# Patient Record
Sex: MALE | Race: BLACK OR AFRICAN AMERICAN | Employment: UNEMPLOYED | URBAN - METROPOLITAN AREA
[De-identification: names, ages, dates, MRNs, and addresses within clinical notes are randomized per-mention and may not be internally consistent; named-entity substitution may affect disease eponyms.]

---

## 2017-01-01 ENCOUNTER — OFFICE VISIT (OUTPATIENT)
Dept: INFUSION THERAPY | Facility: CLINIC | Age: 47
End: 2017-01-01
Attending: INTERNAL MEDICINE
Payer: MEDICAID

## 2017-01-01 ENCOUNTER — TELEPHONE (OUTPATIENT)
Dept: CARDIOLOGY | Facility: CLINIC | Age: 47
End: 2017-01-01

## 2017-01-01 ENCOUNTER — HOSPITAL ENCOUNTER (OUTPATIENT)
Dept: CARDIAC REHAB | Facility: CLINIC | Age: 47
End: 2017-08-03
Attending: NURSE PRACTITIONER
Payer: MEDICAID

## 2017-01-01 ENCOUNTER — OFFICE VISIT (OUTPATIENT)
Dept: INFUSION THERAPY | Facility: CLINIC | Age: 47
End: 2017-01-01
Attending: INTERNAL MEDICINE
Payer: COMMERCIAL

## 2017-01-01 ENCOUNTER — HOSPITAL ENCOUNTER (OUTPATIENT)
Dept: CARDIAC REHAB | Facility: CLINIC | Age: 47
End: 2017-10-10
Attending: NURSE PRACTITIONER
Payer: COMMERCIAL

## 2017-01-01 ENCOUNTER — RADIANT APPOINTMENT (OUTPATIENT)
Dept: ULTRASOUND IMAGING | Facility: CLINIC | Age: 47
End: 2017-01-01
Attending: NURSE PRACTITIONER
Payer: COMMERCIAL

## 2017-01-01 ENCOUNTER — RADIANT APPOINTMENT (OUTPATIENT)
Dept: ULTRASOUND IMAGING | Facility: CLINIC | Age: 47
End: 2017-01-01
Attending: INTERNAL MEDICINE
Payer: COMMERCIAL

## 2017-01-01 ENCOUNTER — PRE VISIT (OUTPATIENT)
Dept: CARDIOLOGY | Facility: CLINIC | Age: 47
End: 2017-01-01

## 2017-01-01 ENCOUNTER — HOSPITAL ENCOUNTER (OUTPATIENT)
Dept: CARDIAC REHAB | Facility: CLINIC | Age: 47
End: 2017-09-19
Attending: NURSE PRACTITIONER
Payer: MEDICAID

## 2017-01-01 ENCOUNTER — HOSPITAL ENCOUNTER (OUTPATIENT)
Dept: CARDIAC REHAB | Facility: CLINIC | Age: 47
End: 2017-08-31
Attending: NURSE PRACTITIONER
Payer: MEDICAID

## 2017-01-01 ENCOUNTER — OFFICE VISIT (OUTPATIENT)
Dept: INTERNAL MEDICINE | Facility: CLINIC | Age: 47
End: 2017-01-01

## 2017-01-01 ENCOUNTER — HOSPITAL ENCOUNTER (EMERGENCY)
Facility: CLINIC | Age: 47
Discharge: HOME | End: 2017-01-15
Attending: EMERGENCY MEDICINE | Admitting: EMERGENCY MEDICINE
Payer: COMMERCIAL

## 2017-01-01 ENCOUNTER — TELEPHONE (OUTPATIENT)
Dept: GASTROENTEROLOGY | Facility: CLINIC | Age: 47
End: 2017-01-01

## 2017-01-01 ENCOUNTER — OFFICE VISIT (OUTPATIENT)
Dept: INFUSION THERAPY | Facility: CLINIC | Age: 47
End: 2017-01-01
Attending: NURSE PRACTITIONER
Payer: COMMERCIAL

## 2017-01-01 ENCOUNTER — HOSPITAL ENCOUNTER (OUTPATIENT)
Dept: CARDIAC REHAB | Facility: CLINIC | Age: 47
End: 2017-09-26
Attending: NURSE PRACTITIONER
Payer: MEDICAID

## 2017-01-01 ENCOUNTER — HOSPITAL ENCOUNTER (OUTPATIENT)
Dept: CARDIAC REHAB | Facility: CLINIC | Age: 47
End: 2017-10-31
Attending: NURSE PRACTITIONER
Payer: COMMERCIAL

## 2017-01-01 ENCOUNTER — APPOINTMENT (OUTPATIENT)
Dept: MEDSURG UNIT | Facility: CLINIC | Age: 47
End: 2017-01-01
Attending: INTERNAL MEDICINE
Payer: COMMERCIAL

## 2017-01-01 ENCOUNTER — CARE COORDINATION (OUTPATIENT)
Dept: CARDIOLOGY | Facility: CLINIC | Age: 47
End: 2017-01-01

## 2017-01-01 ENCOUNTER — HOSPITAL ENCOUNTER (OUTPATIENT)
Dept: CARDIAC REHAB | Facility: CLINIC | Age: 47
End: 2017-09-12
Attending: NURSE PRACTITIONER
Payer: MEDICAID

## 2017-01-01 ENCOUNTER — ALLIED HEALTH/NURSE VISIT (OUTPATIENT)
Dept: PHARMACY | Facility: CLINIC | Age: 47
End: 2017-01-01
Payer: COMMERCIAL

## 2017-01-01 ENCOUNTER — HOSPITAL ENCOUNTER (OUTPATIENT)
Facility: CLINIC | Age: 47
End: 2017-01-01
Admitting: INTERNAL MEDICINE
Payer: COMMERCIAL

## 2017-01-01 ENCOUNTER — RADIANT APPOINTMENT (OUTPATIENT)
Dept: ULTRASOUND IMAGING | Facility: CLINIC | Age: 47
End: 2017-01-01
Attending: INTERNAL MEDICINE
Payer: MEDICAID

## 2017-01-01 ENCOUNTER — OFFICE VISIT (OUTPATIENT)
Dept: CARDIOLOGY | Facility: CLINIC | Age: 47
End: 2017-01-01
Attending: INTERNAL MEDICINE
Payer: COMMERCIAL

## 2017-01-01 ENCOUNTER — RADIANT APPOINTMENT (OUTPATIENT)
Dept: ULTRASOUND IMAGING | Facility: CLINIC | Age: 47
End: 2017-01-01
Attending: NURSE PRACTITIONER
Payer: MEDICAID

## 2017-01-01 ENCOUNTER — HOSPITAL ENCOUNTER (OUTPATIENT)
Dept: CARDIAC REHAB | Facility: CLINIC | Age: 47
End: 2017-09-28
Attending: NURSE PRACTITIONER
Payer: MEDICAID

## 2017-01-01 ENCOUNTER — HOSPITAL ENCOUNTER (OUTPATIENT)
Dept: CARDIAC REHAB | Facility: CLINIC | Age: 47
End: 2017-09-05
Attending: NURSE PRACTITIONER
Payer: MEDICAID

## 2017-01-01 ENCOUNTER — HOSPITAL ENCOUNTER (OUTPATIENT)
Dept: CARDIAC REHAB | Facility: CLINIC | Age: 47
End: 2017-10-12
Attending: NURSE PRACTITIONER
Payer: COMMERCIAL

## 2017-01-01 ENCOUNTER — APPOINTMENT (OUTPATIENT)
Dept: GENERAL RADIOLOGY | Facility: CLINIC | Age: 47
DRG: 286 | End: 2017-01-01
Attending: FAMILY MEDICINE
Payer: COMMERCIAL

## 2017-01-01 ENCOUNTER — HOSPITAL ENCOUNTER (OUTPATIENT)
Dept: CARDIAC REHAB | Facility: CLINIC | Age: 47
End: 2017-07-27
Attending: NURSE PRACTITIONER
Payer: COMMERCIAL

## 2017-01-01 ENCOUNTER — OFFICE VISIT (OUTPATIENT)
Dept: GASTROENTEROLOGY | Facility: CLINIC | Age: 47
End: 2017-01-01
Attending: NURSE PRACTITIONER
Payer: COMMERCIAL

## 2017-01-01 ENCOUNTER — HOSPITAL ENCOUNTER (EMERGENCY)
Facility: CLINIC | Age: 47
Discharge: HOME | End: 2017-05-31
Attending: EMERGENCY MEDICINE | Admitting: EMERGENCY MEDICINE
Payer: COMMERCIAL

## 2017-01-01 ENCOUNTER — OFFICE VISIT (OUTPATIENT)
Dept: CARDIOLOGY | Facility: CLINIC | Age: 47
End: 2017-01-01
Attending: NURSE PRACTITIONER
Payer: COMMERCIAL

## 2017-01-01 ENCOUNTER — HOSPITAL ENCOUNTER (OUTPATIENT)
Dept: CARDIAC REHAB | Facility: CLINIC | Age: 47
End: 2017-08-29
Attending: NURSE PRACTITIONER
Payer: MEDICAID

## 2017-01-01 ENCOUNTER — HOSPITAL ENCOUNTER (OUTPATIENT)
Dept: CARDIAC REHAB | Facility: CLINIC | Age: 47
End: 2017-08-17
Attending: NURSE PRACTITIONER
Payer: MEDICAID

## 2017-01-01 ENCOUNTER — TELEPHONE (OUTPATIENT)
Dept: PHARMACY | Facility: CLINIC | Age: 47
End: 2017-01-01

## 2017-01-01 ENCOUNTER — HOSPITAL ENCOUNTER (OUTPATIENT)
Facility: CLINIC | Age: 47
Discharge: HOME | End: 2017-05-01
Attending: INTERNAL MEDICINE | Admitting: INTERNAL MEDICINE
Payer: COMMERCIAL

## 2017-01-01 ENCOUNTER — HOSPITAL ENCOUNTER (INPATIENT)
Facility: CLINIC | Age: 47
LOS: 4 days | Discharge: HOME | DRG: 286 | End: 2017-06-08
Attending: FAMILY MEDICINE | Admitting: INTERNAL MEDICINE
Payer: COMMERCIAL

## 2017-01-01 ENCOUNTER — HOSPITAL ENCOUNTER (OUTPATIENT)
Dept: CARDIAC REHAB | Facility: CLINIC | Age: 47
End: 2017-08-24
Attending: NURSE PRACTITIONER
Payer: MEDICAID

## 2017-01-01 ENCOUNTER — HOSPITAL ENCOUNTER (OUTPATIENT)
Dept: CARDIAC REHAB | Facility: CLINIC | Age: 47
End: 2017-08-15
Attending: NURSE PRACTITIONER
Payer: MEDICAID

## 2017-01-01 ENCOUNTER — HOSPITAL ENCOUNTER (OUTPATIENT)
Dept: CARDIAC REHAB | Facility: CLINIC | Age: 47
End: 2017-08-22
Attending: NURSE PRACTITIONER
Payer: MEDICAID

## 2017-01-01 ENCOUNTER — HOSPITAL ENCOUNTER (OUTPATIENT)
Dept: CARDIAC REHAB | Facility: CLINIC | Age: 47
End: 2017-10-19
Attending: NURSE PRACTITIONER
Payer: COMMERCIAL

## 2017-01-01 ENCOUNTER — APPOINTMENT (OUTPATIENT)
Dept: CT IMAGING | Facility: CLINIC | Age: 47
DRG: 286 | End: 2017-01-01
Attending: FAMILY MEDICINE
Payer: COMMERCIAL

## 2017-01-01 ENCOUNTER — APPOINTMENT (OUTPATIENT)
Dept: CARDIOLOGY | Facility: CLINIC | Age: 47
DRG: 286 | End: 2017-01-01
Attending: INTERNAL MEDICINE
Payer: COMMERCIAL

## 2017-01-01 ENCOUNTER — HOSPITAL ENCOUNTER (EMERGENCY)
Facility: CLINIC | Age: 47
Discharge: HOME | End: 2017-01-12
Attending: EMERGENCY MEDICINE | Admitting: EMERGENCY MEDICINE
Payer: COMMERCIAL

## 2017-01-01 ENCOUNTER — HOSPITAL ENCOUNTER (OUTPATIENT)
Dept: CARDIAC REHAB | Facility: CLINIC | Age: 47
End: 2017-10-26
Attending: NURSE PRACTITIONER
Payer: COMMERCIAL

## 2017-01-01 ENCOUNTER — HOSPITAL ENCOUNTER (OUTPATIENT)
Dept: CARDIAC REHAB | Facility: CLINIC | Age: 47
End: 2017-09-07
Attending: NURSE PRACTITIONER
Payer: MEDICAID

## 2017-01-01 VITALS
RESPIRATION RATE: 18 BRPM | DIASTOLIC BLOOD PRESSURE: 53 MMHG | OXYGEN SATURATION: 85 % | WEIGHT: 158.8 LBS | TEMPERATURE: 98.6 F | BODY MASS INDEX: 24.07 KG/M2 | SYSTOLIC BLOOD PRESSURE: 92 MMHG | HEART RATE: 78 BPM | HEIGHT: 68 IN

## 2017-01-01 VITALS
HEART RATE: 80 BPM | SYSTOLIC BLOOD PRESSURE: 96 MMHG | WEIGHT: 155.5 LBS | BODY MASS INDEX: 22.96 KG/M2 | TEMPERATURE: 97.2 F | DIASTOLIC BLOOD PRESSURE: 52 MMHG

## 2017-01-01 VITALS
WEIGHT: 161 LBS | OXYGEN SATURATION: 94 % | HEART RATE: 70 BPM | BODY MASS INDEX: 23.85 KG/M2 | HEIGHT: 69 IN | SYSTOLIC BLOOD PRESSURE: 82 MMHG | DIASTOLIC BLOOD PRESSURE: 43 MMHG

## 2017-01-01 VITALS
OXYGEN SATURATION: 93 % | TEMPERATURE: 96.3 F | HEART RATE: 65 BPM | DIASTOLIC BLOOD PRESSURE: 58 MMHG | SYSTOLIC BLOOD PRESSURE: 79 MMHG

## 2017-01-01 VITALS
DIASTOLIC BLOOD PRESSURE: 59 MMHG | TEMPERATURE: 97.7 F | BODY MASS INDEX: 24.19 KG/M2 | SYSTOLIC BLOOD PRESSURE: 87 MMHG | WEIGHT: 159.1 LBS

## 2017-01-01 VITALS
BODY MASS INDEX: 20.07 KG/M2 | DIASTOLIC BLOOD PRESSURE: 59 MMHG | TEMPERATURE: 97.9 F | RESPIRATION RATE: 18 BRPM | HEART RATE: 83 BPM | WEIGHT: 135.9 LBS | OXYGEN SATURATION: 95 % | SYSTOLIC BLOOD PRESSURE: 94 MMHG

## 2017-01-01 VITALS
SYSTOLIC BLOOD PRESSURE: 91 MMHG | DIASTOLIC BLOOD PRESSURE: 61 MMHG | TEMPERATURE: 97.3 F | WEIGHT: 146.7 LBS | RESPIRATION RATE: 16 BRPM | BODY MASS INDEX: 21.66 KG/M2

## 2017-01-01 VITALS
DIASTOLIC BLOOD PRESSURE: 43 MMHG | HEART RATE: 70 BPM | SYSTOLIC BLOOD PRESSURE: 82 MMHG | TEMPERATURE: 97.8 F | WEIGHT: 161.4 LBS | BODY MASS INDEX: 23.91 KG/M2 | HEIGHT: 69 IN | OXYGEN SATURATION: 94 %

## 2017-01-01 VITALS
HEART RATE: 75 BPM | SYSTOLIC BLOOD PRESSURE: 93 MMHG | OXYGEN SATURATION: 84 % | DIASTOLIC BLOOD PRESSURE: 62 MMHG | TEMPERATURE: 98.3 F | BODY MASS INDEX: 20.05 KG/M2 | WEIGHT: 135.8 LBS

## 2017-01-01 VITALS
HEART RATE: 62 BPM | WEIGHT: 158.1 LBS | SYSTOLIC BLOOD PRESSURE: 97 MMHG | DIASTOLIC BLOOD PRESSURE: 54 MMHG | BODY MASS INDEX: 23.34 KG/M2

## 2017-01-01 VITALS
DIASTOLIC BLOOD PRESSURE: 64 MMHG | BODY MASS INDEX: 24.51 KG/M2 | TEMPERATURE: 97.5 F | RESPIRATION RATE: 12 BRPM | SYSTOLIC BLOOD PRESSURE: 104 MMHG | HEIGHT: 69 IN | WEIGHT: 165.5 LBS | OXYGEN SATURATION: 99 %

## 2017-01-01 VITALS
RESPIRATION RATE: 18 BRPM | DIASTOLIC BLOOD PRESSURE: 59 MMHG | SYSTOLIC BLOOD PRESSURE: 82 MMHG | OXYGEN SATURATION: 94 % | TEMPERATURE: 95.9 F | HEART RATE: 60 BPM

## 2017-01-01 VITALS
OXYGEN SATURATION: 92 % | HEART RATE: 65 BPM | DIASTOLIC BLOOD PRESSURE: 52 MMHG | RESPIRATION RATE: 20 BRPM | SYSTOLIC BLOOD PRESSURE: 80 MMHG | TEMPERATURE: 97.9 F

## 2017-01-01 VITALS
SYSTOLIC BLOOD PRESSURE: 84 MMHG | BODY MASS INDEX: 22.04 KG/M2 | OXYGEN SATURATION: 93 % | HEIGHT: 69 IN | HEART RATE: 59 BPM | DIASTOLIC BLOOD PRESSURE: 54 MMHG | WEIGHT: 148.8 LBS

## 2017-01-01 VITALS
BODY MASS INDEX: 24.71 KG/M2 | SYSTOLIC BLOOD PRESSURE: 92 MMHG | DIASTOLIC BLOOD PRESSURE: 48 MMHG | OXYGEN SATURATION: 81 % | WEIGHT: 162.5 LBS | TEMPERATURE: 96.7 F

## 2017-01-01 VITALS
WEIGHT: 148 LBS | HEIGHT: 69 IN | HEART RATE: 56 BPM | SYSTOLIC BLOOD PRESSURE: 93 MMHG | BODY MASS INDEX: 21.92 KG/M2 | OXYGEN SATURATION: 85 % | DIASTOLIC BLOOD PRESSURE: 57 MMHG

## 2017-01-01 VITALS
DIASTOLIC BLOOD PRESSURE: 59 MMHG | OXYGEN SATURATION: 93 % | SYSTOLIC BLOOD PRESSURE: 90 MMHG | BODY MASS INDEX: 24.2 KG/M2 | HEART RATE: 115 BPM | RESPIRATION RATE: 16 BRPM | TEMPERATURE: 97.4 F | WEIGHT: 163.9 LBS

## 2017-01-01 VITALS
TEMPERATURE: 98.3 F | BODY MASS INDEX: 24.44 KG/M2 | SYSTOLIC BLOOD PRESSURE: 92 MMHG | RESPIRATION RATE: 18 BRPM | HEIGHT: 69 IN | HEART RATE: 61 BPM | DIASTOLIC BLOOD PRESSURE: 50 MMHG | WEIGHT: 165 LBS

## 2017-01-01 VITALS
SYSTOLIC BLOOD PRESSURE: 82 MMHG | RESPIRATION RATE: 20 BRPM | DIASTOLIC BLOOD PRESSURE: 40 MMHG | WEIGHT: 157 LBS | HEART RATE: 67 BPM | BODY MASS INDEX: 23.18 KG/M2 | OXYGEN SATURATION: 83 % | TEMPERATURE: 97.5 F

## 2017-01-01 VITALS
RESPIRATION RATE: 16 BRPM | HEART RATE: 62 BPM | OXYGEN SATURATION: 99 % | DIASTOLIC BLOOD PRESSURE: 68 MMHG | HEIGHT: 69 IN | WEIGHT: 161 LBS | SYSTOLIC BLOOD PRESSURE: 98 MMHG | BODY MASS INDEX: 23.85 KG/M2 | TEMPERATURE: 97.9 F

## 2017-01-01 VITALS
OXYGEN SATURATION: 86 % | TEMPERATURE: 100.7 F | SYSTOLIC BLOOD PRESSURE: 89 MMHG | BODY MASS INDEX: 19.92 KG/M2 | WEIGHT: 134.9 LBS | DIASTOLIC BLOOD PRESSURE: 59 MMHG

## 2017-01-01 VITALS
DIASTOLIC BLOOD PRESSURE: 50 MMHG | HEIGHT: 69 IN | BODY MASS INDEX: 24.44 KG/M2 | SYSTOLIC BLOOD PRESSURE: 92 MMHG | WEIGHT: 165 LBS | HEART RATE: 61 BPM

## 2017-01-01 VITALS — DIASTOLIC BLOOD PRESSURE: 47 MMHG | SYSTOLIC BLOOD PRESSURE: 78 MMHG | BODY MASS INDEX: 23.21 KG/M2 | WEIGHT: 157.19 LBS

## 2017-01-01 VITALS
DIASTOLIC BLOOD PRESSURE: 53 MMHG | WEIGHT: 163.6 LBS | SYSTOLIC BLOOD PRESSURE: 86 MMHG | HEART RATE: 65 BPM | BODY MASS INDEX: 24.88 KG/M2

## 2017-01-01 VITALS
SYSTOLIC BLOOD PRESSURE: 88 MMHG | DIASTOLIC BLOOD PRESSURE: 54 MMHG | TEMPERATURE: 98 F | BODY MASS INDEX: 23.77 KG/M2 | OXYGEN SATURATION: 93 % | WEIGHT: 156.31 LBS | HEART RATE: 98 BPM

## 2017-01-01 VITALS
WEIGHT: 164.6 LBS | TEMPERATURE: 98 F | SYSTOLIC BLOOD PRESSURE: 95 MMHG | HEART RATE: 61 BPM | DIASTOLIC BLOOD PRESSURE: 58 MMHG | RESPIRATION RATE: 16 BRPM | BODY MASS INDEX: 24.3 KG/M2

## 2017-01-01 DIAGNOSIS — I50.9 CONGESTIVE HEART FAILURE, UNSPECIFIED CONGESTIVE HEART FAILURE CHRONICITY, UNSPECIFIED CONGESTIVE HEART FAILURE TYPE: Primary | ICD-10-CM

## 2017-01-01 DIAGNOSIS — R06.09 DYSPNEA ON EXERTION: ICD-10-CM

## 2017-01-01 DIAGNOSIS — R18.8 ASCITES: Primary | ICD-10-CM

## 2017-01-01 DIAGNOSIS — R53.83 OTHER FATIGUE: Primary | ICD-10-CM

## 2017-01-01 DIAGNOSIS — N18.6 ESRD (END STAGE RENAL DISEASE) ON DIALYSIS (H): ICD-10-CM

## 2017-01-01 DIAGNOSIS — K76.9 DISORDER OF LIVER: Primary | ICD-10-CM

## 2017-01-01 DIAGNOSIS — Z99.2 ENCOUNTER FOR EXTRACORPOREAL DIALYSIS (H): ICD-10-CM

## 2017-01-01 DIAGNOSIS — L29.9 PRURITIC DISORDER: ICD-10-CM

## 2017-01-01 DIAGNOSIS — I27.20 PULMONARY HYPERTENSION (H): Primary | ICD-10-CM

## 2017-01-01 DIAGNOSIS — I50.9 CHRONIC CONGESTIVE HEART FAILURE, UNSPECIFIED CONGESTIVE HEART FAILURE TYPE: Primary | ICD-10-CM

## 2017-01-01 DIAGNOSIS — R19.7 DIARRHEA, UNSPECIFIED TYPE: ICD-10-CM

## 2017-01-01 DIAGNOSIS — I27.20 PULMONARY HTN (H): Primary | ICD-10-CM

## 2017-01-01 DIAGNOSIS — I27.0 PRIMARY PULMONARY HYPERTENSION (H): ICD-10-CM

## 2017-01-01 DIAGNOSIS — Z76.0 ISSUE OF REPEAT PRESCRIPTION: ICD-10-CM

## 2017-01-01 DIAGNOSIS — N18.6 END STAGE RENAL DISEASE (H): ICD-10-CM

## 2017-01-01 DIAGNOSIS — I27.20 PULMONARY HYPERTENSION (H): ICD-10-CM

## 2017-01-01 DIAGNOSIS — I27.0 PRIMARY PULMONARY HYPERTENSION (H): Primary | ICD-10-CM

## 2017-01-01 DIAGNOSIS — R42 DIZZINESS: ICD-10-CM

## 2017-01-01 DIAGNOSIS — I95.3 HEMODIALYSIS-ASSOCIATED HYPOTENSION: ICD-10-CM

## 2017-01-01 DIAGNOSIS — R57.0 CARDIOGENIC SHOCK (H): Primary | ICD-10-CM

## 2017-01-01 DIAGNOSIS — I27.21 PULMONARY ARTERIAL HYPERTENSION (H): Primary | ICD-10-CM

## 2017-01-01 DIAGNOSIS — R79.89 ELEVATED LFTS: Primary | ICD-10-CM

## 2017-01-01 DIAGNOSIS — L29.9 PRURITIC DISORDER: Primary | ICD-10-CM

## 2017-01-01 DIAGNOSIS — R94.5 ABNORMAL RESULTS OF LIVER FUNCTION STUDIES: ICD-10-CM

## 2017-01-01 DIAGNOSIS — R06.09 DYSPNEA ON EXERTION: Primary | ICD-10-CM

## 2017-01-01 DIAGNOSIS — Z00.00 ROUTINE GENERAL MEDICAL EXAMINATION AT A HEALTH CARE FACILITY: ICD-10-CM

## 2017-01-01 DIAGNOSIS — I42.9 CARDIOMYOPATHY (H): ICD-10-CM

## 2017-01-01 DIAGNOSIS — I50.9 CHF (CONGESTIVE HEART FAILURE) (H): Primary | ICD-10-CM

## 2017-01-01 DIAGNOSIS — I95.3 HEMODIALYSIS-ASSOCIATED HYPOTENSION: Primary | ICD-10-CM

## 2017-01-01 DIAGNOSIS — K76.9 DISORDER OF LIVER: ICD-10-CM
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
## 2017-01-01 DIAGNOSIS — Z99.2 ESRD (END STAGE RENAL DISEASE) ON DIALYSIS (H): ICD-10-CM

## 2017-01-01 DIAGNOSIS — J81.0 ACUTE PULMONARY EDEMA (H): ICD-10-CM

## 2017-01-01 DIAGNOSIS — I27.20 PULMONARY HTN (H): ICD-10-CM

## 2017-01-01 DIAGNOSIS — R53.81 MALAISE: ICD-10-CM

## 2017-01-01 DIAGNOSIS — I12.0 HYPERTENSIVE CHRONIC KIDNEY DISEASE WITH STAGE 5 CHRONIC KIDNEY DISEASE OR END STAGE RENAL DISEASE (H): ICD-10-CM

## 2017-01-01 DIAGNOSIS — L29.89 OTHER PRURITUS: ICD-10-CM

## 2017-01-01 DIAGNOSIS — I50.21 CHF (CONGESTIVE HEART FAILURE), NYHA CLASS I, ACUTE, SYSTOLIC (H): ICD-10-CM

## 2017-01-01 DIAGNOSIS — I27.29 PULMONARY HYPERTENSIVE VENOUS DISEASE (H): ICD-10-CM

## 2017-01-01 DIAGNOSIS — E04.1 THYROID NODULE: ICD-10-CM

## 2017-01-01 DIAGNOSIS — N19 RENAL FAILURE: ICD-10-CM

## 2017-01-01 DIAGNOSIS — R41.82 ALTERED MENTAL STATUS, UNSPECIFIED ALTERED MENTAL STATUS TYPE: ICD-10-CM

## 2017-01-01 DIAGNOSIS — R94.5 ABNORMAL RESULTS OF LIVER FUNCTION STUDIES: Primary | ICD-10-CM

## 2017-01-01 DIAGNOSIS — L29.9 PRURITUS: Primary | ICD-10-CM

## 2017-01-01 DIAGNOSIS — R53.83 OTHER FATIGUE: ICD-10-CM

## 2017-01-01 DIAGNOSIS — R06.00 DYSPNEA, UNSPECIFIED TYPE: ICD-10-CM

## 2017-01-01 DIAGNOSIS — L29.9 ITCHING: Primary | ICD-10-CM

## 2017-01-01 DIAGNOSIS — D64.9 ANEMIA: ICD-10-CM

## 2017-01-01 DIAGNOSIS — I50.9 CHF (CONGESTIVE HEART FAILURE) (H): ICD-10-CM

## 2017-01-01 DIAGNOSIS — K21.9 GASTROESOPHAGEAL REFLUX DISEASE, ESOPHAGITIS PRESENCE NOT SPECIFIED: ICD-10-CM

## 2017-01-01 DIAGNOSIS — D64.9 ANEMIA: Primary | ICD-10-CM

## 2017-01-01 LAB
ALBUMIN SERPL-MCNC: 2.7 G/DL (ref 3.4–5)
ALBUMIN SERPL-MCNC: 2.8 G/DL (ref 3.4–5)
ALBUMIN SERPL-MCNC: 2.9 G/DL (ref 3.4–5)
ALBUMIN SERPL-MCNC: 2.9 G/DL (ref 3.4–5)
ALBUMIN SERPL-MCNC: 3 G/DL (ref 3.4–5)
ALBUMIN SERPL-MCNC: 3 G/DL (ref 3.4–5)
ALBUMIN SERPL-MCNC: 3.1 G/DL (ref 3.4–5)
ALBUMIN SERPL-MCNC: 3.2 G/DL (ref 3.4–5)
ALBUMIN SERPL-MCNC: 3.3 G/DL (ref 3.4–5)
ALBUMIN SERPL-MCNC: 3.3 G/DL (ref 3.4–5)
ALBUMIN SERPL-MCNC: 3.5 G/DL (ref 3.4–5)
ALP SERPL-CCNC: 102 U/L (ref 40–150)
ALP SERPL-CCNC: 104 U/L (ref 40–150)
ALP SERPL-CCNC: 115 U/L (ref 40–150)
ALP SERPL-CCNC: 88 U/L (ref 40–150)
ALP SERPL-CCNC: 89 U/L (ref 40–150)
ALP SERPL-CCNC: 94 U/L (ref 40–150)
ALP SERPL-CCNC: 94 U/L (ref 40–150)
ALP SERPL-CCNC: 96 U/L (ref 40–150)
ALP SERPL-CCNC: 96 U/L (ref 40–150)
ALP SERPL-CCNC: 97 U/L (ref 40–150)
ALP SERPL-CCNC: 98 U/L (ref 40–150)
ALT SERPL W P-5'-P-CCNC: 18 U/L (ref 0–70)
ALT SERPL W P-5'-P-CCNC: 18 U/L (ref 0–70)
ALT SERPL W P-5'-P-CCNC: 19 U/L (ref 0–70)
ALT SERPL W P-5'-P-CCNC: 23 U/L (ref 0–70)
ALT SERPL W P-5'-P-CCNC: 23 U/L (ref 0–70)
ALT SERPL W P-5'-P-CCNC: 24 U/L (ref 0–70)
ALT SERPL W P-5'-P-CCNC: 24 U/L (ref 0–70)
ALT SERPL W P-5'-P-CCNC: 25 U/L (ref 0–70)
ALT SERPL W P-5'-P-CCNC: 30 U/L (ref 0–70)
AMMONIA PLAS-SCNC: 35 UMOL/L (ref 10–50)
ANION GAP SERPL CALCULATED.3IONS-SCNC: 10 MMOL/L (ref 3–14)
ANION GAP SERPL CALCULATED.3IONS-SCNC: 11 MMOL/L (ref 3–14)
ANION GAP SERPL CALCULATED.3IONS-SCNC: 12 MMOL/L (ref 3–14)
ANION GAP SERPL CALCULATED.3IONS-SCNC: 12 MMOL/L (ref 3–14)
ANION GAP SERPL CALCULATED.3IONS-SCNC: 14 MMOL/L (ref 3–14)
ANION GAP SERPL CALCULATED.3IONS-SCNC: 14 MMOL/L (ref 3–14)
ANION GAP SERPL CALCULATED.3IONS-SCNC: 8 MMOL/L (ref 3–14)
ANION GAP SERPL CALCULATED.3IONS-SCNC: 8 MMOL/L (ref 3–14)
ANION GAP SERPL CALCULATED.3IONS-SCNC: 9 MMOL/L (ref 3–14)
ANION GAP SERPL CALCULATED.3IONS-SCNC: NORMAL MMOL/L (ref 6–17)
AST SERPL W P-5'-P-CCNC: 21 U/L (ref 0–45)
AST SERPL W P-5'-P-CCNC: 22 U/L (ref 0–45)
AST SERPL W P-5'-P-CCNC: 24 U/L (ref 0–45)
AST SERPL W P-5'-P-CCNC: 24 U/L (ref 0–45)
AST SERPL W P-5'-P-CCNC: 25 U/L (ref 0–45)
AST SERPL W P-5'-P-CCNC: 28 U/L (ref 0–45)
AST SERPL W P-5'-P-CCNC: 29 U/L (ref 0–45)
AST SERPL W P-5'-P-CCNC: 30 U/L (ref 0–45)
AST SERPL W P-5'-P-CCNC: 31 U/L (ref 0–45)
AST SERPL W P-5'-P-CCNC: 33 U/L (ref 0–45)
AST SERPL W P-5'-P-CCNC: 45 U/L (ref 0–45)
BACTERIA SPEC CULT: NO GROWTH
BACTERIA SPEC CULT: NO GROWTH
BASE EXCESS BLDV CALC-SCNC: 2 MMOL/L
BASOPHILS # BLD AUTO: 0 10E9/L (ref 0–0.2)
BASOPHILS # BLD AUTO: 0.1 10E9/L (ref 0–0.2)
BASOPHILS NFR BLD AUTO: 0.6 %
BASOPHILS NFR BLD AUTO: 1.1 %
BILIRUB DIRECT SERPL-MCNC: 1.2 MG/DL (ref 0–0.2)
BILIRUB DIRECT SERPL-MCNC: 1.2 MG/DL (ref 0–0.2)
BILIRUB DIRECT SERPL-MCNC: 1.6 MG/DL (ref 0–0.2)
BILIRUB SERPL-MCNC: 1.5 MG/DL (ref 0.2–1.3)
BILIRUB SERPL-MCNC: 1.6 MG/DL (ref 0.2–1.3)
BILIRUB SERPL-MCNC: 1.7 MG/DL (ref 0.2–1.3)
BILIRUB SERPL-MCNC: 1.7 MG/DL (ref 0.2–1.3)
BILIRUB SERPL-MCNC: 1.8 MG/DL (ref 0.2–1.3)
BILIRUB SERPL-MCNC: 2.3 MG/DL (ref 0.2–1.3)
BILIRUB SERPL-MCNC: 2.4 MG/DL (ref 0.2–1.3)
BILIRUB SERPL-MCNC: 3 MG/DL (ref 0.2–1.3)
BILIRUB SERPL-MCNC: 3.9 MG/DL (ref 0.2–1.3)
BUN SERPL-MCNC: 10 MG/DL (ref 7–30)
BUN SERPL-MCNC: 17 MG/DL (ref 7–30)
BUN SERPL-MCNC: 21 MG/DL (ref 7–30)
BUN SERPL-MCNC: 27 MG/DL (ref 7–30)
BUN SERPL-MCNC: 27 MG/DL (ref 7–30)
BUN SERPL-MCNC: 31 MG/DL (ref 7–30)
BUN SERPL-MCNC: 33 MG/DL (ref 7–30)
BUN SERPL-MCNC: 35 MG/DL (ref 7–30)
BUN SERPL-MCNC: 40 MG/DL (ref 7–30)
BUN SERPL-MCNC: 40 MG/DL (ref 7–30)
BUN SERPL-MCNC: 46 MG/DL (ref 7–30)
BUN SERPL-MCNC: NORMAL MG/DL (ref 7–30)
CA-I BLD-SCNC: 4.3 MG/DL (ref 4.4–5.2)
CALCIUM SERPL-MCNC: 8.5 MG/DL (ref 8.5–10.1)
CALCIUM SERPL-MCNC: 8.5 MG/DL (ref 8.5–10.1)
CALCIUM SERPL-MCNC: 8.8 MG/DL (ref 8.5–10.1)
CALCIUM SERPL-MCNC: 8.9 MG/DL (ref 8.5–10.1)
CALCIUM SERPL-MCNC: 9 MG/DL (ref 8.5–10.1)
CALCIUM SERPL-MCNC: 9.1 MG/DL (ref 8.5–10.1)
CALCIUM SERPL-MCNC: 9.2 MG/DL (ref 8.5–10.1)
CALCIUM SERPL-MCNC: 9.3 MG/DL (ref 8.5–10.1)
CALCIUM SERPL-MCNC: 9.4 MG/DL (ref 8.5–10.1)
CALCIUM SERPL-MCNC: 9.5 MG/DL (ref 8.5–10.1)
CALCIUM SERPL-MCNC: 9.8 MG/DL (ref 8.5–10.1)
CALCIUM SERPL-MCNC: NORMAL MG/DL (ref 8.5–10.1)
CHLORIDE SERPL-SCNC: 100 MMOL/L (ref 94–109)
CHLORIDE SERPL-SCNC: 101 MMOL/L (ref 94–109)
CHLORIDE SERPL-SCNC: 101 MMOL/L (ref 94–109)
CHLORIDE SERPL-SCNC: 94 MMOL/L (ref 94–109)
CHLORIDE SERPL-SCNC: 97 MMOL/L (ref 94–109)
CHLORIDE SERPL-SCNC: 98 MMOL/L (ref 94–109)
CHLORIDE SERPL-SCNC: 98 MMOL/L (ref 94–109)
CHLORIDE SERPL-SCNC: 99 MMOL/L (ref 94–109)
CHLORIDE SERPL-SCNC: 99 MMOL/L (ref 94–109)
CHLORIDE SERPL-SCNC: NORMAL MMOL/L (ref 94–109)
CO2 BLDCOV-SCNC: 28 MMOL/L (ref 21–28)
CO2 BLDCOV-SCNC: 29 MMOL/L (ref 21–28)
CO2 SERPL-SCNC: 25 MMOL/L (ref 20–32)
CO2 SERPL-SCNC: 26 MMOL/L (ref 20–32)
CO2 SERPL-SCNC: 26 MMOL/L (ref 20–32)
CO2 SERPL-SCNC: 28 MMOL/L (ref 20–32)
CO2 SERPL-SCNC: 29 MMOL/L (ref 20–32)
CO2 SERPL-SCNC: 30 MMOL/L (ref 20–32)
CO2 SERPL-SCNC: 30 MMOL/L (ref 20–32)
CO2 SERPL-SCNC: 31 MMOL/L (ref 20–32)
CO2 SERPL-SCNC: 32 MMOL/L (ref 20–32)
CO2 SERPL-SCNC: NORMAL MMOL/L (ref 20–32)
CREAT SERPL-MCNC: 4.05 MG/DL (ref 0.66–1.25)
CREAT SERPL-MCNC: 4.47 MG/DL (ref 0.66–1.25)
CREAT SERPL-MCNC: 4.53 MG/DL (ref 0.66–1.25)
CREAT SERPL-MCNC: 5.89 MG/DL (ref 0.66–1.25)
CREAT SERPL-MCNC: 6.35 MG/DL (ref 0.66–1.25)
CREAT SERPL-MCNC: 6.54 MG/DL (ref 0.66–1.25)
CREAT SERPL-MCNC: 6.72 MG/DL (ref 0.66–1.25)
CREAT SERPL-MCNC: 6.93 MG/DL (ref 0.66–1.25)
CREAT SERPL-MCNC: 7.35 MG/DL (ref 0.66–1.25)
CREAT SERPL-MCNC: 7.57 MG/DL (ref 0.66–1.25)
CREAT SERPL-MCNC: 7.92 MG/DL (ref 0.66–1.25)
CREAT SERPL-MCNC: NORMAL MG/DL (ref 0.66–1.25)
DEPRECATED CALCIDIOL+CALCIFEROL SERPL-MC: 15 UG/L (ref 20–75)
DIFFERENTIAL METHOD BLD: ABNORMAL
DIFFERENTIAL METHOD BLD: ABNORMAL
EOSINOPHIL # BLD AUTO: 0.2 10E9/L (ref 0–0.7)
EOSINOPHIL # BLD AUTO: 0.2 10E9/L (ref 0–0.7)
EOSINOPHIL NFR BLD AUTO: 3.8 %
EOSINOPHIL NFR BLD AUTO: 4.8 %
ERYTHROCYTE [DISTWIDTH] IN BLOOD BY AUTOMATED COUNT: 16 % (ref 10–15)
ERYTHROCYTE [DISTWIDTH] IN BLOOD BY AUTOMATED COUNT: 16 % (ref 10–15)
ERYTHROCYTE [DISTWIDTH] IN BLOOD BY AUTOMATED COUNT: 17.4 % (ref 10–15)
ERYTHROCYTE [DISTWIDTH] IN BLOOD BY AUTOMATED COUNT: 17.5 % (ref 10–15)
ERYTHROCYTE [DISTWIDTH] IN BLOOD BY AUTOMATED COUNT: 17.5 % (ref 10–15)
ERYTHROCYTE [DISTWIDTH] IN BLOOD BY AUTOMATED COUNT: 18.2 % (ref 10–15)
ERYTHROCYTE [DISTWIDTH] IN BLOOD BY AUTOMATED COUNT: 18.4 % (ref 10–15)
ERYTHROCYTE [DISTWIDTH] IN BLOOD BY AUTOMATED COUNT: 18.6 % (ref 10–15)
ERYTHROCYTE [DISTWIDTH] IN BLOOD BY AUTOMATED COUNT: 18.6 % (ref 10–15)
ERYTHROCYTE [DISTWIDTH] IN BLOOD BY AUTOMATED COUNT: 19.2 % (ref 10–15)
ERYTHROCYTE [DISTWIDTH] IN BLOOD BY AUTOMATED COUNT: 20.2 % (ref 10–15)
GFR SERPL CREATININE-BSD FRML MDRD: 10 ML/MIN/1.7M2
GFR SERPL CREATININE-BSD FRML MDRD: 10 ML/MIN/1.7M2
GFR SERPL CREATININE-BSD FRML MDRD: 14 ML/MIN/1.7M2
GFR SERPL CREATININE-BSD FRML MDRD: 14 ML/MIN/1.7M2
GFR SERPL CREATININE-BSD FRML MDRD: 16 ML/MIN/1.7M2
GFR SERPL CREATININE-BSD FRML MDRD: 7 ML/MIN/1.7M2
GFR SERPL CREATININE-BSD FRML MDRD: 8 ML/MIN/1.7M2
GFR SERPL CREATININE-BSD FRML MDRD: 8 ML/MIN/1.7M2
GFR SERPL CREATININE-BSD FRML MDRD: 9 ML/MIN/1.7M2
GFR SERPL CREATININE-BSD FRML MDRD: NORMAL ML/MIN/1.7M2
GLUCOSE BLD-MCNC: 97 MG/DL (ref 70–99)
GLUCOSE SERPL-MCNC: 100 MG/DL (ref 70–99)
GLUCOSE SERPL-MCNC: 104 MG/DL (ref 70–99)
GLUCOSE SERPL-MCNC: 120 MG/DL (ref 70–99)
GLUCOSE SERPL-MCNC: 122 MG/DL (ref 70–99)
GLUCOSE SERPL-MCNC: 127 MG/DL (ref 70–99)
GLUCOSE SERPL-MCNC: 65 MG/DL (ref 70–99)
GLUCOSE SERPL-MCNC: 74 MG/DL (ref 70–99)
GLUCOSE SERPL-MCNC: 86 MG/DL (ref 70–99)
GLUCOSE SERPL-MCNC: 91 MG/DL (ref 70–99)
GLUCOSE SERPL-MCNC: 95 MG/DL (ref 70–99)
GLUCOSE SERPL-MCNC: 96 MG/DL (ref 70–99)
GLUCOSE SERPL-MCNC: NORMAL MG/DL (ref 70–99)
HBV SURFACE AB SERPL IA-ACNC: 5.44 M[IU]/ML
HBV SURFACE AG SERPL QL IA: NONREACTIVE
HCO3 BLDV-SCNC: 27 MMOL/L (ref 21–28)
HCT VFR BLD AUTO: 40.1 % (ref 40–53)
HCT VFR BLD AUTO: 41.4 % (ref 40–53)
HCT VFR BLD AUTO: 41.4 % (ref 40–53)
HCT VFR BLD AUTO: 41.8 % (ref 40–53)
HCT VFR BLD AUTO: 42.4 % (ref 40–53)
HCT VFR BLD AUTO: 43 % (ref 40–53)
HCT VFR BLD AUTO: 44.1 % (ref 40–53)
HCT VFR BLD AUTO: 45.1 % (ref 40–53)
HCT VFR BLD AUTO: 45.3 % (ref 40–53)
HCT VFR BLD AUTO: 46.1 % (ref 40–53)
HCT VFR BLD AUTO: 46.7 % (ref 40–53)
HCT VFR BLD CALC: 49 %PCV (ref 40–53)
HGB BLD CALC-MCNC: 16.7 G/DL (ref 13.3–17.7)
HGB BLD-MCNC: 12.6 G/DL (ref 13.3–17.7)
HGB BLD-MCNC: 12.9 G/DL (ref 13.3–17.7)
HGB BLD-MCNC: 13 G/DL (ref 13.3–17.7)
HGB BLD-MCNC: 13.1 G/DL (ref 13.3–17.7)
HGB BLD-MCNC: 13.2 G/DL (ref 13.3–17.7)
HGB BLD-MCNC: 13.3 G/DL (ref 13.3–17.7)
HGB BLD-MCNC: 14 G/DL (ref 13.3–17.7)
HGB BLD-MCNC: 14.2 G/DL (ref 13.3–17.7)
HGB BLD-MCNC: 14.6 G/DL (ref 13.3–17.7)
HGB BLD-MCNC: 14.7 G/DL (ref 13.3–17.7)
HGB BLD-MCNC: 14.8 G/DL (ref 13.3–17.7)
IMM GRANULOCYTES # BLD: 0 10E9/L (ref 0–0.4)
IMM GRANULOCYTES # BLD: 0 10E9/L (ref 0–0.4)
IMM GRANULOCYTES NFR BLD: 0.2 %
IMM GRANULOCYTES NFR BLD: 0.3 %
INR PPP: 1.28 (ref 0.86–1.14)
INR PPP: 1.28 (ref 0.86–1.14)
INR PPP: 1.4 (ref 0.86–1.14)
INTERPRETATION ECG - MUSE: NORMAL
IRON SATN MFR SERPL: 30 % (ref 15–46)
IRON SERPL-MCNC: 71 UG/DL (ref 35–180)
LACTATE BLD-SCNC: 2.2 MMOL/L (ref 0.7–2.1)
LACTATE BLD-SCNC: 2.9 MMOL/L (ref 0.7–2.1)
LACTATE BLD-SCNC: 3 MMOL/L (ref 0.7–2.1)
LACTATE BLD-SCNC: 3.2 MMOL/L (ref 0.7–2.1)
LACTATE BLD-SCNC: 3.6 MMOL/L (ref 0.7–2.1)
LYMPHOCYTES # BLD AUTO: 0.9 10E9/L (ref 0.8–5.3)
LYMPHOCYTES # BLD AUTO: 1.2 10E9/L (ref 0.8–5.3)
LYMPHOCYTES NFR BLD AUTO: 25.4 %
LYMPHOCYTES NFR BLD AUTO: 27.1 %
MAGNESIUM SERPL-MCNC: 2.3 MG/DL (ref 1.6–2.3)
MCH RBC QN AUTO: 31 PG (ref 26.5–33)
MCH RBC QN AUTO: 31.6 PG (ref 26.5–33)
MCH RBC QN AUTO: 31.7 PG (ref 26.5–33)
MCH RBC QN AUTO: 32 PG (ref 26.5–33)
MCH RBC QN AUTO: 32.4 PG (ref 26.5–33)
MCH RBC QN AUTO: 32.6 PG (ref 26.5–33)
MCH RBC QN AUTO: 34.4 PG (ref 26.5–33)
MCH RBC QN AUTO: 34.5 PG (ref 26.5–33)
MCH RBC QN AUTO: 35.1 PG (ref 26.5–33)
MCHC RBC AUTO-ENTMCNC: 30.7 G/DL (ref 31.5–36.5)
MCHC RBC AUTO-ENTMCNC: 30.9 G/DL (ref 31.5–36.5)
MCHC RBC AUTO-ENTMCNC: 31.2 G/DL (ref 31.5–36.5)
MCHC RBC AUTO-ENTMCNC: 31.3 G/DL (ref 31.5–36.5)
MCHC RBC AUTO-ENTMCNC: 31.4 G/DL (ref 31.5–36.5)
MCHC RBC AUTO-ENTMCNC: 31.6 G/DL (ref 31.5–36.5)
MCHC RBC AUTO-ENTMCNC: 31.6 G/DL (ref 31.5–36.5)
MCHC RBC AUTO-ENTMCNC: 31.7 G/DL (ref 31.5–36.5)
MCHC RBC AUTO-ENTMCNC: 32.6 G/DL (ref 31.5–36.5)
MCV RBC AUTO: 100 FL (ref 78–100)
MCV RBC AUTO: 100 FL (ref 78–100)
MCV RBC AUTO: 101 FL (ref 78–100)
MCV RBC AUTO: 101 FL (ref 78–100)
MCV RBC AUTO: 103 FL (ref 78–100)
MCV RBC AUTO: 104 FL (ref 78–100)
MCV RBC AUTO: 111 FL (ref 78–100)
MCV RBC AUTO: 113 FL (ref 78–100)
MCV RBC AUTO: 113 FL (ref 78–100)
MCV RBC AUTO: 97 FL (ref 78–100)
MCV RBC AUTO: 98 FL (ref 78–100)
MICRO REPORT STATUS: NORMAL
MICRO REPORT STATUS: NORMAL
MONOCYTES # BLD AUTO: 0.3 10E9/L (ref 0–1.3)
MONOCYTES # BLD AUTO: 0.5 10E9/L (ref 0–1.3)
MONOCYTES NFR BLD AUTO: 11.4 %
MONOCYTES NFR BLD AUTO: 7.6 %
NEUTROPHILS # BLD AUTO: 2.2 10E9/L (ref 1.6–8.3)
NEUTROPHILS # BLD AUTO: 2.5 10E9/L (ref 1.6–8.3)
NEUTROPHILS NFR BLD AUTO: 56.4 %
NEUTROPHILS NFR BLD AUTO: 61.3 %
NRBC # BLD AUTO: 0 10*3/UL
NRBC # BLD AUTO: 0 10*3/UL
NRBC BLD AUTO-RTO: 1 /100
NRBC BLD AUTO-RTO: 1 /100
NT-PROBNP SERPL-MCNC: ABNORMAL PG/ML (ref 0–125)
NT-PROBNP SERPL-MCNC: ABNORMAL PG/ML (ref 0–450)
O2/TOTAL GAS SETTING VFR VENT: 95 %
PCO2 BLDV: 41 MM HG (ref 40–50)
PCO2 BLDV: 46 MM HG (ref 40–50)
PCO2 BLDV: 47 MM HG (ref 40–50)
PH BLDV: 7.39 PH (ref 7.32–7.43)
PH BLDV: 7.4 PH (ref 7.32–7.43)
PH BLDV: 7.42 PH (ref 7.32–7.43)
PLATELET # BLD AUTO: 108 10E9/L (ref 150–450)
PLATELET # BLD AUTO: 114 10E9/L (ref 150–450)
PLATELET # BLD AUTO: 116 10E9/L (ref 150–450)
PLATELET # BLD AUTO: 126 10E9/L (ref 150–450)
PLATELET # BLD AUTO: 127 10E9/L (ref 150–450)
PLATELET # BLD AUTO: 129 10E9/L (ref 150–450)
PLATELET # BLD AUTO: 131 10E9/L (ref 150–450)
PLATELET # BLD AUTO: 132 10E9/L (ref 150–450)
PLATELET # BLD AUTO: 143 10E9/L (ref 150–450)
PLATELET # BLD AUTO: 143 10E9/L (ref 150–450)
PLATELET # BLD AUTO: 153 10E9/L (ref 150–450)
PLATELET # BLD AUTO: 166 10E9/L (ref 150–450)
PLATELET # BLD AUTO: 168 10E9/L (ref 150–450)
PLATELET # BLD AUTO: 172 10E9/L (ref 150–450)
PLATELET # BLD AUTO: 199 10E9/L (ref 150–450)
PO2 BLDV: 11 MM HG (ref 25–47)
PO2 BLDV: 12 MM HG (ref 25–47)
PO2 BLDV: 28 MM HG (ref 25–47)
POTASSIUM BLD-SCNC: 4.2 MMOL/L (ref 3.4–5.3)
POTASSIUM SERPL-SCNC: 3 MMOL/L (ref 3.4–5.3)
POTASSIUM SERPL-SCNC: 3.7 MMOL/L (ref 3.4–5.3)
POTASSIUM SERPL-SCNC: 4.1 MMOL/L (ref 3.4–5.3)
POTASSIUM SERPL-SCNC: 4.1 MMOL/L (ref 3.4–5.3)
POTASSIUM SERPL-SCNC: 4.3 MMOL/L (ref 3.4–5.3)
POTASSIUM SERPL-SCNC: 4.3 MMOL/L (ref 3.4–5.3)
POTASSIUM SERPL-SCNC: 4.6 MMOL/L (ref 3.4–5.3)
POTASSIUM SERPL-SCNC: NORMAL MMOL/L (ref 3.4–5.3)
PROCALCITONIN SERPL-MCNC: 1.06 NG/ML
PROT SERPL-MCNC: 6.6 G/DL (ref 6.8–8.8)
PROT SERPL-MCNC: 7 G/DL (ref 6.8–8.8)
PROT SERPL-MCNC: 7.2 G/DL (ref 6.8–8.8)
PROT SERPL-MCNC: 7.2 G/DL (ref 6.8–8.8)
PROT SERPL-MCNC: 7.3 G/DL (ref 6.8–8.8)
PROT SERPL-MCNC: 7.4 G/DL (ref 6.8–8.8)
PROT SERPL-MCNC: 7.5 G/DL (ref 6.8–8.8)
PROT SERPL-MCNC: 7.6 G/DL (ref 6.8–8.8)
PROT SERPL-MCNC: 7.7 G/DL (ref 6.8–8.8)
PROT SERPL-MCNC: 7.7 G/DL (ref 6.8–8.8)
PROT SERPL-MCNC: 7.8 G/DL (ref 6.8–8.8)
RBC # BLD AUTO: 3.68 10E12/L (ref 4.4–5.9)
RBC # BLD AUTO: 3.77 10E12/L (ref 4.4–5.9)
RBC # BLD AUTO: 3.87 10E12/L (ref 4.4–5.9)
RBC # BLD AUTO: 3.87 10E12/L (ref 4.4–5.9)
RBC # BLD AUTO: 4.08 10E12/L (ref 4.4–5.9)
RBC # BLD AUTO: 4.14 10E12/L (ref 4.4–5.9)
RBC # BLD AUTO: 4.49 10E12/L (ref 4.4–5.9)
RBC # BLD AUTO: 4.51 10E12/L (ref 4.4–5.9)
RBC # BLD AUTO: 4.56 10E12/L (ref 4.4–5.9)
RBC # BLD AUTO: 4.64 10E12/L (ref 4.4–5.9)
RBC # BLD AUTO: 4.69 10E12/L (ref 4.4–5.9)
SAO2 % BLDV FROM PO2: 12 %
SAO2 % BLDV FROM PO2: 13 %
SODIUM BLD-SCNC: 140 MMOL/L (ref 133–144)
SODIUM SERPL-SCNC: 135 MMOL/L (ref 133–144)
SODIUM SERPL-SCNC: 135 MMOL/L (ref 133–144)
SODIUM SERPL-SCNC: 136 MMOL/L (ref 133–144)
SODIUM SERPL-SCNC: 137 MMOL/L (ref 133–144)
SODIUM SERPL-SCNC: 137 MMOL/L (ref 133–144)
SODIUM SERPL-SCNC: 139 MMOL/L (ref 133–144)
SODIUM SERPL-SCNC: 141 MMOL/L (ref 133–144)
SODIUM SERPL-SCNC: 142 MMOL/L (ref 133–144)
SODIUM SERPL-SCNC: NORMAL MMOL/L (ref 133–144)
SPECIMEN SOURCE: NORMAL
SPECIMEN SOURCE: NORMAL
STFR SERPL-SCNC: 4.6 NMOL/L
T4 FREE SERPL-MCNC: 1.08 NG/DL (ref 0.76–1.46)
TIBC SERPL-MCNC: 235 UG/DL (ref 240–430)
TROPONIN I SERPL-MCNC: 0.17 UG/L (ref 0–0.04)
TROPONIN I SERPL-MCNC: 0.19 UG/L (ref 0–0.04)
TROPONIN I SERPL-MCNC: 0.51 UG/L (ref 0–0.04)
TROPONIN I SERPL-MCNC: 0.54 UG/L (ref 0–0.04)
TSH SERPL DL<=0.005 MIU/L-ACNC: 8.2 MU/L (ref 0.4–4)
VIT B12 SERPL-MCNC: 1856 PG/ML (ref 193–986)
WBC # BLD AUTO: 3.5 10E9/L (ref 4–11)
WBC # BLD AUTO: 3.5 10E9/L (ref 4–11)
WBC # BLD AUTO: 3.6 10E9/L (ref 4–11)
WBC # BLD AUTO: 4.1 10E9/L (ref 4–11)
WBC # BLD AUTO: 4.5 10E9/L (ref 4–11)
WBC # BLD AUTO: 4.8 10E9/L (ref 4–11)
WBC # BLD AUTO: 4.9 10E9/L (ref 4–11)
WBC # BLD AUTO: 5.1 10E9/L (ref 4–11)
WBC # BLD AUTO: 5.4 10E9/L (ref 4–11)
WBC # BLD AUTO: 5.5 10E9/L (ref 4–11)
WBC # BLD AUTO: 7.1 10E9/L (ref 4–11)

## 2017-01-01 PROCEDURE — G0239 OTH RESP PROC, GROUP: HCPCS | Performed by: CLINICAL EXERCISE PHYSIOLOGIST

## 2017-01-01 PROCEDURE — 99283 EMERGENCY DEPT VISIT LOW MDM: CPT | Performed by: EMERGENCY MEDICINE

## 2017-01-01 PROCEDURE — 83735 ASSAY OF MAGNESIUM: CPT | Performed by: FAMILY MEDICINE

## 2017-01-01 PROCEDURE — 83605 ASSAY OF LACTIC ACID: CPT | Performed by: STUDENT IN AN ORGANIZED HEALTH CARE EDUCATION/TRAINING PROGRAM

## 2017-01-01 PROCEDURE — 82330 ASSAY OF CALCIUM: CPT

## 2017-01-01 PROCEDURE — 85025 COMPLETE CBC W/AUTO DIFF WBC: CPT | Performed by: FAMILY MEDICINE

## 2017-01-01 PROCEDURE — 40000244 ZZH STATISTIC VISIT PULM REHAB

## 2017-01-01 PROCEDURE — G0239 OTH RESP PROC, GROUP: HCPCS

## 2017-01-01 PROCEDURE — 85610 PROTHROMBIN TIME: CPT | Performed by: EMERGENCY MEDICINE

## 2017-01-01 PROCEDURE — 25000125 ZZHC RX 250: Performed by: INTERNAL MEDICINE

## 2017-01-01 PROCEDURE — 85027 COMPLETE CBC AUTOMATED: CPT | Performed by: INTERNAL MEDICINE

## 2017-01-01 PROCEDURE — 36415 COLL VENOUS BLD VENIPUNCTURE: CPT | Performed by: NURSE PRACTITIONER

## 2017-01-01 PROCEDURE — 83880 ASSAY OF NATRIURETIC PEPTIDE: CPT | Performed by: INTERNAL MEDICINE

## 2017-01-01 PROCEDURE — 25000128 H RX IP 250 OP 636: Mod: ZF | Performed by: NURSE PRACTITIONER

## 2017-01-01 PROCEDURE — 21400006 ZZH R&B CCU INTERMEDIATE UMMC

## 2017-01-01 PROCEDURE — 93005 ELECTROCARDIOGRAM TRACING: CPT

## 2017-01-01 PROCEDURE — 99213 OFFICE O/P EST LOW 20 MIN: CPT | Mod: ZF

## 2017-01-01 PROCEDURE — 27210190 US PARACENTESIS

## 2017-01-01 PROCEDURE — B2111ZZ FLUOROSCOPY OF MULTIPLE CORONARY ARTERIES USING LOW OSMOLAR CONTRAST: ICD-10-PCS | Performed by: INTERNAL MEDICINE

## 2017-01-01 PROCEDURE — 40000244 ZZH STATISTIC VISIT PULM REHAB: Performed by: CLINICAL EXERCISE PHYSIOLOGIST

## 2017-01-01 PROCEDURE — 99605 MTMS BY PHARM NP 15 MIN: CPT | Mod: U4 | Performed by: PHARMACIST

## 2017-01-01 PROCEDURE — 27210787 ZZH MANIFOLD CR2

## 2017-01-01 PROCEDURE — 40000116 ZZH STATISTIC OP CR VISIT: Performed by: CLINICAL EXERCISE PHYSIOLOGIST

## 2017-01-01 PROCEDURE — 25000132 ZZH RX MED GY IP 250 OP 250 PS 637: Performed by: STUDENT IN AN ORGANIZED HEALTH CARE EDUCATION/TRAINING PROGRAM

## 2017-01-01 PROCEDURE — 25000125 ZZHC RX 250: Mod: ZF | Performed by: INTERNAL MEDICINE

## 2017-01-01 PROCEDURE — 87040 BLOOD CULTURE FOR BACTERIA: CPT | Performed by: FAMILY MEDICINE

## 2017-01-01 PROCEDURE — 36415 COLL VENOUS BLD VENIPUNCTURE: CPT | Performed by: INTERNAL MEDICINE

## 2017-01-01 PROCEDURE — 82803 BLOOD GASES ANY COMBINATION: CPT

## 2017-01-01 PROCEDURE — P9047 ALBUMIN (HUMAN), 25%, 50ML: HCPCS | Mod: ZF | Performed by: INTERNAL MEDICINE

## 2017-01-01 PROCEDURE — 99291 CRITICAL CARE FIRST HOUR: CPT | Mod: 25 | Performed by: INTERNAL MEDICINE

## 2017-01-01 PROCEDURE — 99212 OFFICE O/P EST SF 10 MIN: CPT | Mod: ZF

## 2017-01-01 PROCEDURE — 25000128 H RX IP 250 OP 636: Mod: ZF | Performed by: INTERNAL MEDICINE

## 2017-01-01 PROCEDURE — 99214 OFFICE O/P EST MOD 30 MIN: CPT | Mod: ZP | Performed by: INTERNAL MEDICINE

## 2017-01-01 PROCEDURE — P9047 ALBUMIN (HUMAN), 25%, 50ML: HCPCS | Mod: ZF | Performed by: NURSE PRACTITIONER

## 2017-01-01 PROCEDURE — 83550 IRON BINDING TEST: CPT | Performed by: INTERNAL MEDICINE

## 2017-01-01 PROCEDURE — 82306 VITAMIN D 25 HYDROXY: CPT | Performed by: NURSE PRACTITIONER

## 2017-01-01 PROCEDURE — 80053 COMPREHEN METABOLIC PANEL: CPT | Performed by: INTERNAL MEDICINE

## 2017-01-01 PROCEDURE — 86706 HEP B SURFACE ANTIBODY: CPT | Performed by: INTERNAL MEDICINE

## 2017-01-01 PROCEDURE — 85027 COMPLETE CBC AUTOMATED: CPT | Performed by: STUDENT IN AN ORGANIZED HEALTH CARE EDUCATION/TRAINING PROGRAM

## 2017-01-01 PROCEDURE — 85025 COMPLETE CBC W/AUTO DIFF WBC: CPT | Performed by: EMERGENCY MEDICINE

## 2017-01-01 PROCEDURE — 82803 BLOOD GASES ANY COMBINATION: CPT | Performed by: FAMILY MEDICINE

## 2017-01-01 PROCEDURE — 80053 COMPREHEN METABOLIC PANEL: CPT | Performed by: STUDENT IN AN ORGANIZED HEALTH CARE EDUCATION/TRAINING PROGRAM

## 2017-01-01 PROCEDURE — 27211236 ZZH CATHETER -FFR CR18

## 2017-01-01 PROCEDURE — 25000128 H RX IP 250 OP 636: Performed by: INTERNAL MEDICINE

## 2017-01-01 PROCEDURE — 87340 HEPATITIS B SURFACE AG IA: CPT | Performed by: INTERNAL MEDICINE

## 2017-01-01 PROCEDURE — 90937 HEMODIALYSIS REPEATED EVAL: CPT

## 2017-01-01 PROCEDURE — 93010 ELECTROCARDIOGRAM REPORT: CPT | Mod: Z6 | Performed by: FAMILY MEDICINE

## 2017-01-01 PROCEDURE — 99284 EMERGENCY DEPT VISIT MOD MDM: CPT | Mod: Z6 | Performed by: EMERGENCY MEDICINE

## 2017-01-01 PROCEDURE — 99232 SBSQ HOSP IP/OBS MODERATE 35: CPT | Mod: 25 | Performed by: INTERNAL MEDICINE

## 2017-01-01 PROCEDURE — 99212 OFFICE O/P EST SF 10 MIN: CPT

## 2017-01-01 PROCEDURE — G0278 ILIAC ART ANGIO,CARDIAC CATH: HCPCS | Mod: GC | Performed by: INTERNAL MEDICINE

## 2017-01-01 PROCEDURE — 36415 COLL VENOUS BLD VENIPUNCTURE: CPT | Performed by: STUDENT IN AN ORGANIZED HEALTH CARE EDUCATION/TRAINING PROGRAM

## 2017-01-01 PROCEDURE — G0238 OTH RESP PROC, INDIV: HCPCS | Performed by: CLINICAL EXERCISE PHYSIOLOGIST

## 2017-01-01 PROCEDURE — 99291 CRITICAL CARE FIRST HOUR: CPT | Mod: 25 | Performed by: FAMILY MEDICINE

## 2017-01-01 PROCEDURE — 71010 XR CHEST PORT 1 VW: CPT

## 2017-01-01 PROCEDURE — 25000128 H RX IP 250 OP 636: Performed by: FAMILY MEDICINE

## 2017-01-01 PROCEDURE — 27210995 ZZH RX 272: Mod: ZF | Performed by: NURSE PRACTITIONER

## 2017-01-01 PROCEDURE — S0090 SILDENAFIL CITRATE, 25 MG: HCPCS | Performed by: INTERNAL MEDICINE

## 2017-01-01 PROCEDURE — 99214 OFFICE O/P EST MOD 30 MIN: CPT | Mod: ZP | Performed by: NURSE PRACTITIONER

## 2017-01-01 PROCEDURE — 25000132 ZZH RX MED GY IP 250 OP 250 PS 637: Performed by: INTERNAL MEDICINE

## 2017-01-01 PROCEDURE — 80048 BASIC METABOLIC PNL TOTAL CA: CPT | Performed by: INTERNAL MEDICINE

## 2017-01-01 PROCEDURE — 83605 ASSAY OF LACTIC ACID: CPT | Performed by: INTERNAL MEDICINE

## 2017-01-01 PROCEDURE — 83880 ASSAY OF NATRIURETIC PEPTIDE: CPT | Performed by: FAMILY MEDICINE

## 2017-01-01 PROCEDURE — 85027 COMPLETE CBC AUTOMATED: CPT | Performed by: NURSE PRACTITIONER

## 2017-01-01 PROCEDURE — 27210757 ZZH CATH TEMP PACING HEART CR8

## 2017-01-01 PROCEDURE — 85610 PROTHROMBIN TIME: CPT | Performed by: NURSE PRACTITIONER

## 2017-01-01 PROCEDURE — 25000132 ZZH RX MED GY IP 250 OP 250 PS 637: Performed by: EMERGENCY MEDICINE

## 2017-01-01 PROCEDURE — 99153 MOD SED SAME PHYS/QHP EA: CPT

## 2017-01-01 PROCEDURE — 83540 ASSAY OF IRON: CPT | Performed by: INTERNAL MEDICINE

## 2017-01-01 PROCEDURE — 25000132 ZZH RX MED GY IP 250 OP 250 PS 637: Performed by: FAMILY MEDICINE

## 2017-01-01 PROCEDURE — 27210995 ZZH RX 272: Mod: ZF | Performed by: INTERNAL MEDICINE

## 2017-01-01 PROCEDURE — 93460 R&L HRT ART/VENTRICLE ANGIO: CPT

## 2017-01-01 PROCEDURE — 4A023N8 MEASUREMENT OF CARDIAC SAMPLING AND PRESSURE, BILATERAL, PERCUTANEOUS APPROACH: ICD-10-PCS | Performed by: INTERNAL MEDICINE

## 2017-01-01 PROCEDURE — 85049 AUTOMATED PLATELET COUNT: CPT | Performed by: INTERNAL MEDICINE

## 2017-01-01 PROCEDURE — C1887 CATHETER, GUIDING: HCPCS

## 2017-01-01 PROCEDURE — 99239 HOSP IP/OBS DSCHRG MGMT >30: CPT | Mod: GC | Performed by: INTERNAL MEDICINE

## 2017-01-01 PROCEDURE — 40000497 ZZHCL STATISTIC SODIUM ED POCT

## 2017-01-01 PROCEDURE — 20000004 ZZH R&B ICU UMMC

## 2017-01-01 PROCEDURE — 36415 COLL VENOUS BLD VENIPUNCTURE: CPT | Performed by: EMERGENCY MEDICINE

## 2017-01-01 PROCEDURE — 80053 COMPREHEN METABOLIC PANEL: CPT | Performed by: NURSE PRACTITIONER

## 2017-01-01 PROCEDURE — 82248 BILIRUBIN DIRECT: CPT | Performed by: NURSE PRACTITIONER

## 2017-01-01 PROCEDURE — 25000128 H RX IP 250 OP 636: Performed by: STUDENT IN AN ORGANIZED HEALTH CARE EDUCATION/TRAINING PROGRAM

## 2017-01-01 PROCEDURE — 80076 HEPATIC FUNCTION PANEL: CPT | Performed by: INTERNAL MEDICINE

## 2017-01-01 PROCEDURE — 84484 ASSAY OF TROPONIN QUANT: CPT | Performed by: FAMILY MEDICINE

## 2017-01-01 PROCEDURE — 93460 R&L HRT ART/VENTRICLE ANGIO: CPT | Mod: 26 | Performed by: INTERNAL MEDICINE

## 2017-01-01 PROCEDURE — 93010 ELECTROCARDIOGRAM REPORT: CPT | Performed by: INTERNAL MEDICINE

## 2017-01-01 PROCEDURE — 80053 COMPREHEN METABOLIC PANEL: CPT | Performed by: FAMILY MEDICINE

## 2017-01-01 PROCEDURE — 99152 MOD SED SAME PHYS/QHP 5/>YRS: CPT

## 2017-01-01 PROCEDURE — 99606 MTMS BY PHARM EST 15 MIN: CPT | Mod: U4 | Performed by: PHARMACIST

## 2017-01-01 PROCEDURE — 99214 OFFICE O/P EST MOD 30 MIN: CPT | Performed by: NURSE PRACTITIONER

## 2017-01-01 PROCEDURE — 80053 COMPREHEN METABOLIC PANEL: CPT | Performed by: EMERGENCY MEDICINE

## 2017-01-01 PROCEDURE — 93798 PHYS/QHP OP CAR RHAB W/ECG: CPT | Performed by: CLINICAL EXERCISE PHYSIOLOGIST

## 2017-01-01 PROCEDURE — 25000125 ZZHC RX 250: Performed by: STUDENT IN AN ORGANIZED HEALTH CARE EDUCATION/TRAINING PROGRAM

## 2017-01-01 PROCEDURE — 82140 ASSAY OF AMMONIA: CPT | Performed by: FAMILY MEDICINE

## 2017-01-01 PROCEDURE — 40000502 ZZHCL STATISTIC GLUCOSE ED POCT

## 2017-01-01 PROCEDURE — 84238 ASSAY NONENDOCRINE RECEPTOR: CPT | Performed by: INTERNAL MEDICINE

## 2017-01-01 PROCEDURE — 40000501 ZZHCL STATISTIC HEMATOCRIT ED POCT

## 2017-01-01 PROCEDURE — 83605 ASSAY OF LACTIC ACID: CPT

## 2017-01-01 PROCEDURE — 84145 PROCALCITONIN (PCT): CPT | Performed by: FAMILY MEDICINE

## 2017-01-01 PROCEDURE — 85049 AUTOMATED PLATELET COUNT: CPT | Performed by: NURSE PRACTITIONER

## 2017-01-01 PROCEDURE — 83605 ASSAY OF LACTIC ACID: CPT | Performed by: FAMILY MEDICINE

## 2017-01-01 PROCEDURE — C1769 GUIDE WIRE: HCPCS

## 2017-01-01 PROCEDURE — C1894 INTRO/SHEATH, NON-LASER: HCPCS

## 2017-01-01 PROCEDURE — 99223 1ST HOSP IP/OBS HIGH 75: CPT

## 2017-01-01 PROCEDURE — 99607 MTMS BY PHARM ADDL 15 MIN: CPT | Mod: U4 | Performed by: PHARMACIST

## 2017-01-01 PROCEDURE — 83880 ASSAY OF NATRIURETIC PEPTIDE: CPT | Performed by: NURSE PRACTITIONER

## 2017-01-01 PROCEDURE — 96365 THER/PROPH/DIAG IV INF INIT: CPT | Mod: ZF

## 2017-01-01 PROCEDURE — 85610 PROTHROMBIN TIME: CPT | Performed by: FAMILY MEDICINE

## 2017-01-01 PROCEDURE — 99283 EMERGENCY DEPT VISIT LOW MDM: CPT | Mod: Z6 | Performed by: EMERGENCY MEDICINE

## 2017-01-01 PROCEDURE — 33210 INSERT ELECTRD/PM CATH SNGL: CPT

## 2017-01-01 PROCEDURE — 70450 CT HEAD/BRAIN W/O DYE: CPT

## 2017-01-01 PROCEDURE — 25000125 ZZHC RX 250: Performed by: FAMILY MEDICINE

## 2017-01-01 PROCEDURE — 99291 CRITICAL CARE FIRST HOUR: CPT | Mod: 25

## 2017-01-01 PROCEDURE — 99283 EMERGENCY DEPT VISIT LOW MDM: CPT

## 2017-01-01 PROCEDURE — 40000498 ZZHCL STATISTIC POTASSIUM ED POCT

## 2017-01-01 PROCEDURE — 27211181 ZZH BALLOON TIP PRESSURE CR5

## 2017-01-01 PROCEDURE — 84484 ASSAY OF TROPONIN QUANT: CPT | Performed by: INTERNAL MEDICINE

## 2017-01-01 PROCEDURE — 25000128 H RX IP 250 OP 636

## 2017-01-01 PROCEDURE — 82607 VITAMIN B-12: CPT | Performed by: NURSE PRACTITIONER

## 2017-01-01 PROCEDURE — 40000172 ZZH STATISTIC PROCEDURE PREP ONLY

## 2017-01-01 PROCEDURE — 27210946 ZZH KIT HC TOTES DISP CR8

## 2017-01-01 PROCEDURE — 27211089 ZZH KIT ACIST INJECTOR CR3

## 2017-01-01 PROCEDURE — C1760 CLOSURE DEV, VASC: HCPCS

## 2017-01-01 RX ORDER — MIDODRINE HYDROCHLORIDE 5 MG/1
15 TABLET ORAL
Status: DISCONTINUED | OUTPATIENT
Start: 2017-01-01 | End: 2017-01-01

## 2017-01-01 RX ORDER — PROMETHAZINE HYDROCHLORIDE 25 MG/ML
6.25-25 INJECTION, SOLUTION INTRAMUSCULAR; INTRAVENOUS EVERY 4 HOURS PRN
Status: DISCONTINUED | OUTPATIENT
Start: 2017-01-01 | End: 2017-01-01 | Stop reason: HOSPADM

## 2017-01-01 RX ORDER — DIPHENHYDRAMINE HYDROCHLORIDE 50 MG/ML
25-50 INJECTION INTRAMUSCULAR; INTRAVENOUS
Status: DISCONTINUED | OUTPATIENT
Start: 2017-01-01 | End: 2017-01-01 | Stop reason: HOSPADM

## 2017-01-01 RX ORDER — ALBUMIN (HUMAN) 12.5 G/50ML
12.5 SOLUTION INTRAVENOUS 4 TIMES DAILY PRN
Status: DISCONTINUED | OUTPATIENT
Start: 2017-01-01 | End: 2017-01-01 | Stop reason: HOSPADM

## 2017-01-01 RX ORDER — SODIUM CHLORIDE 9 MG/ML
INJECTION, SOLUTION INTRAVENOUS CONTINUOUS
Status: CANCELLED | OUTPATIENT
Start: 2017-01-01

## 2017-01-01 RX ORDER — CYCLOBENZAPRINE HCL 5 MG
5 TABLET ORAL 3 TIMES DAILY PRN
Qty: 15 TABLET | Refills: 0 | Status: SHIPPED | OUTPATIENT
Start: 2017-01-01 | End: 2017-01-01

## 2017-01-01 RX ORDER — LIDOCAINE 40 MG/G
CREAM TOPICAL
Status: DISCONTINUED | OUTPATIENT
Start: 2017-01-01 | End: 2017-01-01 | Stop reason: HOSPADM

## 2017-01-01 RX ORDER — ALBUMIN (HUMAN) 12.5 G/50ML
12.5 SOLUTION INTRAVENOUS 4 TIMES DAILY PRN
Status: CANCELLED
Start: 2017-01-01

## 2017-01-01 RX ORDER — DOPAMINE HYDROCHLORIDE 160 MG/100ML
2-20 INJECTION, SOLUTION INTRAVENOUS CONTINUOUS PRN
Status: DISCONTINUED | OUTPATIENT
Start: 2017-01-01 | End: 2017-01-01 | Stop reason: HOSPADM

## 2017-01-01 RX ORDER — VERAPAMIL HYDROCHLORIDE 2.5 MG/ML
1-5 INJECTION, SOLUTION INTRAVENOUS
Status: DISCONTINUED | OUTPATIENT
Start: 2017-01-01 | End: 2017-01-01 | Stop reason: HOSPADM

## 2017-01-01 RX ORDER — LORATADINE 10 MG/1
10 TABLET ORAL DAILY
Qty: 90 TABLET | Refills: 3 | Status: SHIPPED | OUTPATIENT
Start: 2017-01-01

## 2017-01-01 RX ORDER — NITROGLYCERIN 5 MG/ML
100-500 VIAL (ML) INTRAVENOUS
Status: DISCONTINUED | OUTPATIENT
Start: 2017-01-01 | End: 2017-01-01 | Stop reason: HOSPADM

## 2017-01-01 RX ORDER — FUROSEMIDE 10 MG/ML
20-100 INJECTION INTRAMUSCULAR; INTRAVENOUS
Status: DISCONTINUED | OUTPATIENT
Start: 2017-01-01 | End: 2017-01-01 | Stop reason: HOSPADM

## 2017-01-01 RX ORDER — ARGATROBAN 1 MG/ML
350 INJECTION, SOLUTION INTRAVENOUS
Status: DISCONTINUED | OUTPATIENT
Start: 2017-01-01 | End: 2017-01-01 | Stop reason: HOSPADM

## 2017-01-01 RX ORDER — HYDROXYZINE PAMOATE 25 MG/1
50 CAPSULE ORAL 3 TIMES DAILY PRN
Qty: 120 CAPSULE | Refills: 0 | Status: SHIPPED | OUTPATIENT
Start: 2017-01-01

## 2017-01-01 RX ORDER — ADENOSINE 3 MG/ML
12-12000 INJECTION, SOLUTION INTRAVENOUS
Status: DISCONTINUED | OUTPATIENT
Start: 2017-01-01 | End: 2017-01-01 | Stop reason: HOSPADM

## 2017-01-01 RX ORDER — MIDODRINE HYDROCHLORIDE 10 MG/1
20 TABLET ORAL 3 TIMES DAILY
Qty: 180 TABLET | Refills: 3 | Status: SHIPPED | OUTPATIENT
Start: 2017-01-01 | End: 2017-01-01

## 2017-01-01 RX ORDER — ACETAMINOPHEN 650 MG/1
325 SUPPOSITORY RECTAL EVERY 4 HOURS PRN
Status: DISCONTINUED | OUTPATIENT
Start: 2017-01-01 | End: 2017-01-01 | Stop reason: HOSPADM

## 2017-01-01 RX ORDER — LIDOCAINE HYDROCHLORIDE 10 MG/ML
30 INJECTION, SOLUTION EPIDURAL; INFILTRATION; INTRACAUDAL; PERINEURAL
Status: DISCONTINUED | OUTPATIENT
Start: 2017-01-01 | End: 2017-01-01 | Stop reason: HOSPADM

## 2017-01-01 RX ORDER — BOSENTAN 125 MG/1
125 TABLET, FILM COATED ORAL 2 TIMES DAILY
Status: DISCONTINUED | OUTPATIENT
Start: 2017-01-01 | End: 2017-01-01 | Stop reason: HOSPADM

## 2017-01-01 RX ORDER — ONDANSETRON 2 MG/ML
4 INJECTION INTRAMUSCULAR; INTRAVENOUS EVERY 4 HOURS PRN
Status: DISCONTINUED | OUTPATIENT
Start: 2017-01-01 | End: 2017-01-01 | Stop reason: HOSPADM

## 2017-01-01 RX ORDER — DEXTROSE MONOHYDRATE 25 G/50ML
12.5-5 INJECTION, SOLUTION INTRAVENOUS EVERY 30 MIN PRN
Status: DISCONTINUED | OUTPATIENT
Start: 2017-01-01 | End: 2017-01-01 | Stop reason: HOSPADM

## 2017-01-01 RX ORDER — HYDROXYZINE HYDROCHLORIDE 25 MG/1
50 TABLET, FILM COATED ORAL ONCE
Status: COMPLETED | OUTPATIENT
Start: 2017-01-01 | End: 2017-01-01

## 2017-01-01 RX ORDER — MIDODRINE HYDROCHLORIDE 5 MG/1
10 TABLET ORAL
Status: DISCONTINUED | OUTPATIENT
Start: 2017-01-01 | End: 2017-01-01

## 2017-01-01 RX ORDER — MIDODRINE HYDROCHLORIDE 10 MG/1
20 TABLET ORAL 3 TIMES DAILY
Qty: 90 TABLET | Refills: 3 | Status: SHIPPED | OUTPATIENT
Start: 2017-01-01 | End: 2017-01-01

## 2017-01-01 RX ORDER — NALOXONE HYDROCHLORIDE 0.4 MG/ML
.2-.4 INJECTION, SOLUTION INTRAMUSCULAR; INTRAVENOUS; SUBCUTANEOUS
Status: ACTIVE | OUTPATIENT
Start: 2017-01-01 | End: 2017-01-01

## 2017-01-01 RX ORDER — NITROGLYCERIN 5 MG/ML
100-200 VIAL (ML) INTRAVENOUS
Status: DISCONTINUED | OUTPATIENT
Start: 2017-01-01 | End: 2017-01-01 | Stop reason: HOSPADM

## 2017-01-01 RX ORDER — PROTAMINE SULFATE 10 MG/ML
1-5 INJECTION, SOLUTION INTRAVENOUS
Status: DISCONTINUED | OUTPATIENT
Start: 2017-01-01 | End: 2017-01-01 | Stop reason: HOSPADM

## 2017-01-01 RX ORDER — ATROPINE SULFATE 0.1 MG/ML
0.5 INJECTION INTRAVENOUS EVERY 5 MIN PRN
Status: ACTIVE | OUTPATIENT
Start: 2017-01-01 | End: 2017-01-01

## 2017-01-01 RX ORDER — LORATADINE 10 MG/1
10 TABLET ORAL DAILY
Status: DISCONTINUED | OUTPATIENT
Start: 2017-01-01 | End: 2017-01-01 | Stop reason: HOSPADM

## 2017-01-01 RX ORDER — CARVEDILOL 25 MG/1
12.5 TABLET ORAL 2 TIMES DAILY
Qty: 90 TABLET | Refills: 2 | Status: ON HOLD | OUTPATIENT
Start: 2017-01-01 | End: 2017-01-01

## 2017-01-01 RX ORDER — ASPIRIN 325 MG
325 TABLET ORAL
Status: DISCONTINUED | OUTPATIENT
Start: 2017-01-01 | End: 2017-01-01 | Stop reason: HOSPADM

## 2017-01-01 RX ORDER — NALOXONE HYDROCHLORIDE 0.4 MG/ML
.1-.4 INJECTION, SOLUTION INTRAMUSCULAR; INTRAVENOUS; SUBCUTANEOUS
Status: DISCONTINUED | OUTPATIENT
Start: 2017-01-01 | End: 2017-01-01 | Stop reason: HOSPADM

## 2017-01-01 RX ORDER — CLOPIDOGREL BISULFATE 75 MG/1
300-600 TABLET ORAL
Status: DISCONTINUED | OUTPATIENT
Start: 2017-01-01 | End: 2017-01-01 | Stop reason: HOSPADM

## 2017-01-01 RX ORDER — BOSENTAN 125 MG/1
125 TABLET, FILM COATED ORAL 2 TIMES DAILY
Qty: 30 TABLET | Refills: 0 | Status: ON HOLD | OUTPATIENT
Start: 2017-01-01 | End: 2017-01-01

## 2017-01-01 RX ORDER — SILDENAFIL CITRATE 20 MG/1
40 TABLET ORAL 3 TIMES DAILY
Qty: 180 TABLET | Refills: 11 | Status: SHIPPED | OUTPATIENT
Start: 2017-01-01

## 2017-01-01 RX ORDER — NICARDIPINE HYDROCHLORIDE 2.5 MG/ML
100 INJECTION INTRAVENOUS
Status: DISCONTINUED | OUTPATIENT
Start: 2017-01-01 | End: 2017-01-01 | Stop reason: HOSPADM

## 2017-01-01 RX ORDER — BOSENTAN 125 MG/1
125 TABLET, FILM COATED ORAL 2 TIMES DAILY
Qty: 60 TABLET | Refills: 11 | Status: SHIPPED | OUTPATIENT
Start: 2017-01-01 | End: 2017-01-01

## 2017-01-01 RX ORDER — ACETAMINOPHEN 325 MG/1
325 TABLET ORAL EVERY 4 HOURS PRN
Status: DISCONTINUED | OUTPATIENT
Start: 2017-01-01 | End: 2017-01-01 | Stop reason: HOSPADM

## 2017-01-01 RX ORDER — PRAVASTATIN SODIUM 40 MG
40 TABLET ORAL
COMMUNITY
End: 2017-01-01

## 2017-01-01 RX ORDER — DIPHENHYDRAMINE HYDROCHLORIDE 50 MG/ML
25 INJECTION INTRAMUSCULAR; INTRAVENOUS EVERY 6 HOURS PRN
Status: DISCONTINUED | OUTPATIENT
Start: 2017-01-01 | End: 2017-01-01 | Stop reason: HOSPADM

## 2017-01-01 RX ORDER — HYDROXYZINE HYDROCHLORIDE 25 MG/1
50 TABLET, FILM COATED ORAL 3 TIMES DAILY PRN
Status: DISCONTINUED | OUTPATIENT
Start: 2017-01-01 | End: 2017-01-01 | Stop reason: HOSPADM

## 2017-01-01 RX ORDER — ENALAPRILAT 1.25 MG/ML
1.25-2.5 INJECTION INTRAVENOUS
Status: DISCONTINUED | OUTPATIENT
Start: 2017-01-01 | End: 2017-01-01 | Stop reason: HOSPADM

## 2017-01-01 RX ORDER — MAGNESIUM HYDROXIDE 1200 MG/15ML
1000 LIQUID ORAL CONTINUOUS PRN
Status: DISCONTINUED | OUTPATIENT
Start: 2017-01-01 | End: 2017-01-01 | Stop reason: HOSPADM

## 2017-01-01 RX ORDER — HEPARIN SODIUM,PORCINE 10 UNIT/ML
2-5 VIAL (ML) INTRAVENOUS
Status: DISCONTINUED | OUTPATIENT
Start: 2017-01-01 | End: 2017-01-01 | Stop reason: HOSPADM

## 2017-01-01 RX ORDER — NITROGLYCERIN 20 MG/100ML
.07-2 INJECTION INTRAVENOUS CONTINUOUS PRN
Status: DISCONTINUED | OUTPATIENT
Start: 2017-01-01 | End: 2017-01-01 | Stop reason: HOSPADM

## 2017-01-01 RX ORDER — BOSENTAN 125 MG/1
125 TABLET, FILM COATED ORAL ONCE
Status: COMPLETED | OUTPATIENT
Start: 2017-01-01 | End: 2017-01-01

## 2017-01-01 RX ORDER — FENTANYL CITRATE 50 UG/ML
25-50 INJECTION, SOLUTION INTRAMUSCULAR; INTRAVENOUS
Status: ACTIVE | OUTPATIENT
Start: 2017-01-01 | End: 2017-01-01

## 2017-01-01 RX ORDER — URSODIOL 300 MG/1
300 CAPSULE ORAL 2 TIMES DAILY
Qty: 60 CAPSULE | Refills: 1 | Status: SHIPPED | OUTPATIENT
Start: 2017-01-01 | End: 2017-01-01

## 2017-01-01 RX ORDER — PROTAMINE SULFATE 10 MG/ML
25-100 INJECTION, SOLUTION INTRAVENOUS EVERY 5 MIN PRN
Status: DISCONTINUED | OUTPATIENT
Start: 2017-01-01 | End: 2017-01-01 | Stop reason: HOSPADM

## 2017-01-01 RX ORDER — METOPROLOL TARTRATE 1 MG/ML
5 INJECTION, SOLUTION INTRAVENOUS EVERY 5 MIN PRN
Status: DISCONTINUED | OUTPATIENT
Start: 2017-01-01 | End: 2017-01-01 | Stop reason: HOSPADM

## 2017-01-01 RX ORDER — MIDODRINE HYDROCHLORIDE 10 MG/1
10 TABLET ORAL 3 TIMES DAILY
Qty: 180 TABLET | Refills: 3 | Status: SHIPPED | OUTPATIENT
Start: 2017-01-01 | End: 2017-01-01

## 2017-01-01 RX ORDER — MIDODRINE HYDROCHLORIDE 5 MG/1
20 TABLET ORAL
Status: DISCONTINUED | OUTPATIENT
Start: 2017-01-01 | End: 2017-01-01 | Stop reason: HOSPADM

## 2017-01-01 RX ORDER — POTASSIUM CHLORIDE 29.8 MG/ML
20 INJECTION INTRAVENOUS
Status: DISCONTINUED | OUTPATIENT
Start: 2017-01-01 | End: 2017-01-01 | Stop reason: HOSPADM

## 2017-01-01 RX ORDER — MIDODRINE HYDROCHLORIDE 5 MG/1
5 TABLET ORAL ONCE
Status: COMPLETED | OUTPATIENT
Start: 2017-01-01 | End: 2017-01-01

## 2017-01-01 RX ORDER — IOPAMIDOL 755 MG/ML
60 INJECTION, SOLUTION INTRAVASCULAR ONCE
Status: COMPLETED | OUTPATIENT
Start: 2017-01-01 | End: 2017-01-01

## 2017-01-01 RX ORDER — FLUMAZENIL 0.1 MG/ML
0.2 INJECTION, SOLUTION INTRAVENOUS
Status: ACTIVE | OUTPATIENT
Start: 2017-01-01 | End: 2017-01-01

## 2017-01-01 RX ORDER — ASPIRIN 81 MG/1
81-324 TABLET, CHEWABLE ORAL
Status: DISCONTINUED | OUTPATIENT
Start: 2017-01-01 | End: 2017-01-01 | Stop reason: HOSPADM

## 2017-01-01 RX ORDER — NITROGLYCERIN 0.4 MG/1
0.4 TABLET SUBLINGUAL EVERY 5 MIN PRN
Status: DISCONTINUED | OUTPATIENT
Start: 2017-01-01 | End: 2017-01-01 | Stop reason: HOSPADM

## 2017-01-01 RX ORDER — MIDODRINE HYDROCHLORIDE 5 MG/1
10 TABLET ORAL DAILY
Status: DISCONTINUED | OUTPATIENT
Start: 2017-01-01 | End: 2017-01-01

## 2017-01-01 RX ORDER — MIDODRINE HYDROCHLORIDE 10 MG/1
20 TABLET ORAL 3 TIMES DAILY
Qty: 180 TABLET | Refills: 0 | Status: SHIPPED | OUTPATIENT
Start: 2017-01-01

## 2017-01-01 RX ORDER — FENTANYL CITRATE 50 UG/ML
25-50 INJECTION, SOLUTION INTRAMUSCULAR; INTRAVENOUS
Status: DISCONTINUED | OUTPATIENT
Start: 2017-01-01 | End: 2017-01-01 | Stop reason: HOSPADM

## 2017-01-01 RX ORDER — CLOPIDOGREL BISULFATE 75 MG/1
75 TABLET ORAL
Status: DISCONTINUED | OUTPATIENT
Start: 2017-01-01 | End: 2017-01-01 | Stop reason: HOSPADM

## 2017-01-01 RX ORDER — ATROPINE SULFATE 0.1 MG/ML
.5-1 INJECTION INTRAVENOUS
Status: DISCONTINUED | OUTPATIENT
Start: 2017-01-01 | End: 2017-01-01 | Stop reason: HOSPADM

## 2017-01-01 RX ORDER — ARGATROBAN 1 MG/ML
150 INJECTION, SOLUTION INTRAVENOUS
Status: DISCONTINUED | OUTPATIENT
Start: 2017-01-01 | End: 2017-01-01 | Stop reason: HOSPADM

## 2017-01-01 RX ORDER — ASPIRIN 81 MG/1
81 TABLET ORAL DAILY
Status: DISCONTINUED | OUTPATIENT
Start: 2017-01-01 | End: 2017-01-01 | Stop reason: HOSPADM

## 2017-01-01 RX ORDER — LIDOCAINE 40 MG/G
CREAM TOPICAL
Status: DISCONTINUED | OUTPATIENT
Start: 2017-01-01 | End: 2017-01-01

## 2017-01-01 RX ORDER — LORAZEPAM 2 MG/ML
.5-2 INJECTION INTRAMUSCULAR
Status: DISCONTINUED | OUTPATIENT
Start: 2017-01-01 | End: 2017-01-01 | Stop reason: HOSPADM

## 2017-01-01 RX ORDER — DOBUTAMINE HYDROCHLORIDE 200 MG/100ML
2-20 INJECTION INTRAVENOUS CONTINUOUS PRN
Status: DISCONTINUED | OUTPATIENT
Start: 2017-01-01 | End: 2017-01-01 | Stop reason: HOSPADM

## 2017-01-01 RX ORDER — HEPARIN SODIUM 1000 [USP'U]/ML
1000-10000 INJECTION, SOLUTION INTRAVENOUS; SUBCUTANEOUS EVERY 5 MIN PRN
Status: DISCONTINUED | OUTPATIENT
Start: 2017-01-01 | End: 2017-01-01 | Stop reason: HOSPADM

## 2017-01-01 RX ORDER — DOPAMINE HYDROCHLORIDE 160 MG/100ML
INJECTION, SOLUTION INTRAVENOUS
Status: COMPLETED
Start: 2017-01-01 | End: 2017-01-01

## 2017-01-01 RX ORDER — SODIUM NITROPRUSSIDE 25 MG/ML
100-200 INJECTION INTRAVENOUS
Status: DISCONTINUED | OUTPATIENT
Start: 2017-01-01 | End: 2017-01-01 | Stop reason: HOSPADM

## 2017-01-01 RX ORDER — AMLODIPINE BESYLATE 10 MG/1
10 TABLET ORAL
COMMUNITY
End: 2017-01-01

## 2017-01-01 RX ORDER — PRASUGREL 10 MG/1
10-60 TABLET, FILM COATED ORAL
Status: DISCONTINUED | OUTPATIENT
Start: 2017-01-01 | End: 2017-01-01 | Stop reason: HOSPADM

## 2017-01-01 RX ORDER — PHENYLEPHRINE HCL IN 0.9% NACL 1 MG/10 ML
20-100 SYRINGE (ML) INTRAVENOUS
Status: DISCONTINUED | OUTPATIENT
Start: 2017-01-01 | End: 2017-01-01 | Stop reason: HOSPADM

## 2017-01-01 RX ORDER — POTASSIUM CHLORIDE 7.45 MG/ML
10 INJECTION INTRAVENOUS
Status: DISCONTINUED | OUTPATIENT
Start: 2017-01-01 | End: 2017-01-01 | Stop reason: HOSPADM

## 2017-01-01 RX ORDER — DOPAMINE HYDROCHLORIDE 160 MG/100ML
3 INJECTION, SOLUTION INTRAVENOUS CONTINUOUS
Status: DISCONTINUED | OUTPATIENT
Start: 2017-01-01 | End: 2017-01-01 | Stop reason: HOSPADM

## 2017-01-01 RX ORDER — LIDOCAINE/PRILOCAINE 2.5 %-2.5%
CREAM (GRAM) TOPICAL DAILY PRN
Status: DISCONTINUED | OUTPATIENT
Start: 2017-01-01 | End: 2017-01-01 | Stop reason: HOSPADM

## 2017-01-01 RX ORDER — LIDOCAINE 40 MG/G
CREAM TOPICAL
Status: CANCELLED | OUTPATIENT
Start: 2017-01-01

## 2017-01-01 RX ORDER — CEFTRIAXONE 1 G/1
1 INJECTION, POWDER, FOR SOLUTION INTRAMUSCULAR; INTRAVENOUS ONCE
Status: COMPLETED | OUTPATIENT
Start: 2017-01-01 | End: 2017-01-01

## 2017-01-01 RX ORDER — NALOXONE HYDROCHLORIDE 0.4 MG/ML
0.4 INJECTION, SOLUTION INTRAMUSCULAR; INTRAVENOUS; SUBCUTANEOUS EVERY 5 MIN PRN
Status: DISCONTINUED | OUTPATIENT
Start: 2017-01-01 | End: 2017-01-01 | Stop reason: HOSPADM

## 2017-01-01 RX ORDER — METHYLPREDNISOLONE SODIUM SUCCINATE 125 MG/2ML
125 INJECTION, POWDER, LYOPHILIZED, FOR SOLUTION INTRAMUSCULAR; INTRAVENOUS
Status: DISCONTINUED | OUTPATIENT
Start: 2017-01-01 | End: 2017-01-01 | Stop reason: HOSPADM

## 2017-01-01 RX ORDER — CARVEDILOL 25 MG/1
12.5 TABLET ORAL 2 TIMES DAILY
Qty: 90 TABLET | Refills: 2
Start: 2017-01-01 | End: 2017-01-01

## 2017-01-01 RX ORDER — FLUMAZENIL 0.1 MG/ML
0.2 INJECTION, SOLUTION INTRAVENOUS
Status: DISCONTINUED | OUTPATIENT
Start: 2017-01-01 | End: 2017-01-01 | Stop reason: HOSPADM

## 2017-01-01 RX ORDER — NIFEDIPINE 10 MG/1
10 CAPSULE ORAL
Status: DISCONTINUED | OUTPATIENT
Start: 2017-01-01 | End: 2017-01-01 | Stop reason: HOSPADM

## 2017-01-01 RX ORDER — SILDENAFIL CITRATE 20 MG/1
40 TABLET ORAL 3 TIMES DAILY
Status: DISCONTINUED | OUTPATIENT
Start: 2017-01-01 | End: 2017-01-01 | Stop reason: HOSPADM

## 2017-01-01 RX ORDER — HYDRALAZINE HYDROCHLORIDE 20 MG/ML
10-20 INJECTION INTRAMUSCULAR; INTRAVENOUS
Status: DISCONTINUED | OUTPATIENT
Start: 2017-01-01 | End: 2017-01-01 | Stop reason: HOSPADM

## 2017-01-01 RX ADMIN — CALCIUM ACETATE 1334 MG: 667 CAPSULE ORAL at 07:52

## 2017-01-01 RX ADMIN — MIDODRINE HYDROCHLORIDE 15 MG: 5 TABLET ORAL at 09:51

## 2017-01-01 RX ADMIN — MIDODRINE HYDROCHLORIDE 10 MG: 5 TABLET ORAL at 18:57

## 2017-01-01 RX ADMIN — CALCIUM ACETATE 1334 MG: 667 CAPSULE ORAL at 08:43

## 2017-01-01 RX ADMIN — MIDODRINE HYDROCHLORIDE 20 MG: 5 TABLET ORAL at 12:30

## 2017-01-01 RX ADMIN — LIDOCAINE HYDROCHLORIDE 30 ML: 20 SOLUTION ORAL; TOPICAL at 21:58

## 2017-01-01 RX ADMIN — Medication: at 13:52

## 2017-01-01 RX ADMIN — ALBUMIN HUMAN 12.5 G: 0.25 SOLUTION INTRAVENOUS at 13:01

## 2017-01-01 RX ADMIN — SODIUM CHLORIDE 1000 ML: 9 INJECTION, SOLUTION INTRAVENOUS at 09:35

## 2017-01-01 RX ADMIN — LIDOCAINE AND PRILOCAINE: 25; 25 CREAM TOPICAL at 10:54

## 2017-01-01 RX ADMIN — ALBUMIN HUMAN 12.5 G: 0.25 SOLUTION INTRAVENOUS at 15:00

## 2017-01-01 RX ADMIN — ALBUMIN HUMAN 12.5 G: 0.25 SOLUTION INTRAVENOUS at 10:46

## 2017-01-01 RX ADMIN — MIDODRINE HYDROCHLORIDE 20 MG: 5 TABLET ORAL at 08:43

## 2017-01-01 RX ADMIN — LIDOCAINE HYDROCHLORIDE 20 ML: 10 INJECTION, SOLUTION INFILTRATION; PERINEURAL at 14:55

## 2017-01-01 RX ADMIN — LORATADINE 10 MG: 10 TABLET ORAL at 07:52

## 2017-01-01 RX ADMIN — CALCIUM ACETATE 1334 MG: 667 CAPSULE ORAL at 16:33

## 2017-01-01 RX ADMIN — SILDENAFIL 40 MG: 20 TABLET ORAL at 14:28

## 2017-01-01 RX ADMIN — ALBUMIN HUMAN 25 G: 0.25 SOLUTION INTRAVENOUS at 10:35

## 2017-01-01 RX ADMIN — ALBUMIN HUMAN 12.5 G: 0.25 SOLUTION INTRAVENOUS at 10:37

## 2017-01-01 RX ADMIN — ALBUMIN HUMAN 12.5 G: 0.25 SOLUTION INTRAVENOUS at 12:06

## 2017-01-01 RX ADMIN — SILDENAFIL 40 MG: 20 TABLET ORAL at 09:54

## 2017-01-01 RX ADMIN — BOSENTAN 125 MG: 125 TABLET, FILM COATED ORAL at 20:40

## 2017-01-01 RX ADMIN — OMEPRAZOLE 40 MG: 20 CAPSULE, DELAYED RELEASE ORAL at 09:53

## 2017-01-01 RX ADMIN — BOSENTAN 125 MG: 125 TABLET, FILM COATED ORAL at 01:10

## 2017-01-01 RX ADMIN — Medication 60 MG: at 07:52

## 2017-01-01 RX ADMIN — OMEPRAZOLE 40 MG: 20 CAPSULE, DELAYED RELEASE ORAL at 07:51

## 2017-01-01 RX ADMIN — SILDENAFIL 40 MG: 20 TABLET ORAL at 13:38

## 2017-01-01 RX ADMIN — BOSENTAN 125 MG: 125 TABLET, FILM COATED ORAL at 10:30

## 2017-01-01 RX ADMIN — SODIUM CHLORIDE 250 ML: 9 INJECTION, SOLUTION INTRAVENOUS at 11:48

## 2017-01-01 RX ADMIN — LORATADINE 10 MG: 10 TABLET ORAL at 08:45

## 2017-01-01 RX ADMIN — MIDODRINE HYDROCHLORIDE 10 MG: 5 TABLET ORAL at 16:33

## 2017-01-01 RX ADMIN — Medication 60 MG: at 17:09

## 2017-01-01 RX ADMIN — CALCIUM ACETATE 1334 MG: 667 CAPSULE ORAL at 14:30

## 2017-01-01 RX ADMIN — ALBUMIN HUMAN 12.5 G: 0.25 SOLUTION INTRAVENOUS at 14:58

## 2017-01-01 RX ADMIN — ALBUMIN HUMAN 12.5 G: 0.25 SOLUTION INTRAVENOUS at 12:16

## 2017-01-01 RX ADMIN — ALBUMIN HUMAN 12.5 G: 0.25 SOLUTION INTRAVENOUS at 15:04

## 2017-01-01 RX ADMIN — LIDOCAINE HYDROCHLORIDE 20 ML: 10 INJECTION, SOLUTION INFILTRATION; PERINEURAL at 10:27

## 2017-01-01 RX ADMIN — LIDOCAINE HYDROCHLORIDE 0.5 ML: 10 INJECTION, SOLUTION EPIDURAL; INFILTRATION; INTRACAUDAL; PERINEURAL at 09:30

## 2017-01-01 RX ADMIN — LIDOCAINE HYDROCHLORIDE 20 ML: 10 INJECTION, SOLUTION INFILTRATION; PERINEURAL at 15:19

## 2017-01-01 RX ADMIN — LIDOCAINE HYDROCHLORIDE 0.5 ML: 10 INJECTION, SOLUTION EPIDURAL; INFILTRATION; INTRACAUDAL; PERINEURAL at 21:30

## 2017-01-01 RX ADMIN — ALBUMIN HUMAN 12.5 G: 0.25 SOLUTION INTRAVENOUS at 08:59

## 2017-01-01 RX ADMIN — LIDOCAINE HYDROCHLORIDE 20 ML: 10 INJECTION, SOLUTION INFILTRATION; PERINEURAL at 15:11

## 2017-01-01 RX ADMIN — BOSENTAN 125 MG: 125 TABLET, FILM COATED ORAL at 00:10

## 2017-01-01 RX ADMIN — CALCIUM ACETATE 1334 MG: 667 CAPSULE ORAL at 08:45

## 2017-01-01 RX ADMIN — BOSENTAN 125 MG: 125 TABLET, FILM COATED ORAL at 07:52

## 2017-01-01 RX ADMIN — IOPAMIDOL 60 ML: 755 INJECTION, SOLUTION INTRAVASCULAR at 10:45

## 2017-01-01 RX ADMIN — MIDODRINE HYDROCHLORIDE 10 MG: 5 TABLET ORAL at 07:51

## 2017-01-01 RX ADMIN — MIDODRINE HYDROCHLORIDE 5 MG: 5 TABLET ORAL at 17:06

## 2017-01-01 RX ADMIN — SILDENAFIL 40 MG: 20 TABLET ORAL at 07:51

## 2017-01-01 RX ADMIN — Medication 60 MG: at 09:54

## 2017-01-01 RX ADMIN — Medication 60 MG: at 16:08

## 2017-01-01 RX ADMIN — MIDODRINE HYDROCHLORIDE 15 MG: 5 TABLET ORAL at 19:32

## 2017-01-01 RX ADMIN — BOSENTAN 125 MG: 125 TABLET, FILM COATED ORAL at 21:34

## 2017-01-01 RX ADMIN — ALBUMIN HUMAN 12.5 G: 0.25 SOLUTION INTRAVENOUS at 13:08

## 2017-01-01 RX ADMIN — MIDODRINE HYDROCHLORIDE 20 MG: 5 TABLET ORAL at 17:51

## 2017-01-01 RX ADMIN — ALBUMIN HUMAN 12.5 G: 0.25 SOLUTION INTRAVENOUS at 08:37

## 2017-01-01 RX ADMIN — ASPIRIN 81 MG: 81 TABLET, COATED ORAL at 08:45

## 2017-01-01 RX ADMIN — DOPAMINE HYDROCHLORIDE 2 MCG/KG/MIN: 160 INJECTION, SOLUTION INTRAVENOUS at 16:33

## 2017-01-01 RX ADMIN — ASPIRIN 81 MG: 81 TABLET, COATED ORAL at 07:51

## 2017-01-01 RX ADMIN — ALBUMIN HUMAN 12.5 G: 0.25 SOLUTION INTRAVENOUS at 08:30

## 2017-01-01 RX ADMIN — ALBUMIN HUMAN 12.5 G: 0.25 SOLUTION INTRAVENOUS at 15:24

## 2017-01-01 RX ADMIN — LIDOCAINE HYDROCHLORIDE 20 ML: 10 INJECTION, SOLUTION INFILTRATION; PERINEURAL at 14:50

## 2017-01-01 RX ADMIN — CALCIUM ACETATE 1334 MG: 667 CAPSULE ORAL at 08:50

## 2017-01-01 RX ADMIN — LIDOCAINE HYDROCHLORIDE 20 ML: 10 INJECTION, SOLUTION INFILTRATION; PERINEURAL at 12:49

## 2017-01-01 RX ADMIN — ALBUMIN HUMAN 12.5 G: 0.25 SOLUTION INTRAVENOUS at 15:38

## 2017-01-01 RX ADMIN — Medication 20 ML: at 12:51

## 2017-01-01 RX ADMIN — SODIUM CHLORIDE 250 ML: 9 INJECTION, SOLUTION INTRAVENOUS at 21:29

## 2017-01-01 RX ADMIN — BOSENTAN 125 MG: 125 TABLET, FILM COATED ORAL at 07:53

## 2017-01-01 RX ADMIN — MIDODRINE HYDROCHLORIDE 5 MG: 5 TABLET ORAL at 14:28

## 2017-01-01 RX ADMIN — ALBUMIN HUMAN 37.5 G: 0.25 SOLUTION INTRAVENOUS at 15:18

## 2017-01-01 RX ADMIN — FENTANYL CITRATE 25 MCG: 50 INJECTION, SOLUTION INTRAMUSCULAR; INTRAVENOUS at 09:37

## 2017-01-01 RX ADMIN — LORATADINE 10 MG: 10 TABLET ORAL at 07:51

## 2017-01-01 RX ADMIN — MIDODRINE HYDROCHLORIDE 15 MG: 5 TABLET ORAL at 14:48

## 2017-01-01 RX ADMIN — SODIUM CHLORIDE 250 ML: 9 INJECTION, SOLUTION INTRAVENOUS at 09:35

## 2017-01-01 RX ADMIN — ALBUMIN HUMAN 12.5 G: 0.25 SOLUTION INTRAVENOUS at 12:28

## 2017-01-01 RX ADMIN — CALCIUM ACETATE 1334 MG: 667 CAPSULE ORAL at 20:04

## 2017-01-01 RX ADMIN — LIDOCAINE AND PRILOCAINE: 25; 25 CREAM TOPICAL at 12:30

## 2017-01-01 RX ADMIN — MIDODRINE HYDROCHLORIDE 15 MG: 5 TABLET ORAL at 17:38

## 2017-01-01 RX ADMIN — Medication 1500 UNITS: at 09:38

## 2017-01-01 RX ADMIN — ASPIRIN 81 MG: 81 TABLET, COATED ORAL at 07:52

## 2017-01-01 RX ADMIN — SILDENAFIL 40 MG: 20 TABLET ORAL at 20:09

## 2017-01-01 RX ADMIN — OMEPRAZOLE 40 MG: 20 CAPSULE, DELAYED RELEASE ORAL at 08:49

## 2017-01-01 RX ADMIN — LORATADINE 10 MG: 10 TABLET ORAL at 09:53

## 2017-01-01 RX ADMIN — ALBUMIN HUMAN 12.5 G: 0.25 SOLUTION INTRAVENOUS at 15:18

## 2017-01-01 RX ADMIN — SILDENAFIL 40 MG: 20 TABLET ORAL at 20:17

## 2017-01-01 RX ADMIN — SILDENAFIL 40 MG: 20 TABLET ORAL at 20:40

## 2017-01-01 RX ADMIN — MIDODRINE HYDROCHLORIDE 10 MG: 5 TABLET ORAL at 14:30

## 2017-01-01 RX ADMIN — ALBUMIN HUMAN 25 G: 0.25 SOLUTION INTRAVENOUS at 15:11

## 2017-01-01 RX ADMIN — OMEPRAZOLE 40 MG: 20 CAPSULE, DELAYED RELEASE ORAL at 07:53

## 2017-01-01 RX ADMIN — ALBUMIN HUMAN 12.5 G: 0.25 SOLUTION INTRAVENOUS at 12:53

## 2017-01-01 RX ADMIN — MIDODRINE HYDROCHLORIDE 15 MG: 5 TABLET ORAL at 12:26

## 2017-01-01 RX ADMIN — LORATADINE 10 MG: 10 TABLET ORAL at 08:43

## 2017-01-01 RX ADMIN — SODIUM CHLORIDE 250 ML: 9 INJECTION, SOLUTION INTRAVENOUS at 13:52

## 2017-01-01 RX ADMIN — Medication: at 21:30

## 2017-01-01 RX ADMIN — BOSENTAN 125 MG: 125 TABLET, FILM COATED ORAL at 21:14

## 2017-01-01 RX ADMIN — ALBUMIN HUMAN 12.5 G: 0.25 SOLUTION INTRAVENOUS at 08:48

## 2017-01-01 RX ADMIN — SODIUM CHLORIDE 250 ML: 9 INJECTION, SOLUTION INTRAVENOUS at 23:57

## 2017-01-01 RX ADMIN — ALBUMIN HUMAN 37.5 G: 0.25 SOLUTION INTRAVENOUS at 14:55

## 2017-01-01 RX ADMIN — ALBUMIN HUMAN 12.5 G: 0.25 SOLUTION INTRAVENOUS at 10:31

## 2017-01-01 RX ADMIN — HYDROXYZINE HYDROCHLORIDE 25 MG: 25 TABLET ORAL at 23:38

## 2017-01-01 RX ADMIN — LIDOCAINE HYDROCHLORIDE 20 ML: 10 INJECTION, SOLUTION INFILTRATION; PERINEURAL at 10:35

## 2017-01-01 RX ADMIN — ALBUMIN HUMAN 12.5 G: 0.25 SOLUTION INTRAVENOUS at 13:00

## 2017-01-01 RX ADMIN — MIDODRINE HYDROCHLORIDE 15 MG: 5 TABLET ORAL at 07:52

## 2017-01-01 RX ADMIN — SILDENAFIL 40 MG: 20 TABLET ORAL at 14:48

## 2017-01-01 RX ADMIN — MIDODRINE HYDROCHLORIDE 15 MG: 5 TABLET ORAL at 20:04

## 2017-01-01 RX ADMIN — OMEPRAZOLE 40 MG: 20 CAPSULE, DELAYED RELEASE ORAL at 08:45

## 2017-01-01 RX ADMIN — ASPIRIN 81 MG: 81 TABLET, COATED ORAL at 10:04

## 2017-01-01 RX ADMIN — HYDROXYZINE HYDROCHLORIDE 50 MG: 25 TABLET ORAL at 15:28

## 2017-01-01 RX ADMIN — LIDOCAINE HYDROCHLORIDE 20 ML: 10 INJECTION, SOLUTION INFILTRATION; PERINEURAL at 15:13

## 2017-01-01 RX ADMIN — NOREPINEPHRINE BITARTRATE 0.03 MCG/KG/MIN: 1 INJECTION INTRAVENOUS at 01:34

## 2017-01-01 RX ADMIN — ASPIRIN 81 MG: 81 TABLET, COATED ORAL at 08:43

## 2017-01-01 RX ADMIN — Medication 60 MG: at 18:33

## 2017-01-01 RX ADMIN — CEFTRIAXONE 1 G: 1 INJECTION, POWDER, FOR SOLUTION INTRAMUSCULAR; INTRAVENOUS at 02:08

## 2017-01-01 RX ADMIN — SODIUM CHLORIDE 2000 ML: 9 INJECTION, SOLUTION INTRAVENOUS at 13:52

## 2017-01-01 RX ADMIN — Medication 60 MG: at 08:45

## 2017-01-01 RX ADMIN — ALBUMIN HUMAN 12.5 G: 0.25 SOLUTION INTRAVENOUS at 15:46

## 2017-01-01 RX ADMIN — SILDENAFIL 40 MG: 20 TABLET ORAL at 08:43

## 2017-01-01 RX ADMIN — Medication 60 MG: at 14:31

## 2017-01-01 RX ADMIN — CALCIUM ACETATE 1334 MG: 667 CAPSULE ORAL at 09:52

## 2017-01-01 RX ADMIN — BOSENTAN 125 MG: 125 TABLET, FILM COATED ORAL at 09:57

## 2017-01-01 RX ADMIN — CALCIUM ACETATE 1334 MG: 667 CAPSULE ORAL at 18:56

## 2017-01-01 RX ADMIN — BOSENTAN 125 MG: 125 TABLET, FILM COATED ORAL at 10:20

## 2017-01-01 RX ADMIN — LIDOCAINE HYDROCHLORIDE 20 ML: 10 INJECTION, SOLUTION INFILTRATION; PERINEURAL at 08:20

## 2017-01-01 RX ADMIN — CALCIUM ACETATE 1334 MG: 667 CAPSULE ORAL at 12:30

## 2017-01-01 RX ADMIN — SILDENAFIL 40 MG: 20 TABLET ORAL at 14:31

## 2017-01-01 RX ADMIN — SODIUM CHLORIDE 1000 ML: 9 INJECTION, SOLUTION INTRAVENOUS at 21:29

## 2017-01-01 RX ADMIN — ALBUMIN HUMAN 12.5 G: 0.25 SOLUTION INTRAVENOUS at 15:11

## 2017-01-01 RX ADMIN — AZITHROMYCIN MONOHYDRATE 500 MG: 500 INJECTION, POWDER, LYOPHILIZED, FOR SOLUTION INTRAVENOUS at 03:07

## 2017-01-01 RX ADMIN — MIDODRINE HYDROCHLORIDE 10 MG: 5 TABLET ORAL at 08:45

## 2017-01-01 RX ADMIN — MIDAZOLAM HYDROCHLORIDE 0.5 MG: 1 INJECTION, SOLUTION INTRAMUSCULAR; INTRAVENOUS at 09:37

## 2017-01-01 RX ADMIN — SILDENAFIL 40 MG: 20 TABLET ORAL at 07:53

## 2017-01-01 ASSESSMENT — ACTIVITIES OF DAILY LIVING (ADL)
RETIRED_COMMUNICATION: 0-->UNDERSTANDS/COMMUNICATES WITHOUT DIFFICULTY
SWALLOWING: 0-->SWALLOWS FOODS/LIQUIDS WITHOUT DIFFICULTY
RETIRED_EATING: 0-->INDEPENDENT
TRANSFERRING: 0-->INDEPENDENT
COGNITION: 0 - NO COGNITION ISSUES REPORTED
DRESS: 0-->INDEPENDENT
FALL_HISTORY_WITHIN_LAST_SIX_MONTHS: NO
TOILETING: 0-->INDEPENDENT
AMBULATION: 0-->INDEPENDENT
BATHING: 0-->INDEPENDENT

## 2017-01-01 ASSESSMENT — PAIN DESCRIPTION - DESCRIPTORS
DESCRIPTORS: SORE
DESCRIPTORS: SORE

## 2017-01-01 ASSESSMENT — ENCOUNTER SYMPTOMS
FEVER: 0
WHEEZING: 0
CONSTIPATION: 0
NECK PAIN: 1
FEVER: 0
ABDOMINAL PAIN: 0
ARTHRALGIAS: 0
BACK PAIN: 0
DYSURIA: 0
COUGH: 0
CONFUSION: 1
WEAKNESS: 0
NERVOUS/ANXIOUS: 0
COLOR CHANGE: 0
WEAKNESS: 1
ARTHRALGIAS: 0
PANIC: 0
COUGH: 0
COUGH: 0
COLOR CHANGE: 0
SHORTNESS OF BREATH: 1
CHILLS: 1
NAUSEA: 0
TINGLING: 0
NECK PAIN: 0
POSTURAL DYSPNEA: 1
PALPITATIONS: 0
SHORTNESS OF BREATH: 0
FEVER: 0
NECK STIFFNESS: 0
COLOR CHANGE: 0
INSOMNIA: 1
NAUSEA: 0
EYE PAIN: 0
SHORTNESS OF BREATH: 0
WEAKNESS: 1
DEPRESSION: 0
TROUBLE SWALLOWING: 0
ABDOMINAL PAIN: 0
FEVER: 0
HEADACHES: 0
NEUROLOGICAL NEGATIVE: 1
WEIGHT GAIN: 0
EYE REDNESS: 0
SORE THROAT: 0
CHILLS: 0
ABDOMINAL PAIN: 0
HEMATURIA: 0
PALPITATIONS: 0
DYSPNEA ON EXERTION: 1
ABDOMINAL PAIN: 0
VOMITING: 0
HEADACHES: 0
DIAPHORESIS: 1
CHILLS: 0
FREQUENCY: 0
MYALGIAS: 0
LEG SWELLING: 1
SHORTNESS OF BREATH: 0
WEIGHT LOSS: 0
VOMITING: 0
FEVER: 0
BLOOD IN STOOL: 0
SLEEP DISTURBANCE: 1
POLYDIPSIA: 0
DIARRHEA: 0
LIGHT-HEADEDNESS: 1
HEADACHES: 1
VOMITING: 0
ORTHOPNEA: 1
DECREASED CONCENTRATION: 0
DIFFICULTY URINATING: 0
VOMITING: 0
COUGH: 0
FATIGUE: 0
NUMBNESS: 0
PALPITATIONS: 0

## 2017-01-01 ASSESSMENT — PAIN SCALES - GENERAL
PAINLEVEL: EXTREME PAIN (8)
PAINLEVEL: NO PAIN (0)

## 2017-01-12 NOTE — PATIENT INSTRUCTIONS
DISCHARGE INSTRUCTIONS FOLLOWING ABDOMINAL PARACENTESIS    After you go home:    No strenuous activity for 24 hours    Resume your regular diet    Limit fluid intake for the first 48 hours to no more than 2 quarts per day.  There should be minimal drainage from the needle site.  If drainage does occur and soaks through the bandage, apply gentle pressure with your hand for 5 minutes.    Notify MD for the following:    Excessive drainage    Excessive swelling, redness or tenderness at the needle site    Fever greater than 101 degrees F    Dizziness or light-headedness when getting up or walking      IF THIS IS A MEDICAL EMERGENCY, CALL [phone]    If you have any questions or concerns    Contact the Hepatology Clinic:   [phone]    If you are a post-transplant patient, contact the Transplant Office:  [phone]    If this is after hours, contact the hospital :  [phone] and as to have the GI resident on call paged                   I have received and understand my discharge instructions and I have all of my personal belongings.          ------------------------------------------------------        ---------------------------------------------------    Patient / Significant Other's Signature     Relationship
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
## 2017-01-12 NOTE — ED AVS SNAPSHOT
[facility], [place], Emergency Department    [address] [place] [address]    Phone:  [phone]                                       Jayy Gill   MRN: [id number]    Department:  [facility], Emergency Department   Date of Visit:  1/12/2017           After Visit Summary Signature Page     I have received my discharge instructions, and my questions have been answered. I have discussed any challenges I see with this plan with the nurse or doctor.    ..........................................................................................................................................  Patient/Patient Representative Signature      ..........................................................................................................................................  Patient Representative Print Name and Relationship to Patient    ..................................................               ................................................  Date                                            Time    ..........................................................................................................................................  Reviewed by Signature/Title    ...................................................              ..............................................  Date                                                            Time

## 2017-01-12 NOTE — PROGRESS NOTES
Paracentesis Nursing Note  Jay[facility] Gill [facility].    During today's appointment orders from Cory Fuentes NP were completed.    Progress Note:  Patient identification verified by name and date of birth.  Assessment completed.  Vitals monitored throughout appointment and recorded in Doc Flowsheets.  See proceduralist note in ultrasound.    Date of consent or authorization: 11/11/2016.  Invasive Procedure Safety Checklist was completed and sent for scanning.     Paracentesis performed by Alton Wick PA-C Radiology.    The following labs were communicated to provider performing paracentesis:  PLT      153   1/12/2017    Total amount of ascites fluid drained: 2.4 liters.  Color of ascites fluid: yellow.  Total amount of albumin given: 37.5  grams.    Patient tolerated procedure well.    Post procedure,denies pain or discomfort post paracentesis.      Discharge Plan:  Discharge instructions were reviewed with patient.  Patient/Representative verbalized understanding and all questions were answered.   Discharged from [facility] in stable condition.    Alba Duran RN    Administrations This Visit     albumin human 25 % injection 12.5 g     Admin Date Action Dose Route Administered By             01/12/2017 New Bag 50 g Intravenous Alba Duran RN                    lidocaine BUFFERED 1 % injection 20 mL     Admin Date Action Dose Route Administered By             01/12/2017 Given by Other 20 mL Injection Alba Duran RN                          BP 95/58 mmHg  Pulse 61  Temp(Src) 98  F (36.7  C) (Tympanic)  Resp 16  Wt 74.662 kg (164 lb 9.6 oz)
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
## 2017-01-12 NOTE — PROGRESS NOTES
Procedure date: 1/12/2017:    1. Ultrasound guided therapeutic paracentesis (CSC).    Preop diagnosis: Ascites.    Postop diagnosis: Same.    Proceduralist: Oniel Wick PA-C    Assist: None.    Attending on duty: Delmar Lomax MD    Medications: No intravenous sedation was administered. 1% buffered lidocaine, 10 cc.  37.5 grams 25% albumin IV.     Nursing: The patient was placed on continuous monitoring. The patient remained stable throughout the procedure.    PROCEDURE: The patient understood the limitations, alternatives, and risks of the procedure and requested the procedure be performed. Both written and oral consent were obtained.    The patient was identified by name and date of birth, and placed in the recumbent position. The right lower quadrant was prepped and draped in the usual sterile fashion. Ultrasound evaluation revealed a moderate fluid collection at this location, and an image was saved. The site overlying the fluid was anesthetized with 1% buffered lidocaine. Under ultrasound guidance, a 5 Turkmen, 15 cm. Pigtail Yueh catheter was advanced into the abdominal space, and an image was saved. Syringe aspiration revealed initial return of clear serous fluid. Catheter to vacuum drainage, with 2400 cc ascites aspirated. Repeat ultrasound revealed no ascites remaining. Catheter removed. Sterile dressing applied. Images were saved throughout the procedure. Procedure was well tolerated, with no immediate complications.    Estimate blood loss: Less than 1 cc.    Specimens: None.    IMPRESSION: Ultrasound guided therapeutic paracentesis. Total of  2400 cc of clear serous ascites aspirated.    PLAN: Followup per primary team.
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
## 2017-01-12 NOTE — MR AVS SNAPSHOT
After Visit Summary   1/12/2017    Jayy Gill    MRN: [id number]           Patient Information     Date Of Birth          1970        Visit Information        Provider Department      1/12/2017 2:00 PM Provider, Herminia Spec Inf Para; HERMINIA 40 ATC [facility] Specialty and Procedure        Today's Diagnoses     Chronic congestive heart failure, unspecified congestive heart failure type (H)    -  1     Pulmonary hypertension (H)           Care Instructions    DISCHARGE INSTRUCTIONS FOLLOWING ABDOMINAL PARACENTESIS    After you go home:    No strenuous activity for 24 hours    Resume your regular diet    Limit fluid intake for the first 48 hours to no more than 2 quarts per day.  There should be minimal drainage from the needle site.  If drainage does occur and soaks through the bandage, apply gentle pressure with your hand for 5 minutes.    Notify MD for the following:    Excessive drainage    Excessive swelling, redness or tenderness at the needle site    Fever greater than 101 degrees F    Dizziness or light-headedness when getting up or walking      IF THIS IS A MEDICAL EMERGENCY, CALL 911    If you have any questions or concerns    Contact the Hepatology Clinic:   [phone]    If you are a post-transplant patient, contact the Transplant Office:  [phone]    If this is after hours, contact the hospital :  [phone] and as to have the GI resident on call paged                   I have received and understand my discharge instructions and I have all of my personal belongings.          ------------------------------------------------------        ---------------------------------------------------    Patient / Significant Other's Signature     Relationship          Follow-ups after your visit        Your next 10 appointments already scheduled     Jan 19, 2017 12:30 PM   Lab with  LAB    [facility] ([facility] [place])    [address] [place] [address]  [address] [facility] [address]   [phone]            Jan 19, 2017  1:00 PM   (Arrive by 12:45 PM)   RETURN PRIMARY PULMONARY with Tyrone Blank MD   [facility] ([facility])    [address]  3rd Floor  [facility] [address]   [phone]            Feb 02, 2017  2:00 PM   Paracentesis Visit with Uc Spec Inf Para Provider, UC 39 ATC   [facility] Specialty and Procedure ([facility])    [address]  2nd Floor  [facility] [address]   [phone]            Feb 23, 2017  2:00 PM   Paracentesis Visit with Uc Spec Inf Para Provider, UC 39 ATC   [facility] Specialty and Procedure ([facility])    [address]  [address] [facility] [address]   [phone]            Mar 16, 2017  2:00 PM   Paracentesis Visit with Uc Spec Inf Para Provider, UC 39 ATC   [facility] Specialty and Procedure ([facility])    [address]  [address] [facility] [address] [phone]              Who to contact     If you have questions or need follow up information about today's clinic visit or your schedule please contact [facility] SPECIALTY AND PROCEDURE directly at [phone].  Normal or non-critical lab and imaging results will be communicated to you by MyChart, letter or phone within 4 business days after the clinic has received the results. If you do not hear from us within 7 days, please contact the clinic through [employer] or phone. If you have a critical or abnormal lab result, we will notify you by phone as soon as possible.  Submit refill requests through [employer] or call your pharmacy and they will forward the refill request to us. Please allow 3 business days for your refill to be completed.          Additional Information About Your Visit        [facility]  "Information     [employer] lets you send messages to your doctor, view your test results, renew your prescriptions, schedule appointments and more. To sign up, go to [email or web address] . Click on \"Log in\" on the left side of the screen, which will take you to the Welcome page. Then click on \"Sign up Now\" on the right side of the page.     You will be asked to enter the access code listed below, as well as some personal information. Please follow the directions to create your username and password.     Your access code is: [email or web address]  Expires: 3/27/2017  6:30 AM     Your access code will  in 90 days. If you need help or a new code, please call your [facility] clinic or [phone].        Care EveryWhere ID     This is your Care EveryWhere ID. This could be used by other organizations to access your [facility] medical records  [id number]        Your Vitals Were     Pulse Temperature Respirations             61 98  F (36.7  C) (Tympanic) 16          Blood Pressure from Last 3 Encounters:   17 95/58   16 100/68   16 88/64    Weight from Last 3 Encounters:   17 74.662 kg (164 lb 9.6 oz)   16 71.442 kg (157 lb 8 oz)   16 73.982 kg (163 lb 1.6 oz)              We Performed the Following     CBC with platelets     Hepatic panel     Treatment Conditions     US Paracentesis        Primary Care Provider Office Phone # Fax #    Milly Eve Healy [phone]       [address]  [facility] [address]        Thank you!     Thank you for choosing [facility] SPECIALTY AND PROCEDURE  for your care. Our goal is always to provide you with excellent care. Hearing back from our patients is one way we can continue to improve our services. Please take a few minutes to complete the written survey that you may receive in the mail after your visit with us. Thank you!             Your Updated Medication List - Protect others " around you: Learn how to safely use, store and throw away your medicines at www.disposemymeds.org.          This list is accurate as of: 1/12/17  3:07 PM.  Always use your most recent med list.                   Brand Name Dispense Instructions for use    amLODIPine 5 MG tablet    NORVASC     10 mg daily       aspirin 81 MG tablet      Take 81 mg by mouth daily       bosentan 125 MG tablet    TRACLEER    60 tablet    Take 1 tablet (125 mg) by mouth 2 times daily       carvedilol 25 MG tablet    COREG    180 tablet    Take 1 tablet (25 mg) by mouth 2 times daily       midodrine HCl 10 MG Tabs      Take 10 mg by mouth As needed       omeprazole 40 MG capsule    priLOSEC    90 capsule    Take 1 capsule (40 mg) by mouth daily Take 30-60 minutes before a meal.       paricalcitol 5 MCG/ML injection    ZEMPLAR     2 mcg 2 mcg by IV Push route dialysis.       PHOSLO 667 MG Caps capsule   Generic drug:  calcium acetate     180 capsule    Take 2 capsules (1,334 mg) by mouth 3 times daily (with meals)       pravastatin 40 MG tablet    PRAVACHOL     Take 40 mg by mouth Take 1 tablet (40 mg) by mouth at bedtime.       RENAL 1 MG Caps      Take 1 mg by mouth Take 1 capsule (1 mg) by mouth daily in the afternoon.  Indications: Nutritional Support       sildenafil 20 MG tablet    REVATIO/VIAGRA    180 tablet    Take 2 tablets (40 mg) by mouth 3 times daily       torsemide 20 MG tablet    DEMADEX    90 tablet    Take 3 tablets (60 mg) by mouth 2 times daily

## 2017-01-15 NOTE — ED AVS SNAPSHOT
[facility], Emergency Department    [address] [address]    Phone:  [phone]                                       Jayy Gill   MRN: [id number]    Department:  [facility], Emergency Department   Date of Visit:  1/15/2017           Patient Information     Date Of Birth          1970        Your diagnoses for this visit were:     Other pruritus        You were seen by Citlaly Arias MD.        Discharge Instructions       Take Hydroxyzine as needed for itching. Also use Eucerin Cream (think cream that comes in a tub with a screw cap) and hydrocortisone ointment and apply to itchy areas as needed.     Please make an appointment to follow up with Your Primary Care Provider in 5 days if not improving. Return to the Emergency Department if new or worsening symptoms.         Future Appointments        Provider Department Dept Phone Center    1/19/2017 12:00 PM Cory Fuentes NP [facility] Hepatology [phone] [facility]    1/19/2017 12:30 PM Lab [facility] Lab [phone] [facility]    1/19/2017 1:00 PM Tyrone Blank MD [facility] [phone] [facility]    2/2/2017 2:00 PM Specialty Infusion Paracentesis Provider; [facility] Specialty and Procedure [phone] [facility]    2/23/2017 2:00 PM Specialty Infusion Paracentesis Provider; [facility] Specialty and Procedure [phone] [facility]    3/16/2017 2:00 PM Specialty Infusion Paracentesis Provider; [facility] Specialty and Procedure [phone] [facility]      24 Hour Appointment Hotline       To make an appointment at any [facility] call [phone] ([phone]). If you don't have a family doctor or clinic, we will help you find one. [facility] are conveniently located to serve the needs of you and your family.             Review of your medicines       START taking        Dose / Directions Last dose taken    hydrOXYzine 25 MG capsule   Commonly known as:  VISTARIL   Dose:  50 mg   Quantity:  120 capsule        Take 2 capsules (50 mg) by mouth 3 times daily as needed for itching   Refills:  0          Our records show that you are taking the medicines listed below. If these are incorrect, please call your family doctor or clinic.        Dose / Directions Last dose taken    aspirin 81 MG tablet   Dose:  81 mg        Take 81 mg by mouth daily   Refills:  0        bosentan 125 MG tablet   Commonly known as:  TRACLEER   Dose:  125 mg   Quantity:  60 tablet        Take 1 tablet (125 mg) by mouth 2 times daily   Refills:  11        carvedilol 25 MG tablet   Commonly known as:  COREG   Dose:  25 mg   Quantity:  180 tablet        Take 1 tablet (25 mg) by mouth 2 times daily   Refills:  2        cyclobenzaprine 5 MG tablet   Commonly known as:  FLEXERIL   Dose:  5 mg   Quantity:  15 tablet        Take 1 tablet (5 mg) by mouth 3 times daily as needed for muscle spasms   Refills:  0        midodrine HCl 10 MG Tabs   Dose:  10 mg        Take 10 mg by mouth As needed   Refills:  0        omeprazole 40 MG capsule   Commonly known as:  priLOSEC   Dose:  40 mg   Quantity:  90 capsule        Take 1 capsule (40 mg) by mouth daily Take 30-60 minutes before a meal.   Refills:  3        paricalcitol 5 MCG/ML injection   Commonly known as:  ZEMPLAR   Dose:  2 mcg        2 mcg 2 mcg by IV Push route dialysis.   Refills:  0        PHOSLO 667 MG Caps capsule   Dose:  1334 mg   Quantity:  180 capsule   Generic drug:  calcium acetate        Take 2 capsules (1,334 mg) by mouth 3 times daily (with meals)   Refills:  0        pravastatin 40 MG tablet   Commonly known as:  PRAVACHOL   Dose:  40 mg        Take 40 mg by mouth Take 1 tablet (40 mg) by mouth at bedtime.   Refills:  0        RENAL 1 MG Caps   Dose:  1 mg        Take 1 mg by mouth Take 1 capsule (1 mg) by mouth daily in the  "afternoon.  Indications: Nutritional Support   Refills:  0        sildenafil 20 MG tablet   Commonly known as:  REVATIO/VIAGRA   Dose:  40 mg   Quantity:  180 tablet        Take 2 tablets (40 mg) by mouth 3 times daily   Refills:  11        torsemide 20 MG tablet   Commonly known as:  DEMADEX   Dose:  60 mg   Quantity:  90 tablet        Take 3 tablets (60 mg) by mouth 2 times daily   Refills:  3                Prescriptions were sent or printed at these locations (1 Prescription)                   Other Prescriptions                Printed at Department/Unit printer (1 of 1)         hydrOXYzine (VISTARIL) 25 MG capsule                Procedures and tests performed during your visit     CBC with platelets differential    Comprehensive metabolic panel    INR      Orders Needing Specimen Collection     None      Pending Results     No orders found from 2017 to 2017.            Pending Culture Results     No orders found from 2017 to 2017.            Thank you for choosing [facility]       Thank you for choosing [facility] for your care. Our goal is always to provide you with excellent care. Hearing back from our patients is one way we can continue to improve our services. Please take a few minutes to complete the written survey that you may receive in the mail after you visit with us. Thank you!        [employer] Information     [employer] lets you send messages to your doctor, view your test results, renew your prescriptions, schedule appointments and more. To sign up, go to [email or web address] . Click on \"Log in\" on the left side of the screen, which will take you to the Welcome page. Then click on \"Sign up Now\" on the right side of the page.     You will be asked to enter the access code listed below, as well as some personal information. Please follow the directions to create your username and password.     Your access code is: [email or web address]  Expires: 3/27/2017  6:30 AM     Your access code will  " in 90 days. If you need help or a new code, please call your [facility] clinic or [phone].        Care EveryWhere ID     This is your Care EveryWhere ID. This could be used by other organizations to access your [facility] medical records  [id number]        After Visit Summary       This is your record. Keep this with you and show to your community pharmacist(s) and doctor(s) at your next visit.

## 2017-01-15 NOTE — ED NOTES
"Jayy comes in for itching everywhere that is making him cry and be restless. He has no apparent rash and denies pain but tells me, \"I need to be able to sleep because this is driving me crazy.\" His girlfriend tells me, \"Benedryl makes him itch more. I did give him zyrtec today but it is not helping. What about hydroxyzine?\"  "

## 2017-01-15 NOTE — DISCHARGE INSTRUCTIONS
Take Hydroxyzine as needed for itching. Also use Eucerin Cream (think cream that comes in a tub with a screw cap) and hydrocortisone ointment and apply to itchy areas as needed.     Please make an appointment to follow up with Your Primary Care Provider in 5 days if not improving. Return to the Emergency Department if new or worsening symptoms.

## 2017-01-15 NOTE — ED AVS SNAPSHOT
[facility], [place], Emergency Department    [address] [place] [address]    Phone:  [phone]                                       Jayy Gill   MRN: [id number]    Department:  [facility], Emergency Department   Date of Visit:  1/15/2017           After Visit Summary Signature Page     I have received my discharge instructions, and my questions have been answered. I have discussed any challenges I see with this plan with the nurse or doctor.    ..........................................................................................................................................  Patient/Patient Representative Signature      ..........................................................................................................................................  Patient Representative Print Name and Relationship to Patient    ..................................................               ................................................  Date                                            Time    ..........................................................................................................................................  Reviewed by Signature/Title    ...................................................              ..............................................  Date                                                            Time

## 2017-01-15 NOTE — ED NOTES
Pt took zyrtec prior to coming to see if that would help with his itching.  Pt have sores on his head, back and legs.
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
## 2017-01-19 NOTE — PATIENT INSTRUCTIONS
Try Sarna lotion for itching (apply cool, before bedtime)    Encourage really good skin hydration (lotion), and avoid ultra-hot showers.     Ok to continue Hydroxyzine for now (careful though as can cause sedation; advise don't drive shortly after taking it)    Cory Fuentes will ask her nurse Gogo Cosme to call you next week to see how your itching is and whether you need any additional medications    Abdominal ultrasound after next paracentesis    Follow up with Dr Healy for a physical in the near future, refill on Omeperazole    Follow up appointment with Cory Fuentes CNP in 3-4 months    If you have any questions or concerns about your last test results or plan of care, please call our clinic at [phone].
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
## 2017-01-19 NOTE — MR AVS SNAPSHOT
After Visit Summary   1/19/2017    Jayy Gill    MRN: [id number]           Patient Information     Date Of Birth          1970        Visit Information        Provider Department      1/19/2017 12:00 PM Cory Fuentes NP [facility] Hepatology        Today's Diagnoses     Abnormal results of liver function studies    -  1       Care Instructions    Try Sarna lotion for itching (apply cool, before bedtime)    Encourage really good skin hydration (lotion), and avoid ultra-hot showers.     Ok to continue Hydroxyzine for now (careful though as can cause sedation; advise don't drive shortly after taking it)    Cory Fuentes will ask her nurse Gogo Cosme to call you next week to see how your itching is and whether you need any additional medications    Abdominal ultrasound after next paracentesis    Follow up with Dr Healy for a physical in the near future, refill on Omeperazole    Follow up appointment with Cory Fuentes CNP in 3-4 months    If you have any questions or concerns about your last test results or plan of care, please call our clinic at [phone].           Follow-ups after your visit        Your next 10 appointments already scheduled     Jan 19, 2017  1:00 PM   (Arrive by 12:45 PM)   RETURN PRIMARY PULMONARY with Tyrone Blank MD   [facility] ([facility])    [address]  3rd Floor  [facility] [address] [phone]            Feb 02, 2017  2:00 PM   Paracentesis Visit with  Spec Inf Para Provider,  39 ATC   [facility] ([facility])    [address]  2nd Floor  [facility] [address] [phone]            Feb 02, 2017  4:00 PM   US ABDOMEN/PELVIS DUPLEX COMPLETE with UCUS3   [facility] US ([facility])    [address]  1st Floor  [facility] [address] [phone]           Please  bring a list of your medicines (including vitamins, minerals and over-the-counter drugs). Also, tell your doctor about any allergies you may have. Wear comfortable clothes and leave your valuables at home.  Adults: No eating or drinking for 8 hours before the exam. You may take medicine with a small sip of water.  Children: - Children 6+ years: No food or drink for 6 hours before exam. - Children 1-5 years: No food or drink for 4 hours before exam. - Infants, breast-fed: may have breast milk up to 2 hours before exam. - Infants, formula: may have bottle until 4 hours before exam.  Please call the Imaging Department at your exam site with any questions.            Feb 17, 2017  2:30 PM   (Arrive by 2:15 PM)   PHYSICAL with Milly Healy MD   [facility] ([facility])    [address] [place] [address]  4th [facility] [address] [phone]            Feb 23, 2017  2:00 PM   Paracentesis Visit with  Spec Inf Para Provider,  39 ATC   [facility] Specialty and Procedure ([facility])    [address] [place] [address]   [phone]            Mar 16, 2017  2:00 PM   Paracentesis Visit with  Spec Inf Para Provider,  39 ATC   [facility] Procedure ([facility])    [address] [place] [address]   [phone]            Apr 25, 2017 11:00 AM   (Arrive by 10:45 AM)   Return General Liver with Cory Fuentes NP   [facility] Hepatology ([facility])    [address] [place] [address]  3rd [facility] [address]   [phone]              Future tests that were ordered for you today     Open Future Orders        Priority Expected Expires Ordered    US Abdomen Complete w Doppler Complete Routine  1/20/2018 1/19/2017    Comprehensive metabolic panel Routine 1/19/2017 2/3/2017  "2017    N terminal pro BNP outpatient Routine 2017 2/3/2017 2017    CBC with platelets Routine 2017 2/3/2017 2017            Who to contact     If you have questions or need follow up information about today's clinic visit or your schedule please contact [facility]PATOLOG[facility] directly at [phone].  Normal or non-critical lab and imaging results will be communicated to you by [employer], letter or phone within 4 business days after the clinic has received the results. If you do not hear from us within 7 days, please contact the clinic through [employer] or phone. If you have a critical or abnormal lab result, we will notify you by phone as soon as possible.  Submit refill requests through "[employer]" or call your pharmacy and they will forward the refill request to us. Please allow 3 business days for your refill to be completed.          Additional Information About Your Visit        [employer] Information     "[employer]" lets you send messages to your doctor, view your test results, renew your prescriptions, schedule appointments and more. To sign up, go to [email or web address]" . Click on \"Log in\" on the left side of the screen, which will take you to the Welcome page. Then click on \"Sign up Now\" on the right side of the page.     You will be asked to enter the access code listed below, as well as some personal information. Please follow the directions to create your username and password.     Your access code is: [email or web address]  Expires: 3/27/2017  6:30 AM     Your access code will  in 90 days. If you need help or a new code, please call your [place] clinic or [phone].        Care EveryWhere ID     This is your Care EveryWhere ID. This could be used by other organizations to access your [place] medical records  [id number]        Your Vitals Were     Pulse Temperature Respirations Height BMI (Body Mass Index)       61 98.3  F (36.8  C) (Oral) 18 1.753 m (5' 9.02\") 24.36 kg/m2        Blood " Pressure from Last 3 Encounters:   01/19/17 92/50   01/15/17 82/59   01/12/17 98/68    Weight from Last 3 Encounters:   01/19/17 74.844 kg (165 lb)   01/12/17 73.029 kg (161 lb)   01/12/17 74.662 kg (164 lb 9.6 oz)               Primary Care Provider Office Phone # Fax #    Milly Eve Healy [phone]       [address]  [facility] [address]        Thank you!     Thank you for choosing [facility]PATOLOG[facility]  for your care. Our goal is always to provide you with excellent care. Hearing back from our patients is one way we can continue to improve our services. Please take a few minutes to complete the written survey that you may receive in the mail after your visit with us. Thank you!             Your Updated Medication List - Protect others around you: Learn how to safely use, store and throw away your medicines at www.disposemymeds.org.          This list is accurate as of: 1/19/17 12:44 PM.  Always use your most recent med list.                   Brand Name Dispense Instructions for use    aspirin 81 MG tablet      Take 81 mg by mouth daily       bosentan 125 MG tablet    TRACLEER    60 tablet    Take 1 tablet (125 mg) by mouth 2 times daily       carvedilol 25 MG tablet    COREG    180 tablet    Take 1 tablet (25 mg) by mouth 2 times daily       hydrOXYzine 25 MG capsule    VISTARIL    120 capsule    Take 2 capsules (50 mg) by mouth 3 times daily as needed for itching       midodrine HCl 10 MG Tabs      Take 10 mg by mouth As needed       omeprazole 40 MG capsule    priLOSEC    90 capsule    Take 1 capsule (40 mg) by mouth daily Take 30-60 minutes before a meal.       paricalcitol 5 MCG/ML injection    ZEMPLAR     2 mcg 2 mcg by IV Push route dialysis.       PHOSLO 667 MG Caps capsule   Generic drug:  calcium acetate     180 capsule    Take 2 capsules (1,334 mg) by mouth 3 times daily (with meals)       RENAL 1 MG Caps      Take 1 mg by mouth Take 1 capsule (1 mg)  by mouth daily in the afternoon.  Indications: Nutritional Support       sildenafil 20 MG tablet    REVATIO/VIAGRA    180 tablet    Take 2 tablets (40 mg) by mouth 3 times daily       torsemide 20 MG tablet    DEMADEX    90 tablet    Take 3 tablets (60 mg) by mouth 2 times daily

## 2017-01-19 NOTE — PROGRESS NOTES
Patient returns for follow-up and is essential unchanged. He is fatigued, particularly after dialysis.  He has no PND, orthopnea, edema, wheezing, hemoptysis.  Alert, oriented, JVP increased (non dialysis day), chest clear, no edema, regular rhythm.    I saw and examined patient and agree with fellow's note.  Tyrone Blank

## 2017-01-19 NOTE — NURSING NOTE
"Chief Complaint   Patient presents with     RECHECK     cirrhosis       Initial BP 84/46 mmHg  Pulse 61  Temp(Src) 98.3  F (36.8  C) (Oral)  Resp 18  Ht 1.753 m (5' 9.02\")  Wt 74.844 kg (165 lb)  BMI 24.36 kg/m2 Estimated body mass index is 24.36 kg/(m^2) as calculated from the following:    Height as of this encounter: 1.753 m (5' 9.02\").    Weight as of this encounter: 74.844 kg (165 lb).  BP completed using cuff size: regular    "

## 2017-01-19 NOTE — NURSING NOTE
attempted to get SpO2 on multiple fingers, low sensor quality, unable to get accurate read. Provider notified. Pt seeing cardiology later today.    DIA ADORNO CMA
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
## 2017-01-19 NOTE — PATIENT INSTRUCTIONS
Medication Changes:  No medication changes at this time. Please continue current medication regiment.     Patient Instructions:      Follow up Appointment Information:  3 months follow up    We are located on the third floor of the [facility] on the [facility].  Our address is     [address] [place] [address] on [address]rd [place] [address]      Thank you for allowing us to be a part of your care here at the [facility]    If you have questions or concerns please contact us at:    Kelsi Stover RN      Nurse Coordinator       Pulmonary Hypertension     [facility]   [phone])[phone]                ** Please note that you will NOT receive a reminder call regarding your scheduled testing, reminder calls are for provider appointments only.  If you are scheduled for testing within the [employer] Website system you may receive a call regarding pre-registration for billing purposes only.**     Remember to weigh yourself daily after voiding and before you consume any food or beverages and log the numbers.  If you have gained/lost 2 pounds overnight or 5 pounds in a week contact us immediately for medication adjustments or further instructions.

## 2017-01-19 NOTE — NURSING NOTE
Med Reconcile: Reviewed and verified all current medications with the patient. The updated medication list was printed and given to the patient.  Return Appointment: Patient given instructions regarding scheduling next clinic visit. Patient demonstrated understanding of this information and agreed to call with further questions or concerns.  Patient stated he understood all health information given and agreed to call with further questions or concerns.     Medication Changes:   No medication changes at this time. Please continue current medication regiment.   Patient Instructions:   Follow up Appointment Information:   3 months follow up

## 2017-01-26 NOTE — TELEPHONE ENCOUNTER
Pt states that he was unable to  Sarna due to the expense. But pt is still experiencing itching and like to try the Frankie. Please advise.

## 2017-01-26 NOTE — TELEPHONE ENCOUNTER
----- Message from Cory Fuentes NP sent at 1/19/2017 12:56 PM CST -----  Regarding: Call pt in 1 week  Luis Alfredo Bowens,  I was wondering if you can call this pt next Thursday at  and check and see how he is doing with itching. I advised he try Sarna and cont. Hydroxyzine. If not doing well, we may add Ursodiol.  Let me know what he says and will go from there.   Thanks,  Cory

## 2017-01-27 NOTE — TELEPHONE ENCOUNTER
[facility] pharmacy ran it through pt's insurance and it is not covered.

## 2017-01-31 NOTE — TELEPHONE ENCOUNTER
Instructed pt to begin the newscript, use lotion and avoid hot showers. Pt states that he ran out of the hydroxyzine and it was prescribed when in the hospital. Can pt get a new script? Please advise.

## 2017-01-31 NOTE — TELEPHONE ENCOUNTER
Ok, please call and let her know I submitted a prescription for Ursodiol 300 mg twice a day to help, but it can take some time. He can use lotion (ideally Sarna, buy it out of pocket), or other lotion. Use avoiding hot showers.

## 2017-02-02 NOTE — TELEPHONE ENCOUNTER
Pt stops in to state that he became dizzy when he started the ursodiol. He has stopped the medication. Pt then describes this itching as deep in his veins then more so itching topically. I asked that pt touch base with PCP until Cory hears back from Chase the pharmacist as the itching may not be associated with the liver as his bilirubin is within normal range. Pt asked that he could get enough to hold him over until he sees his PCP which will be in a couple of weeks. Informed pt that I will mention this to Cory. DALILA.

## 2017-02-02 NOTE — PROGRESS NOTES
Paracentesis Nursing Note  Jayy Gill [facility].    During today's appointment orders from Cory Fuentes NP were completed.    Progress Note:  Patient identification verified by name and date of birth.  Assessment completed.  Vitals monitored throughout appointment and recorded in Doc Flowsheets.  See proceduralist note in ultrasound.    Date of consent or authorization: 2/2/17.  Invasive Procedure Safety Checklist was completed and sent for scanning.     Paracentesis performed by Cristino Strickland PA-C Radiology.    The following labs were communicated to provider performing paracentesis:  PLT      126   1/19/2017    Total amount of ascites fluid drained: 2.1 liters.  Color of ascites fluid: yellow.  Total amount of albumin given: 25  grams.    Patient tolerated procedure well.    Post procedure,denies pain or discomfort post paracentesis.      Discharge Plan:  Discharge instructions were reviewed with patient.  Patient/Representative verbalized understanding and all questions were answered.   Discharged from [facility] in stable condition.    Delmy Garcia RN    Administrations This Visit     albumin human 25 % injection 12.5 g     Admin Date Action Dose Route Administered By             02/02/2017 New Bag 50 g Intravenous Delmy Garcia RN                    lidocaine BUFFERED 1 % injection 20 mL     Admin Date Action Dose Route Administered By             02/02/2017 Given by Other Clinician 20 mL Injection Delmy Garcia RN                          BP 97/54 mmHg  Pulse 62  Wt 71.714 kg (158 lb 1.6 oz)

## 2017-02-03 NOTE — TELEPHONE ENCOUNTER
----- Message from Cory Fuentes NP sent at 2/3/2017  2:51 PM CST -----  Regarding: Hydroxyzine/QT changes  Hi all,  Jayy has itching. Am not really sure it is from his liver (bili a whiff abnl).     He went to the ED and they gave him Hydroxyzine. I try to get him to  and try Sarna but he said he couldn't afford it, I then started him on Frankie but he stopped,said it may him dizzy. He wanted a refill on Hydroxyzine. I looked it up and see there is a contraindication if prolonged QT which he has. Personally do not feel comfort refilling.    Welcome any thoughts on this.    Thanks,  Cory

## 2017-02-03 NOTE — TELEPHONE ENCOUNTER
I called pt and informed him of Cory Fuentes's insight on the medication hydroxyzine. Due to oCry apprehension to prescribe, a message has been sent to pt's providers. I encouraged pt to contact PCP to see if he could get in sooner then the appt scheduled. Pt agreed.

## 2017-02-13 NOTE — MR AVS SNAPSHOT
After Visit Summary   2/13/2017    Jayy Gill    MRN: [id number]           Patient Information     Date Of Birth          1970        Visit Information        Provider Department      2/13/2017 1:00 PM Milly Healy MD [facility]        Today's Diagnoses     Itching    -  1    Renal failure        Thyroid nodule          Care Instructions    Primary Care Center Medication Refill Request Information:  * Please contact your pharmacy regarding ANY request for medication refills.  ** [facility] Prescription Fax = [phone]  * Please allow 3 business days for routine medication refills.  * Please allow 5 business days for controlled substance medication refills.     Primary Care Center Test Result notification information:  *You will be notified with in 7-10 days of your appointment day regarding the results of your test.  If you are on MyChart you will be notified as soon as the provider has reviewed the results and signed off on them.    Primary Care Center [phone]           Follow-ups after your visit        Your next 10 appointments already scheduled     Feb 16, 2017  2:00 PM CST   US THYROID with US3   [facility] ([facility])    [address] [place] [id number] [phone]           Please bring a list of your medicines (including vitamins, minerals and over-the-counter drugs). Also, tell your doctor about any allergies you may have. Wear comfortable clothes and leave your valuables at home.  You do not need to do anything special to prepare for your exam.  Please call the Imaging Department at your exam site with any questions.            Feb 23, 2017  2:00 PM CST   Paracentesis Visit with  Spec Inf Para Provider,  39 ATC   [facility] Procedure ([facility])    [address] [place] [id number]    [phone]            Mar 16, 2017  2:00 PM CDT   Paracentesis Visit with  Spec Inf Para Provider, UC 39 ATC   [facility] Specialty [facility] Procedure ([facility])    [address] [place] [address]  [address]nd [facility] [id number] [phone]            Apr 06, 2017  2:00 PM CDT   Lab with ELBA LAB   [facility] ([facility])    [address] [place] [address]  [address]st [facility] [id number] [phone]            Apr 06, 2017  2:30 PM CDT   (Arrive by 2:15 PM)   RETURN PRIMARY PULMONARY with Tyrone Blank MD   [facility] ([facility])    [address] [place] [address]  3rd [facility] [id number] [phone]            Apr 25, 2017 11:00 AM CDT   (Arrive by 10:45 AM)   Return General Liver with Cory Fuentes NP   [facility] Hepatology ([facility])    [address] [place] [id number] [phone]              Future tests that were ordered for you today     Open Future Orders        Priority Expected Expires Ordered    US Thyroid Routine  2/13/2018 2/13/2017            Who to contact     Please call your clinic at [phone] to:    Ask questions about your health    Make or cancel appointments    Discuss your medicines    Learn about your test results    Speak to your doctor   If you have compliments or concerns about an experience at your clinic, or if you wish to file a complaint, please contact [facility] Patient Relations at [phone] or email us at [email or web address]         Additional Information About Your Visit        Jemstephart Information     [employer] is an electronic gateway that provides easy, online access to your medical records. With [employer], you can request a clinic appointment, read your test results, renew a prescription or communicate with your care team.     To sign up for MyChart visit  "the website at [email or web address]   You will be asked to enter the access code listed below, as well as some personal information. Please follow the directions to create your username and password.     Your access code is: [email or web address]  Expires: 3/27/2017  6:30 AM     Your access code will  in 90 days. If you need help or a new code, please contact your [facility] or call [phone] for assistance.        Care EveryWhere ID     This is your Care EveryWhere ID. This could be used by other organizations to access your [facility] medical records  [id number]        Your Vitals Were     Pulse Temperature Respirations Height Pulse Oximetry BMI (Body Mass Index)    78 98.6  F (37  C) (Oral) 18 1.727 m (5' 8\") 85% 24.15 kg/m2       Blood Pressure from Last 3 Encounters:   17 92/53   17 97/54   17 92/50    Weight from Last 3 Encounters:   17 72 kg (158 lb 12.8 oz)   17 71.7 kg (158 lb 1.6 oz)   17 74.8 kg (165 lb)                 Today's Medication Changes          These changes are accurate as of: 17  1:49 PM.  If you have any questions, ask your nurse or doctor.               Start taking these medicines.        Dose/Directions    loratadine 10 MG tablet   Commonly known as:  CLARITIN   Used for:  Itching   Started by:  Milly Healy MD        Dose:  10 mg   Take 1 tablet (10 mg) by mouth daily   Quantity:  90 tablet   Refills:  3         These medicines have changed or have updated prescriptions.        Dose/Directions    carvedilol 25 MG tablet   Commonly known as:  COREG   This may have changed:  how much to take   Used for:  Renal failure   Changed by:  Milly Healy MD        Dose:  12.5 mg   Take 0.5 tablets (12.5 mg) by mouth 2 times daily   Quantity:  90 tablet   Refills:  2         Stop taking these medicines if you haven't already. Please contact your care team if you have questions.     ursodiol 300 MG " capsule   Commonly known as:  ACTIGALL   Stopped by:  Milly Healy MD                Where to get your medicines      These medications were sent to [facility] [place] [place] - [address] [facility] [address]  [address] [facility] [address], [facility] [address]    Hours:  TRANSPLANT PHONE NUMBER [phone] Phone:  [phone]     loratadine 10 MG tablet    sildenafil 20 MG tablet         Some of these will need a paper prescription and others can be bought over the counter.  Ask your nurse if you have questions.     You don't need a prescription for these medications     carvedilol 25 MG tablet                Primary Care Provider Office Phone # Fax #    Milly Healy [phone]       [address]  [facility] [address]        Thank you!     Thank you for choosing [facility]  for your care. Our goal is always to provide you with excellent care. Hearing back from our patients is one way we can continue to improve our services. Please take a few minutes to complete the written survey that you may receive in the mail after your visit with us. Thank you!             Your Updated Medication List - Protect others around you: Learn how to safely use, store and throw away your medicines at www.disposemymeds.org.          This list is accurate as of: 2/13/17  1:49 PM.  Always use your most recent med list.                   Brand Name Dispense Instructions for use    aspirin 81 MG tablet      Take 81 mg by mouth daily       bosentan 125 MG tablet    TRACLEER    60 tablet    Take 1 tablet (125 mg) by mouth 2 times daily       camphor-menthol 0.5-0.5 % Lotn    SARNA    222 mL    Apply 1 mL topically every 6 hours as needed for skin care       carvedilol 25 MG tablet    COREG    90 tablet    Take 0.5 tablets (12.5 mg) by mouth 2 times daily       hydrOXYzine 25 MG capsule    VISTARIL    120 capsule    Take 2 capsules (50  mg) by mouth 3 times daily as needed for itching       loratadine 10 MG tablet    CLARITIN    90 tablet    Take 1 tablet (10 mg) by mouth daily       midodrine HCl 10 MG Tabs      Take 10 mg by mouth As needed       omeprazole 40 MG capsule    priLOSEC    90 capsule    Take 1 capsule (40 mg) by mouth daily Take 30-60 minutes before a meal.       paricalcitol 5 MCG/ML injection    ZEMPLAR     2 mcg 2 mcg by IV Push route dialysis.       PHOSLO 667 MG Caps capsule   Generic drug:  calcium acetate     180 capsule    Take 2 capsules (1,334 mg) by mouth 3 times daily (with meals)       RENAL 1 MG Caps      Take 1 mg by mouth Take 1 capsule (1 mg) by mouth daily in the afternoon.  Indications: Nutritional Support       sildenafil 20 MG tablet    REVATIO/VIAGRA    180 tablet    Take 2 tablets (40 mg) by mouth 3 times daily       torsemide 20 MG tablet    DEMADEX    90 tablet    Take 3 tablets (60 mg) by mouth 2 times daily

## 2017-02-13 NOTE — PROGRESS NOTES
"Chief complaint:  Jayy Gill is a 46 year old male presents for   Chief Complaint   Patient presents with     Physical     Here for annual physical and body itchiness        SUBJECTIVE:  Itching: has been very bothersome.  Saw Kailey in the GI clinic who was concerned about hydroxizine use due to prolonged QT.  No rashes    Has had some sores on his back.  States they're old, but he doesn't remember picking them.       HD: doing well.  No missed sessions.  BP has been runing low.  Taking 12.5mg coreg, but not on dialysis days.    SOB: doing well.  Not using O2, but has some available if needed.  He does not want to use portable O2 due to the inconvenience.  Sate running in the mid 80's, but asymptomatic        Medications and allergies were reviewed by me today.     SocHx:   History   Smoking Status     Never Smoker   Smokeless Tobacco     Never Used   ,   Family history and PMH reviewed and updated in chart    Patient Active Problem List    Diagnosis Date Noted     CHF (congestive heart failure) (H) 03/14/2016     Priority: Medium     Disorder of liver 02/19/2016     Priority: Medium     Thyroid nodule 08/18/2015     Priority: Medium     1/2015 US improved  Incidentally noted on cardiac PET  3/2015 saw Dr. Amato   \"Left solitary thyroid nodule: incidentally found from PET/CT scan. It is metabolically active with SUV 4.9. US showed left solitary thyroid nodule with small calcification. He has already underwent FNA which showed AUS. I discussed the result. Given co-morbidities, I decided to repeat US and possible re-biopsy in 3 months. \"       LAD (lymphadenopathy) 08/18/2015     Priority: Medium     ?due to sarcoid  - CT 3/2015: No significant change in bilateral level IV cervical, subclavicular and superior mediastinal adenopathy since 1/20/2015. Multiple superior mediastinal lymph nodes are stable since previous chest CT 9/12/2013.  - Endobronchial biopsy 9/2014 and 12/2014 nondiagnostic       Diarrhea " "08/18/2015     Priority: Medium     IBD panel done 4/2015 returned suggestive of UC  Colonoscopy done 7/2015 had poor prep  Colonoscopy 11/2015 without evidence of IBD       Dyspnea 11/20/2014     Priority: Medium     ESRD (end stage renal disease) on dialysis 03/06/2014     Priority: Medium     Hypertension 03/06/2014     Priority: Medium     Cardiomyopathy (H) 03/06/2014     Priority: Medium     TTE 5/2015: severe pHTN (90/30), severe ARMANDO, mod RVH       Pulmonary hypertension 11/21/2013     Priority: Medium       RHC   11/21/14: RA 6  RV 77/8  PA 84/22(45)  W 8  CO/I 6.9/3.6 PVR 6.2      9/26/13  RA- 17/12/9  RV 87/15  PA 85/17/43  PCW  10/11/6  PVR  5.8  COI  3.4    4/30/15: RA 15  RV 94/17  PA 94/42(58)  W 15  CO/CI 4.2/2.2  PVR 10.3  PAsat 61.8%      6MWT  3/20/14 198m/4L/85% lowest    Echo  3/6/14- normal pressures    VQ- 9/26/13         Review Of Systems   ROS: 10 point ROS neg other than the symptoms noted above in the HPI.    ROS    PE:  BP 92/53 (BP Location: Right arm, Patient Position: Chair, Cuff Size: Adult Regular)  Pulse 78  Temp 98.6  F (37  C) (Oral)  Resp 18  Ht 1.727 m (5' 8\")  Wt 72 kg (158 lb 12.8 oz)  SpO2 (!) 85%  BMI 24.15 kg/m2  Gen: no distress, comfortable, pleasant   Eyes: anicteric, normal extra-ocular movements   Cardiovascular: regular rate and rhythm  Respiratory: clear to auscultation, no wheezes or crackles, normal breath sounds.  +clubbing  Gastrointestinal: positive bowel sounds, nontender, nondistended  Skin: well healed scars on back, but no open sores.  No rashes.    Psychological: appropriate mood       ASSESSMENT/PLAN:    Hypoxia: sats at baseline.  Encouraged O2 use, but he is not currently interested due to the inconveniences.    Itching  Unclear etiology.  Hydroxazine helps, but has prolonged QTc so not recommended.  Will try claritin.  If no improvement, consider hydroxazine.  He understands the possible associated risks  - loratadine (CLARITIN) 10 MG tablet  Dispense: " 90 tablet; Refill: 3    Thyroid nodule  F/u US  - US Thyroid      HM:  Thinks he had tetanus in past 5 years    RTC: 6 mo    Soni Healy MD

## 2017-02-13 NOTE — PATIENT INSTRUCTIONS
[facility] Medication Refill Request Information:  * Please contact your pharmacy regarding ANY request for medication refills.  ** [facility] Prescription Fax = [phone]  * Please allow 3 business days for routine medication refills.  * Please allow 5 business days for controlled substance medication refills.     [facility] Test Result notification information:  *You will be notified with in 7-10 days of your appointment day regarding the results of your test.  If you are on MyChart you will be notified as soon as the provider has reviewed the results and signed off on them.    [facility] [phone]

## 2017-02-13 NOTE — NURSING NOTE
Chief Complaint   Patient presents with     Physical     Here for annual physical and body itchiness     Mac Batista CMA at 12:58 PM on 2/13/2017

## 2017-02-14 NOTE — TELEPHONE ENCOUNTER
Prior Authorization Specialty Medication Request    Medication/Dose: Revatio  Frequency: TID    Route: PO  Revatio (sildenafil) 40 mg TID Quantity 180 tablets/30 days  Diagnosis and ICD: Idiopathic PAH   WHO Group: 1 NYHA FC: 3  New/Renewal/Insurance Change PA: Renewal  Previously Tried and Failed Therapies:   *Revatio initiated: prior to 09/2013  *Tracleer initiated: 12/04/13   Orenitram 6/12/15

## 2017-02-15 NOTE — TELEPHONE ENCOUNTER
[facility] Prior Authorization Team   Phone: [phone]  Fax: [phone]    PA Initiation    Medication: Revatio  Insurance Company: [place] Medicaid ([facility] - Phone [phone] Fax [phone]  Pharmacy Filling the Rx: [facility] [place], [place] - [address] [place] [address] [phone]  Filling Pharmacy Phone: [phone]  Filling Pharmacy Fax: [phone]  Start Date: 2/15/2017    Clinical documentation and RHC were also included with request.

## 2017-02-16 NOTE — TELEPHONE ENCOUNTER
[facility] Prior Authorization Team   Phone: [phone]  Fax: [phone]    Prior Authorization Approval    Authorization Effective Date: 2/1/2017  Authorization Expiration Date: 2/28/2017  Medication: Revatio - Approved  Approved Dose/Quantity:    Reference #: [id number]   Insurance Company: [place] Medicaid ([facility]) - Phone [phone] Fax [phone]  Expected CoPay: $1.00     CoPay Card Available:      Foundation Assistance Needed:    Which Pharmacy is filling the prescription (Not needed for infusion/clinic administered): [facility] [place], [place] - [address] [place] [address] [phone]  Pharmacy Notified: Yes  Patient Notified: No     The pharmacy was contacted and the patient has picked up.

## 2017-02-17 NOTE — TELEPHONE ENCOUNTER
Ok. I would pass this info on to his PCP who can reassess, advise on Neuro. Thanks for trying and the update.

## 2017-02-17 NOTE — TELEPHONE ENCOUNTER
Informed pt that Cory Fuentes suggests that the recommendation for a neurologist come from the PCP. I forwarded message to pt's PCP.

## 2017-02-17 NOTE — TELEPHONE ENCOUNTER
"I called pt and had questioned if he would be interested in purchasing sarna lotion but at our \"[employer] expense\". Pt states that he is not willing to place lotion all over his body as the discomfort stems from his veins. Pt describes the sensation as \"jumpy veins\". When pt saw PCP, she prescribed Claritin but it has not helped. Questioned pt if he was wanting to see a neurologist and pt agreed to seeing one. Please advise.   "

## 2017-02-23 NOTE — PATIENT INSTRUCTIONS
Dear Jayy Gill    Thank you for choosing [facility] ([facility]) for your procedure.  The following information is a summary of our appointment as well as important reminders.      DISCHARGE INSTRUCTIONS FOLLOWING ABDOMINAL PARACENTESIS    After you go home:    No strenuous activity for 24 hours    Resume your regular diet    Limit fluid intake for the first 48 hours to no more than 2 quarts per day.  There should be minimal drainage from the needle site.  If drainage does occur and soaks through the bandage, apply gentle pressure with your hand for 5 minutes.    Notify MD for the following:    Excessive drainage    Excessive swelling, redness or tenderness at the needle site    Fever greater than 101 degrees F    Dizziness or light-headedness when getting up or walking      IF THIS IS A MEDICAL EMERGENCY, CALL [phone]    If you have any questions or concerns    Contact the Hepatology Clinic:   [phone]    If you are a post-transplant patient, contact the Transplant Office:  [phone]    If this is after hours, contact the hospital :  [phone] and as to have the GI resident on call paged                     Additional information: you had a paracentesis performed today.       We look forward in seeing you on your next appointment here at [facility].  Please don t hesitate to call us at [phone] to reschedule any of your appointments or to speak with one of the [facility] registered nurses.  It was a pleasure taking care of you today.    Sincerely,  Miranda Dias RN  [facility]  [facility]  [address] [place] [address]  [address]  Phone:  [phone]

## 2017-02-23 NOTE — PROGRESS NOTES
Paracentesis Nursing Note  Jayy Gill presents today [facility] for a [facility]acentesis.    During today's appointment orders from Cory Fuentes NP were completed.    Pt O2 readings via pulse oximeter to fingertip consistently registered from upper 70's to lower 80's. Pt declined oxygen via NC, stating only method to record accurate result is via ear lobe; adaptor n/a. Pt remained assymptomatic throughout procedure; no difficulty breathing, mentation clear not cloudy, warm skin and mucous membranes WNL of without discoloration. Pt did not present with any indication of deoxygenation.     Progress Note:  Patient identification verified by name and date of birth.  Assessment completed.  Vitals monitored throughout appointment and recorded in Doc Flowsheets.  See proceduralist note in ultrasound.    Date of consent or authorization: 2/2/17.  Invasive Procedure Safety Checklist was completed and sent for scanning.     Paracentesis performed by Cristino Strickland PA-C Radiology.    The following labs were communicated to provider performing paracentesis:  Lab Results   Component Value Date     02/23/2017       Total amount of ascites fluid drained: 2.1 liters.  Color of ascites fluid: yellow and clear.  Total amount of albumin given: 25  grams.    Patient tolerated procedure well.    Post procedure,denies pain or discomfort post paracentesis.      Discharge Plan:  Discharge instructions were reviewed with patient.  Patient/Representative verbalized understanding and all questions were answered.   Discharged from [facility] in stable condition.    Miranda Dias RN        BP 96/48  Temp 96.7  F (35.9  C) (Oral)  Wt 75.2 kg (165 lb 11.2 oz)  SpO2 (!) 81%  BMI 25.19 kg/m2

## 2017-02-23 NOTE — MR AVS SNAPSHOT
After Visit Summary   2/23/2017    Jayy Gill    MRN: [id number]           Patient Information     Date Of Birth          1970        Visit Information        Provider Department      2/23/2017 2:00 PM Alton Wick PA-C; [address] Specialty and Procedure        Today's Diagnoses     Chronic congestive heart failure, unspecified congestive heart failure type (H)    -  1      Care Instructions    Dear Jayy Gill    Thank you for choosing [facility] ([facility]) for your procedure.  The following information is a summary of our appointment as well as important reminders.      DISCHARGE INSTRUCTIONS FOLLOWING ABDOMINAL PARACENTESIS    After you go home:    No strenuous activity for 24 hours    Resume your regular diet    Limit fluid intake for the first 48 hours to no more than 2 quarts per day.  There should be minimal drainage from the needle site.  If drainage does occur and soaks through the bandage, apply gentle pressure with your hand for 5 minutes.    Notify MD for the following:    Excessive drainage    Excessive swelling, redness or tenderness at the needle site    Fever greater than 101 degrees F    Dizziness or light-headedness when getting up or walking      IF THIS IS A MEDICAL EMERGENCY, CALL 911    If you have any questions or concerns    Contact the Hepatology Clinic:   [phone]    If you are a post-transplant patient, contact the Transplant Office:  [phone]    If this is after hours, contact the hospital :  [phone] and as to have the GI resident on call paged                     Additional information: you had a paracentesis performed today.       We look forward in seeing you on your next appointment here at [facility].  Please don t hesitate to call us at [phone] to reschedule any of your appointments or to speak with one of the [facility] registered nurses.  It  was a pleasure taking care of you today.    Sincerely,  Miranda Dias RN  [facility]  Special[facility] Infusion [facility] Procedure Center  [address] [place] [address]  [address]  Phone:  [phone]          Follow-ups after your visit        Your next 10 appointments already scheduled     Mar 16, 2017  2:00 PM CDT   Paracentesis Visit with  Spec Inf Para Provider, UC 39 ATC   [facility] [place] Specialty and Procedure ([facility])    [address] [place] [address]  2nd Floor  [facility] [address] [phone]            Apr 06, 2017  2:00 PM CDT   Lab with  LAB   [facility] ([facility])    [address] [place] [address]  1st [facility] [address] [phone]            Apr 06, 2017  2:30 PM CDT   (Arrive by 2:15 PM)   RETURN PRIMARY PULMONARY with Tyrone Blank MD   [facility] Heart Care ([facility])    [address] [place] [address]  3rd [facility] [address] [phone]            Apr 25, 2017 11:00 AM CDT   (Arrive by 10:45 AM)   Return General Liver with Cory Fuentes NP   [facility] Hepatology ([facility])    [address] [place] [address] [phone]              Who to contact     If you have questions or need follow up information about today's clinic visit or your schedule please contact [facility] SPECIALTY AND PROCEDURE directly at [phone].  Normal or non-critical lab and imaging results will be communicated to you by MyChart, letter or phone within 4 business days after the clinic has received the results. If you do not hear from us within 7 days, please contact the clinic through MyChart or phone. If you have a critical or abnormal lab result, we will notify you by phone as soon as possible.  Submit refill requests through [employer] or call your pharmacy and they will forward the  "refill request to us. Please allow 3 business days for your refill to be completed.          Additional Information About Your Visit        MyChart Information     [employer] lets you send messages to your doctor, view your test results, renew your prescriptions, schedule appointments and more. To sign up, go to [email or web address] . Click on \"Log in\" on the left side of the screen, which will take you to the Welcome page. Then click on \"Sign up Now\" on the right side of the page.     You will be asked to enter the access code listed below, as well as some personal information. Please follow the directions to create your username and password.     Your access code is: [email or web address]  Expires: 3/27/2017  6:30 AM     Your access code will  in 90 days. If you need help or a new code, please call your [place] clinic or [phone].        Care EveryWhere ID     This is your Care EveryWhere ID. This could be used by other organizations to access your [place] medical records  [id number]        Your Vitals Were     Temperature Pulse Oximetry BMI (Body Mass Index)             96.7  F (35.9  C) (Oral) 81% 24.71 kg/m2          Blood Pressure from Last 3 Encounters:   17 92/48   17 92/53   17 97/54    Weight from Last 3 Encounters:   17 73.7 kg (162 lb 8 oz)   17 72 kg (158 lb 12.8 oz)   17 71.7 kg (158 lb 1.6 oz)              We Performed the Following     Platelet count     Treatment Conditions     US Paracentesis        Primary Care Provider Office Phone # Fax #    Millycruz Healy [phone]       [address]  [facility] [address]        Thank you!     Thank you for choosing [facility] SPECIALTY AND PROCEDURE  for your care. Our goal is always to provide you with excellent care. Hearing back from our patients is one way we can continue to improve our services. Please take a few minutes to complete the " written survey that you may receive in the mail after your visit with us. Thank you!             Your Updated Medication List - Protect others around you: Learn how to safely use, store and throw away your medicines at www.disposemymeds.org.          This list is accurate as of: 2/23/17  4:00 PM.  Always use your most recent med list.                   Brand Name Dispense Instructions for use    aspirin 81 MG tablet      Take 81 mg by mouth daily       bosentan 125 MG tablet    TRACLEER    60 tablet    Take 1 tablet (125 mg) by mouth 2 times daily       camphor-menthol 0.5-0.5 % Lotn    SARNA    222 mL    Apply 1 mL topically every 6 hours as needed for skin care       carvedilol 25 MG tablet    COREG    90 tablet    Take 0.5 tablets (12.5 mg) by mouth 2 times daily       hydrOXYzine 25 MG capsule    VISTARIL    120 capsule    Take 2 capsules (50 mg) by mouth 3 times daily as needed for itching       loratadine 10 MG tablet    CLARITIN    90 tablet    Take 1 tablet (10 mg) by mouth daily       midodrine HCl 10 MG Tabs      Take 10 mg by mouth As needed       omeprazole 40 MG capsule    priLOSEC    90 capsule    Take 1 capsule (40 mg) by mouth daily Take 30-60 minutes before a meal.       paricalcitol 5 MCG/ML injection    ZEMPLAR     2 mcg 2 mcg by IV Push route dialysis.       PHOSLO 667 MG Caps capsule   Generic drug:  calcium acetate     180 capsule    Take 2 capsules (1,334 mg) by mouth 3 times daily (with meals)       RENAL 1 MG Caps      Take 1 mg by mouth Take 1 capsule (1 mg) by mouth daily in the afternoon.  Indications: Nutritional Support       sildenafil 20 MG tablet    REVATIO/VIAGRA    180 tablet    Take 2 tablets (40 mg) by mouth 3 times daily       torsemide 20 MG tablet    DEMADEX    90 tablet    Take 3 tablets (60 mg) by mouth 2 times daily

## 2017-03-08 NOTE — TELEPHONE ENCOUNTER
Prior Authorization Specialty Medication Request     Medication/Dose: Revatio  Frequency: TID   Route: PO  Revatio (sildenafil) 40 mg TID Quantity 180 tablets/30 days  Diagnosis and ICD: Idiopathic PAH WHO Group: 1 NYHA FC: 3  New/Renewal/Insurance Change PA: Renewal  Previously Tried and Failed Therapies:   *Revatio initiated: prior to 09/2013  *Tracleer initiated: 12/04/13   *Orenitram 6/12/15

## 2017-03-08 NOTE — TELEPHONE ENCOUNTER
[facility] Prior Authorization Team   Phone: [phone]  Fax: [phone]    No prior authorization is required at this time. A [facility] representative states that this medication has an override authorized until 072017. At this time a test-claim processes correctly through insurance and no PA is required.

## 2017-03-16 NOTE — PROGRESS NOTES
Paracentesis Nursing Note  Jayy Gill presents today to [facility] for a paracentesis.    During today's appointment orders from Cory Fuentes NP were completed.    Progress Note:  Patient identification verified by name and date of birth.  Assessment completed.  Vitals monitored throughout appointment and recorded in Doc Flowsheets.  See proceduralist note in ultrasound.    Date of consent or authorization: today 3/16/17.  Invasive Procedure Safety Checklist was completed and sent for scanning.     Paracentesis performed by Jamey Pereira PA-C Radiology.    The following labs were communicated to provider performing paracentesis:  Lab Results   Component Value Date     02/23/2017       Total amount of ascites fluid drained: 1.7 liters.  Color of ascites fluid: yellow.  Total amount of albumin given: 25  grams.    Patient tolerated procedure well.    Post procedure,denies pain or discomfort post paracentesis. Denies dizzness      Discharge Plan:  Discharge instructions were reviewed with patient.  Patient/Representative verbalized understanding and all questions were answered.   Discharged from [facility] in stable condition.    Graciela Stahl RN        BP (!) 87/59  Temp 97.7  F (36.5  C) (Tympanic)  Wt 73.5 kg (162 lb)  BMI 24.63 kg/m2

## 2017-03-16 NOTE — MR AVS SNAPSHOT
After Visit Summary   3/16/2017    Jayy Gill    MRN: [id number]           Patient Information     Date Of Birth          1970        Visit Information        Provider Department      3/16/2017 2:00 PM Jamey Pereira PA-C;  39 ATC [facility] Specialty and Procedure        Today's Diagnoses     Chronic congestive heart failure, unspecified congestive heart failure type (H)    -  1       Follow-ups after your visit        Your next 10 appointments already scheduled     Apr 06, 2017  2:00 PM CDT   Lab with  LAB   [facility] Lab ([facility])    [address] [place] [id number] [phone]            Apr 06, 2017  2:30 PM CDT   (Arrive by 2:15 PM)   RETURN PRIMARY PULMONARY with Tyrone Blank MD   [facility] ([facility])    [address] [place] [id number] [phone]            Apr 25, 2017 11:00 AM CDT   (Arrive by 10:45 AM)   Return General Liver with Cory Fuentes NP   [facility] Hepatology ([facility])    [address] [place] [address] [phone]              Who to contact     If you have questions or need follow up information about today's clinic visit or your schedule please contact [facility] SPECIALTY AND PROCEDURE directly at [phone].  Normal or non-critical lab and imaging results will be communicated to you by MyChart, letter or phone within 4 business days after the clinic has received the results. If you do not hear from us within 7 days, please contact the clinic through MyChart or phone. If you have a critical or abnormal lab result, we will notify you by phone as soon as possible.  Submit refill requests through [employer] or call your pharmacy and they will forward the refill request to us. Please allow 3 business days for your refill  "to be completed.          Additional Information About Your Visit        [employer] Information     [employer] lets you send messages to your doctor, view your test results, renew your prescriptions, schedule appointments and more. To sign up, go to [email or web address] . Click on \"Log in\" on the left side of the screen, which will take you to the Welcome page. Then click on \"Sign up Now\" on the right side of the page.     You will be asked to enter the access code listed below, as well as some personal information. Please follow the directions to create your username and password.     Your access code is: [email or web address]  Expires: 3/27/2017  7:30 AM     Your access code will  in 90 days. If you need help or a new code, please call your [place] clinic or [phone].        Care EveryWhere ID     This is your Care EveryWhere ID. This could be used by other organizations to access your [place] medical records  [id number]        Your Vitals Were     Temperature BMI (Body Mass Index)                97.7  F (36.5  C) (Tympanic) 24.63 kg/m2           Blood Pressure from Last 3 Encounters:   17 (!) 87/59   17 92/48   17 92/53    Weight from Last 3 Encounters:   17 73.5 kg (162 lb)   17 73.7 kg (162 lb 8 oz)   17 72 kg (158 lb 12.8 oz)              We Performed the Following     Treatment Conditions     US Paracentesis        Primary Care Provider Office Phone # Fax #    Millycruz Healy [phone]       [address]  [facility] [address]        Thank you!     Thank you for choosing [facility] SPECIALTY AND PROCEDURE  for your care. Our goal is always to provide you with excellent care. Hearing back from our patients is one way we can continue to improve our services. Please take a few minutes to complete the written survey that you may receive in the mail after your visit with us. Thank you!           "   Your Updated Medication List - Protect others around you: Learn how to safely use, store and throw away your medicines at www.disposemymeds.org.          This list is accurate as of: 3/16/17  3:26 PM.  Always use your most recent med list.                   Brand Name Dispense Instructions for use    aspirin 81 MG tablet      Take 81 mg by mouth daily       bosentan 125 MG tablet    TRACLEER    60 tablet    Take 1 tablet (125 mg) by mouth 2 times daily       camphor-menthol 0.5-0.5 % Lotn    SARNA    222 mL    Apply 1 mL topically every 6 hours as needed for skin care       carvedilol 25 MG tablet    COREG    90 tablet    Take 0.5 tablets (12.5 mg) by mouth 2 times daily       hydrOXYzine 25 MG capsule    VISTARIL    120 capsule    Take 2 capsules (50 mg) by mouth 3 times daily as needed for itching       loratadine 10 MG tablet    CLARITIN    90 tablet    Take 1 tablet (10 mg) by mouth daily       midodrine HCl 10 MG Tabs      Take 10 mg by mouth As needed       omeprazole 40 MG capsule    priLOSEC    90 capsule    Take 1 capsule (40 mg) by mouth daily Take 30-60 minutes before a meal.       paricalcitol 5 MCG/ML injection    ZEMPLAR     2 mcg 2 mcg by IV Push route dialysis.       PHOSLO 667 MG Caps capsule   Generic drug:  calcium acetate     180 capsule    Take 2 capsules (1,334 mg) by mouth 3 times daily (with meals)       RENAL 1 MG Caps      Take 1 mg by mouth Take 1 capsule (1 mg) by mouth daily in the afternoon.  Indications: Nutritional Support       sildenafil 20 MG tablet    REVATIO/VIAGRA    180 tablet    Take 2 tablets (40 mg) by mouth 3 times daily       torsemide 20 MG tablet    DEMADEX    90 tablet    Take 3 tablets (60 mg) by mouth 2 times daily

## 2017-04-06 NOTE — LETTER
4/6/2017      RE: Jayy Gill  [address]  [place] [facility] [address]       Dear Colleague,    Thank you for the opportunity to participate in the care of your patient, Jayy Gill, at the [facility]. Please see a copy of my visit note below.    Tyrone Blank M.D.  Cardiovascular Medicine    I personally saw and examined this patient, discussed care with housestaff and other consultants, reviewed current laboratories and imaging studies, and conveyed impression and diagnostic/therapeutic plan to patient.    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 Asymmetric ventricular hypertrophy with mid cavity gradient  3. Pulmonary hypertension  4. ESRD with dialysis dependence  5. Hypotension  6. Exertional lightheadedness  7. Pruritis     History  Patient returns for f/u with increasing exercise associated lightheadedness without true syncope, weight gain, weight loss, palpitation, chest pain, hemoptysis.  He has had midodrine added to avoid dialysis associated hypotension.     Objective  BP (!) 86/53 (BP Location: Right arm, Patient Position: Chair, Cuff Size: Adult Regular)  Pulse 65  Wt 74.2 kg (163 lb 9.6 oz)  BMI 24.88 kg/m2   Alert oriented basilar rales, S1S2 S4, soft murmur with standing, no edema, arm fistula    Wt Readings from Last 5 Encounters:   04/06/17 74.2 kg (163 lb 9.6 oz)   03/16/17 72.2 kg (159 lb 1.6 oz)   02/23/17 73.7 kg (162 lb 8 oz)   02/13/17 72 kg (158 lb 12.8 oz)   02/02/17 71.7 kg (158 lb 1.6 oz)       Meds  Current Outpatient Prescriptions   Medication     carvedilol (COREG) 25 MG tablet     sildenafil (REVATIO/VIAGRA) 20 MG tablet     loratadine (CLARITIN) 10 MG tablet     hydrOXYzine (VISTARIL) 25 MG capsule     midodrine HCl 10 MG TABS     torsemide (DEMADEX) 20 MG tablet     bosentan (TRACLEER) 125 MG tablet     omeprazole (PRILOSEC) 40 MG capsule     aspirin 81 MG tablet     B Complex-C-Folic Acid (RENAL) 1 MG CAPS      paricalcitol (ZEMPLAR) 5 MCG/ML injection     calcium acetate (PHOSLO) 667 MG CAPS     [DISCONTINUED] SILDENAFIL CITRATE PO     No current facility-administered medications for this visit.          Labs  Results for BRANDIN AWAN (MRN [id number]) as of 4/6/2017 15:12   Ref. Range 3/16/2017 15:22 4/6/2017 14:10   Sodium Latest Ref Range: 133 - 144 mmol/L  139   Potassium Latest Ref Range: 3.4 - 5.3 mmol/L  4.6   Chloride Latest Ref Range: 94 - 109 mmol/L  99   Carbon Dioxide Latest Ref Range: 20 - 32 mmol/L  31   Urea Nitrogen Latest Ref Range: 7 - 30 mg/dL  35 (H)   Creatinine Latest Ref Range: 0.66 - 1.25 mg/dL  6.93 (H)   GFR Estimate Latest Ref Range: >60 mL/min/1.7m2  9 (L)   GFR Estimate If Black Latest Ref Range: >60 mL/min/1.7m2  10 (L)   Calcium Latest Ref Range: 8.5 - 10.1 mg/dL  9.1   Anion Gap Latest Ref Range: 3 - 14 mmol/L  9   Albumin Latest Ref Range: 3.4 - 5.0 g/dL  3.1 (L)   Protein Total Latest Ref Range: 6.8 - 8.8 g/dL  7.6   Bilirubin Total Latest Ref Range: 0.2 - 1.3 mg/dL  1.8 (H)   Alkaline Phosphatase Latest Ref Range: 40 - 150 U/L  104   ALT Latest Ref Range: 0 - 70 U/L  19   AST Latest Ref Range: 0 - 45 U/L  21   N-Terminal Pro Bnp Latest Ref Range: 0 - 125 pg/mL  [address] (H)   Glucose Latest Ref Range: 70 - 99 mg/dL  122 (H)   WBC Latest Ref Range: 4.0 - 11.0 10e9/L  4.9   Hemoglobin Latest Ref Range: 13.3 - 17.7 g/dL  12.6 (L)   Hematocrit Latest Ref Range: 40.0 - 53.0 %  40.1   Platelet Count Latest Ref Range: 150 - 450 10e9/L  108 (L)   RBC Count Latest Ref Range: 4.4 - 5.9 10e12/L  3.87 (L)   MCV Latest Ref Range: 78 - 100 fl  104 (H)   MCH Latest Ref Range: 26.5 - 33.0 pg  32.6   MCHC Latest Ref Range: 31.5 - 36.5 g/dL  31.4 (L)   RDW Latest Ref Range: 10.0 - 15.0 %  16.0 (H)   US PARACENTESIS Unknown Rpt      Imaging          Interpretation Summary  Global and regional left ventricular function is normal with an EF of 60-65%.  Severe asymmetric LV hypertrophy (septum 1.4, PW 2.0). There  is a dynamic mid  -ventricular gradient of 36 mmHg due to this.  Severe pulmonary hypertension is present. Right ventricular systolic pressure  is 83mmHg above the right atrial pressure. Paradoxical septal motion  consistent with right ventricular pressure and volume overload is present.  Estimated right atrial pressure is > 15 mmHg.  Global right ventricular function is mildly reduced. Mild right ventricular  dilation is present.  Moderate to severe functional tricuspid insufficiency is present. Mechanism  is annular dilation (5.5 cm).  Small circumferential pericardial effusion is present without any hemodynamic  significance.  ______________________________________________________________________________           Left Ventricle  Global and regional left ventricular function is normal with an EF of 60-65%.  Severe asymmetric LV hypertrophy (septum 1.4, PW 2.0). Normal left  ventricular filling for age. Paradoxical septal motion consistent with right  ventricular pressure and volume overload is present.     Right Ventricle  Mild right ventricular dilation is present. Global right ventricular function  is mildly reduced. A pacemaker lead is noted in the right ventricle.  Atria  The left atrium appears normal. Severe right atrial enlargement is present.     Mitral Valve  The mitral valve is normal.     Aortic Valve  Aortic valve is normal in structure and function.     Tricuspid Valve  Moderate to severe tricuspid insufficiency is present. Severe pulmonary  hypertension is present. Right ventricular systolic pressure is 83mmHg above  the right atrial pressure.     Vessels  The aorta root is normal. Dilation of the inferior vena cava is present with  abnormal respiratory variation in diameter. Estimated right atrial pressure  is > 15 mmHg.     Pericardium  Small circumferential pericardial effusion is present without any hemodynamic  significance.  Compared to Previous Study  This study was compared with the study  from 5/1/15 . There has been no  change.     ______________________________________________________________________________  MMode/2D Measurements & Calculations  IVSd: 1.1 cm  LVIDd: 3.2 cm  LVIDs: 1.4 cm  LVPWd: 1.8 cm  FS: 56.4 %  EDV(Teich): 40.4 ml  ESV(Teich): 4.9 ml  LV mass(C)d: 163.0 grams  Ao root diam: 3.4 cm  LA dimension: 5.0 cm  asc Aorta Diam: 3.5 cm  LA/Ao: 1.5  LVOT diam: 2.3 cm  LVOT area: 4.0 cm2  LA Volume (BP): 57.0 ml     LA Volume Index (BP): 30.6 ml/m2           Doppler Measurements & Calculations  MV E max jareth: 57.9 cm/sec  MV A max jareth: 55.4 cm/sec  MV E/A: 1.0  MV dec time: 0.24 sec  Ao V2 max: 141.9 cm/sec  Ao max P.1 mmHg  Ao V2 mean: 104.0 cm/sec  Ao mean P.7 mmHg  Ao V2 VTI: 30.2 cm  ANTON(I,D): 3.2 cm2  ANTON(V,D): 3.4 cm2  LV V1 max: 121.2 cm/sec  LV V1 VTI: 24.1 cm  SV(LVOT): 96.7 ml  PI end-d jareth: 142.7 cm/sec  TR max jareth: 400.4 cm/sec  TR max P.6 mmHg  ANTON Index (cm2/m2): 1.7  Lateral E/e': 6.9  Medial E/e': 6.9        Assessment/Plan     The patient has progressive symptoms of exertional pre-syncope and as yet completely understood cardiopulmonary problem.  His hemodynamics have been odd, especially in light of mid-systolic left ventricular gradient.    I would suggest that the patient have BHC with simultaneous determination of LVED with careful pull-back to establish gradient.  I would also suggest that the patient have pressures in arteries measured during right ventricular apical pacing to create LBBB to see if either PA pressure comes down with elimination of the ventricular gradient as well as to see of LV mid cavitary gradient can be eliminated.  In addition the patient should have PCP measures from territories from each of 4 pulmonary veins in view of history and biopsy suggestive of PVOD.    Please do not hesitate to contact me if you have any questions/concerns.     Sincerely,     Tyrone Blank MD

## 2017-04-06 NOTE — NURSING NOTE
Chief Complaint   Patient presents with     Vascular Disease     Jayy is here for a 3 month follow up     Alex Vaughn LPN

## 2017-04-06 NOTE — PROGRESS NOTES
Tyrone Blank M.D.  Cardiovascular Medicine    I personally saw and examined this patient, discussed care with housestaff and other consultants, reviewed current laboratories and imaging studies, and conveyed impression and diagnostic/therapeutic plan to patient.    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 Asymmetric ventricular hypertrophy with mid cavity gradient  3. Pulmonary hypertension  4. ESRD with dialysis dependence  5. Hypotension  6. Exertional lightheadedness  7. Pruritis     History  Patient returns for f/u with increasing exercise associated lightheadedness without true syncope, weight gain, weight loss, palpitation, chest pain, hemoptysis.  He has had midodrine added to avoid dialysis associated hypotension.     Objective  BP (!) 86/53 (BP Location: Right arm, Patient Position: Chair, Cuff Size: Adult Regular)  Pulse 65  Wt 74.2 kg (163 lb 9.6 oz)  BMI 24.88 kg/m2   Alert oriented basilar rales, S1S2 S4, soft murmur with standing, no edema, arm fistula    Wt Readings from Last 5 Encounters:   04/06/17 74.2 kg (163 lb 9.6 oz)   03/16/17 72.2 kg (159 lb 1.6 oz)   02/23/17 73.7 kg (162 lb 8 oz)   02/13/17 72 kg (158 lb 12.8 oz)   02/02/17 71.7 kg (158 lb 1.6 oz)       Meds  Current Outpatient Prescriptions   Medication     carvedilol (COREG) 25 MG tablet     sildenafil (REVATIO/VIAGRA) 20 MG tablet     loratadine (CLARITIN) 10 MG tablet     hydrOXYzine (VISTARIL) 25 MG capsule     midodrine HCl 10 MG TABS     torsemide (DEMADEX) 20 MG tablet     bosentan (TRACLEER) 125 MG tablet     omeprazole (PRILOSEC) 40 MG capsule     aspirin 81 MG tablet     B Complex-C-Folic Acid (RENAL) 1 MG CAPS     paricalcitol (ZEMPLAR) 5 MCG/ML injection     calcium acetate (PHOSLO) 667 MG CAPS     [DISCONTINUED] SILDENAFIL CITRATE PO     No current facility-administered medications for this visit.          Labs  Results for BRANDIN AWAN (MRN [id number]) as of 4/6/2017 15:12   Ref. Range 3/16/2017 15:22 4/6/2017 14:10   Sodium  Latest Ref Range: 133 - 144 mmol/L  139   Potassium Latest Ref Range: 3.4 - 5.3 mmol/L  4.6   Chloride Latest Ref Range: 94 - 109 mmol/L  99   Carbon Dioxide Latest Ref Range: 20 - 32 mmol/L  31   Urea Nitrogen Latest Ref Range: 7 - 30 mg/dL  35 (H)   Creatinine Latest Ref Range: 0.66 - 1.25 mg/dL  6.93 (H)   GFR Estimate Latest Ref Range: >60 mL/min/1.7m2  9 (L)   GFR Estimate If Black Latest Ref Range: >60 mL/min/1.7m2  10 (L)   Calcium Latest Ref Range: 8.5 - 10.1 mg/dL  9.1   Anion Gap Latest Ref Range: 3 - 14 mmol/L  9   Albumin Latest Ref Range: 3.4 - 5.0 g/dL  3.1 (L)   Protein Total Latest Ref Range: 6.8 - 8.8 g/dL  7.6   Bilirubin Total Latest Ref Range: 0.2 - 1.3 mg/dL  1.8 (H)   Alkaline Phosphatase Latest Ref Range: 40 - 150 U/L  104   ALT Latest Ref Range: 0 - 70 U/L  19   AST Latest Ref Range: 0 - 45 U/L  21   N-Terminal Pro Bnp Latest Ref Range: 0 - 125 pg/mL  78485 (H)   Glucose Latest Ref Range: 70 - 99 mg/dL  122 (H)   WBC Latest Ref Range: 4.0 - 11.0 10e9/L  4.9   Hemoglobin Latest Ref Range: 13.3 - 17.7 g/dL  12.6 (L)   Hematocrit Latest Ref Range: 40.0 - 53.0 %  40.1   Platelet Count Latest Ref Range: 150 - 450 10e9/L  108 (L)   RBC Count Latest Ref Range: 4.4 - 5.9 10e12/L  3.87 (L)   MCV Latest Ref Range: 78 - 100 fl  104 (H)   MCH Latest Ref Range: 26.5 - 33.0 pg  32.6   MCHC Latest Ref Range: 31.5 - 36.5 g/dL  31.4 (L)   RDW Latest Ref Range: 10.0 - 15.0 %  16.0 (H)   US PARACENTESIS Unknown Rpt      Imaging          Interpretation Summary  Global and regional left ventricular function is normal with an EF of 60-65%.  Severe asymmetric LV hypertrophy (septum 1.4, PW 2.0). There is a dynamic mid  -ventricular gradient of 36 mmHg due to this.  Severe pulmonary hypertension is present. Right ventricular systolic pressure  is 83mmHg above the right atrial pressure. Paradoxical septal motion  consistent with right ventricular pressure and volume overload is present.  Estimated right atrial  pressure is > 15 mmHg.  Global right ventricular function is mildly reduced. Mild right ventricular  dilation is present.  Moderate to severe functional tricuspid insufficiency is present. Mechanism  is annular dilation (5.5 cm).  Small circumferential pericardial effusion is present without any hemodynamic  significance.  ______________________________________________________________________________           Left Ventricle  Global and regional left ventricular function is normal with an EF of 60-65%.  Severe asymmetric LV hypertrophy (septum 1.4, PW 2.0). Normal left  ventricular filling for age. Paradoxical septal motion consistent with right  ventricular pressure and volume overload is present.     Right Ventricle  Mild right ventricular dilation is present. Global right ventricular function  is mildly reduced. A pacemaker lead is noted in the right ventricle.  Atria  The left atrium appears normal. Severe right atrial enlargement is present.     Mitral Valve  The mitral valve is normal.     Aortic Valve  Aortic valve is normal in structure and function.     Tricuspid Valve  Moderate to severe tricuspid insufficiency is present. Severe pulmonary  hypertension is present. Right ventricular systolic pressure is 83mmHg above  the right atrial pressure.     Vessels  The aorta root is normal. Dilation of the inferior vena cava is present with  abnormal respiratory variation in diameter. Estimated right atrial pressure  is > 15 mmHg.     Pericardium  Small circumferential pericardial effusion is present without any hemodynamic  significance.  Compared to Previous Study  This study was compared with the study from 5/1/15 . There has been no  change.     ______________________________________________________________________________  MMode/2D Measurements & Calculations  IVSd: 1.1 cm  LVIDd: 3.2 cm  LVIDs: 1.4 cm  LVPWd: 1.8 cm  FS: 56.4 %  EDV(Teich): 40.4 ml  ESV(Teich): 4.9 ml  LV mass(C)d: 163.0 grams  Ao root diam:  3.4 cm  LA dimension: 5.0 cm  asc Aorta Diam: 3.5 cm  LA/Ao: 1.5  LVOT diam: 2.3 cm  LVOT area: 4.0 cm2  LA Volume (BP): 57.0 ml     LA Volume Index (BP): 30.6 ml/m2           Doppler Measurements & Calculations  MV E max jareth: 57.9 cm/sec  MV A max jareth: 55.4 cm/sec  MV E/A: 1.0  MV dec time: 0.24 sec  Ao V2 max: 141.9 cm/sec  Ao max P.1 mmHg  Ao V2 mean: 104.0 cm/sec  Ao mean P.7 mmHg  Ao V2 VTI: 30.2 cm  ANTON(I,D): 3.2 cm2  ANTON(V,D): 3.4 cm2  LV V1 max: 121.2 cm/sec  LV V1 VTI: 24.1 cm  SV(LVOT): 96.7 ml  PI end-d jareth: 142.7 cm/sec  TR max jareth: 400.4 cm/sec  TR max P.6 mmHg  ANTON Index (cm2/m2): 1.7  Lateral E/e': 6.9  Medial E/e': 6.9        Assessment/Plan     The patient has progressive symptoms of exertional pre-syncope and as yet completely understood cardiopulmonary problem.  His hemodynamics have been odd, especially in light of mid-systolic left ventricular gradient.    I would suggest that the patient have BHC with simultaneous determination of LVED with careful pull-back to establish gradient.  I would also suggest that the patient have pressures in arteries measured during right ventricular apical pacing to create LBBB to see if either PA pressure comes down with elimination of the ventricular gradient as well as to see of LV mid cavitary gradient can be eliminated.  In addition the patient should have PCP measures from territories from each of 4 pulmonary veins in view of history and biopsy suggestive of PVOD.

## 2017-04-06 NOTE — MR AVS SNAPSHOT
After Visit Summary   4/6/2017    Jayy Gill    MRN: [id number]           Patient Information     Date Of Birth          1970        Visit Information        Provider Department      4/6/2017 2:30 PM Tyrone Blank MD [facility]        Today's Diagnoses     Pulmonary HTN (H)    -  1      Care Instructions    Medication Changes:  No medication changes at this time. Please continue current medication regiment.    Patient Instructions:    Check-In  Time Check-In Location Estimated Length Procedure   Name        [facility]  waiting room  minutes Right and Left Heart Cath**     Procedure Preparations & Instructions     This is an invasive procedure that DOES require preparation:    - Nothing to eat or drink for 6 hours   - A ride should be arranged for you as you will be unable to drive home.  You will be required to lay flat for approximately 2-4 hours in the recovery unit to ensure proper clotting of the artery.  - Take 325 mg of Asprin 24 hours prior to procedure and morning of prior.      *For Patients with Diabetes: ? DO NOT take any oral diabetic medication, short-acting diabetes medications/insulin, humalog or regular insulin the morning of your test  ? Take   dose of long-acting insulin (Lantus, Levemir) the day of your test  ? Hold Oral Diabetic on the day of the procedure and for 48 hours after IV contrast given  ? Remember to  bring your glucometer and insulin with you to take after your test if needed     *For Patients on anticoagulants: ? Your Coumadin Clinic will give you instructions on medication adjustments or bridging prior to the procedure        Follow up Appointment Information:  After testing    We are located on the third floor of the [facility] on the [facility].  Our address is     [address] [place] [address] [place] [address] [place] [address]    Thank you for allowing us to be a part of your care here at the  [facility]    If you have questions or concerns please contact us at:    Armida Pastor, RN, BSN    Hermann Laguerre (Schedule,P.A.)  Nurse Coordinator     Clinic   Pulmonary Hypertension   Pulmonary Hypertension  [facility]  (P)[phone]    (P) [phone]        (F)[phone]    ** Please note that you will NOT receive a reminder call regarding your scheduled testing, reminder calls are for provider appointments only.  If you are scheduled for testing within the [facility] system you may receive a call regarding pre-registration for billing purposes only.**     Remember to weigh yourself daily after voiding and before you consume any food or beverages and log the numbers.  If you have gained/lost 2 pounds overnight or 5 pounds in a week contact us immediately for medication adjustments or further instructions.         Follow-ups after your visit        Follow-up notes from your care team     Return for with Rosamaria, Return PH, with, Labs Adter Testing.      Your next 10 appointments already scheduled     Apr 20, 2017  1:30 PM CDT   Cath 90 Minute with [id number]   [facility] ([facility])    [address] [facility] [facility] [place] [id number] [phone]            Apr 25, 2017 11:00 AM CDT   (Arrive by 10:45 AM)   Return General Liver with Cory Fuentes NP   [facility] ([facility])    [address] [address] [phone]              Future tests that were ordered for you today     Open Future Orders        Priority Expected Expires Ordered    Outpatient Coronary Angiography Adult Order Routine  6/25/2017 4/6/2017            Who to contact     If you have questions or need follow up information about today's clinic visit or your schedule please contact [facility]  "directly at [phone].  Normal or non-critical lab and imaging results will be communicated to you by MyChart, letter or phone within 4 business days after the clinic has received the results. If you do not hear from us within 7 days, please contact the clinic through [employer] or phone. If you have a critical or abnormal lab result, we will notify you by phone as soon as possible.  Submit refill requests through [employer] or call your pharmacy and they will forward the refill request to us. Please allow 3 business days for your refill to be completed.          Additional Information About Your Visit        [employer] Information     [employer] lets you send messages to your doctor, view your test results, renew your prescriptions, schedule appointments and more. To sign up, go to [email or web address] . Click on \"Log in\" on the left side of the screen, which will take you to the Welcome page. Then click on \"Sign up Now\" on the right side of the page.     You will be asked to enter the access code listed below, as well as some personal information. Please follow the directions to create your username and password.     Your access code is: [email or web address]  Expires: 2017  3:55 PM     Your access code will  in 90 days. If you need help or a new code, please call your [facility] clinic or [phone].        Care EveryWhere ID     This is your Care EveryWhere ID. This could be used by other organizations to access your [facility] medical records  [id number]        Your Vitals Were     Pulse BMI (Body Mass Index)                65 24.88 kg/m2           Blood Pressure from Last 3 Encounters:   17 (!) 86/53   17 (!) 87/59   17 92/48    Weight from Last 3 Encounters:   17 74.2 kg (163 lb 9.6 oz)   17 72.2 kg (159 lb 1.6 oz)   17 73.7 kg (162 lb 8 oz)                 Today's Medication Changes          These changes are accurate as of: 17  3:55 PM.  If you have any questions, ask your " nurse or doctor.               These medicines have changed or have updated prescriptions.        Dose/Directions    aspirin 81 MG tablet   This may have changed:  Another medication with the same name was removed. Continue taking this medication, and follow the directions you see here.        Dose:  81 mg   Take 81 mg by mouth daily   Refills:  0         Stop taking these medicines if you haven't already. Please contact your care team if you have questions.     amLODIPine 10 MG tablet   Commonly known as:  NORVASC   Stopped by:  Tyrone Blank MD           camphor-menthol 0.5-0.5 % Lotn   Commonly known as:  SARNA   Stopped by:  Tyrone Blank MD           pravastatin 40 MG tablet   Commonly known as:  PRAVACHOL   Stopped by:  Tyrone Blank MD                    Primary Care Provider Office Phone # Fax #    Milly Eve Healy [phone]       [address] [place] [id number]        Thank you!     Thank you for choosing [facility]  for your care. Our goal is always to provide you with excellent care. Hearing back from our patients is one way we can continue to improve our services. Please take a few minutes to complete the written survey that you may receive in the mail after your visit with us. Thank you!             Your Updated Medication List - Protect others around you: Learn how to safely use, store and throw away your medicines at www.disposemymeds.org.          This list is accurate as of: 4/6/17  3:55 PM.  Always use your most recent med list.                   Brand Name Dispense Instructions for use    aspirin 81 MG tablet      Take 81 mg by mouth daily       bosentan 125 MG tablet    TRACLEER    60 tablet    Take 1 tablet (125 mg) by mouth 2 times daily       carvedilol 25 MG tablet    COREG    90 tablet    Take 0.5 tablets (12.5 mg) by mouth 2 times daily       hydrOXYzine 25 MG capsule    VISTARIL    120 capsule     Take 2 capsules (50 mg) by mouth 3 times daily as needed for itching       loratadine 10 MG tablet    CLARITIN    90 tablet    Take 1 tablet (10 mg) by mouth daily       midodrine HCl 10 MG Tabs      Take 10 mg by mouth As needed       omeprazole 40 MG capsule    priLOSEC    90 capsule    Take 1 capsule (40 mg) by mouth daily Take 30-60 minutes before a meal.       paricalcitol 5 MCG/ML injection    ZEMPLAR     2 mcg 2 mcg by IV Push route dialysis.       PHOSLO 667 MG Caps capsule   Generic drug:  calcium acetate     180 capsule    Take 2 capsules (1,334 mg) by mouth 3 times daily (with meals)       RENAL 1 MG Caps      Take 1 mg by mouth Take 1 capsule (1 mg) by mouth daily in the afternoon.  Indications: Nutritional Support       sildenafil 20 MG tablet    REVATIO/VIAGRA    180 tablet    Take 2 tablets (40 mg) by mouth 3 times daily       torsemide 20 MG tablet    DEMADEX    90 tablet    Take 3 tablets (60 mg) by mouth 2 times daily

## 2017-04-11 NOTE — TELEPHONE ENCOUNTER
[facility]   2/16/16: RA 16/17 (13), RV 85/10, PA 85/20 (48), PAW 10,  (5.9), ARI 5.7 (3.0)  4/30/15: RA 15 RV 94/17 PA 94/42(58) W 15 CO/CI 4.2/2.2 PVR 10.3 PAsat 61.8%  11/21/14: RA 6 RV 77/8 PA 84/22(45) W 8 CO/I 6.9/3.6 PVR 6.2   7/2/14: RA 12/9 (7), RV 64/12, PA 65/20 (37), PAW 7/7 (6),  (4.8), ARI 6.4 (3.4)  9/26/13: RA- 17/12/9, RV 87/15, PA 85/17/43, PCW 10/11/6, PVR 5.8, COI 3.4    6MWT  3/20/14 198m/4L/85% lowest     Echo  2/16/16- RVSP 83/severe atrial enlargement is present. Mild RV dilation is present. Global RV function is mildly reduced.      PFT  9/5/2014  3/20/2014     VQ- 9/26/13

## 2017-04-12 NOTE — PROGRESS NOTES
Paracentesis Nursing Note  Jayy Gill presents today to [facility] for a paracentesis.    During today's appointment orders from Cory Fuentes NP were completed.    Progress Note:  Patient identification verified by name and date of birth.  Assessment completed.  Vitals monitored throughout appointment and recorded in Doc Flowsheets.  See proceduralist note in ultrasound.    Date of consent or authorization: 3/16/17.  Invasive Procedure Safety Checklist was completed and sent for scanning.     Paracentesis performed by Cristino Strickland PA-C Radiology.    The following labs were communicated to provider performing paracentesis:  Lab Results   Component Value Date     04/06/2017       Total amount of ascites fluid drained: 2.1 liters  Color of ascites fluid: yellow and clear.  Total amount of albumin given: 25  grams.    Patient with SBP 70's during procedure. Patient has a history of lower pressures. Patient asymptomatic but stopped paracentesis anyways after 2L due to BP's. Patient had dialysis run today also. Patient scheduled his next appointment for a paracentesis on a non dialysis day.     Post procedure,denies pain or discomfort post paracentesis. and SBP returned to upper 80's post procedure. Patient denies dizziness or lightheadedness. Just complains of fatigue after dialysis run.. Do to fatigue, assisted patient in wheelchair downstairs per his request.       Discharge Plan:  Discharge instructions were reviewed with patient.  Patient/Representative verbalized understanding and all questions were answered.   Discharged from [facility] in stable condition.    Ericka Severson, RN       Administrations This Visit     albumin human 25 % injection 12.5 g     Admin Date Action Dose Route Administered By             04/12/2017 New Bag 12.5 g Intravenous Severson, Ericka, RN              Admin Date Action Dose Route Administered By              04/12/2017 New Bag 12.5 g Intravenous Severson, Ericka, HERBIE                    lidocaine BUFFERED 1 % injection 20 mL     Admin Date Action Dose Route Administered By             04/12/2017 Given by Other Clinician 20 mL Injection Severson, Ericka, RN                            BP (!) 88/54  Pulse 98  Temp 98  F (36.7  C) (Tympanic)  Wt 70.9 kg (156 lb 4.9 oz)  SpO2 93%  BMI 23.77 kg/m2

## 2017-04-12 NOTE — MR AVS SNAPSHOT
After Visit Summary   4/12/2017    Jayy Gill    MRN: [id number]           Patient Information     Date Of Birth          1970        Visit Information        Provider Department      4/12/2017 12:00 PM Cristino Strickland PA-C; UC 39 ATC [facility] Specialty and Procedure        Today's Diagnoses     Chronic congestive heart failure, unspecified congestive heart failure type (H)    -  1       Follow-ups after your visit        Your next 10 appointments already scheduled     Apr 20, 2017 12:00 PM CDT   Procedure 1.5 hr with U2A ROOM 16   Unit [address] [facility] [place] ([facility])    [address] [address]               Apr 20, 2017  1:30 PM CDT   Cath 90 Minute with [id number]   [facility], [place],  Heart Cath Lab ([facility])    [address] [id number] [phone]            Apr 25, 2017 10:30 AM CDT   Lab with ELBA LAB   [facility] ([facility])    [address] [place] [id number] [phone]            Apr 25, 2017 11:00 AM CDT   (Arrive by 10:45 AM)   Return General Liver with Cory Fuentes NP   [facility] Hepatology ([facility])    [address] [place] [address] [id number] [phone]            Apr 25, 2017  1:30 PM CDT   (Arrive by 1:15 PM)   RETURN PRIMARY PULMONARY with Tyrone Blank MD   [facility] ([facility])    [address] [place] [address] [id number] [phone]            May 04, 2017  9:30 AM CDT   Paracentesis Visit with  Spec Inf Para Provider, UC 40 ATC   [facility] Specialty and Procedure ([facility])    [address] [place] [address] [id number] [phone]              Who to contact     If you have questions or  "need follow up information about today's clinic visit or your schedule please contact [facility] SPECIALTY AND PROCEDURE directly at [phone].  Normal or non-critical lab and imaging results will be communicated to you by Neonodehart, letter or phone within 4 business days after the clinic has received the results. If you do not hear from us within 7 days, please contact the clinic through Neonodehart or phone. If you have a critical or abnormal lab result, we will notify you by phone as soon as possible.  Submit refill requests through Kwanji or call your pharmacy and they will forward the refill request to us. Please allow 3 business days for your refill to be completed.          Additional Information About Your Visit        NeonodeharFanChatter Information     Kwanji lets you send messages to your doctor, view your test results, renew your prescriptions, schedule appointments and more. To sign up, go to [email or web address] . Click on \"Log in\" on the left side of the screen, which will take you to the Welcome page. Then click on \"Sign up Now\" on the right side of the page.     You will be asked to enter the access code listed below, as well as some personal information. Please follow the directions to create your username and password.     Your access code is: [email or web address]  Expires: 2017  3:55 PM     Your access code will  in 90 days. If you need help or a new code, please call your [place] clinic or [phone].        Care EveryWhere ID     This is your Care EveryWhere ID. This could be used by other organizations to access your [facility] medical records  [id number]        Your Vitals Were     Pulse Temperature Pulse Oximetry BMI (Body Mass Index)          98 98  F (36.7  C) (Tympanic) 93% 23.77 kg/m2         Blood Pressure from Last 3 Encounters:   17 (!) 88/54   17 (!) 86/53   17 (!) 87/59    Weight from Last 3 Encounters:   17 70.9 kg (156 lb 4.9 oz) "   04/06/17 74.2 kg (163 lb 9.6 oz)   03/16/17 72.2 kg (159 lb 1.6 oz)              We Performed the Following     Treatment Conditions     US Paracentesis        Primary Care Provider Office Phone # Fax #    Milly Eve Healy [phone]       [address]  [facility] [address]        Thank you!     Thank you for choosing [facility] SPECIALTY AND PROCEDURE  for your care. Our goal is always to provide you with excellent care. Hearing back from our patients is one way we can continue to improve our services. Please take a few minutes to complete the written survey that you may receive in the mail after your visit with us. Thank you!             Your Updated Medication List - Protect others around you: Learn how to safely use, store and throw away your medicines at www.disposemymeds.org.          This list is accurate as of: 4/12/17  2:54 PM.  Always use your most recent med list.                   Brand Name Dispense Instructions for use    aspirin 81 MG tablet      Take 81 mg by mouth daily       bosentan 125 MG tablet    TRACLEER    60 tablet    Take 1 tablet (125 mg) by mouth 2 times daily       carvedilol 25 MG tablet    COREG    90 tablet    Take 0.5 tablets (12.5 mg) by mouth 2 times daily       hydrOXYzine 25 MG capsule    VISTARIL    120 capsule    Take 2 capsules (50 mg) by mouth 3 times daily as needed for itching       loratadine 10 MG tablet    CLARITIN    90 tablet    Take 1 tablet (10 mg) by mouth daily       midodrine HCl 10 MG Tabs      Take 10 mg by mouth As needed       omeprazole 40 MG capsule    priLOSEC    90 capsule    Take 1 capsule (40 mg) by mouth daily Take 30-60 minutes before a meal.       paricalcitol 5 MCG/ML injection    ZEMPLAR     2 mcg 2 mcg by IV Push route dialysis.       PHOSLO 667 MG Caps capsule   Generic drug:  calcium acetate     180 capsule    Take 2 capsules (1,334 mg) by mouth 3 times daily (with  meals)       RENAL 1 MG Caps      Take 1 mg by mouth Take 1 capsule (1 mg) by mouth daily in the afternoon.  Indications: Nutritional Support       sildenafil 20 MG tablet    REVATIO/VIAGRA    180 tablet    Take 2 tablets (40 mg) by mouth 3 times daily       torsemide 20 MG tablet    DEMADEX    90 tablet    Take 3 tablets (60 mg) by mouth 2 times daily

## 2017-04-25 NOTE — MR AVS SNAPSHOT
After Visit Summary   4/25/2017    Jayy Gill    MRN: [id number]           Patient Information     Date Of Birth          1970        Visit Information        Provider Department      4/25/2017 11:00 AM Cory Fuentes NP [facility] Hepatology        Care Instructions    Please talk with Dr Blank today about your lightheadedness when walking.     Consider referral to Palliative Care medicine as discussed and if interested, talk wit your primary care provider or Dr Blank.     Follow up appointment with Dr Rojo in 4-6 months.     If you have any questions or concerns about your last test results or plan of care, please call our clinic at [phone].               Follow-ups after your visit        Your next 10 appointments already scheduled     May 04, 2017  9:30 AM CDT   Paracentesis Visit with  Spec Inf Para Provider,  40 ATC   [facility] [place] Specialty and Procedure ([facility])    [address] [place] [address]  2nd [facility] [address] [phone]            Aug 29, 2017  9:00 AM CDT   Lab with  LAB   [facility] Lab ([facility])    [address] [place] [address] [phone]            Aug 29, 2017 10:00 AM CDT   (Arrive by 9:45 AM)   Return General Liver with Rocky Rojo MD   [facility] Hepatology ([facility])    [address] [place] [address]  3rd [facility] [address] [phone]              Who to contact     If you have questions or need follow up information about today's clinic visit or your schedule please contact [facility] HEPATOLOGY directly at [phone].  Normal or non-critical lab and imaging results will be communicated to you by MyChart, letter or phone within 4 business days after the clinic has received the results. If you do not hear from us within 7 days, please contact the clinic through MyChart or phone. If you have a  "critical or abnormal lab result, we will notify you by phone as soon as possible.  Submit refill requests through [email or web address] or call your pharmacy and they will forward the refill request to us. Please allow 3 business days for your refill to be completed.          Additional Information About Your Visit        MyChart Information     [email or web address] lets you send messages to your doctor, view your test results, renew your prescriptions, schedule appointments and more. To sign up, go to [email or web address] . Click on \"Log in\" on the left side of the screen, which will take you to the Welcome page. Then click on \"Sign up Now\" on the right side of the page.     You will be asked to enter the access code listed below, as well as some personal information. Please follow the directions to create your username and password.     Your access code is: [email or web address]  Expires: 2017  3:55 PM     Your access code will  in 90 days. If you need help or a new code, please call your [place] clinic or [phone].        Care EveryWhere ID     This is your Care EveryWhere ID. This could be used by other organizations to access your [facility] medical records  [id number]        Your Vitals Were     Pulse Temperature Height Pulse Oximetry BMI (Body Mass Index)       70 97.8  F (36.6  C) (Oral) 1.753 m (5' 9\") 94% 23.83 kg/m2        Blood Pressure from Last 3 Encounters:   17 (!) 82/43   17 (!) 82/43   17 (!) 88/54    Weight from Last 3 Encounters:   17 73 kg (161 lb)   17 73.2 kg (161 lb 6.4 oz)   17 70.9 kg (156 lb 4.9 oz)              Today, you had the following     No orders found for display       Primary Care Provider Office Phone # Fax #    Milly Healy [phone]       [address]  [facility] [address]        Thank you!     Thank you for choosing [facility] HEPATOLOGY  for your care. Our goal is always to provide you with excellent " care. Hearing back from our patients is one way we can continue to improve our services. Please take a few minutes to complete the written survey that you may receive in the mail after your visit with us. Thank you!             Your Updated Medication List - Protect others around you: Learn how to safely use, store and throw away your medicines at www.disposemymeds.org.          This list is accurate as of: 4/25/17 12:10 PM.  Always use your most recent med list.                   Brand Name Dispense Instructions for use    aspirin 81 MG tablet      Take 81 mg by mouth daily       bosentan 125 MG tablet    TRACLEER    60 tablet    Take 1 tablet (125 mg) by mouth 2 times daily       carvedilol 25 MG tablet    COREG    90 tablet    Take 0.5 tablets (12.5 mg) by mouth 2 times daily       hydrOXYzine 25 MG capsule    VISTARIL    120 capsule    Take 2 capsules (50 mg) by mouth 3 times daily as needed for itching       loratadine 10 MG tablet    CLARITIN    90 tablet    Take 1 tablet (10 mg) by mouth daily       midodrine HCl 10 MG Tabs      Take 10 mg by mouth As needed       omeprazole 40 MG capsule    priLOSEC    90 capsule    Take 1 capsule (40 mg) by mouth daily Take 30-60 minutes before a meal.       paricalcitol 5 MCG/ML injection    ZEMPLAR     2 mcg 2 mcg by IV Push route dialysis.       PHOSLO 667 MG Caps capsule   Generic drug:  calcium acetate     180 capsule    Take 2 capsules (1,334 mg) by mouth 3 times daily (with meals)       RENAL 1 MG Caps      Take 1 mg by mouth Take 1 capsule (1 mg) by mouth daily in the afternoon.  Indications: Nutritional Support       sildenafil 20 MG tablet    REVATIO/VIAGRA    180 tablet    Take 2 tablets (40 mg) by mouth 3 times daily       torsemide 20 MG tablet    DEMADEX    90 tablet    Take 3 tablets (60 mg) by mouth 2 times daily

## 2017-04-25 NOTE — NURSING NOTE
Chief Complaint   Patient presents with     RECHECK     abnormal results of Liver function   Pt roomed, vitals, meds, and allergies reviewed with pt. Pt ready for provider.  Jamel Vickers, CMA

## 2017-04-25 NOTE — PROGRESS NOTES
REASON FOR VISIT:  Followup congestive heart failure, ascites.      HISTORY OF PRESENT ILLNESS:  Mr. Jayy Gill is a 46-year-old -American male who has a history of pulmonary hypertension/PVOD and end-stage renal disease along with hypotension.  He additionally does have ascites.  He has had ascites fluid analysis that shows he has elevated total protein of 4.  His SAAG is borderline at 1.1.  Currently, he is getting a small amount of ascitic fluid removed approximately every 2-3 weeks (around 2 liters).  He feels as though this interval is quite comfortable and manageable for him.  He denies any difficulty with abdominal pain.  He does have chronic nausea and rare vomiting.  He feels as though he has a decreased appetite.  His weight is low but generally stable over the last couple months.  He does not have any problems with diarrhea or constipation.  No GI bleeding.  He indicates that he is not sleeping especially well, but this is a long-term issue for him.  His energy level is very low.  When asked specifically if he has any depression, he indicates that he is hanging on.  He does not have any shortness of breath.  What he does have are some concerns regarding being lightheaded when he is walking.  Of note, he does have significant hypotension for which he takes midodrine.  He is on several heart medications.  He indicates that his dialysis runs have been able to be completed despite his low blood pressure.  He has seen his primary cardiologist, Dr. Blank, shortly after today's appointment.  He also does report some ongoing issues with itching for which he currently takes hydroxyzine approximately every 8 hours.  He indicates that it does help with his itching.  (He previously tried and did not tolerate ursodiol.)  His most recent bilirubin is 1.7.  Regarding his current quality of life, he indicates that he does watch a fair bit of TV.  On dialysis days, he likes to work on cars and still watch TV.   When asked specifically what he would do if he was a lot healthier, he thinks he would do about the same types of activities.      ALLERGIES AND MEDICATIONS:  Listed in the electronic medical record.      VITAL SIGNS:  Blood pressure is low at 82/43 (patient often runs in this range), heart rate 70, temperature 97.8, O2 sat 94% on room air.   HEENT:  Anicteric sclerae.  Oropharynx is clear.   NECK:  Supple, no lymphadenopathy.   PULMONARY:  Clear to auscultation.  No crackles or rales noted posteriorly.   CARDIOVASCULAR:  S1, S2, regular rate.   ABDOMEN:  Semi-firm, nontender.   EXTREMITIES:  Exam shows +1-2 edema bilaterally.   NEUROLOGIC:  Grossly intact.   PSYCHIATRIC:  Normal speech, normal affect, normal memory.      LABORATORY:  Results from today showed the following:  White count 4.1, hemoglobin 13.2, platelet count 168,000.  INR 1.4.  Sodium 137, potassium 4.6, glucose 91, creatinine elevated at 6.35.  Total bilirubin 1.7, albumin 2.8, alkaline phosphatase 102, ALT 18, AST 22.  BMP 27,000 (trending up).      ASSESSMENT:  Pulmonary hypertension, pulmonary veno-occlusive disease, hypotension, end-stage renal disease, ascites.      PLAN:  At this time, I have advised Mr. Gill to talk with Dr. Blank who he will be seeing shortly after today's appointment regarding his lightheadedness, as he might require some medication adjustments given his hypotension.  I did broach the subject of enrolling him in palliative care for symptom management and support (not hospice).  He is open to thinking about it, but he does not want to commit to it just yet.  I did send his primary care provider, Dr. Healy, a message regarding this, as he really might benefit from some additional support and symptom management.  I did let him know that I am leaving our liver program and would like him to have a followup appointment scheduled with Dr. Rojo who he has met in the past and asked for that to be sometime in the next 4-6  months.  With his ascitic protein of 4, I have not previously placed him on SBP prophylaxis.  To date, he has not had an upper endoscopy procedure.  I am not sure if he could safely tolerate it.  I would value Dr. Rojo's input on this at a future visit.  All of his questions were addressed.      Fifteen minutes were spent with this patient, greater than 50% spent in counseling and coordination of care.        cc: MD Tyrone High MD John [place], MD

## 2017-04-25 NOTE — MR AVS SNAPSHOT
After Visit Summary   4/25/2017    Jayy Gill    MRN: [id number]           Patient Information     Date Of Birth          1970        Visit Information        Provider Department      4/25/2017 12:00 PM Graciela Penn APRN CNP M [facility]        Today's Diagnoses     Fatigue    -  1    Dyspnea on exertion          Care Instructions    Medication Changes:  Continue taking your Tracleer  Midodrine 10 mg, daily (call if you are still dizzy)    Patient Instructions:  **add vitamin B12 & vitamin D to labs today  Check-In  Time Check-In Location Estimated Length Procedure   Name        [facility]  waiting room  minutes Right and Left Heart Cath**      Procedure Preparations & Instructions    This is an invasive procedure that DOES require preparation:       Nothing to eat or drink for 6 hours     A ride should be arranged for you as you will be unable to drive home. You will be required to lay flat for approximately 2-4 hours in the recovery unit to ensure proper clotting of the artery.    Take 325 mg of Asprin 24 hours prior to procedure and morning of prior.        *For Patients with Diabetes:   DO NOT take any oral diabetic medication, short-acting diabetes medications/insulin, humalog or regular insulin the morning of your test    Take   dose of long-acting insulin (Lantus, Levemir) the day of your test    Hold Oral Diabetic on the day of the procedure and for 48 hours after IV contrast given    Remember to bring your glucometer and insulin with you to take after your test if needed       *For Patients on anticoagulants:   Your Coumadin Clinic will give you instructions on medication adjustments or bridging prior to the procedure      Results:  Lab Results   Component Value Date    WBC 4.1 04/25/2017    HGB 13.2 (L) 04/25/2017    HCT 41.8 04/25/2017     04/25/2017    CHOL 68 03/15/2016    TRIG 64 03/15/2016    HDL 30 (L) 03/15/2016    ALT 18 04/25/2017    AST 22  04/25/2017     04/25/2017    BUN 27 04/25/2017    CO2 30 04/25/2017    TSH 8.98 (H) 02/19/2016    INR 1.40 (H) 04/25/2017     Follow up Appointment Information:    After testing   [address]  3rd Floor Routine Clinic   Follow Up Visit CAROLYN Villalpando CNP   Appointment Preparations & Instructions  We ask that you plan accordingly due to traffic and parking that may delay you. We look forward to seeing you!     We are located on the third floor of the [facility] [place] [facility].  Our address is     [address] [place] [address] on 3rd [place] [address]    Thank you for allowing us to be a part of your care here at the [facility]    If you have questions or concerns please contact us at:    Armida Pastor RN, BSN    Hermann Laguerre (Schedule,P.A.)  Nurse Coordinator     Clinic   Pulmonary Hypertension   Pulmonary Hypertension  [facility]  (P)[phone]    (P) [phone]        (F)[phone]    ** Please note that you will NOT receive a reminder call regarding your scheduled testing, reminder calls are for provider appointments only.  If you are scheduled for testing within the [facility] system you may receive a call regarding pre-registration for billing purposes only.**     Remember to weigh yourself daily after voiding and before you consume any food or beverages and log the numbers.  If you have gained/lost 2 pounds overnight or 5 pounds in a week contact us immediately for medication adjustments or further instructions.          Follow-ups after your visit        Follow-up notes from your care team     Return for with CAROLYN Villalpando CNP, Return , Will schedule follow up after testing is completed.      Your next 10 appointments already scheduled     May 04, 2017  9:30 AM CDT   Paracentesis Visit with Uc  "Spec Inf Para Provider,  40 ATC   [facility] Specialty and Procedure ([facility])    [address] [facility]  2nd Floor  [facility] [id number] [phone]            Aug 29, 2017  9:00 AM CDT   Lab with  LAB   [facility] Lab ([facility])    [address]  [address] [facility] [address] [phone]            Aug 29, 2017 10:00 AM CDT   (Arrive by 9:45 AM)   Return General Liver with Rocky Rojo MD   [facility] Hepatology ([facility])    [address]  3rd Floor  [facility] [id number] [phone]              Who to contact     If you have questions or need follow up information about today's clinic visit or your schedule please contact [facility] directly at [phone].  Normal or non-critical lab and imaging results will be communicated to you by MyChart, letter or phone within 4 business days after the clinic has received the results. If you do not hear from us within 7 days, please contact the clinic through [facility] or phone. If you have a critical or abnormal lab result, we will notify you by phone as soon as possible.  Submit refill requests through [facility] or call your pharmacy and they will forward the refill request to us. Please allow 3 business days for your refill to be completed.          Additional Information About Your Visit        [facility] Information     [facility] lets you send messages to your doctor, view your test results, renew your prescriptions, schedule appointments and more. To sign up, go to [email or web address] . Click on \"Log in\" on the left side of the screen, which will take you to the Welcome page. Then click on \"Sign up Now\" on the right side of the page.     You will be asked to enter the access code listed below, as well as some personal information. Please follow the directions to create your username and password.     Your access code " "is: [email or web address]  Expires: 2017  3:55 PM     Your access code will  in 90 days. If you need help or a new code, please call your [facility] clinic or [phone].        Care EveryWhere ID     This is your Care EveryWhere ID. This could be used by other organizations to access your [facility] medical records  [id number]        Your Vitals Were     Pulse Height Pulse Oximetry BMI (Body Mass Index)          70 1.753 m (5' 9\") 94% 23.78 kg/m2         Blood Pressure from Last 3 Encounters:   17 (!) 82/43   17 (!) 82/43   17 (!) 88/54    Weight from Last 3 Encounters:   17 73 kg (161 lb)   17 73.2 kg (161 lb 6.4 oz)   17 70.9 kg (156 lb 4.9 oz)              We Performed the Following     Vitamin B12     Vitamin D Deficiency Screening        Primary Care Provider Office Phone # Fax #    Millycruz Healy [phone]       [address]  [facility] [address]        Thank you!     Thank you for choosing [facility]  for your care. Our goal is always to provide you with excellent care. Hearing back from our patients is one way we can continue to improve our services. Please take a few minutes to complete the written survey that you may receive in the mail after your visit with us. Thank you!             Your Updated Medication List - Protect others around you: Learn how to safely use, store and throw away your medicines at www.disposemymeds.org.          This list is accurate as of: 17 12:55 PM.  Always use your most recent med list.                   Brand Name Dispense Instructions for use    aspirin 81 MG tablet      Take 81 mg by mouth daily       bosentan 125 MG tablet    TRACLEER    60 tablet    Take 1 tablet (125 mg) by mouth 2 times daily       carvedilol 25 MG tablet    COREG    90 tablet    Take 0.5 tablets (12.5 mg) by mouth 2 times daily       hydrOXYzine 25 MG capsule    VISTARIL    120 capsule    Take 2 " capsules (50 mg) by mouth 3 times daily as needed for itching       loratadine 10 MG tablet    CLARITIN    90 tablet    Take 1 tablet (10 mg) by mouth daily       midodrine HCl 10 MG Tabs      Take 10 mg by mouth As needed       omeprazole 40 MG capsule    priLOSEC    90 capsule    Take 1 capsule (40 mg) by mouth daily Take 30-60 minutes before a meal.       paricalcitol 5 MCG/ML injection    ZEMPLAR     2 mcg 2 mcg by IV Push route dialysis.       PHOSLO 667 MG Caps capsule   Generic drug:  calcium acetate     180 capsule    Take 2 capsules (1,334 mg) by mouth 3 times daily (with meals)       RENAL 1 MG Caps      Take 1 mg by mouth Take 1 capsule (1 mg) by mouth daily in the afternoon.  Indications: Nutritional Support       sildenafil 20 MG tablet    REVATIO/VIAGRA    180 tablet    Take 2 tablets (40 mg) by mouth 3 times daily       torsemide 20 MG tablet    DEMADEX    90 tablet    Take 3 tablets (60 mg) by mouth 2 times daily

## 2017-04-25 NOTE — NURSING NOTE
Diet: Patient instructed regarding a heart healthy diet, including discussion of reduced fat and sodium intake. Patient demonstrated understanding of this information and agreed to call with further questions or concerns.    Labs: Patient was given results of the laboratory testing obtained today. Patient demonstrated understanding of this information and agreed to call with further questions or concerns.     Left Heart Catheterization: Patient given Coronary Angiogram and Angioplasty: A Patient's Guide booklet. Patient was instructed regarding left heart catheterization, including discussion of the indication, procedure, preparation, intra-procedural steps, and recovery at home. Patient demonstrated understanding of this information and agreed to call with further questions or concerns.    Med Reconcile: Reviewed and verified all current medications with the patient. The updated medication list was printed and given to the patient.    Return Appointment: Patient given instructions regarding scheduling next clinic visit. Patient demonstrated understanding of this information and agreed to call with further questions or concerns.    Right Heart Catheterization: Patient was instructed regarding right heart catheterization, including discussion of the procedure, preparation, intra-procedural steps, and recovery at home. Patient demonstrated understanding of this information and agreed to call with further questions or concerns.    Medication Change: Patient was educated regarding prescribed medication change, including discussion of the indication, administration, side effects, and when to report to MD or RN. Patient demonstrated understanding of this information and agreed to call with further questions or concerns.    Patient stated he understood all health information given and agreed to call with further questions or concerns.     Medication Changes:  Continue taking your Tracleer  Midodrine 10 mg, daily (call if  you are still dizzy)    Patient Instructions:  **add Vitamin B12 and Vitamin D levels    Check-In  Time Check-In Location Estimated Length Procedure   Name        [facility]  waiting room  minutes Right and Left Heart Cath**      Procedure Preparations & Instructions    This is an invasive procedure that DOES require preparation:       Nothing to eat or drink for 6 hours     A ride should be arranged for you as you will be unable to drive home. You will be required to lay flat for approximately 2-4 hours in the recovery unit to ensure proper clotting of the artery.    Take 325 mg of Asprin 24 hours prior to procedure and morning of prior.        *For Patients with Diabetes:   DO NOT take any oral diabetic medication, short-acting diabetes medications/insulin, humalog or regular insulin the morning of your test    Take   dose of long-acting insulin (Lantus, Levemir) the day of your test    Hold Oral Diabetic on the day of the procedure and for 48 hours after IV contrast given    Remember to bring your glucometer and insulin with you to take after your test if needed       *For Patients on anticoagulants:   Your Coumadin Clinic will give you instructions on medication adjustments or bridging prior to the procedure      Results:  Lab Results   Component Value Date    WBC 4.1 04/25/2017    HGB 13.2 (L) 04/25/2017    HCT 41.8 04/25/2017     04/25/2017    CHOL 68 03/15/2016    TRIG 64 03/15/2016    HDL 30 (L) 03/15/2016    ALT 18 04/25/2017    AST 22 04/25/2017     04/25/2017    BUN 27 04/25/2017    CO2 30 04/25/2017    TSH 8.98 (H) 02/19/2016    INR 1.40 (H) 04/25/2017     Follow up Appointment Information:    After testing   [facility]  [address] [place] [address]  3rd Floor Routine Clinic   Follow Up Visit CAROLYN Villalpando, CNP   Appointment Preparations & Instructions  We ask that you plan accordingly due to traffic and parking that may delay you. We look forward to seeing you!

## 2017-04-25 NOTE — PATIENT INSTRUCTIONS
Please talk with Dr Blank today about your lightheadedness when walking.     Consider referral to Palliative Care medicine as discussed and if interested, talk wit your primary care provider or Dr Blank.     Follow up appointment with Dr Rojo in 4-6 months.     If you have any questions or concerns about your last test results or plan of care, please call our clinic at [phone].

## 2017-04-25 NOTE — LETTER
4/25/2017      RE: Jayy Gill  [address]  [place] [facility] [address]       REASON FOR VISIT:  Followup congestive heart failure, ascites.      HISTORY OF PRESENT ILLNESS:  Mr. Jayy Gill is a 46-year-old -American male who has a history of pulmonary hypertension/PVOD and end-stage renal disease along with hypotension.  He additionally does have ascites.  He has had ascites fluid analysis that shows he has elevated total protein of 4.  His SAAG is borderline at 1.1.  Currently, he is getting a small amount of ascitic fluid removed approximately every 2-3 weeks (around 2 liters).  He feels as though this interval is quite comfortable and manageable for him.  He denies any difficulty with abdominal pain.  He does have chronic nausea and rare vomiting.  He feels as though he has a decreased appetite.  His weight is low but generally stable over the last couple months.  He does not have any problems with diarrhea or constipation.  No GI bleeding.  He indicates that he is not sleeping especially well, but this is a long-term issue for him.  His energy level is very low.  When asked specifically if he has any depression, he indicates that he is hanging on.  He does not have any shortness of breath.  What he does have are some concerns regarding being lightheaded when he is walking.  Of note, he does have significant hypotension for which he takes midodrine.  He is on several heart medications.  He indicates that his dialysis runs have been able to be completed despite his low blood pressure.  He has seen his primary cardiologist, Dr. Blank, shortly after today's appointment.  He also does report some ongoing issues with itching for which he currently takes hydroxyzine approximately every 8 hours.  He indicates that it does help with his itching.  (He previously tried and did not tolerate ursodiol.)  His most recent bilirubin is 1.7.  Regarding his current quality of life, he indicates that he does watch a  fair bit of TV.  On dialysis days, he likes to work on cars and still watch TV.  When asked specifically what he would do if he was a lot healthier, he thinks he would do about the same types of activities.      ALLERGIES AND MEDICATIONS:  Listed in the electronic medical record.      VITAL SIGNS:  Blood pressure is low at 82/43 (patient often runs in this range), heart rate 70, temperature 97.8, O2 sat 94% on room air.   HEENT:  Anicteric sclerae.  Oropharynx is clear.   NECK:  Supple, no lymphadenopathy.   PULMONARY:  Clear to auscultation.  No crackles or rales noted posteriorly.   CARDIOVASCULAR:  S1, S2, regular rate.   ABDOMEN:  Semi-firm, nontender.   EXTREMITIES:  Exam shows +1-2 edema bilaterally.   NEUROLOGIC:  Grossly intact.   PSYCHIATRIC:  Normal speech, normal affect, normal memory.      LABORATORY:  Results from today showed the following:  White count 4.1, hemoglobin 13.2, platelet count 168,000.  INR 1.4.  Sodium 137, potassium 4.6, glucose 91, creatinine elevated at 6.35.  Total bilirubin 1.7, albumin 2.8, alkaline phosphatase 102, ALT 18, AST 22.  BMP 27,000 (trending up).      ASSESSMENT:  Pulmonary hypertension, pulmonary veno-occlusive disease, hypotension, end-stage renal disease, ascites.      PLAN:  At this time, I have advised Mr. Gill to talk with Dr. Blank who he will be seeing shortly after today's appointment regarding his lightheadedness, as he might require some medication adjustments given his hypotension.  I did broach the subject of enrolling him in palliative care for symptom management and support (not hospice).  He is open to thinking about it, but he does not want to commit to it just yet.  I did send his primary care provider, Dr. Healy, a message regarding this, as he really might benefit from some additional support and symptom management.  I did let him know that I am leaving our liver program and would like him to have a followup appointment scheduled with   Darrel who he has met in the past and asked for that to be sometime in the next 4-6 months.  With his ascitic protein of 4, I have not previously placed him on SBP prophylaxis.  To date, he has not had an upper endoscopy procedure.  I am not sure if he could safely tolerate it.  I would value Dr. Rojo's input on this at a future visit.  All of his questions were addressed.      Fifteen minutes were spent with this patient, greater than 50% spent in counseling and coordination of care.         Cory Fuentes NP         cc: MD Tyrone High MD John Lake, MD

## 2017-04-25 NOTE — NURSING NOTE
Chief Complaint   Patient presents with     Follow Up For     Return PH for followup s/p right & left heart cath (Sildenafil, Tracleer)

## 2017-04-25 NOTE — PATIENT INSTRUCTIONS
Medication Changes:  Continue taking your Tracleer  Midodrine 10 mg, daily (call if you are still dizzy)    Patient Instructions:  **add vitamin B12 & vitamin D to labs today  Check-In  Time Check-In Location Estimated Length Procedure   Name        [facility]  waiting room  minutes Right and Left Heart Cath**      Procedure Preparations & Instructions    This is an invasive procedure that DOES require preparation:       Nothing to eat or drink for 6 hours     A ride should be arranged for you as you will be unable to drive home. You will be required to lay flat for approximately 2-4 hours in the recovery unit to ensure proper clotting of the artery.    Take 325 mg of Asprin 24 hours prior to procedure and morning of prior.        *For Patients with Diabetes:   DO NOT take any oral diabetic medication, short-acting diabetes medications/insulin, humalog or regular insulin the morning of your test    Take   dose of long-acting insulin (Lantus, Levemir) the day of your test    Hold Oral Diabetic on the day of the procedure and for 48 hours after IV contrast given    Remember to bring your glucometer and insulin with you to take after your test if needed       *For Patients on anticoagulants:   Your Coumadin Clinic will give you instructions on medication adjustments or bridging prior to the procedure      Results:  Lab Results   Component Value Date    WBC 4.1 04/25/2017    HGB 13.2 (L) 04/25/2017    HCT 41.8 04/25/2017     04/25/2017    CHOL 68 03/15/2016    TRIG 64 03/15/2016    HDL 30 (L) 03/15/2016    ALT 18 04/25/2017    AST 22 04/25/2017     04/25/2017    BUN 27 04/25/2017    CO2 30 04/25/2017    TSH 8.98 (H) 02/19/2016    INR 1.40 (H) 04/25/2017     Follow up Appointment Information:    After testing   [address]  3rd Floor Routine Clinic   Follow Up Visit CAROLYN Villalpando, CNP   Appointment Preparations & Instructions  We ask that you plan accordingly due to traffic and parking  that may delay you. We look forward to seeing you!     We are located on the third floor of the [facility] ([facility]) on the [facility].  Our address is     [address] [place] [address] on [address]rd [place] [address]    Thank you for allowing us to be a part of your care here at the [facility]    If you have questions or concerns please contact us at:    Armida Pastor RN, BSN    Hermann Laguerre (Schedule,P.A.)  Nurse Coordinator     Clinic   Pulmonary Hypertension   Pulmonary Hypertension  [facility]  (P)[phone]    (P) [phone]        (F)[phone]    ** Please note that you will NOT receive a reminder call regarding your scheduled testing, reminder calls are for provider appointments only.  If you are scheduled for testing within the [facility] system you may receive a call regarding pre-registration for billing purposes only.**     Remember to weigh yourself daily after voiding and before you consume any food or beverages and log the numbers.  If you have gained/lost 2 pounds overnight or 5 pounds in a week contact us immediately for medication adjustments or further instructions.

## 2017-04-25 NOTE — LETTER
4/25/2017      RE: Jayy Gill  [address]  [place] [facility] [address]       Dear Colleague,    Thank you for the opportunity to participate in the care of your patient, Jayy Gill, at the [facility]. Please see a copy of my visit note below.    April 25, 2017      Dear Doctor Milly Healy,    We had the pleasure of seeing Mr. Jayy Gill at [facility] today.  As you know, Mr. Gill is a pleasant 46-year-old -American male who has a history of hypertensive cardiomyopathy (versus cardiac cirrhosis), pulmonary hypertension, right heart failure, end-stage kidney disease currently on dialysis and PVOD.     Today he is here for follow up after a recent visit on 4/6/2017.  He reports that since his last visit, he has had daily lightheadedness and dizziness, as well as low BP's.  He feels unsteady when he walks, but states he has not fallen; no syncope as well.  His last dialysis treatment was yesterday and 2.5 Liters were removed.  The procedure was not stopped due to hypotension, but rather he states his BPs were consistently low yesterday.  He denies any shortness of breath.  He has experienced chest pain intermittently but cannot correlate it with any certain time or activity.  He reports it being non-radiating, not associated with any nausea, diaphoresis, or shortness of breath, and he cannot correlate it with palpitations although he states he has experienced those as well from time to time.  Appetite is poor, no intentional weight loss or gain.  No changes in sleep - he sleeps with one pillow and wakes up several times a night.  No related PND or coughing, he states he wakes up and just falls back to sleep.  He overall would like to have more energy.  Regarding medications, he is coming to the end of his Tracleer and states he has not received any replacements for months.       Problem  List  1. 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 Asymmetric ventricular hypertrophy with mid cavity gradient  3. Pulmonary hypertension  4. ESRD with dialysis dependence  5. Hypotension  6. Exertional lightheadedness  7. Pruritis       PAST MEDICAL HISTORY:  Past Medical History:   Diagnosis Date     Cardiomyopathy (H)      Dialysis patient (H)     M-W-F     Diarrhea     C diff     ESRD (end stage renal disease) (H)      HLD (hyperlipidemia)      Hypertension      Pulmonary hypertension (H) 11/21/2013       FAMILY HISTORY:  Family History   Problem Relation Age of Onset     Hypertension Mother      DIABETES Father          SOCIAL HISTORY:  Social History     Social History     Marital status: Single     Spouse name: N/A     Number of children: 2     Years of education: N/A     Social History Main Topics     Smoking status: Never Smoker     Smokeless tobacco: Never Used     Alcohol use No     Drug use: No     Sexual activity: Yes     Partners: Female     Other Topics Concern     Not on file     Social History Narrative       CURRENT MEDICATIONS:  Current Outpatient Prescriptions   Medication Sig Dispense Refill     carvedilol (COREG) 25 MG tablet Take 0.5 tablets (12.5 mg) by mouth 2 times daily 90 tablet 2     sildenafil (REVATIO/VIAGRA) 20 MG tablet Take 2 tablets (40 mg) by mouth 3 times daily 180 tablet 11     loratadine (CLARITIN) 10 MG tablet Take 1 tablet (10 mg) by mouth daily 90 tablet 3     hydrOXYzine (VISTARIL) 25 MG capsule Take 2 capsules (50 mg) by mouth 3 times daily as needed for itching 120 capsule 0     midodrine HCl 10 MG TABS Take 10 mg by mouth As needed       torsemide (DEMADEX) 20 MG tablet Take 3 tablets (60 mg) by mouth 2 times daily 90 tablet 3     bosentan (TRACLEER) 125 MG tablet Take 1 tablet (125 mg) by mouth 2 times daily 60 tablet 11     omeprazole (PRILOSEC) 40 MG capsule Take 1 capsule (40 mg) by mouth daily Take 30-60 minutes before a meal. 90 capsule 3     aspirin 81 MG tablet Take 81 mg by mouth  "daily       B Complex-C-Folic Acid (RENAL) 1 MG CAPS Take 1 mg by mouth Take 1 capsule (1 mg) by mouth daily in the afternoon.  Indications: Nutritional Support       paricalcitol (ZEMPLAR) 5 MCG/ML injection 2 mcg 2 mcg by IV Push route dialysis.       [DISCONTINUED] SILDENAFIL CITRATE PO Take 20 mg by mouth 3 times daily       calcium acetate (PHOSLO) 667 MG CAPS Take 2 capsules (1,334 mg) by mouth 3 times daily (with meals) 180 capsule        ROS:   10 point ROS negative except HPI    EXAM:  BP (!) 82/43  Pulse 70  Ht 1.753 m (5' 9\")  Wt 73 kg (161 lb)  SpO2 94%  BMI 23.78 kg/m2  Home weight  General: appears comfortable, alert and articulate  Head: normocephalic, atraumatic  Eyes: anicteric sclera, EOMI  Neck: no adenopathy  Orophyarynx: moist mucosa, no lesions, dentition intact  Heart: regular, S1/S2 S4, murmur, gallop, rub, estimated JVP 8 cm  Lungs: clear, no rales or wheezing  Abdomen: round, soft, non-tender, bowel sounds present, no hepatosplenomegaly  Extremities: no clubbing, cyanosis; +1 bilateral lower leg edema  Neurological: normal speech and affect, no gross motor deficits    Labs:  CBC RESULTS:  Lab Results   Component Value Date    WBC 4.1 04/25/2017    RBC 4.08 (L) 04/25/2017    HGB 13.2 (L) 04/25/2017    HCT 41.8 04/25/2017     (H) 04/25/2017    MCH 32.4 04/25/2017    MCHC 31.6 04/25/2017    RDW 16.0 (H) 04/25/2017     04/25/2017       CMP RESULTS:  Lab Results   Component Value Date     04/25/2017    POTASSIUM 4.6 04/25/2017    CHLORIDE 98 04/25/2017    CO2 30 04/25/2017    ANIONGAP 9 04/25/2017    GLC 91 04/25/2017    BUN 27 04/25/2017    CR 6.35 (H) 04/25/2017    GFRESTIMATED 10 (L) 04/25/2017    GFRESTBLACK 12 (L) 04/25/2017    JAZMINE 9.4 04/25/2017    BILITOTAL 1.7 (H) 04/25/2017    ALBUMIN 2.8 (L) 04/25/2017    ALKPHOS 102 04/25/2017    ALT 18 04/25/2017    AST 22 04/25/2017        INR RESULTS:  Lab Results   Component Value Date    INR 1.40 (H) 04/25/2017       No " components found for: CK  Lab Results   Component Value Date    MAG 2.3 03/18/2016     Lab Results   Component Value Date    NTBNPI 87011 (H) 03/13/2016       Echocardiogram (2/16/2016):  Interpretation Summary  Global and regional left ventricular function is normal with an EF of 60-65%.  Severe asymmetric LV hypertrophy (septum 1.4, PW 2.0). There is a dynamic mid  -ventricular gradient of 36 mmHg due to this.  Severe pulmonary hypertension is present. Right ventricular systolic pressure  is 83mmHg above the right atrial pressure. Paradoxical septal motion  consistent with right ventricular pressure and volume overload is present.  Estimated right atrial pressure is > 15 mmHg.  Global right ventricular function is mildly reduced. Mild right ventricular  dilation is present.  Moderate to severe functional tricuspid insufficiency is present. Mechanism  is annular dilation (5.5 cm).  Small circumferential pericardial effusion is present without any hemodynamic  significance.      US Abdomen Complete (2/02/2017):  Impression:   1. No focal liver lesion or parenchymal abnormality. There is  hepatosplenomegaly with decrease in mild ascites, likely sequela of  portal hypertension.   2. Adenomyomatosis/cholesterolosis of the gallbladder, otherwise  unremarkable.   3. Echogenic atrophic kidneys consistent with medical renal disease.  Bilateral renal cysts with simple appearance.  4. Patent Doppler evaluation with antegrade flow.     I have personally reviewed the examination and initial interpretation  and I agree with the findings.      RHC (2/16/2016):  RA- 16, 17, 13  RV-85, 10  PA-85/20, 48  PAW-10  PVR-5.9  CO/CI-6.4 (3.4)  Kate CO/CI-5.7 (3.0)      Assessment and Plan:   Mr. Gill is a 46 year old male with a past medical history of pulmonary hypertension of unclear etiology (likely PAH) complicated by right ventricular failure; WHO Group I, NYHA Functional Class III.     1. PAH with RV failure.  Continue sildenafil  40 mg PO TID and bosentan 125 mg PO BID. Reschedule left and right heart catheterization for next week, for accurate measurements of current mPAP, mRA and mPAWP pressures.  Return to clinic following procedure, or sooner if symptoms worsen.    2. Hypotension; lightheadedness upon activity.  Change dosing of midodrine from prn on HD days to 10 mg daily.  Despite 2.5 L taken off with HD yesterday, he does have hypotension and 1+ BLE edema.  Patient is to call if dizziness does not improve.     3. Malaise.  Although the lack of energy Mr. Gill feels is likely a direct result of HD and resultant hypotension; we will add on a Vitamin B12 and Vitamin D to his labs to check if those values are low as well.  Hgb and Hct are slightly improved from last visit.    4. ESRD on HD: followed by nephrology.     It was a pleasure seeing Mr. Gill at the [facility].    Please contact us with any questions or concerns that you may have.    Sincerely,  Graciela Penn, APRN, CNP.

## 2017-04-25 NOTE — PROGRESS NOTES
April 25, 2017      Dear Doctor Milly Healy,    We had the pleasure of seeing Mr. Jayy Gill at The [facility] today.  As you know, Mr. Gill is a pleasant 46-year-old -American male who has a history of hypertensive cardiomyopathy (versus cardiac cirrhosis), pulmonary hypertension, right heart failure, end-stage kidney disease currently on dialysis and PVOD.     Today he is here for follow up after a recent visit on 4/6/2017.  He reports that since his last visit, he has had daily lightheadedness and dizziness, as well as low BP's.  He feels unsteady when he walks, but states he has not fallen; no syncope as well.  His last dialysis treatment was yesterday and 2.5 Liters were removed.  The procedure was not stopped due to hypotension, but rather he states his BPs were consistently low yesterday.  He denies any shortness of breath.  He has experienced chest pain intermittently but cannot correlate it with any certain time or activity.  He reports it being non-radiating, not associated with any nausea, diaphoresis, or shortness of breath, and he cannot correlate it with palpitations although he states he has experienced those as well from time to time.  Appetite is poor, no intentional weight loss or gain.  No changes in sleep - he sleeps with one pillow and wakes up several times a night.  No related PND or coughing, he states he wakes up and just falls back to sleep.  He overall would like to have more energy.  Regarding medications, he is coming to the end of his Tracleer and states he has not received any replacements for months.       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 Asymmetric ventricular hypertrophy with mid cavity gradient  3. Pulmonary hypertension  4. ESRD with dialysis dependence  5. Hypotension  6. Exertional lightheadedness  7. Pruritis       PAST MEDICAL HISTORY:  Past Medical History:   Diagnosis Date     Cardiomyopathy (H)      Dialysis  patient (H)     M-W-F     Diarrhea     C diff     ESRD (end stage renal disease) (H)      HLD (hyperlipidemia)      Hypertension      Pulmonary hypertension (H) 11/21/2013       FAMILY HISTORY:  Family History   Problem Relation Age of Onset     Hypertension Mother      DIABETES Father          SOCIAL HISTORY:  Social History     Social History     Marital status: Single     Spouse name: N/A     Number of children: 2     Years of education: N/A     Social History Main Topics     Smoking status: Never Smoker     Smokeless tobacco: Never Used     Alcohol use No     Drug use: No     Sexual activity: Yes     Partners: Female     Other Topics Concern     Not on file     Social History Narrative       CURRENT MEDICATIONS:  Current Outpatient Prescriptions   Medication Sig Dispense Refill     carvedilol (COREG) 25 MG tablet Take 0.5 tablets (12.5 mg) by mouth 2 times daily 90 tablet 2     sildenafil (REVATIO/VIAGRA) 20 MG tablet Take 2 tablets (40 mg) by mouth 3 times daily 180 tablet 11     loratadine (CLARITIN) 10 MG tablet Take 1 tablet (10 mg) by mouth daily 90 tablet 3     hydrOXYzine (VISTARIL) 25 MG capsule Take 2 capsules (50 mg) by mouth 3 times daily as needed for itching 120 capsule 0     midodrine HCl 10 MG TABS Take 10 mg by mouth As needed       torsemide (DEMADEX) 20 MG tablet Take 3 tablets (60 mg) by mouth 2 times daily 90 tablet 3     bosentan (TRACLEER) 125 MG tablet Take 1 tablet (125 mg) by mouth 2 times daily 60 tablet 11     omeprazole (PRILOSEC) 40 MG capsule Take 1 capsule (40 mg) by mouth daily Take 30-60 minutes before a meal. 90 capsule 3     aspirin 81 MG tablet Take 81 mg by mouth daily       B Complex-C-Folic Acid (RENAL) 1 MG CAPS Take 1 mg by mouth Take 1 capsule (1 mg) by mouth daily in the afternoon.  Indications: Nutritional Support       paricalcitol (ZEMPLAR) 5 MCG/ML injection 2 mcg 2 mcg by IV Push route dialysis.       [DISCONTINUED] SILDENAFIL CITRATE PO Take 20 mg by mouth 3 times  "daily       calcium acetate (PHOSLO) 667 MG CAPS Take 2 capsules (1,334 mg) by mouth 3 times daily (with meals) 180 capsule        ROS:   10 point ROS negative except HPI    EXAM:  BP (!) 82/43  Pulse 70  Ht 1.753 m (5' 9\")  Wt 73 kg (161 lb)  SpO2 94%  BMI 23.78 kg/m2  Home weight  General: appears comfortable, alert and articulate  Head: normocephalic, atraumatic  Eyes: anicteric sclera, EOMI  Neck: no adenopathy  Orophyarynx: moist mucosa, no lesions, dentition intact  Heart: regular, S1/S2 S4, murmur, gallop, rub, estimated JVP 8 cm  Lungs: clear, no rales or wheezing  Abdomen: round, soft, non-tender, bowel sounds present, no hepatosplenomegaly  Extremities: no clubbing, cyanosis; +1 bilateral lower leg edema  Neurological: normal speech and affect, no gross motor deficits    Labs:  CBC RESULTS:  Lab Results   Component Value Date    WBC 4.1 04/25/2017    RBC 4.08 (L) 04/25/2017    HGB 13.2 (L) 04/25/2017    HCT 41.8 04/25/2017     (H) 04/25/2017    MCH 32.4 04/25/2017    MCHC 31.6 04/25/2017    RDW 16.0 (H) 04/25/2017     04/25/2017       CMP RESULTS:  Lab Results   Component Value Date     04/25/2017    POTASSIUM 4.6 04/25/2017    CHLORIDE 98 04/25/2017    CO2 30 04/25/2017    ANIONGAP 9 04/25/2017    GLC 91 04/25/2017    BUN 27 04/25/2017    CR 6.35 (H) 04/25/2017    GFRESTIMATED 10 (L) 04/25/2017    GFRESTBLACK 12 (L) 04/25/2017    JAZMINE 9.4 04/25/2017    BILITOTAL 1.7 (H) 04/25/2017    ALBUMIN 2.8 (L) 04/25/2017    ALKPHOS 102 04/25/2017    ALT 18 04/25/2017    AST 22 04/25/2017        INR RESULTS:  Lab Results   Component Value Date    INR 1.40 (H) 04/25/2017       No components found for: CK  Lab Results   Component Value Date    MAG 2.3 03/18/2016     Lab Results   Component Value Date    NTBNPI 37159 (H) 03/13/2016       Echocardiogram (2/16/2016):  Interpretation Summary  Global and regional left ventricular function is normal with an EF of 60-65%.  Severe asymmetric LV " hypertrophy (septum 1.4, PW 2.0). There is a dynamic mid  -ventricular gradient of 36 mmHg due to this.  Severe pulmonary hypertension is present. Right ventricular systolic pressure  is 83mmHg above the right atrial pressure. Paradoxical septal motion  consistent with right ventricular pressure and volume overload is present.  Estimated right atrial pressure is > 15 mmHg.  Global right ventricular function is mildly reduced. Mild right ventricular  dilation is present.  Moderate to severe functional tricuspid insufficiency is present. Mechanism  is annular dilation (5.5 cm).  Small circumferential pericardial effusion is present without any hemodynamic  significance.      US Abdomen Complete (2/02/2017):  Impression:   1. No focal liver lesion or parenchymal abnormality. There is  hepatosplenomegaly with decrease in mild ascites, likely sequela of  portal hypertension.   2. Adenomyomatosis/cholesterolosis of the gallbladder, otherwise  unremarkable.   3. Echogenic atrophic kidneys consistent with medical renal disease.  Bilateral renal cysts with simple appearance.  4. Patent Doppler evaluation with antegrade flow.     I have personally reviewed the examination and initial interpretation  and I agree with the findings.      RHC (2/16/2016):  RA- 16, 17, 13  RV-85, 10  PA-85/20, 48  PAW-10  PVR-5.9  CO/CI-6.4 (3.4)  Kate CO/CI-5.7 (3.0)      Assessment and Plan:   Mr. Gill is a 46 year old male with a past medical history of pulmonary hypertension of unclear etiology (likely PAH) complicated by right ventricular failure; WHO Group I, NYHA Functional Class III.     1. PAH with RV failure.  Continue sildenafil 40 mg PO TID and bosentan 125 mg PO BID. Reschedule left and right heart catheterization for next week, for accurate measurements of current mPAP, mRA and mPAWP pressures.  Return to clinic following procedure, or sooner if symptoms worsen.    2. Hypotension; lightheadedness upon activity.  Change dosing of  midodrine from prn on HD days to 10 mg daily.  Despite 2.5 L taken off with HD yesterday, he does have hypotension and 1+ BLE edema.  Patient is to call if dizziness does not improve.     3. Malaise.  Although the lack of energy Mr. Gill feels is likely a direct result of HD and resultant hypotension; we will add on a Vitamin B12 and Vitamin D to his labs to check if those values are low as well.  Hgb and Hct are slightly improved from last visit.    4. ESRD on HD: followed by nephrology.     It was a pleasure seeing Mr. Gill at the [facility].    Please contact us with any questions or concerns that you may have.    Sincerely,  Graciela Penn, CAROLYN, CNP.

## 2017-04-26 NOTE — PROGRESS NOTES
"Situation: follow up regarding pt's scheduled right & left heart cath with Dr. Santa on Monday, 5/1    Background: Mr. Gill is a pleasant 46-year-old -American male who has a history of hypertensive cardiomyopathy (versus cardiac cirrhosis), pulmonary hypertension, right heart failure, end-stage kidney disease currently on dialysis and PVOD.     Pt was seen in clinic on Tuesday, 4/25 at 12:00pm. Sergio Penn, APRN, CNP saw this pt and reordered his right & left heart cath because he was a no call/no show to his last cath appointment.    Pt stated in clinic that he \"forgot\" when his appointment was. Writer strongly encouraged to attend all appointment and follow through with all recommendations made by his providers in order to optimize his health.     Intervention: called Jayy this morning (4/26) and informed him that we had rescheduled his right & left heart cath with Dr. Ramirez for Monday, 5/1 at 12:00pm.     Response: Jayy assured writer that he will attend his appointment for his right & left heart cath on Monday, 5/1 at 12:00pm. Writer again emphasized the importance of this testing in order to provide the best care for him.    Pt verbalized understanding of above information and agrees to the plan. All questions and concerns were addressed.     Continue to monitor closely.    "

## 2017-04-28 NOTE — NURSING NOTE
Chief Complaint   Patient presents with     Fatigue     Here for fatique and go over labs results     Mac Batista CMA at 3:17 PM on 4/28/2017

## 2017-04-28 NOTE — MR AVS SNAPSHOT
After Visit Summary   4/28/2017    Jayy Gill    MRN: [id number]           Patient Information     Date Of Birth          1970        Visit Information        Provider Department      4/28/2017 3:00 PM Milly Healy MD [facility]        Today's Diagnoses     Other fatigue    -  1      Care Instructions    Primary Care Center Medication Refill Request Information:  * Please contact your pharmacy regarding ANY request for medication refills.  ** [facility] Prescription Fax = [phone]  * Please allow 3 business days for routine medication refills.  * Please allow 5 business days for controlled substance medication refills.     Primary Care Center Test Result notification information:  *You will be notified with in 7-10 days of your appointment day regarding the results of your test.  If you are on MyChart you will be notified as soon as the provider has reviewed the results and signed off on them.    [facility] [phone]             Follow-ups after your visit        Your next 10 appointments already scheduled     May 01, 2017 12:00 PM CDT   Procedure 1.5 hr with U2A ROOM 1   Unit 2A [facility] ([facility])    [address] [address]               May 01, 2017  1:30 PM CDT   Cath 90 Minute with UUHCVR5   [facility] [place],  Heart Cath Lab ([facility])    [address] [address] [phone]            May 01, 2017  3:00 PM CDT   US THYROID with UCUS2   [facility] US ([facility])    [address]  1st Floor  [facility] [id number] [phone]           Please bring a list of your medicines (including vitamins, minerals and over-the-counter drugs). Also, tell your doctor about any allergies you may have. Wear comfortable clothes and leave your valuables at home.  You do not need to do anything  special to prepare for your exam.  Please call the Imaging Department at your exam site with any questions.            May 04, 2017  9:15 AM CDT   LAB with  LAB   [facility] ([facility])    [address]  [address] [facility] [address] [phone]           Patient must bring picture ID.  Patient should be prepared to give a urine specimen  Please do not eat 10-12 hours before your appointment if you are coming in fasting for labs on lipids, cholesterol, or glucose (sugar).  Pregnant women should follow their Care Team instructions. Water with medications is okay. Do not drink coffee or other fluids.   If you have concerns about taking  your medications, please ask at office or if scheduling via [employer], send a message by clicking on Secure Messaging, Message Your Care Team.            May 04, 2017  9:30 AM CDT   Paracentesis Visit with  Spec Inf Para Provider,  40 ATC   [facility] ([facility])    [address]  [address] [facility] [id number] [phone]            Aug 29, 2017  9:00 AM CDT   Lab with  LAB   [facility] ([facility])    [address]  [address] [facility] [address] [phone]            Aug 29, 2017 10:00 AM CDT   (Arrive by 9:45 AM)   Return General Liver with Rocky Rojo MD   [facility] Hepatology ([facility])    [address] [place] [address]  [address] [facility] [address] [phone]              Future tests that were ordered for you today     Open Future Orders        Priority Expected Expires Ordered    TSH with free T4 reflex Routine 4/28/2017 5/12/2017 4/28/2017            Who to contact     Please call your clinic at [phone] to:    Ask questions about your health    Make or cancel appointments    Discuss your medicines    Learn about your test results    Speak to your doctor   If  you have compliments or concerns about an experience at your clinic, or if you wish to file a complaint, please contact [facility] Patient Relations at [phone] or email us at [email or web address]         Additional Information About Your Visit        [employer] Information     [employer] is an electronic gateway that provides easy, online access to your medical records. With JamStar, you can request a clinic appointment, read your test results, renew a prescription or communicate with your care team.     To sign up for Jam[employer] visit the website at [email or web address]   You will be asked to enter the access code listed below, as well as some personal information. Please follow the directions to create your username and password.     Your access code is: [email or web address]  Expires: 2017  3:55 PM     Your access code will  in 90 days. If you need help or a new code, please contact your [facility] or call [phone] for assistance.        Care EveryWhere ID     This is your Care EveryWhere ID. This could be used by other organizations to access your [place] medical records  [id number]        Your Vitals Were     Pulse Temperature Respirations Pulse Oximetry BMI (Body Mass Index)       67 97.5  F (36.4  C) (Oral) 20 83% 23.18 kg/m2        Blood Pressure from Last 3 Encounters:   17 (!) 82/40   17 (!) 82/43   17 (!) 82/43    Weight from Last 3 Encounters:   17 71.2 kg (157 lb)   17 73 kg (161 lb)   17 73.2 kg (161 lb 6.4 oz)               Primary Care Provider Office Phone # Fax #    Milly Healy [phone]       [address] [address]        Thank you!     Thank you for choosing [facility]  for your care. Our goal is always to provide you with excellent care. Hearing back from our patients is one way we can continue  to improve our services. Please take a few minutes to complete the written survey that you may receive in the mail after your visit with us. Thank you!             Your Updated Medication List - Protect others around you: Learn how to safely use, store and throw away your medicines at www.disposemymeds.org.          This list is accurate as of: 4/28/17  4:16 PM.  Always use your most recent med list.                   Brand Name Dispense Instructions for use    aspirin 81 MG tablet      Take 81 mg by mouth daily       bosentan 125 MG tablet    TRACLEER    60 tablet    Take 1 tablet (125 mg) by mouth 2 times daily       carvedilol 25 MG tablet    COREG    90 tablet    Take 0.5 tablets (12.5 mg) by mouth 2 times daily       hydrOXYzine 25 MG capsule    VISTARIL    120 capsule    Take 2 capsules (50 mg) by mouth 3 times daily as needed for itching       loratadine 10 MG tablet    CLARITIN    90 tablet    Take 1 tablet (10 mg) by mouth daily       midodrine HCl 10 MG Tabs      Take 10 mg by mouth As needed       omeprazole 40 MG capsule    priLOSEC    90 capsule    Take 1 capsule (40 mg) by mouth daily Take 30-60 minutes before a meal.       paricalcitol 5 MCG/ML injection    ZEMPLAR     2 mcg 2 mcg by IV Push route dialysis.       PHOSLO 667 MG Caps capsule   Generic drug:  calcium acetate     180 capsule    Take 2 capsules (1,334 mg) by mouth 3 times daily (with meals)       RENAL 1 MG Caps      Take 1 mg by mouth Take 1 capsule (1 mg) by mouth daily in the afternoon.  Indications: Nutritional Support       sildenafil 20 MG tablet    REVATIO/VIAGRA    180 tablet    Take 2 tablets (40 mg) by mouth 3 times daily       torsemide 20 MG tablet    DEMADEX    90 tablet    Take 3 tablets (60 mg) by mouth 2 times daily

## 2017-04-28 NOTE — PATIENT INSTRUCTIONS
[facility] Medication Refill Request Information:  * Please contact your pharmacy regarding ANY request for medication refills.  ** [facility] Prescription Fax = [phone]  * Please allow 3 business days for routine medication refills.  * Please allow 5 business days for controlled substance medication refills.     [facility] Test Result notification information:  *You will be notified with in 7-10 days of your appointment day regarding the results of your test.  If you are on MyChart you will be notified as soon as the provider has reviewed the results and signed off on them.    [facility] [phone]

## 2017-04-28 NOTE — PROGRESS NOTES
"       HPI:       Jayy Gill is a 46 year old male with a PMH of pulmonary HTN, cardiomyopathy, and CKD who presents for follow up of concern(s) listed below:    Pulmonary HTN  Patient is currently taking Sildenafil and bosentan. He is also wearing his O2 mask inconsistently; only when he feels SOB.    Thyroid nodule  Patient does not recollect having a nodule or being referred to specialist.  From last note:  Thyroid nodule- Atypia of undetermined significance (AUS)  1/2015 US improved  Incidentally noted on cardiac PET  3/2015 saw Dr. Amato  \"Left solitary thyroid nodule: incidentally found from PET/CT scan. It is metabolically active with SUV 4.9. US showed left solitary thyroid nodule with small calcification. He has already underwent FNA which showed AUS. I discussed the result. Given co-morbidities, I decided to repeat US and possible re-biopsy in 3 months.\"    Fatigue  Patient says that he has been feeling fatigued for months. It has been constant throughout the day. Does not exercise.         PMHX:     Reviewed.         Review of Systems:     Review of Systems     Constitutional:  Negative for fever, weight loss and weight gain.   Respiratory:   Positive for dyspnea on exertion and postural dyspnea. Negative for cough and wheezing.    Cardiovascular:  Positive for chest pain, dyspnea on exertion, orthopnea, leg swelling and light-headedness. Negative for palpitations.   Gastrointestinal:  Positive for change in stool. Negative for abdominal pain.   Skin:  Negative for rash.   Neurological:  Positive for light-headedness. Negative for tingling and numbness.   Psychiatric/Behavioral:  Negative for depression, decreased concentration, mood swings and panic attacks.    - See HPI         Physical Exam:   BP (!) 82/40  Pulse 67  Temp 97.5  F (36.4  C) (Oral)  Resp 20  Wt 71.2 kg (157 lb)  SpO2 (!) 83%  BMI 23.18 kg/m2  General: middle-aged -american man lying comfortably in " bed  Respiratory: CTA b/l in all lung fields, normal respiratory effort  Cardio: RRR, fistula thrill is appreciated, 2/4 pitting edema in lower extremities  Abd: normal bowel sounds, soft, NTND  Skin: warm and well perfused extremities  Neuro: A&Ox3, CN III-XII grossly intact, strength and gait grossly intact  Psych: blunted affect, looking depressed, patient's head is mainly looking down at floor, poor eye contact, low volume monotonal speech with brief sentences.         Labs and Imaging:     Reviewed.         Assessment and Plan:     1. Thyroid nodule- AUS  Schedule thyroid US (previosly ordered)  -Labs: TSH with reflex T4    2. Pulmonary HTN  -Patient is currently seeing the [place] [facility] HT[facility] team. O2 sat was 83% today.  -Advised the pt to wear O2 mask more consistently.  -Medications include: Sildenafil and bosentan    3. Palliative care for symptomatic trt  Patient is not interested.    Vitamin D deficiency: currently receiving vit D supplementation from his nephrologist.        Options for treatment and follow-up care were reviewed with the patient and/or guardian. Jayy Gill and/or guardian engaged in the decision making process and verbalized understanding of the options discussed and agreed with the final plan.    Milly Healy MD    This note was scribed by Alton Betancourt, 3rd year medical student, for attending physician Dr. Healy.

## 2017-05-01 NOTE — PROGRESS NOTES
Jayy arrived on 2a to prepare for a right and left heart cath.  Pre-procedure assessment is complete.  He stated he was not told that he would need a  for the procedures.  MD notified and will come talk with Jayy about how to proceed.

## 2017-05-01 NOTE — PROGRESS NOTES
Per Dr Jacob procedure has been postponed due to the fact pt does not have a ride home, pt drove himself.  PIV D/C'ed, catheter intact. Pt taking po apple juice before leaving.  1425-Pt did not want to wait for the MD to talk to him. Solitario billings RN in [facility] notified, he was going to let Dr Jacob know pt left.

## 2017-05-04 NOTE — MR AVS SNAPSHOT
After Visit Summary   5/4/2017    Jayy Gill    MRN: [id number]           Patient Information     Date Of Birth          1970        Visit Information        Provider Department      5/4/2017 9:30 AM Provider, [place] Spec Inf Para; HERMINIA 40 ATC [facility] Specialty [facility] Procedure        Today's Diagnoses     Disorder of liver    -  1      Care Instructions      Discharge Instructions for Paracentesis  Paracentesis is a procedure to remove extra fluid from your belly (abdomen). Buildup of fluid in the abdomen is called ascites. The procedure may have been done to take a sample of the fluid. Or, it may have been done to drain the extra fluid from your abdomen.     Ascites is buildup of excess fluid in the abdomen.   Home care    If you have pain after the procedure, your health care provider can prescribe or recommend pain medicines. Take these exactly as directed. If you stopped taking other medicines before the procedure, ask your provider when you can start them again.    Take it easy for 24 hours after the procedure. Avoid physical activity until your provider says it s OK.    You will have a small bandage over the puncture site. Stitches (sutures), surgical staples, adhesive tapes, adhesive strips, or surgical glue may be used to close the incision. They also help stop bleeding and speed healing. You may take the bandage off in 24 hours.    Check the puncture site for the signs of infection listed below.  Follow-up care  Make a follow-up appointment with your health care provider as directed. During your follow-up visit, your provider will check your healing. Let your provider know how you are feeling. You can also discuss the cause of your ascites and whether you need any further treatment.  When to call the doctor  Call your health care provider if you notice any of the following after the procedure:    A fever of 100.4 F (38.0 C) or higher    Trouble breathing    Pain that  doesn't go away even after taking pain medicine    Abdominal pain not caused by having the skin punctured    Bleeding from the puncture site    More than a small amount of fluid leaking from the puncture site    Swelling of the abdomen    Signs of infection at the puncture site. These include increased pain, redness, or swelling, warmth, or foul-smelling drainage.    Blood in your urine    Dizziness, lightheadedness, or fainting      0430-2876 [employer]. [address] [place] [address]. All rights reserved. This information is not intended as a substitute for professional medical care. Always follow your healthcare professional's instructions.              Follow-ups after your visit        Your next 10 appointments already scheduled     Aug 29, 2017  9:00 AM CDT   Lab with  LAB   [facility] ([facility])    [address] [place] [address] [phone]            Aug 29, 2017 10:00 AM CDT   (Arrive by 9:45 AM)   Return General Liver with Rocky Rojo MD   [facility] Hepatology ([facility])    [address] [place] [address] [phone]              Who to contact     If you have questions or need follow up information about today's clinic visit or your schedule please contact [facility] directly at [phone].  Normal or non-critical lab and imaging results will be communicated to you by MyChart, letter or phone within 4 business days after the clinic has received the results. If you do not hear from us within 7 days, please contact the clinic through MyChart or phone. If you have a critical or abnormal lab result, we will notify you by phone as soon as possible.  Submit refill requests through [employer] or call your pharmacy and they will forward the refill request to us. Please allow 3 business days for your refill to be completed.  "         Additional Information About Your Visit        MyChart Information     SecondLeap lets you send messages to your doctor, view your test results, renew your prescriptions, schedule appointments and more. To sign up, go to [email or web address] . Click on \"Log in\" on the left side of the screen, which will take you to the Welcome page. Then click on \"Sign up Now\" on the right side of the page.     You will be asked to enter the access code listed below, as well as some personal information. Please follow the directions to create your username and password.     Your access code is: [email or web address]  Expires: 2017  3:55 PM     Your access code will  in 90 days. If you need help or a new code, please call your [place] clinic or [phone].        Care EveryWhere ID     This is your Care EveryWhere ID. This could be used by other organizations to access your [place] medical records  [id number]        Your Vitals Were     Pulse Temperature BMI (Body Mass Index)             80 97.2  F (36.2  C) (Oral) 22.96 kg/m2          Blood Pressure from Last 3 Encounters:   17 96/52   17 (!) 80/52   17 (!) 82/40    Weight from Last 3 Encounters:   17 70.5 kg (155 lb 8 oz)   17 71.2 kg (157 lb)   17 73 kg (161 lb)              We Performed the Following     Treatment Conditions     US Paracentesis        Primary Care Provider Office Phone # Fax #    Milly Eve Healy [phone]       [address]  [facility] [address]        Thank you!     Thank you for choosing [facility] SPECIAL[facility] PROCEDURE  for your care. Our goal is always to provide you with excellent care. Hearing back from our patients is one way we can continue to improve our services. Please take a few minutes to complete the written survey that you may receive in the mail after your visit with us. Thank you!             Your Updated Medication " List - Protect others around you: Learn how to safely use, store and throw away your medicines at www.disposemymeds.org.          This list is accurate as of: 5/4/17 11:48 AM.  Always use your most recent med list.                   Brand Name Dispense Instructions for use    aspirin 81 MG tablet      Take 81 mg by mouth daily       bosentan 125 MG tablet    TRACLEER    60 tablet    Take 1 tablet (125 mg) by mouth 2 times daily       carvedilol 25 MG tablet    COREG    90 tablet    Take 0.5 tablets (12.5 mg) by mouth 2 times daily       hydrOXYzine 25 MG capsule    VISTARIL    120 capsule    Take 2 capsules (50 mg) by mouth 3 times daily as needed for itching       loratadine 10 MG tablet    CLARITIN    90 tablet    Take 1 tablet (10 mg) by mouth daily       midodrine HCl 10 MG Tabs      Take 10 mg by mouth As needed       omeprazole 40 MG capsule    priLOSEC    90 capsule    Take 1 capsule (40 mg) by mouth daily Take 30-60 minutes before a meal.       paricalcitol 5 MCG/ML injection    ZEMPLAR     2 mcg 2 mcg by IV Push route dialysis.       PHOSLO 667 MG Caps capsule   Generic drug:  calcium acetate     180 capsule    Take 2 capsules (1,334 mg) by mouth 3 times daily (with meals)       RENAL 1 MG Caps      Take 1 mg by mouth Take 1 capsule (1 mg) by mouth daily in the afternoon.  Indications: Nutritional Support       sildenafil 20 MG tablet    REVATIO/VIAGRA    180 tablet    Take 2 tablets (40 mg) by mouth 3 times daily       torsemide 20 MG tablet    DEMADEX    90 tablet    Take 3 tablets (60 mg) by mouth 2 times daily

## 2017-05-04 NOTE — PROGRESS NOTES
Paracentesis Nursing Note  [facility].    During today's appointment orders from Rocky Rojo MD were completed.    Progress Note:  Patient identification verified by name and date of birth.  Assessment completed.  Vitals monitored throughout appointment and recorded in Doc Flowsheets.  See proceduralist note in ultrasound.    Date of consent or authorization: 05/04/17.  Invasive Procedure Safety Checklist was completed and sent for scanning.     Paracentesis performed by Hanna Headley PA-C.    The following labs were communicated to provider performing paracentesis:  Lab Results   Component Value Date     04/25/2017       Total amount of ascites fluid drained: 2.4 liters.  Color of ascites fluid: yellow and clear.  Total amount of albumin given: 25  grams.    Patient tolerated procedure well.    Post procedure,denies pain or discomfort post paracentesis.      Discharge Plan:  Discharge instructions were reviewed with patient.  Patient/Representative verbalized understanding and all questions were answered.   Discharged [facility] in stable condition.    Cassidy Gilmore RN       Administrations This Visit     albumin human 25 % injection 12.5 g     Admin Date Action Dose Route Administered By             05/04/2017 New Bag 25 g Intravenous Cassidy Gilmore RN                    lidocaine BUFFERED 1 % injection 20 mL     Admin Date Action Dose Route Administered By             05/04/2017 Given by Other Clinician 20 mL Injection Cassidy Gilmore RN                              BP (!) 77/48  Pulse 64  Temp 97.2  F (36.2  C) (Oral)  Wt 72.9 kg (160 lb 11.2 oz)  BMI 23.73 kg/m2

## 2017-05-04 NOTE — PATIENT INSTRUCTIONS
Discharge Instructions for Paracentesis  Paracentesis is a procedure to remove extra fluid from your belly (abdomen). Buildup of fluid in the abdomen is called ascites. The procedure may have been done to take a sample of the fluid. Or, it may have been done to drain the extra fluid from your abdomen.     Ascites is buildup of excess fluid in the abdomen.   Home care    If you have pain after the procedure, your health care provider can prescribe or recommend pain medicines. Take these exactly as directed. If you stopped taking other medicines before the procedure, ask your provider when you can start them again.    Take it easy for 24 hours after the procedure. Avoid physical activity until your provider says it s OK.    You will have a small bandage over the puncture site. Stitches (sutures), surgical staples, adhesive tapes, adhesive strips, or surgical glue may be used to close the incision. They also help stop bleeding and speed healing. You may take the bandage off in 24 hours.    Check the puncture site for the signs of infection listed below.  Follow-up care  Make a follow-up appointment with your health care provider as directed. During your follow-up visit, your provider will check your healing. Let your provider know how you are feeling. You can also discuss the cause of your ascites and whether you need any further treatment.  When to call the doctor  Call your health care provider if you notice any of the following after the procedure:    A fever of 100.4 F (38.0 C) or higher    Trouble breathing    Pain that doesn't go away even after taking pain medicine    Abdominal pain not caused by having the skin punctured    Bleeding from the puncture site    More than a small amount of fluid leaking from the puncture site    Swelling of the abdomen    Signs of infection at the puncture site. These include increased pain, redness, or swelling, warmth, or foul-smelling drainage.    Blood in your  urine    Dizziness, lightheadedness, or fainting      4352-1021 [employer]. [address] [place] [address], [place] [address]. All rights reserved. This information is not intended as a substitute for professional medical care. Always follow your healthcare professional's instructions.

## 2017-05-17 NOTE — PROGRESS NOTES
I agree with the PFSH and ROS as completed by the MS.  The remainder of the encounter was performed by me and scribed by the MS.  The scribed note accurately reflects my personal services and the decisions made by me.    Milly Healy MD

## 2017-05-24 NOTE — TELEPHONE ENCOUNTER
LAST VISIT 4/6/17  NEXT VISIT NONE  PLAN Assessment/Plan      The patient has progressive symptoms of exertional pre-syncope and as yet completely understood cardiopulmonary problem.  His hemodynamics have been odd, especially in light of mid-systolic left ventricular gradient.     I would suggest that the patient have BHC with simultaneous determination of LVED with careful pull-back to establish gradient.  I would also suggest that the patient have pressures in arteries measured during right ventricular apical pacing to create LBBB to see if either PA pressure comes down with elimination of the ventricular gradient as well as to see of LV mid cavitary gradient can be eliminated.  In addition the patient should have PCP measures from territories from each of 4 pulmonary veins in view of history and biopsy suggestive of PVOD.

## 2017-05-26 NOTE — TELEPHONE ENCOUNTER
[facility] called to report that they are unable to fill pt's prescription of Tracleer, as it is a specialty drug that needs to be filled by [facility]. [facility] phone number-[phone].

## 2017-05-30 NOTE — TELEPHONE ENCOUNTER
----- Message from Samia Bird LPN sent at 5/30/2017  8:19 AM CDT -----  Regarding: FW: Pt Needing Orders Placed from Hept to Spec Infusion for Paracentesis   Contact: [phone]      ----- Message -----     From: Tray Lees     Sent: 5/30/2017   8:03 AM       To: Hepatology Nurses-  Subject: Pt Needing Orders Placed from Hept to Spec I#    Good Morning Team,    Pt's Friend or Coordinator Nancy Called to confirm that there are Paracentesis Orders, I was not able to see any in the file. Please call pt back directly to confirm when orders are placed so they can call Spec to schedule.    Thank you    Tray :)    Please DO NOT send this message and/or reply back to sender.  Call Center Representatives DO NOT respond to messages.

## 2017-05-30 NOTE — ED AVS SNAPSHOT
[facility], [place], Emergency Department    [address] [place] [address]    Phone:  [phone]                                       Jayy Gill   MRN: [id number]    Department:  [facility], Emergency Department   Date of Visit:  5/30/2017           After Visit Summary Signature Page     I have received my discharge instructions, and my questions have been answered. I have discussed any challenges I see with this plan with the nurse or doctor.    ..........................................................................................................................................  Patient/Patient Representative Signature      ..........................................................................................................................................  Patient Representative Print Name and Relationship to Patient    ..................................................               ................................................  Date                                            Time    ..........................................................................................................................................  Reviewed by Signature/Title    ...................................................              ..............................................  Date                                                            Time

## 2017-05-30 NOTE — ED AVS SNAPSHOT
[facility], Emergency Department    [address] [facility] [address]    Phone:  [phone]                                       Jayy Gill   MRN: [id number]    Department:  [facility], Emergency Department   Date of Visit:  2017           Patient Information     Date Of Birth          1970        Your diagnoses for this visit were:     Pulmonary hypertension (H)        You were seen by Kierra Dior MD.        Discharge Instructions       TODAY'S VISIT:  You were seen today for a medication refill of your Bosentan.   - Discussed this with the Cardiology attending on call and they report that it should be stable to restart your medication at the previous doses and frequency.    FOLLOW-UP:  Please make an appointment to follow up with your Cardiology team.  [facility], operated [facility]  [address]oor [address]  [address] [place] [address]  Appointments: [phone]    PRESCRIPTIONS / MEDICATIONS:  - Luis Eduardo ongoing medications for additional refills, etc. with your usual Cardiology and your other medical teams.    RETURN TO THE EMERGENCY DEPARTMENT  Return to the Emergency Department at any time for new/worsening symptoms.       Pulmonary Hypertension  Pulmonary hypertension is high pressure in the blood vessels that carry blood into the lungs. This strains the lungs and heart and can lead to serious problems.  Systemic hypertension means the pressure is too high in blood vessels throughout the body. A person with pulmonary hypertension may also have systemic hypertension.   Causes of pulmonary hypertension  The cause of pulmonary hypertension is sometimes unknown. But it is most often caused by another health problem. In many cases, controlling this health problem can help prevent or control pulmonary hypertension. Some of the most common causes of pulmonary hypertension are:  In children    Severe lung problems in a     Lung  conditions, such as cystic fibrosis or interstitial lung disease    Heart disease    Congenital heart defects    HIV infection    Other conditions, such as scleroderma, lupus, or sickle cell disease  In adults    Lung conditions, such as chronic obstructive pulmonary disease (COPD), advanced bronchitis, cystic fibrosis, or pulmonary fibrosis    Liver disease    Blood clots in the lungs    Left-sided heart failure    HIV infection    Sleep apnea    Other conditions, such as scleroderma, lupus, or sickle cell disease  Symptoms of pulmonary hypertension  Symptoms may come on suddenly. Or, they may come on slowly over time. Symptoms can include:    Shortness of breath    Blue lips or fingernails (signs that the body is having trouble getting oxygen)    Tiring quickly, especially when active    Fast heartbeat    Pain in the upper right side of the abdomen    Swelling in the legs or ankles    Chest pain or pressure    Fainting or dizzy spells  Diagnosing pulmonary hypertension  Your health care provider will examine you and listen to your heart and lungs. Your blood pressure will also be measured. Tests may be done as well. These may include:    Blood tests. These measure certain body functions. They also check for problems such as infection.    A chest X-ray. This takes a picture of the inside of the chest. It can show certain heart and lung problems.    An electrocardiogram (ECG or EKG). This test records the heart s electrical activity.    An echocardiogram (echo). This test uses sound waves to create a moving picture of the heart.    Pulmonary function tests. These tests measure breathing and lung capacity.    Cardiac catheterization. This procedure gives detailed information about the heart s structures. A thin tube (catheter) is put into a blood vessel in the groin or neck and guided into the right side of the heart. Certain blood pressure tests are then done.  Treating pulmonary hypertension  Treatment depends on  your age, health, and the severity of your symptoms. Any underlying health problems you have will be treated. Treatment may also include:    Oxygen    Medication to lower the pressure in the lung blood vessels    Medication to help the body lose excess water    Medication to prevent blood clots    Medications that help the heart beat stronger, pump more blood and control abnormal heart rhythms  Long-term concerns  Though pulmonary hypertension has no cure, certain treatments may relieve symptoms and slow progression of the disease. In rare and severe cases, a lung transplant may be needed. Your health care provider can tell you more about this if needed.  When to seek medical care  Call your health care provider right away if you have any of the following:    Persistent blueness of lips or fingernails    Shortness of breath    Fever of 100.4 F (38.0 C) or higher    Fainting spells     2706-4536 [employer]. [address] [place] [address]. All rights reserved. This information is not intended as a substitute for professional medical care. Always follow your healthcare professional's instructions.            Future Appointments        Provider Department Dept Phone Center    5/31/2017 2:00 PM Specialty Infusion Paracentesis Provider; Advanced Treatment [facility] Advanced Treatment [place] Specialty and Procedure [phone] [facility]    8/29/2017 9:00 AM Lab [facility] Lab [phone] [facility]    8/29/2017 10:00 AM Rocky Rojo MD [facility] Hepatology [phone] [facility]      24 Hour Appointment Hotline       To make an appointment at any [facility], call [phone] ([phone]). If you don't have a family doctor or clinic, we will help you find one. [facility] are conveniently located to serve the needs of you and your family.             Review of your medicines      Our records show that you are taking the medicines listed below. If these are incorrect, please call your  family doctor or clinic.        Dose / Directions Last dose taken    aspirin 81 MG tablet   Dose:  81 mg        Take 81 mg by mouth daily   Refills:  0        bosentan 125 MG tablet   Commonly known as:  TRACLEER   Dose:  125 mg   Quantity:  30 tablet        Take 1 tablet (125 mg) by mouth 2 times daily   Refills:  0        carvedilol 25 MG tablet   Commonly known as:  COREG   Dose:  12.5 mg   Quantity:  90 tablet        Take 0.5 tablets (12.5 mg) by mouth 2 times daily   Refills:  2        hydrOXYzine 25 MG capsule   Commonly known as:  VISTARIL   Dose:  50 mg   Quantity:  120 capsule        Take 2 capsules (50 mg) by mouth 3 times daily as needed for itching   Refills:  0        loratadine 10 MG tablet   Commonly known as:  CLARITIN   Dose:  10 mg   Quantity:  90 tablet        Take 1 tablet (10 mg) by mouth daily   Refills:  3        midodrine HCl 10 MG Tabs   Dose:  10 mg        Take 10 mg by mouth As needed   Refills:  0        omeprazole 40 MG capsule   Commonly known as:  priLOSEC   Dose:  40 mg   Quantity:  90 capsule        Take 1 capsule (40 mg) by mouth daily Take 30-60 minutes before a meal.   Refills:  3        paricalcitol 5 MCG/ML injection   Commonly known as:  ZEMPLAR   Dose:  2 mcg        2 mcg 2 mcg by IV Push route dialysis.   Refills:  0        PHOSLO 667 MG Caps capsule   Dose:  1334 mg   Quantity:  180 capsule   Generic drug:  calcium acetate        Take 2 capsules (1,334 mg) by mouth 3 times daily (with meals)   Refills:  0        RENAL 1 MG Caps   Dose:  1 mg        Take 1 mg by mouth Take 1 capsule (1 mg) by mouth daily in the afternoon.  Indications: Nutritional Support   Refills:  0        sildenafil 20 MG tablet   Commonly known as:  REVATIO/VIAGRA   Dose:  40 mg   Quantity:  180 tablet        Take 2 tablets (40 mg) by mouth 3 times daily   Refills:  11        torsemide 20 MG tablet   Commonly known as:  DEMADEX   Dose:  60 mg   Quantity:  90 tablet        Take 3 tablets (60 mg) by mouth  "2 times daily   Refills:  3                Prescriptions were sent or printed at these locations (1 Prescription)                   Other Prescriptions                Printed at Department/Unit printer (1 of 1)         bosentan (TRACLEER) 125 MG tablet                Orders Needing Specimen Collection     None      Pending Results     No orders found from 2017 to 2017.            Pending Culture Results     No orders found from 2017 to 2017.            Pending Results Instructions     If you had any lab results that were not finalized at the time of your Discharge, you can call the ED Lab Result RN at [phone]. You will be contacted by this team for any positive Lab results or changes in treatment. The nurses are available 7 days a week from 10A to 6:30P.  You can leave a message 24 hours per day and they will return your call.        Thank you for choosing [facility]       Thank you for choosing [facility] for your care. Our goal is always to provide you with excellent care. Hearing back from our patients is one way we can continue to improve our services. Please take a few minutes to complete the written survey that you may receive in the mail after you visit with us. Thank you!        [employer] Information     [employer] lets you send messages to your doctor, view your test results, renew your prescriptions, schedule appointments and more. To sign up, go to [email or web address] . Click on \"Log in\" on the left side of the screen, which will take you to the Welcome page. Then click on \"Sign up Now\" on the right side of the page.     You will be asked to enter the access code listed below, as well as some personal information. Please follow the directions to create your username and password.     Your access code is: [email or web address]  Expires: 2017  3:55 PM     Your access code will  in 90 days. If you need help or a new code, please call your [facility] clinic or [phone].        Care " EveryWhere ID     This is your Care EveryWhere ID. This could be used by other organizations to access your [facility] medical records  [id number]        After Visit Summary       This is your record. Keep this with you and show to your community pharmacist(s) and doctor(s) at your next visit.

## 2017-05-30 NOTE — TELEPHONE ENCOUNTER
I left detailed message stating that para orders are in pt's chart and then left a number to [facility] to call.

## 2017-05-31 NOTE — ED NOTES
47 yo male presents with abdominal distention, fluid overload & generalized weakness for 2 weeks.   Patient states he is out of medication Bosentan.  On dialysis MWF- states he is compliant.  Scheduled for paracentesis tomorrow. Just needs medications refill.

## 2017-05-31 NOTE — MR AVS SNAPSHOT
After Visit Summary   5/31/2017    Jayy Gill    MRN: [id number]           Patient Information     Date Of Birth          1970        Visit Information        Provider Department      5/31/2017 2:00 PM Cristino Strickland PA-C; HER[facility] 40 ATC [facility] Specialty and Procedure        Today's Diagnoses     Disorder of liver    -  1      Care Instructions    DISCHARGE INSTRUCTIONS FOLLOWING ABDOMINAL PARACENTESIS    After you go home:    No strenuous activity for 24 hours    Resume your regular diet    Limit fluid intake for the first 48 hours to no more than 2 quarts per day.  There should be minimal drainage from the needle site.  If drainage does occur and soaks through the bandage, apply gentle pressure with your hand for 5 minutes.    Notify MD for the following:    Excessive drainage    Excessive swelling, redness or tenderness at the needle site    Fever greater than 101 degrees F    Dizziness or light-headedness when getting up or walking      IF THIS IS A MEDICAL EMERGENCY, CALL 911    If you have any questions or concerns    Contact the Hepatology Clinic:   [phone]    If you are a post-transplant patient, contact the Transplant Office:  [phone]    If this is after hours, contact the hospital :  [phone] and as to have the GI resident on call paged                   I have received and understand my discharge instructions and I have all of my personal belongings.          ------------------------------------------------------        ---------------------------------------------------    Patient / Significant Other's Signature     Relationship            Follow-ups after your visit        Your next 10 appointments already scheduled     Jun 22, 2017 12:00 PM CDT   Paracentesis Visit with [facility] Spec Inf Para Provider,  39 ATC   [facility] Specialty and Procedure ([facility])    [address] [place] [address]  "Floor  [facility] [id number] [phone]            Aug 29, 2017  9:00 AM CDT   Lab with  LAB   [facility] Lab ([facility])    [address] [facility]  1st Floor  [facility] [id number] [phone]            Aug 29, 2017 10:00 AM CDT   (Arrive by 9:45 AM)   Return General Liver with Rocky Rojo MD   [facility] Hepatology ([facility])    [address]  3rd Floor  [facility] [id number] [phone]              Who to contact     If you have questions or need follow up information about today's clinic visit or your schedule please contact [facility] SPECIALTY AND PROCEDURE directly at [phone].  Normal or non-critical lab and imaging results will be communicated to you by [employer], letter or phone within 4 business days after the clinic has received the results. If you do not hear from us within 7 days, please contact the clinic through [employer] or phone. If you have a critical or abnormal lab result, we will notify you by phone as soon as possible.  Submit refill requests through [email or web address] or call your pharmacy and they will forward the refill request to us. Please allow 3 business days for your refill to be completed.          Additional Information About Your Visit        [email or web address] Information     [email or web address] lets you send messages to your doctor, view your test results, renew your prescriptions, schedule appointments and more. To sign up, go to [email or web address] . Click on \"Log in\" on the left side of the screen, which will take you to the Welcome page. Then click on \"Sign up Now\" on the right side of the page.     You will be asked to enter the access code listed below, as well as some personal information. Please follow the directions to create your username and password.     Your access code is: [email or web address]  Expires: 2017  3:55 PM     Your access code will  in 90 days. If you need help or a new code, " please call your [facility] clinic or [phone].        Care EveryWhere ID     This is your Care EveryWhere ID. This could be used by other organizations to access your [facility] medical records  [id number]         Blood Pressure from Last 3 Encounters:   05/31/17 (!) 72/41   05/31/17 104/64   05/04/17 96/52    Weight from Last 3 Encounters:   05/30/17 75.1 kg (165 lb 8 oz)   05/04/17 70.5 kg (155 lb 8 oz)   04/28/17 71.2 kg (157 lb)              We Performed the Following     Platelet count     US Paracentesis        Primary Care Provider Office Phone # Fax #    Milly Healy [phone]       [facility] [address]  [facility] [address]        Thank you!     Thank you for choosing [facility] SPECIALTY AND PROCEDURE  for your care. Our goal is always to provide you with excellent care. Hearing back from our patients is one way we can continue to improve our services. Please take a few minutes to complete the written survey that you may receive in the mail after your visit with us. Thank you!             Your Updated Medication List - Protect others around you: Learn how to safely use, store and throw away your medicines at www.disposemymeds.org.          This list is accurate as of: 5/31/17  3:31 PM.  Always use your most recent med list.                   Brand Name Dispense Instructions for use    aspirin 81 MG tablet      Take 81 mg by mouth daily       bosentan 125 MG tablet    TRACLEER    30 tablet    Take 1 tablet (125 mg) by mouth 2 times daily       carvedilol 25 MG tablet    COREG    90 tablet    Take 0.5 tablets (12.5 mg) by mouth 2 times daily       hydrOXYzine 25 MG capsule    VISTARIL    120 capsule    Take 2 capsules (50 mg) by mouth 3 times daily as needed for itching       loratadine 10 MG tablet    CLARITIN    90 tablet    Take 1 tablet (10 mg) by mouth daily       midodrine HCl 10 MG Tabs      Take 10 mg by mouth As needed        omeprazole 40 MG capsule    priLOSEC    90 capsule    Take 1 capsule (40 mg) by mouth daily Take 30-60 minutes before a meal.       paricalcitol 5 MCG/ML injection    ZEMPLAR     2 mcg 2 mcg by IV Push route dialysis.       PHOSLO 667 MG Caps capsule   Generic drug:  calcium acetate     180 capsule    Take 2 capsules (1,334 mg) by mouth 3 times daily (with meals)       RENAL 1 MG Caps      Take 1 mg by mouth Take 1 capsule (1 mg) by mouth daily in the afternoon.  Indications: Nutritional Support       sildenafil 20 MG tablet    REVATIO/VIAGRA    180 tablet    Take 2 tablets (40 mg) by mouth 3 times daily       torsemide 20 MG tablet    DEMADEX    90 tablet    Take 3 tablets (60 mg) by mouth 2 times daily

## 2017-05-31 NOTE — TELEPHONE ENCOUNTER
[facility] Prior Authorization Team Request    Medication: sildenafil 20mg tablet   Dosing: two tablets three times daily   Qty: 180  Day Supply: 30   NDC (required for Medicaid members):     Insurance [employer] PMAP   BIN: [id number]  PCN: [id number]  Grp: [id number]  ID: [id number]  CoverMyMeds Key (if applicable):     Additional documentation: PA REQUIRED - CALL [employer] [phone]        Filling Pharmacy: [facility]   Phone Number: [phone]  Contact:  Elan Ward NPI (required for Medicaid members):

## 2017-05-31 NOTE — ED PROVIDER NOTES
History     Chief Complaint   Patient presents with     Generalized Weakness     Bloated     HPI  Jayy Gill is a 46 year old male with a history of pulmonary hypertension, CHF, ESRD on HD MWF, hypertension, hyperlipidemia and cardiomyopathy who presents seeking medication refill.     The patient denies any new symptoms today and reports that his blood pressure is at his usual baseline. He notes that he has run out of his bosentan and has been out of this for the past 2 weeks because he has to order it from a specialty pharmacy, and has been unable to arrange as of yet. He is concerned as this medication is for his pulmonary hypertension and needs it refilled.  He denies any complaints currently.  He has no new symptoms or condition/concerns at this time.  Please see ROS for further details.    He has his regularly scheduled appointments for dialysis and paracentesis tomorrow.  He is unsure when his next appointment with Cardiology/Dr. Blank is scheduled.    Past Medical History:   Diagnosis Date     Cardiomyopathy (H)      Dialysis patient (H)     M-W-F     Diarrhea     C diff     ESRD (end stage renal disease) (H)      HLD (hyperlipidemia)      Hypertension      Pulmonary hypertension (H) 11/21/2013       Past Surgical History:   Procedure Laterality Date     AV FISTULA OR GRAFT ARTERIAL      left upper arm     COLONOSCOPY  7/3015     COLONOSCOPY WITH CO2 INSUFFLATION N/A 11/10/2015    Procedure: COLONOSCOPY WITH CO2 INSUFFLATION;  Surgeon: Samuel Fritz MD;  Location:  OR     ENDOBRONCHIAL ULTRASOUND FLEXIBLE N/A 9/18/2014    Procedure: ENDOBRONCHIAL ULTRASOUND FLEXIBLE;  Surgeon: Jg Morrison MD;  Location:  GI     ENDOBRONCHIAL ULTRASOUND FLEXIBLE N/A 12/18/2014    Procedure: ENDOBRONCHIAL ULTRASOUND FLEXIBLE;  Surgeon: Jg Morrison MD;  Location:  OR       Family History   Problem Relation Age of Onset     Hypertension Mother      DIABETES Father        Social History  "  Substance Use Topics     Smoking status: Never Smoker     Smokeless tobacco: Never Used     Alcohol use No     No current facility-administered medications for this encounter.      Current Outpatient Prescriptions   Medication     bosentan (TRACLEER) 125 MG tablet     carvedilol (COREG) 25 MG tablet     sildenafil (REVATIO/VIAGRA) 20 MG tablet     loratadine (CLARITIN) 10 MG tablet     hydrOXYzine (VISTARIL) 25 MG capsule     midodrine HCl 10 MG TABS     torsemide (DEMADEX) 20 MG tablet     omeprazole (PRILOSEC) 40 MG capsule     aspirin 81 MG tablet     B Complex-C-Folic Acid (RENAL) 1 MG CAPS     calcium acetate (PHOSLO) 667 MG CAPS     paricalcitol (ZEMPLAR) 5 MCG/ML injection     [DISCONTINUED] SILDENAFIL CITRATE PO      No Known Allergies     I have reviewed the Medications, Allergies, Past Medical and Surgical History, and Social History in the Epic system.    Review of Systems   Constitutional: Negative for chills, fatigue and fever.   Respiratory: Negative for cough and shortness of breath.    Cardiovascular: Negative for chest pain and palpitations.   Gastrointestinal: Negative for nausea and vomiting.   Genitourinary: Negative.    Musculoskeletal: Negative for arthralgias and myalgias.   Skin: Negative for color change and rash.   Allergic/Immunologic: Negative for immunocompromised state.   Neurological: Negative.        Physical Exam   BP: (!) 83/51  Heart Rate: 60  Temp: 97.5  F (36.4  C)  Resp: 12  Height: 175.3 cm (5' 9\")  Weight: 75.1 kg (165 lb 8 oz)  SpO2: 94 %  Physical Exam  CONSTITUTIONAL: Well-developed and well-nourished. Awake and alert. Non-toxic appearance. No acute distress.   HENT:   - Head: Normocephalic and atraumatic.   - Ears: Hearing and external ear grossly normal.   - Nose: Nose normal. No rhinorrhea. No epistaxis.   - Mouth/Throat: Oropharynx is clear and MMM  EYES: Conjunctivae and lids are normal. No scleral icterus.   NECK: Normal range of motion and phonation normal. Neck " supple.  No tracheal deviation, no stridor. No edema or erythema noted.  CARDIOVASCULAR: Normal rate, regular rhythm, does sound to have an extra heart sound after S2 which could be an S3 or alternatively a more widely split S2 no appreciable rubs.  PULMONARY/CHEST: Effort normal. No accessory muscle usage or stridor. No respiratory distress.  No appreciable abnormal breath sounds.  ABDOMEN: Soft, does have some distention and ascites but no peritoneal findings/No rigidity, rebound or guarding.   MUSCULOSKELETAL: Extremities warm and seemingly well perfused. No edema or calf tenderness.  NEUROLOGIC: Awake, alert. Not disoriented. Normal tone. No seizure activity. Coordination normal. GCS 15  SKIN: Skin is warm and dry. No rash noted. No diaphoresis. No pallor.   PSYCHIATRIC: Normal mood and affect. Speech and behavior normal. Thought processes linear. Cognition and memory are normal.     ED Course     ED Course   Comment By Time   Discussed case with the Cardiology attending on call.  She reports that it should be safe to start his previous dose. Kierra Dior MD 05/30 2324     Procedures               Labs Ordered and Resulted from Time of ED Arrival Up to the Time of Departure from the ED - No data to display         Assessments & Plan (with Medical Decision Making)   IMPRESSION: 46-year-old with a history of pulmonary hypertension, CHF, ESRD on HD (MWF), hypertension, hyperlipidemia and cardiomyopathy  presents for medication refill for bosentan for having run out 2 weeks ago.  He has no new symptoms or complaints here today.  Clinically he appears nontoxic, vital signs while he is hypotensive or consistent with review of previous vitals on EMR.  He does have abnormal findings on exam that would be consistent with his known medical conditions including some ascites, extra heart sound after S2, etc. but no emergent worrisome/new findings (possible S3 was heard previously as well per EMR review, though could be  more wide split S2).     PLAN: We'll order him a dose of his medication here and then prescribed a short course and then have him follow-up with his usual Cardiology teams for further evaluation/management and ongoing prescriptions.  We'll discuss with Cardiology to ensure it is appropriate to restart at the same level given he has not had that for 2 weeks.    INTERVENTIONS:   - Bosentan 125mg PO x1    DISCUSSIONS:  - w/ Cards attending: They report should be safe to restart bosentan as previously dosed  - w/ Patient: I have reviewed the plan, strict return instructions and need for follow up with the patient.    DISPOSITION/PLANNING:  - FINAL IMPRESSION: Medication refill and setting of Pulmonary Hypertension  - DISPOSITION: DC home  --- Follow-up: With Cardiology  --- Recommendations: Strict return instructions  --- Rx: bosentan      ______________________________________________________________________________    - I have reviewed the available nursing notes.      Discharge Medication List as of 5/30/2017 11:43 PM          Final diagnoses:   Pulmonary hypertension (H)       5/30/2017   [facility] [place], EMERGENCY DEPARTMENT     Kierra Dior MD  05/31/17 0250

## 2017-05-31 NOTE — PROGRESS NOTES
Paracentesis Nursing Note  Jayy Gill presents today to [facility] for a paracentesis.    During today's appointment orders from Rocky Rojo MD were completed.    Progress Note:  Patient identification verified by name and date of birth.  Assessment completed.  Vitals monitored throughout appointment and recorded in Doc Flowsheets.  See proceduralist note in ultrasound.    Date of consent or authorization: 5/4/17.  Invasive Procedure Safety Checklist was completed and sent for scanning.     Paracentesis performed by Cristino Strickland PA-C Radiology.    The following labs were communicated to provider performing paracentesis:  Lab Results   Component Value Date     05/31/2017       Total amount of ascites fluid drained: 3 liters.  Color of ascites fluid: yellow.  Total amount of albumin given: 50  grams.    Patient tolerated procedure fair. BP hypotensive to start procedure at 80/40 and after draining started BP dropped to 70/40. Procedure stopped to see if BP would increase. Ordering MD paged as well as Cristino Strickland. No response so spoke with on-call GI MD Dr. Goss. He advised to give total 50g albumin and observe BP. If BP goes back to baseline pt can discharge to home. If BP does not increase recommendation would be to go to ED.   Pt received 50g albumin and observed for another 15minutes. BP did get up to 78/47 which was close to initial BP. Pt did not feel any dizziness or lightheadedness but advised if anything starts to report to ED.   Post procedure,denies pain or discomfort post paracentesis.      Discharge Plan:  Discharge instructions were reviewed with patient.  Patient/Representative verbalized understanding and all questions were answered.   Discharged from [facility] in stable condition.    Mamta Li RN    Administrations This Visit     albumin human 25 % injection 12.5 g     Admin Date Action Dose Route Administered By              05/31/2017 New Bag 12.5 g Intravenous Mamta Li RN                    lidocaine BUFFERED 1 % injection 20 mL     Admin Date Action Dose Route Administered By             05/31/2017 Given 20 mL Injection Mamta Li RN

## 2017-05-31 NOTE — PATIENT INSTRUCTIONS
DISCHARGE INSTRUCTIONS FOLLOWING ABDOMINAL PARACENTESIS    After you go home:    No strenuous activity for 24 hours    Resume your regular diet    Limit fluid intake for the first 48 hours to no more than 2 quarts per day.  There should be minimal drainage from the needle site.  If drainage does occur and soaks through the bandage, apply gentle pressure with your hand for 5 minutes.    Notify MD for the following:    Excessive drainage    Excessive swelling, redness or tenderness at the needle site    Fever greater than 101 degrees F    Dizziness or light-headedness when getting up or walking      IF THIS IS A MEDICAL EMERGENCY, CALL 486    If you have any questions or concerns    Contact the Hepatology Clinic:   [phone]    If you are a post-transplant patient, contact the Transplant Office:  [phone]    If this is after hours, contact the hospital :  [phone] and as to have the GI resident on call paged                   I have received and understand my discharge instructions and I have all of my personal belongings.          ------------------------------------------------------        ---------------------------------------------------    Patient / Significant Other's Signature     Relationship

## 2017-05-31 NOTE — DISCHARGE INSTRUCTIONS
TODAY'S VISIT:  You were seen today for a medication refill of your Bosentan.   - Discussed this with the Cardiology attending on call and they report that it should be stable to restart your medication at the previous doses and frequency.    FOLLOW-UP:  Please make an appointment to follow up with your Cardiology team.  [facility]  [address]  [address] [place] [address]  Appointments: [phone]    PRESCRIPTIONS / MEDICATIONS:  - Luis Eduardo ongoing medications for additional refills, etc. with your usual Cardiology and your other medical teams.    RETURN TO THE EMERGENCY DEPARTMENT  Return to the Emergency Department at any time for new/worsening symptoms.       Pulmonary Hypertension  Pulmonary hypertension is high pressure in the blood vessels that carry blood into the lungs. This strains the lungs and heart and can lead to serious problems.  Systemic hypertension means the pressure is too high in blood vessels throughout the body. A person with pulmonary hypertension may also have systemic hypertension.   Causes of pulmonary hypertension  The cause of pulmonary hypertension is sometimes unknown. But it is most often caused by another health problem. In many cases, controlling this health problem can help prevent or control pulmonary hypertension. Some of the most common causes of pulmonary hypertension are:  In children    Severe lung problems in a     Lung conditions, such as cystic fibrosis or interstitial lung disease    Heart disease    Congenital heart defects    HIV infection    Other conditions, such as scleroderma, lupus, or sickle cell disease  In adults    Lung conditions, such as chronic obstructive pulmonary disease (COPD), advanced bronchitis, cystic fibrosis, or pulmonary fibrosis    Liver disease    Blood clots in the lungs    Left-sided heart failure    HIV infection    Sleep apnea    Other conditions, such as  scleroderma, lupus, or sickle cell disease  Symptoms of pulmonary hypertension  Symptoms may come on suddenly. Or, they may come on slowly over time. Symptoms can include:    Shortness of breath    Blue lips or fingernails (signs that the body is having trouble getting oxygen)    Tiring quickly, especially when active    Fast heartbeat    Pain in the upper right side of the abdomen    Swelling in the legs or ankles    Chest pain or pressure    Fainting or dizzy spells  Diagnosing pulmonary hypertension  Your health care provider will examine you and listen to your heart and lungs. Your blood pressure will also be measured. Tests may be done as well. These may include:    Blood tests. These measure certain body functions. They also check for problems such as infection.    A chest X-ray. This takes a picture of the inside of the chest. It can show certain heart and lung problems.    An electrocardiogram (ECG or EKG). This test records the heart s electrical activity.    An echocardiogram (echo). This test uses sound waves to create a moving picture of the heart.    Pulmonary function tests. These tests measure breathing and lung capacity.    Cardiac catheterization. This procedure gives detailed information about the heart s structures. A thin tube (catheter) is put into a blood vessel in the groin or neck and guided into the right side of the heart. Certain blood pressure tests are then done.  Treating pulmonary hypertension  Treatment depends on your age, health, and the severity of your symptoms. Any underlying health problems you have will be treated. Treatment may also include:    Oxygen    Medication to lower the pressure in the lung blood vessels    Medication to help the body lose excess water    Medication to prevent blood clots    Medications that help the heart beat stronger, pump more blood and control abnormal heart rhythms  Long-term concerns  Though pulmonary hypertension has no cure, certain treatments  may relieve symptoms and slow progression of the disease. In rare and severe cases, a lung transplant may be needed. Your health care provider can tell you more about this if needed.  When to seek medical care  Call your health care provider right away if you have any of the following:    Persistent blueness of lips or fingernails    Shortness of breath    Fever of 100.4 F (38.0 C) or higher    Fainting spells     5421-2504 [employer]. [address] [place] [address], [place] [id number]. All rights reserved. This information is not intended as a substitute for professional medical care. Always follow your healthcare professional's instructions.

## 2017-05-31 NOTE — TELEPHONE ENCOUNTER
[facility] Prior Authorization Team Request    Medication: Tracleer 125mg tablets   Dosing: one tablet twice daily   Qty: 60   Day Supply: 30   NDC (required for Medicaid members):     Insurance [employer] PMAP   BIN: [id number]  PCN: [id number]  Grp: [id number]  ID: [id number]    CoverMyMeds Key (if applicable):     Additional documentation:   PA REQUIRED - CALL [place] [phone]      Filling Pharmacy: [facility]   Phone Number: [phone]  Contact:  Elan Ward NPI (required for Medicaid members):

## 2017-06-01 NOTE — TELEPHONE ENCOUNTER
[facility] Prior Authorization Team   Phone: [phone]  Fax: [phone]      PA Initiation    Medication: tracleer 125mg  Insurance Company: [employer] PMAP - Phone [phone] Fax [phone]  Pharmacy Filling the Rx:    Filling Pharmacy Phone:    Filling Pharmacy Fax:    Start Date: 6/1/2017

## 2017-06-03 NOTE — IP AVS SNAPSHOT
MRN:[id number]                      After Visit Summary   6/3/2017    Jayy Gill    MRN: [id number]           Thank you!     Thank you for choosing [facility] for your care. Our goal is always to provide you with excellent care. Hearing back from our patients is one way we can continue to improve our services. Please take a few minutes to complete the written survey that you may receive in the mail after you visit with us. Thank you!        Patient Information     Date Of Birth          1970        Designated Caregiver       Most Recent Value    Caregiver    Will someone help with your care after discharge? no    Name of designated caregiver n/a    Phone number of caregiver n/a    Caregiver address n/a      About your hospital stay     You were admitted on:  June 4, 2017 You last received care in the:  [address] [facility]    You were discharged on:  June 8, 2017        Reason for your hospital stay       Severe pulmonary hypertension                  Who to Call     For medical emergencies, please call 911.  For non-urgent questions about your medical care, please call your primary care provider or clinic, [phone]          Attending Provider     Provider Rodger Olguin MD Emergency Medicine    Lindsey Kamara MD Cardiology    MarjanBrittney epperson MD Cardiology       Primary Care Provider Office Phone # Fax #    Milly Eve Healy [phone]      After Care Instructions     Activity       Your activity upon discharge: bedrest            Diet       Follow this diet upon discharge: Orders Placed This Encounter      Advance Diet as Tolerated: Regular Diet Adult; Dialysis Diet            Monitor and record       fluid intake and output daily  Limit intake to 1.5 per day            Supplies       List the supplies the pt needs to go home                  Follow-up Appointments     Follow Up and recommended labs and tests       [facility]    Phone: [phone]   Fax: [phone]    For resumption of outpatient dialysis on M-W-F at 5 AM.                  Your next 10 appointments already scheduled     Jun 22, 2017 12:00 PM CDT   Paracentesis Visit with  Spec Inf Para Provider, UC 39 ATC   [facility] ([facility])    [address]  2nd Floor  [place] [id number] [phone]            Aug 29, 2017  9:00 AM CDT   Lab with  LAB   [facility] ([facility])    [address]  [address] [facility] [id number] [phone]            Aug 29, 2017 10:00 AM CDT   (Arrive by 9:45 AM)   Return General Liver with Rocky Rojo MD   [facility] ([facility])    [address]  [address]rd [facility] [id number] [phone]              Additional Services     Medication Therapy Management Referral       Reason for referral:  on more than 5 medications and managing chronic disease    This service is designed to help you get the most from your medications.  A specially trained pharmacist will work closely with you and your doctors  to solve any problems related to your medications and to help you get the   best results from taking them.      The Medication Therapy Management staff will call you to schedule an appointment.                  Pending Results     Date and Time Order Name Status Description    6/8/2017 1751 Comprehensive metabolic panel In process     6/3/2017 2313 Blood culture Preliminary     6/3/2017 2313 Blood culture Preliminary             Admission Information     Date & Time Provider Department Dept. Phone    6/3/2017 Brittney Phillip MD [address] [facility] [phone]      Your Vitals Were     Blood Pressure Pulse Temperature Respirations Weight Pulse Oximetry    90/59 115 97.4  F (36.3  C) (Oral) 16 74.3 kg (163 lb 14.4 oz) 93%    BMI (Body Mass Index)                    "24.2 kg/m2           KBLE Information     KBLE lets you send messages to your doctor, view your test results, renew your prescriptions, schedule appointments and more. To sign up, go to [email or web address] . Click on \"Log in\" on the left side of the screen, which will take you to the Welcome page. Then click on \"Sign up Now\" on the right side of the page.     You will be asked to enter the access code listed below, as well as some personal information. Please follow the directions to create your username and password.     Your access code is: [email or web address]  Expires: 2017  3:55 PM     Your access code will  in 90 days. If you need help or a new code, please call your [facility] clinic or [phone].        Care EveryWhere ID     This is your Care EveryWhere ID. This could be used by other organizations to access your [facility] medical records  [id number]           Review of your medicines      START taking        Dose / Directions    macitentan 10 MG tablet   Commonly known as:  OPSUMIT   Used for:  Pulmonary hypertension (H)        Dose:  10 mg   Take 1 tablet (10 mg) by mouth daily   Quantity:  30 tablet   Refills:  3         CONTINUE these medicines which may have CHANGED, or have new prescriptions. If we are uncertain of the size of tablets/capsules you have at home, strength may be listed as something that might have changed.        Dose / Directions    midodrine HCl 10 MG Tabs   This may have changed:    - how much to take  - when to take this  - additional instructions   Used for:  Pulmonary hypertension (H)        Dose:  20 mg   Take 20 mg by mouth 3 times daily   Quantity:  90 tablet   Refills:  3         CONTINUE these medicines which have NOT CHANGED        Dose / Directions    aspirin 81 MG tablet        Dose:  81 mg   Take 81 mg by mouth daily   Refills:  0       hydrOXYzine 25 MG capsule   Commonly known as:  VISTARIL        Dose:  50 mg   Take 2 capsules (50 mg) by mouth 3 times " daily as needed for itching   Quantity:  120 capsule   Refills:  0       loratadine 10 MG tablet   Commonly known as:  CLARITIN   Used for:  Itching        Dose:  10 mg   Take 1 tablet (10 mg) by mouth daily   Quantity:  90 tablet   Refills:  3       omeprazole 40 MG capsule   Commonly known as:  priLOSEC   Used for:  Gastroesophageal reflux disease, esophagitis presence not specified        Dose:  40 mg   Take 1 capsule (40 mg) by mouth daily Take 30-60 minutes before a meal.   Quantity:  90 capsule   Refills:  3       paricalcitol 5 MCG/ML injection   Commonly known as:  ZEMPLAR   Used for:  Esophageal reflux, Pulmonary hypertension (H), Renal failure        Dose:  2 mcg   2 mcg 2 mcg by IV Push route dialysis.   Refills:  0       PHOSLO 667 MG Caps capsule   Used for:  Other dyspnea and respiratory abnormality   Generic drug:  calcium acetate        Dose:  1334 mg   Take 2 capsules (1,334 mg) by mouth 3 times daily (with meals)   Quantity:  180 capsule   Refills:  0       RENAL 1 MG Caps   Used for:  Esophageal reflux, Pulmonary hypertension (H), Renal failure        Dose:  1 mg   Take 1 mg by mouth Take 1 capsule (1 mg) by mouth daily in the afternoon.  Indications: Nutritional Support   Refills:  0       sildenafil 20 MG tablet   Commonly known as:  REVATIO/VIAGRA   Used for:  Pulmonary hypertension (H)        Dose:  40 mg   Take 2 tablets (40 mg) by mouth 3 times daily   Quantity:  180 tablet   Refills:  11       torsemide 20 MG tablet   Commonly known as:  DEMADEX   Used for:  PAH (pulmonary artery hypertension) (H), Cardiomyopathy (H)        Dose:  60 mg   Take 3 tablets (60 mg) by mouth 2 times daily   Quantity:  90 tablet   Refills:  3         STOP taking     bosentan 125 MG tablet   Commonly known as:  TRACLEER           carvedilol 25 MG tablet   Commonly known as:  COREG                Where to get your medicines      Some of these will need a paper prescription and others can be bought over the counter.  Ask your nurse if you have questions.     Bring a paper prescription for each of these medications     macitentan 10 MG tablet    midodrine HCl 10 MG Tabs                Protect others around you: Learn how to safely use, store and throw away your medicines at www.disposemymeds.org.             Medication List: This is a list of all your medications and when to take them. Check marks below indicate your daily home schedule. Keep this list as a reference.      Medications           Morning Afternoon Evening Bedtime As Needed    aspirin 81 MG tablet   Take 81 mg by mouth daily                                hydrOXYzine 25 MG capsule   Commonly known as:  VISTARIL   Take 2 capsules (50 mg) by mouth 3 times daily as needed for itching                                loratadine 10 MG tablet   Commonly known as:  CLARITIN   Take 1 tablet (10 mg) by mouth daily   Last time this was given:  10 mg on 6/8/2017  8:43 AM                                macitentan 10 MG tablet   Commonly known as:  OPSUMIT   Take 1 tablet (10 mg) by mouth daily                                midodrine HCl 10 MG Tabs   Take 20 mg by mouth 3 times daily   Last time this was given:  20 mg on 6/8/2017  5:51 PM                                omeprazole 40 MG capsule   Commonly known as:  priLOSEC   Take 1 capsule (40 mg) by mouth daily Take 30-60 minutes before a meal.   Last time this was given:  40 mg on 6/8/2017  8:49 AM                                paricalcitol 5 MCG/ML injection   Commonly known as:  ZEMPLAR   2 mcg 2 mcg by IV Push route dialysis.                                PHOSLO 667 MG Caps capsule   Take 2 capsules (1,334 mg) by mouth 3 times daily (with meals)   Last time this was given:  1,334 mg on 6/8/2017 12:30 PM   Generic drug:  calcium acetate                                RENAL 1 MG Caps   Take 1 mg by mouth Take 1 capsule (1 mg) by mouth daily in the afternoon.  Indications: Nutritional Support                                 sildenafil 20 MG tablet   Commonly known as:  REVATIO/VIAGRA   Take 2 tablets (40 mg) by mouth 3 times daily   Last time this was given:  40 mg on 6/8/2017  8:43 AM                                torsemide 20 MG tablet   Commonly known as:  DEMADEX   Take 3 tablets (60 mg) by mouth 2 times daily   Last time this was given:  60 mg on 6/8/2017  5:09 PM

## 2017-06-03 NOTE — IP AVS SNAPSHOT
[address] [address]    Phone:  [phone]                                       After Visit Summary   6/3/2017    Jayy Gill    MRN: [id number]           After Visit Summary Signature Page     I have received my discharge instructions, and my questions have been answered. I have discussed any challenges I see with this plan with the nurse or doctor.    ..........................................................................................................................................  Patient/Patient Representative Signature      ..........................................................................................................................................  Patient Representative Print Name and Relationship to Patient    ..................................................               ................................................  Date                                            Time    ..........................................................................................................................................  Reviewed by Signature/Title    ...................................................              ..............................................  Date                                                            Time

## 2017-06-04 PROBLEM — I95.9 HYPOTENSION: Status: ACTIVE | Noted: 2017-01-01

## 2017-06-04 NOTE — H&P
Cards 2  History and Physical    Jayy Gill MRN# [id number]   Age: 46 year old YOB: 1970     Date of Admission:  6/3/2017    Primary care provider: Milly Healy        Chief Complaint   Med refill and fatigue    History of Present Illness   Jayy Gill is a 46 year old male with PAH with RV failure, cirrhosis, and ESRD presenting for medication renewal and fatigue. He has been out of his bosentan for about the last 2-3 weeks. He presented to the ED on 5/30 and received a dose, but has not had any subsequently. He states that he is taking all of his other medications. He has noticed increasing fatigue over the last few weeks. After returning from [place] by car, his family recommended he go to the ED for evaluation. He denies any significant shortness of breath. He has not had an increase in his orthopnea. His leg swelling has worsening, particularly after his car ride, but denies any weight gain or calf tenderness. He had scheduled dialysis on Friday. No cough or fevers. Does feel chilled. No chest pain. He uses oxygen at home as needed.     Of note, he underwent paracentesis on 5/30 with removal of 3L. During this, he became hypotensive to 70/40 and received 50 grams albumin with improvement in his pressures to 78/47. He was asymptomatic at the time.    He last was seen in pulmonary hypertension clinic on 4/25/17. He was recommended at that time to undergo LHC and RHC. He cancelled this on 5/1.     In the ED, he was found to be hypoxic to the 80s and was placed on oxygen. He was given bosentan, 250 NS, and started on NE for hypotension. He was also given ceftriaxone and azithromycin to cover for CAP.    Past Medical History     Past Medical History:   Diagnosis Date     Cardiomyopathy (H)      Dialysis patient (H)     M-W-F     Diarrhea     C diff     ESRD (end stage renal disease) (H)      HLD (hyperlipidemia)      Hypertension      Pulmonary hypertension (H) 11/21/2013        Past  Surgical History      Past Surgical History:   Procedure Laterality Date     AV FISTULA OR GRAFT ARTERIAL      left upper arm     COLONOSCOPY  7/3015     COLONOSCOPY WITH CO2 INSUFFLATION N/A 11/10/2015    Procedure: COLONOSCOPY WITH CO2 INSUFFLATION;  Surgeon: Samuel Fritz MD;  Location: [facility] OR     ENDOBRONCHIAL ULTRASOUND FLEXIBLE N/A 9/18/2014    Procedure: ENDOBRONCHIAL ULTRASOUND FLEXIBLE;  Surgeon: Jg Morrison MD;  Location:  GI     ENDOBRONCHIAL ULTRASOUND FLEXIBLE N/A 12/18/2014    Procedure: ENDOBRONCHIAL ULTRASOUND FLEXIBLE;  Surgeon: Jg Morrison MD;  Location:  OR        Social History   Lives in [place] in an apartment. Never smoker. No alcohol use.    Family History     Family History   Problem Relation Age of Onset     Hypertension Mother      DIABETES Father     Thinks his mother had pulmonary hypertension    Allergies   No Known Allergies     Medications   Current Outpatient Prescriptions on File Prior to Encounter:  bosentan (TRACLEER) 125 MG tablet Take 1 tablet (125 mg) by mouth 2 times daily   carvedilol (COREG) 25 MG tablet Take 0.5 tablets (12.5 mg) by mouth 2 times daily   sildenafil (REVATIO/VIAGRA) 20 MG tablet Take 2 tablets (40 mg) by mouth 3 times daily   loratadine (CLARITIN) 10 MG tablet Take 1 tablet (10 mg) by mouth daily   hydrOXYzine (VISTARIL) 25 MG capsule Take 2 capsules (50 mg) by mouth 3 times daily as needed for itching   midodrine HCl 10 MG TABS Take 10 mg by mouth As needed   torsemide (DEMADEX) 20 MG tablet Take 3 tablets (60 mg) by mouth 2 times daily   omeprazole (PRILOSEC) 40 MG capsule Take 1 capsule (40 mg) by mouth daily Take 30-60 minutes before a meal.   aspirin 81 MG tablet Take 81 mg by mouth daily   B Complex-C-Folic Acid (RENAL) 1 MG CAPS Take 1 mg by mouth Take 1 capsule (1 mg) by mouth daily in the afternoon.  Indications: Nutritional Support   paricalcitol (ZEMPLAR) 5 MCG/ML injection 2 mcg 2 mcg by IV Push route  dialysis.   [DISCONTINUED] SILDENAFIL CITRATE PO Take 20 mg by mouth 3 times daily   calcium acetate (PHOSLO) 667 MG CAPS Take 2 capsules (1,334 mg) by mouth 3 times daily (with meals)       Review of Systems   Review Of Systems  Skin: negative  Eyes: negative  Ears/Nose/Throat: negative  Respiratory: see HPI  Cardiovascular: see HPI  Gastrointestinal: negative  Genitourinary: negative  Musculoskeletal: negative  Neurologic: negative  Psychiatric: negative  Hematologic/Lymphatic/Immunologic: negative  Endocrine: negative     Physical Exam   Blood pressure (!) 65/41, pulse 62, temperature 99.8  F (37.7  C), temperature source Rectal, resp. rate 18, SpO2 99 %.    Wt Readings from Last 4 Encounters:   05/31/17 71.3 kg (157 lb 3 oz)   05/30/17 75.1 kg (165 lb 8 oz)   05/04/17 70.5 kg (155 lb 8 oz)   04/28/17 71.2 kg (157 lb)     General: Alert, interactive, soft spoken  HEENT: AT/NC, sclera anicteric, EOMI, PERRL  Neck: Supple, JVD to below angle of jaw  Resp: clear bilaterally with good air movement  Cardiac: regular, with 2/6 systolic murmur best heard along left sternal border with wide s2 split  Abdomen: mildly distended, midly tender in epigastrium without guarding or rebound; hepatomegaly  Extremities: 2+ edema bilaterally to the knee  Skin: Warm and dry, no jaundice or rash  Neuro:  Face symmetric, moving all extremities without focal deficit       Data   CMP  Recent Labs  Lab 06/03/17 2316 06/03/17  2314    139   POTASSIUM 4.2 4.1   CHLORIDE  --  99   CO2  --  28   ANIONGAP  --  12   GLC 97 96   BUN  --  31*   CR  --  6.54*   GFRESTIMATED  --  9*   GFRESTBLACK  --  11*   JAZMINE  --  9.3   MAG  --  2.3   PROTTOTAL  --  7.2   ALBUMIN  --  3.3*   BILITOTAL  --  2.4*   ALKPHOS  --  98   AST  --  28   ALT  --  24     CBC  Recent Labs  Lab 06/03/17 2316 06/03/17  2314 05/31/17  1420   WBC  --  4.5  --    RBC  --  4.56  --    HGB 16.7 14.6  --    HCT  --  46.1  --    MCV  --  101*  --    MCH  --  32.0  --    MCHC   --  31.7  --    RDW  --  18.6*  --    PLT  --  116* 114*     INR  Recent Labs  Lab 06/03/17  2314   INR 1.28*     Lab Results   Component Value Date    TROPI 0.174 (HH) 06/03/2017    TROPI 0.263 (HH) 03/14/2016    TROPI 0.246 (HH) 03/13/2016    TROPI 0.149 (HH) 04/25/2015    TROPI 0.149 (HH) 04/24/2015     CXR  PRELIMINARY REPORT - The following report is a preliminary  interpretation.  1. Stable cardiomegaly with prominent interstitial markings favoring  pulmonary edema.  2. Increased retrocardiac opacity, pulmonary edema versus infection  and/or atelectasis.    CT Head  PRELIMINARY REPORT - The following report is a preliminary  interpretation.   No acute intracranial pathology.    EKG  Sinus rhythm with right-axis deviation, QTc 509     Echocardiogram (2/16/2016):  Interpretation Summary  Global and regional left ventricular function is normal with an EF of 60-65%.  Severe asymmetric LV hypertrophy (septum 1.4, PW 2.0). There is a dynamic mid  -ventricular gradient of 36 mmHg due to this.  Severe pulmonary hypertension is present. Right ventricular systolic pressure  is 83mmHg above the right atrial pressure. Paradoxical septal motion  consistent with right ventricular pressure and volume overload is present.  Estimated right atrial pressure is > 15 mmHg.  Global right ventricular function is mildly reduced. Mild right ventricular  dilation is present.  Moderate to severe functional tricuspid insufficiency is present. Mechanism  is annular dilation (5.5 cm).  Small circumferential pericardial effusion is present without any hemodynamic  significance.     RHC (2/16/2016):  RA- 16, 17, 13  RV-85, 10  PA-85/20, 48  PAW-10  PVR-5.9  CO/CI-6.4 (3.4)  Kate CO/CI-5.7 (3.0)     Assessment    Jayy Gill is a 46 year old male with PAH with RV failure, cirrhosis, and ESRD presenting with hypoxia and hypotension.       Plan   # Hypotension  # Acute on chronic hypoxic respiratory failure  # Severe PH with RV failure, WHO  Group I  # Moderate/Severe TR  Unclear etiology of hypotension at this time. MAPs at clinic visits have been 55-70. Now with MAPs in the low 50s. Suspect this may be due to worsening pulmonary hypertension and right heart failure in the setting of medication non-compliance. Does not seem markedly volume overload on exam and BNP near baseline. CXR with possible retrocardiac consolidation and procalcitonin elevated, so pneumonia and sepsis is possible as well. However, his history is not really consistent with pneumonia. He did receive ceftriaxone and azithromycin in the ED.  - restart bosentan   - continue sildenafil  - continue home torsemide  - continue home midodrine (last note stated 10mg qday- but increase to tid)  - norepinephrine gtt (discussed central line with patient and risks of receiving pressors through a PIV, but he refused central line at this time)  - PICC order placed (as above, patient refusing IJ central line)  - will not continue antibiotics at this time (should be covered for 24 hours)  - trend lactate    # Elevated troponin  No chest pain. Suspect this is demand ischemia in the setting of hypoxia/hypotension.  - trend troponin    # Cirrhosis, suspect due to heart failure  # Thrombocytopenia  LFTs near baseline. Receives monthly paracentesis, last 5/30.  - CMP in AM    # ESRD, dialysis MWF  - nephrology consult  - continue phoslo     # Altered mental status, resolved  Likely due to hypoxia, hypotension. No pathology on CT Head.    Chronic medical problems:  # GERD - continue omeprazole  # Pruritus - continue loratadine and hydroxyzine PRN    FEN: cardiac/dialysis diet with 2 gram sodium limit  Prophylaxis: home PPI, not anticoagulation at this time, but will consider if not mobile  Lines: PIV x 2    CODE: DNR/DNI    Discussed with Cardiology fellow and reviewed with Dr. Kamara. Will be formally staffed in the AM.    Remigio Sanchez, PGY-2  Internal Medicine  [phone]          Critical Care ICU  Note - Cardiology  Lindsey Kamara M.D.    The patient admitted to the ICU requiring parenteral medications for the adjustment of blood pressure and cardiac output and supplemental oxygen to obtain sufficient oxygenation.  Pt improved today allowing weaning of parenteral medications and transition to oral alpha agonist.  Pt without evidence of ongoing infection therefore antibiotics discontinued.     Continue bosentan and revatio.  Plan to obtain LHC and RHC as previously requested by primary cardiologist.  Will transfer out of ICU.      Lindsey Kamara MD  Section Head - Advanced Heart Failure, Transplantation and Mechanical Circulatory Support  Co-Director - Adult Congenital and Cardiovascular Genetics Center  Associate Professor of Medicine, [facility]    I spent 40 minutes in critical care of the patient including 20 minutes of direct patient care and discussion with the patient, patient's family and care team.

## 2017-06-04 NOTE — PROGRESS NOTES
Patient arrived on 4A shortly after 0400 by means of stretcher and was transferred to the bed via hover mat. Patient's BP found to be 78/49 (56) on arrival and on 0.03mcg/kg/min of norepinephrine through his PIV. PIV found to be very painful to the patient with the slightest of touches and transferring was a very painful process for him. Patient connected to all monitors and found to have O2 saturation in the low 80's, oxymax was increased from 4 to 10 to increase O2 saturation into the mid 90's - the patient stated that a mid 80% range was good for him. Medical team saw patient and consented him for PICC line. Verified that the patient did not want a central line in his neck (he does not) and made the patient aware of the risks of infusing norepinephrine through a PIV. This conversation was heard by myself and I attest that the patient was made aware of the risks of extravasation should IV infiltration occur prior to PICC line insertion. Will continue to monitor for safety and comfort.    Nuno Zuñiga RN

## 2017-06-04 NOTE — CONSULTS
Nephrology Initial Consult  June 4, 2017      Jayy Gill MRN:[id number] YOB: 1970  Date of Admission:6/3/2017  Primary care provider: Milly Healy  Requesting physician: Lindsey Kamara MD    ASSESSMENT AND RECOMMENDATIONS:   1. ESRD on HD at [facility] via left AVF M/W/F. Patient had HD on 06/02  -we will continue maintenance hemodialysis with UF goal 1-2 liters as tolerated. Next HD on Monday  -daily BMP    2.Electrolytes:  -Normal  Serum Na  -Normal serum K  -Normal serum Ca    3. Volume status: euvolemic.     4. Acid/Base status: no acidosis    5. Hypotension-patient states that his SBP normally runs in low 90s and high 80s, and his diastolic tend to run in 60s and 50s.? D/t worsening PHTN in settings of  medications non-compliance. Patient's BP improved ,weaned off Norepeinephrine drip and restarted on Midodrine. Coreg discontinued on admission.   -Monitor BP closely  -continue Midodrine    6. Acute hypoxic respiratory failure- resolved, weaned off supplemental O2 to RA this am    7. Severe Pulmonary hypertension/CHF/moderate to severe TR-On Sildenafil, torsemide and Bosentan    8. Liver cirrhosis complicated by refractory ascitics. Suspect due to heart failure. Patient requires paracentesis every 3 weeks, last paracentesis was on 5/31 with removal of 3 liters    9. Acute encephalopathy-likely secondary to hypotension and hypoxemia.  CT head showed no evidence of acute pathology. Resolved    The patient was staffed with     Thank you for allowing us to participate in the care of your patient. We will follow with you. Please do not hesitate to contact us with questions.    Milagros Alvarado MD  Nephrology Fellow  [facility]  Department of Medicine  Division of Renal Disease and Hypertension  [phone]        REASON FOR CONSULT:  ESRD on maintenance HD M/W/F    HISTORY OF PRESENT ILLNESS:  Jayy Gill is a 46 year old male with PMHx  significant for ESRD on HD M/W/F via RUE AVF, CHF, moderate to severe TR, severe pulmonary hypertension, HTN, liver cirrhosis, recurrent ascitics requires paracentesis every 3 weeks who presented for further evaluation of fatigue, shortness of breath and altered mental status. Upon admission he was found to have blood pressure of 79/48 mmHg and hypoxia with O2 sat of 83% which improved with supplemental oxygen. He was given Bosentan and started on Norepinephrine drip. CXR showed possible retrocardiac consolidation. Laboratory work up showed elevated lactic acid of 3.2.The patient received Ceftriaxone and Azithromycin in ED    PAST MEDICAL HISTORY:  Reviewed with patient on 06/04/2017     Past Medical History:   Diagnosis Date     Cardiomyopathy (H)      Dialysis patient (H)     M-W-F     Diarrhea     C diff     ESRD (end stage renal disease) (H)      HLD (hyperlipidemia)      Hypertension      Pulmonary hypertension (H) 11/21/2013       Past Surgical History:   Procedure Laterality Date     AV FISTULA OR GRAFT ARTERIAL      left upper arm     COLONOSCOPY  7/3015     COLONOSCOPY WITH CO2 INSUFFLATION N/A 11/10/2015    Procedure: COLONOSCOPY WITH CO2 INSUFFLATION;  Surgeon: Samuel Fritz MD;  Location: [facility]     ENDOBRONCHIAL ULTRASOUND FLEXIBLE N/A 9/18/2014    Procedure: ENDOBRONCHIAL ULTRASOUND FLEXIBLE;  Surgeon: Jg Morrison MD;  Location: [facility] GI     ENDOBRONCHIAL ULTRASOUND FLEXIBLE N/A 12/18/2014    Procedure: ENDOBRONCHIAL ULTRASOUND FLEXIBLE;  Surgeon: Jg Morrison MD;  Location: [facility] OR        MEDICATIONS:  PTA Meds  Prior to Admission medications    Medication Sig Last Dose Taking? Auth Provider   bosentan (TRACLEER) 125 MG tablet Take 1 tablet (125 mg) by mouth 2 times daily   Kierra Dior MD   carvedilol (COREG) 25 MG tablet Take 0.5 tablets (12.5 mg) by mouth 2 times daily   Milly Healy MD   sildenafil (REVATIO/VIAGRA) 20 MG tablet Take 2 tablets (40 mg) by mouth  3 times daily   Tyrone Blank MD   loratadine (CLARITIN) 10 MG tablet Take 1 tablet (10 mg) by mouth daily   Milly Healy MD   hydrOXYzine (VISTARIL) 25 MG capsule Take 2 capsules (50 mg) by mouth 3 times daily as needed for itching   Citlaly Arias MD   midodrine HCl 10 MG TABS Take 10 mg by mouth As needed   Reported, Patient   torsemide (DEMADEX) 20 MG tablet Take 3 tablets (60 mg) by mouth 2 times daily   Kalia Peterson MD   omeprazole (PRILOSEC) 40 MG capsule Take 1 capsule (40 mg) by mouth daily Take 30-60 minutes before a meal.   Milly Healy MD   aspirin 81 MG tablet Take 81 mg by mouth daily   Unknown, Entered By History   B Complex-C-Folic Acid (RENAL) 1 MG CAPS Take 1 mg by mouth Take 1 capsule (1 mg) by mouth daily in the afternoon.  Indications: Nutritional Support   Reported, Patient   paricalcitol (ZEMPLAR) 5 MCG/ML injection 2 mcg 2 mcg by IV Push route dialysis.   Reported, Patient   calcium acetate (PHOSLO) 667 MG CAPS Take 2 capsules (1,334 mg) by mouth 3 times daily (with meals)   Tyrone Blank MD      Current Meds    aspirin EC  81 mg Oral Daily     bosentan  125 mg Oral BID     calcium acetate  1,334 mg Oral TID w/meals     loratadine  10 mg Oral Daily     midodrine  10 mg Oral Daily     omeprazole  40 mg Oral QAM AC     sildenafil  40 mg Oral TID     torsemide  60 mg Oral BID     sodium chloride (PF)  3 mL Intracatheter Q8H     Infusion Meds    norepinephrine Stopped (06/04/17 0846)     - MEDICATION INSTRUCTIONS -         ALLERGIES:    No Known Allergies    REVIEW OF SYSTEMS:  A 10 point review of systems was negative except as noted above.    SOCIAL HISTORY:   Social History     Social History     Marital status: Single     Spouse name: N/A     Number of children: 2     Years of education: N/A     Occupational History     Not on file.     Social History Main Topics     Smoking status: Never Smoker     Smokeless tobacco: Never Used      Alcohol use No     Drug use: No     Sexual activity: Yes     Partners: Female     Other Topics Concern     Not on file     Social History Narrative     Reviewed with patient and his significant other who accompanies Jayy Gill in hospital room    FAMILY MEDICAL HISTORY:   Family History   Problem Relation Age of Onset     Hypertension Mother      DIABETES Father      Reviewed with patient     PHYSICAL EXAM:   Temp  Av.6  F (35.9  C)  Min: 94  F (34.4  C)  Max: 99.8  F (37.7  C)      Pulse  Av.5  Min: 62  Max: 80 Resp  Av  Min: 12  Max: 18  SpO2  Av.1 %  Min: 83 %  Max: 100 %       BP (!) 74/47  Pulse 64  Temp 94  F (34.4  C) (Oral)  Resp 18  SpO2 99%   Date 17 07 - 17 0659   Shift 2430-8587 3837-0373 7858-6060 24 Hour Total   I  N  T  A  K  E   P.O. 300   300    I.V. 4.8   4.8    Shift Total 304.8   304.8   O  U  T  P  U  T   Shift Total       Weight (kg)            Admit       GENERAL APPEARANCE: not in distress, somnolent but easy to woke up  EYES: no scleral icterus, pupils equal  HENT: NC/AT,  mouth  without ulcers or lesions  Lymphatics: no cervical or supraclavicular LAD  Pulmonary: lungs clear to auscultation with equal breath sounds bilaterally, no clubbing  CV: regular rhythm, normal rate, no rub   - + ankle Edema b/l  GI: soft, nontender, normal bowel sounds, no HSM   MS: no evidence of inflammation in joints, no muscle tenderness  : no Javed, no scrotal edema  SKIN: no rash, warm, dry, no cyanosis  NEURO: mentation intact and speech normal    LABS:   CMP  Recent Labs  Lab 17  0427 17  2316 17  2314    140 139   POTASSIUM 4.6 4.2 4.1   CHLORIDE 101  --  99   CO2 26  --  28   ANIONGAP 14  --  12   * 97 96   BUN 33*  --  31*   CR 6.72*  --  6.54*   GFRESTIMATED 9*  --  9*   GFRESTBLACK 11*  --  11*   JAZMINE 9.2  --  9.3   MAG  --   --  2.3   PROTTOTAL 7.0  --  7.2   ALBUMIN 2.9*  --  3.3*   BILITOTAL 2.3*  --  2.4*   ALKPHOS 96  --  98    AST 30  --  28   ALT 24  --  24     CBC  Recent Labs  Lab 06/04/17  0427 06/03/17  2316 06/03/17  2314 05/31/17  1420   HGB 14.2 16.7 14.6  --    WBC 5.4  --  4.5  --    RBC 4.49  --  4.56  --    HCT 45.3  --  46.1  --    *  --  101*  --    MCH 31.6  --  32.0  --    MCHC 31.3*  --  31.7  --    RDW 18.6*  --  18.6*  --    *  --  116* 114*     INR  Recent Labs  Lab 06/03/17 2314   INR 1.28*     ABG  Recent Labs  Lab 06/03/17 2319   O2PER 95      URINE STUDIES  Recent Labs   Lab Test  06/09/16   1349  04/24/15   1353   COLOR  [place]ma  Yellow   APPEARANCE   --   Clear   URINEGLC  Negative  Negative   URINEBILI   --   Negative   URINEKETONE  1+  Negative   SG  1.015  1.010   UBLD   --   Negative   URINEPH  6.0  7.0   PROTEIN   --   100*   NITRITE  Negative  Negative   LEUKEST   --   Negative   RBCU   --   <1   WBCU   --   <1     No lab results found.  PTH  No lab results found.  IRON STUDIES  Recent Labs   Lab Test  02/19/16   2310  09/12/13   1455   IRON  56  57   FEB  306  279   IRONSAT  18  20   MARANDA  369   --        IMAGING:  All imaging studies reviewed by me.     Milagros Alvarado MD

## 2017-06-04 NOTE — ED NOTES
ED MD Dr. Moody stated no central line needed for Levophed infusion while in the ED, will continue with PIV at this time.

## 2017-06-04 NOTE — PROGRESS NOTES
Received page  from RN, requesting help with medication named christian. Per RN, pt might be ready for discharge today but she feels that pt should not be discharged without his medication.    Reviewed the chart and consulted with CC.  It seems there is pre-auth initiated but we have no one way to find out the status over the weekend. Spoke with the Pharmacy, they don't have any answers for us. It seems we have to wait till Monday and have pharmacy liaison help figure this out.  RN has been updated.    DAIVD Rice     6/4/2017

## 2017-06-04 NOTE — ED NOTES
Patient presents to ED with severe shortness of breath and fatigue. Patient on dialysis MWF, has not missed any runs. Patient had paracentesis on Thursday. Bilateral leg swelling noted. Unable to get accurate oxygen saturation or temperature at triage.

## 2017-06-04 NOTE — ED NOTES
Cardiology resident at bedside and inquired regarding a central line for Levophed. Resident stated he would consult his fellow and PIV for Levophed infusion is okay at this time.

## 2017-06-04 NOTE — PLAN OF CARE
Problem: Fall Risk (Adult)  Goal: Identify Related Risk Factors and Signs and Symptoms  Related risk factors and signs and symptoms are identified upon initiation of Human Response Clinical Practice Guideline (CPG)  D/I: Pt alert and oriented x 4.  Pt continued to c/o tenderness at IV sites, refused PICC or to allow IVs to be replaced.  R AC pIV infusing levophed had a good blood return.  Levophed turned off at 0845 after clarifying MAP goals with Cards 2.  Goal MAP now>55. Pt has been able to maintain MAPs >55 with levophed off.  IV disconnected and Pt now denies pain at the sites.  Pt refused PICC line and lab draw to recheck troponin.  1 BM today.  Pt transferred to 4E on 10L oxymask.  Able to adjust ear probe for a better waveform and found that Pt satting 97% on room air while sitting up.  Pt continues to sat 91-97% on room air. Ate breakfast and tolerated well.  Denies any lightheadedness or dizziness when sitting, standing, and ambulating in room.  Kristian SALAS contacted for assistance with insurance for Pt to get bosenten covered, so he does not have to present to ED to get this medication.  Per social work, we are in the process of getting a preauthorization for this medication.  We will not be able to finalize this process until tomorrow (Monday).       A/P: Pt will transfer to 6C when bed available.

## 2017-06-04 NOTE — PROGRESS NOTES
Was asked by the patient to completely silence the oxygen saturation alarm because he wanted to get some rest. Patient was informed that it could not be silenced completely because of safety. He said that he understood and still wanted it lowered. The O2 saturation limit was set to 80%. Will continue to monitor for safety and comfort.    Nuno Zuñiga RN

## 2017-06-04 NOTE — ED PROVIDER NOTES
History     Chief Complaint   Patient presents with     Fatigue     Shortness of Breath     HPI  Jayy Gill is a 46-year-old male with a known history of pulmonary hypertension, congestive heart failure, end-stage renal disease on hemodialysis (Monday, Wednesday, Friday), hypertension, hyperlipidemia, and cardiomyopathy who presents with altered mental status/hypoxia, shortness of breath and fatigue. The patient is now complaining of shortness of breath and feeling very weak.  No cough. He states he s been feeling cold and sweaty. The patient states that he had his last dialysis run yesterday. Blood pressure normally runs about 80 in the systolic range, and his diastolics tend to run usually in the 50s. He presents here 79/48. The patient denies any headache or visual complaints. He states he s had no nausea. He s had some poor intake. Daughter who s with him states he s had a recent paracentesis May 31, and apparently he dropped his pressures at that time.  He was also dialyzed yesterday.  The patient is brought here by ambulance per request of family members because of his increasing confusion, shortness of breath.  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  He's also been off of his Tracleer for two weeks.     I have reviewed the Medications, Allergies, Past Medical and Surgical History, and Social History in the Epic system.    This part of the document was transcribed by Fernando Correia for Rodger Moody MD.    Current Facility-Administered Medications   Medication     norepinephrine (LEVOPHED) 16 mg in D5W 250 mL infusion     cefTRIAXone (ROCEPHIN) 1 g vial to attach to  mL bag for ADULTS or NS 50 mL bag for PEDS     azithromycin (ZITHROMAX) 500 mg in NaCl 0.9 % 250 mL intermittent infusion     Current Outpatient Prescriptions   Medication     bosentan (TRACLEER) 125 MG tablet     carvedilol (COREG) 25 MG tablet     sildenafil (REVATIO/VIAGRA) 20 MG tablet     loratadine  (CLARITIN) 10 MG tablet     hydrOXYzine (VISTARIL) 25 MG capsule     midodrine HCl 10 MG TABS     torsemide (DEMADEX) 20 MG tablet     omeprazole (PRILOSEC) 40 MG capsule     aspirin 81 MG tablet     B Complex-C-Folic Acid (RENAL) 1 MG CAPS     paricalcitol (ZEMPLAR) 5 MCG/ML injection     [DISCONTINUED] SILDENAFIL CITRATE PO     calcium acetate (PHOSLO) 667 MG CAPS     Past Medical History:   Diagnosis Date     Cardiomyopathy (H)      Dialysis patient (H)     M-W-F     Diarrhea     C diff     ESRD (end stage renal disease) (H)      HLD (hyperlipidemia)      Hypertension      Pulmonary hypertension (H) 11/21/2013       Past Surgical History:   Procedure Laterality Date     AV FISTULA OR GRAFT ARTERIAL      left upper arm     COLONOSCOPY  7/3015     COLONOSCOPY WITH CO2 INSUFFLATION N/A 11/10/2015    Procedure: COLONOSCOPY WITH CO2 INSUFFLATION;  Surgeon: Samuel Fritz MD;  Location: [facility]     ENDOBRONCHIAL ULTRASOUND FLEXIBLE N/A 9/18/2014    Procedure: ENDOBRONCHIAL ULTRASOUND FLEXIBLE;  Surgeon: Jg Morrison MD;  Location: [facility] GI     ENDOBRONCHIAL ULTRASOUND FLEXIBLE N/A 12/18/2014    Procedure: ENDOBRONCHIAL ULTRASOUND FLEXIBLE;  Surgeon: Jg Morrison MD;  Location: [facility] OR       Family History   Problem Relation Age of Onset     Hypertension Mother      DIABETES Father        Social History   Substance Use Topics     Smoking status: Never Smoker     Smokeless tobacco: Never Used     Alcohol use No      No Known Allergies    Review of Systems   Constitutional: Positive for chills and diaphoresis. Negative for fever.   HENT: Negative for congestion.    Eyes: Negative for redness.   Respiratory: Positive for shortness of breath. Negative for cough.    Cardiovascular: Positive for leg swelling. Negative for chest pain.   Gastrointestinal: Negative for abdominal pain and vomiting.   Genitourinary: Negative for difficulty urinating.   Musculoskeletal: Negative for arthralgias and neck  stiffness.   Skin: Negative for color change.   Neurological: Positive for weakness. Negative for headaches.   Psychiatric/Behavioral: Positive for confusion.   All other systems reviewed and are negative.      Physical Exam   BP: (!) 75/53  Pulse: 62  Heart Rate: 60  Temp: 99.8  F (37.7  C)  Resp: 18  SpO2: (!) 83 %  Physical Exam   Constitutional: He is oriented to person, place, and time. He appears well-developed. He appears lethargic. No distress.   HENT:   Head: Normocephalic and atraumatic.   Mouth/Throat: Oropharynx is clear and moist. No oropharyngeal exudate.   Eyes: Conjunctivae and EOM are normal. Pupils are equal, round, and reactive to light. No scleral icterus.   Neck: Normal range of motion. Neck supple. JVD (3-4 cm bilaterally) present.   Cardiovascular: Normal rate, regular rhythm, normal heart sounds and intact distal pulses.    Pulmonary/Chest: No respiratory distress. He has rales (Bilateral lower bases).   Abdominal: Soft. Bowel sounds are normal. There is no tenderness.   Musculoskeletal: Normal range of motion. He exhibits no edema or tenderness.   Lymphadenopathy:     He has no cervical adenopathy.   Neurological: He is oriented to person, place, and time. He has normal reflexes. He appears lethargic. He displays normal reflexes. No cranial nerve deficit or sensory deficit. He exhibits normal muscle tone. Coordination normal. GCS eye subscore is 4. GCS verbal subscore is 5. GCS motor subscore is 6.   Skin: Skin is warm. No rash noted. He is not diaphoretic.       ED Course     ED Course     Procedures             EKG Interpretation:      Interpreted by Rodger Moody  Time reviewed: 2305  Symptoms at time of EKG: shortness of breath   Rhythm: normal sinus   Rate: normal  Axis: normal  Ectopy: none  Conduction: Prolonged QT, right ventricular hypertrophy, left atrial enlargement  ST Segments/ T Waves: No ST-T wave changes  Q Waves: none  Comparison to prior: Unchanged from 5/1/17    Clinical  Impression: Nonspecific ST-T wave changes with prolonged QT          Critical Care time:  was 60 minutes for this patient excluding procedures.     Lactate is greater than 2 due to Hypoxia, fluid volume depletion, end-stage renal disease and liver cirrhosis, at this time there is no sign of sepsis.         Labs Ordered and Resulted from Time of ED Arrival Up to the Time of Departure from the ED   CBC WITH PLATELETS DIFFERENTIAL - Abnormal; Notable for the following:        Result Value     (*)     RDW 18.6 (*)     Platelet Count 116 (*)     Nucleated RBCs 1 (*)     All other components within normal limits   COMPREHENSIVE METABOLIC PANEL - Abnormal; Notable for the following:     Urea Nitrogen 31 (*)     Creatinine 6.54 (*)     GFR Estimate 9 (*)     GFR Estimate If Black 11 (*)     Bilirubin Total 2.4 (*)     Albumin 3.3 (*)     All other components within normal limits   LACTIC ACID WHOLE BLOOD - Abnormal; Notable for the following:     Lactic Acid 3.6 (*)     All other components within normal limits   INR - Abnormal; Notable for the following:     INR 1.28 (*)     All other components within normal limits   NT PROBNP INPATIENT - Abnormal; Notable for the following:     N-Terminal Pro BNP Inpatient 66682 (*)     All other components within normal limits   TROPONIN I - Abnormal; Notable for the following:     Troponin I ES 0.174 (*)     All other components within normal limits   ISTAT GASES ELEC ICA GLUC QI POCT - Abnormal; Notable for the following:     PO2 Venous 12 (*)     Calcium Ionized 4.3 (*)     All other components within normal limits   AMMONIA   BLOOD GAS VENOUS   MAGNESIUM   PROCALCITONIN   ISTAT CG8 GAS ELEC ICA GLUC QI NURSE POCT   ISTAT CG4 GASES LACTATE QI NURSING POCT   BLOOD CULTURE   BLOOD CULTURE            Assessments & Plan (with Medical Decision Making)  1.  Congestive Heart Failure   2.  Acute Hypoxia Secondary to #1  3.  Hypotension Secondary to #1  4.  End-Stage Renal Disease  on Hemodialysis Monday, Wednesday, Friday   5.  Liver cirrhosis   6.  Hyperlipidemia and Hypertensive Cardiomyopathy     This patient is a 46-year-old male who presents with hypoxia down to 83% upon arrival here to the ED.  He had altered mental status.  He was immediately placed on 6 L nasal cannula with O2 sats bouncing up to 98%.  The patient started to clear shortly afterwards.  The patient s VBG showed a pH of 7.39 with a PCO2 of 46.  Bicarb was 28.  The patient was titrated down to 2 L O2 and would drop his O2 sats down to about 88%.  We could not completly titrate him down to room air.  The patient is not O2-dependent at home.  The patient s mentation is clearer after O2 applied.  His lung fields sound slightly wet on exam. Chest x-ray performed shows stable cardiomegaly with prominent interstitial markings favoring some pulmonary edema.  He does have an increased retrocardiac opacity, pulmonary edema versus infection and/or atelectasis was considered.  The patient was started on Rocephin and Zithromax empirically.  The patient s vital signs showed he was afebrile.  He had a normal white count and differential.  The patient had an elevated lactate around 3.6. This was felt to be secondary to his hypoxia and his hypertensive cardiomyopathy and liver disease.  Did not feel that he was septic at this time.  The patient was noted to have blood pressures that were still sitting around about 62/43.  His normal pressures usually run around 70-80 systolic and 40-50 diastolic.  He was started on norepinephrine to keep his MAP up around 65.  His EKG shows left atrial enlargement and right ventricular hypertrophy but no change.  No ST segment changes.  His troponin was 0.174, which is about the same as it was before.  His N-terminal pro-BNP was 17,936 and this is slightly up.  His weight is 157 pounds, which is unchanged from May 31.  The patient s INR was 1.28.  At this point it was felt most of his symptoms were  secondary to poor compliance with his Tacleer, Revitio, and his Coreg.  I felt that a lot of his hypoxia was cardiac-related.  I spoke to Dr. Lindsey Kamara regarding the patient s presenting signs and symptoms, went over the lab and x-ray reports, and we ll have the patient admitted to  on the cardiac ICU.  Cardiology 2 resident was also contacted as well.  CT scan of the head shows no acute intracranial pathology.  The patient at this time appears to be stable and we ll plan on contacting Nephrology and make them aware of the patient s admission as well.  The patient had a recent paracentesis where they pulled off 3 L of fluid 05/31/2017 and he did become hypotensive at that time, and they had to replace 50 g of albumin.  Also is noted the patient was hemodialyzed yesterday and there is some question as to whether or not this patient still is fluid volume depleted, and this could be contributing somewhat to the elevated lactic acid as well. At this point we ll continue to monitor carefully. I spoke to the resident as well as the attending physician regarding this.      I have reviewed the nursing notes.    I have reviewed the findings, diagnosis, plan and need for follow up with the patient.  This part of the document was transcribed by Fernando Correia for Rodger Moody MD.    New Prescriptions    No medications on file       Final diagnoses:   CHF (congestive heart failure), NYHA class I, acute, systolic (H)   Disorder of liver   Dyspnea, unspecified type   ESRD (end stage renal disease) on dialysis   Cardiomyopathy (H)   Pulmonary hypertension   Acute pulmonary edema (H)   Altered mental status, unspecified altered mental status type       6/3/2017   [facility], [place], EMERGENCY DEPARTMENT     Rodger Moody MD  06/04/17 0225

## 2017-06-04 NOTE — PLAN OF CARE
"Pt transferred from 4E to 6C. Upon arrival via WC, pt very drowsy; he reports \"I'm just really tired that's all\". BP soft w/MAP of 59. Sats maintained on RA.  Answering questions appropriately; able to stand and transfer to bed w/SBA; awaiting meal tray. Pt waiting for social work to obtain assistance for his pulmonary hypertension med. Pt had been unable to take for past 2 weeks d/t financial issues.   Plan for dialysis run tomorrow per usual MWF schedule. NPO after MN for heartcath as well. Pt aware of current orders and at this time is in agreement for procedures.   "

## 2017-06-05 NOTE — PROGRESS NOTES
Vascular Access Services Notes:    Pt refused PICC insertion per 6C RN.          Cristian Burgess, BSN, RN [facility]

## 2017-06-05 NOTE — PLAN OF CARE
Problem: Fluid Volume Excess (Adult,Obstetrics,Pediatric)  Goal: Identify Related Risk Factors and Signs and Symptoms  Related risk factors and signs and symptoms are identified upon initiation of Human Response Clinical Practice Guideline (CPG)  Outcome: No Change  D/I: Monitor showing SR 80s. Took AM meds except for bosentan and sildenafil. To dialysis at 0900. Patient states had little UOP when up to bathroom. Did not save urine. NPO for heart cath after dialysis. Patient with flat affect, not new for patient per recent report. See flowsheets for assessments and additional data.  A: Stable ESRD patient.   P: To dialysis and right heart cath after.     06/05/17 1012   Fluid Volume Excess   Fluid Volume Excess: Related Risk Factors cirrhosis;dialysis intake excess;disease process;fluid intake excessive;other (see comments)  (failure to take medications as O/P)   Signs and Symptoms (Fluid Volume Excess) activity intolerance;acute weight gain;ascites;edema;fatigue;oliguria         Addendum: Returned from dialysis at 1300. Stated that he needed to eat and would be unable to wait for CV lab this afternoon. NP notified and diet was ordered. Stated he wanted MDs to talk to him as he been here for 2 days and nothing is being done. MDs notified. Called RN into room at 1500 stating that he didn't feel good, like he was going to pass out. Sitting on side of bed. Requested patient to lie down in bed. BP 74/49. Received torsemide and sildenofil at 1345. Report given to oncoming RN. Continue to monitor and await further orders.

## 2017-06-05 NOTE — PROGRESS NOTES
Care Coordinator Progress Note     Admission Date/Time:  6/3/2017  Attending MD:  Lindsey Kamara MD   Data  Chart reviewed, discussed with interdisciplinary team.   Patient was admitted for:    CHF (congestive heart failure), NYHA class I, acute, systolic (H)  Disorder of liver  Dyspnea, unspecified type  ESRD (end stage renal disease) on dialysis (H)  Cardiomyopathy (H)  Pulmonary hypertension (H)  Acute pulmonary edema (H)  Altered mental status, unspecified altered mental status type.    Concerns with insurance coverage for discharge needs: TBD for home Tracleer (Bosentan)  Current Living Situation: Patient lives with adult .  Support System: Supportive and Involved SO, Jacqueline Cordova.   Services Involved: Dialysis Services  Transportation: Family or Friend will provide  Coordination of Care and Referrals: Provided patient/family with options for resumption of outpt dialysis.    Assessment     Per Hermann Flores (Ph: [phone]) at the [facility], pt's insurance coverage for Tracleer (Bosentan) has been denied and an appeal process has been submitted; pt is aware, Cards 2 team aware. Pt said that his home script for Sildenafil needs to be refilled also.      Pt said that he is already set up for outpt dialysis at [facility] on M-W-F at 5 AM.   Intervention:      Arrangements made with [facility] (Ph: [phone] Fax: [phone]) for resumption of outpt dialysis on M-W-F at 5 AM.     Plan  Anticipated Discharge Date:  TBD  Anticipated Discharge Plan:  Discharge to home.     SHAUN DIAZ RN BSN  Care Coordinator  [phone]

## 2017-06-05 NOTE — PLAN OF CARE
Problem: Goal Outcome Summary  Goal: Goal Outcome Summary  Outcome: No Change  No significant events over night. Pt slept well between cares. BP's remain low but with MAP >55. Pt denies pain. Pt up in room with SBA and tolerating activity well. Family at bedside on evenings and supportive of pt. Pt plans for dialysis today and NPO since midnight for heartcath. Continue to monitor. Notify MD of any changes/concerns.

## 2017-06-05 NOTE — TELEPHONE ENCOUNTER
PRIOR AUTHORIZATION DENIED    Medication: Tracleer 125mg - Denied    Denial Date: 6/5/2017    Denial Rational: PA Team forwarded denial notification from [employer], which was denied due to required PAH criteria (See below). Letter of medical necessity will be written to pursue appeal.

## 2017-06-05 NOTE — PROGRESS NOTES
To dialysis at 0900. Bosentan and sildenafil held per dialysis RN d/t low BP and desire to remove as much fluid as possible. HF pharmacist paged with information.

## 2017-06-05 NOTE — PROGRESS NOTES
HEMODIALYSIS TREATMENT NOTE    Date: 6/5/2017  Time: 2:35 PM    Data:   Pre Wt:   74.3 kg   Desired Wt:  73.3 kg   Post Wt:    Approximate (Net) Weight Removed:  1.4 Kg   Vascular Access Status: Access/lines visible and secure, Prescribed BFR achieved   Dialyzer Rinse: Light, Streaked   Total Blood Volume Processed:  49.1 L   Total Dialysis (Treatment) Time: 3 hrs    Assessment/Interventions:  3 hours of HD via LUE AVF. 300 BFR. Lidocaine and 16 gauge needles used per patient request. 300 BFR. 1.4 kg removed without complications. MAP >50. Sites held for 15 min and clotted with gelfoam. Scheduled AM dose of Tacleer given. See MAR for medication details. Report given to primary RN on 6C.       Plan:    Per renal team.

## 2017-06-05 NOTE — PROGRESS NOTES
[facility]  CARDIOLOGY DAILY PROGRESS NOTE    Interval History:  No acute events overnight.     Jayy Gill is a 46 year old male    Assessment/Plan  ASSESSMENT:    This is a 47 YO M with     - Chronic severe type I pulmonary arterial hypertension with moderate to severe tricuspid insufficiency and mild RV dysfunction.  - Known biopsy proven pulmonary veno occlusive disease  - Possible sarcoidosis seen on PET scan  - Possible myocarditis seen on PET scan, cannot obtain MRI  - Chronic hypotension  - ESRD on dialysis  - Liver cirrhosis with repeated paracentesis  - Chronic thrombocytopenia  - Medication non compliance  - Chronically elevated PCT without subjective signs of infection  - GERD  - Chronic pruiritus     PLAN:  - Continue pulmonary hypertension therapy with revatio and bosentan  - Evaluation of cardiac filling pressures with right and left sided pressures as planned in the past today. LV pullback with gradient, RHC baseline and PCWP measurement from 4 different spots considering PVOD.   - Continue home dose of diuresis, weight stable near baseline, sat was 93% on RA.  - To avoid RV pressure exceeding systemic pressure and possible R to L shunt, need to keep MAP above Right sided pressures. Increased midodrine to 10 mg three times a day  - Stopped coreg yesterday as he needs to maintain his MAPs for the reason mentioned above and there is no clear indication for BB currently (has cardiomyopathy but well preserved EF)  - Dialysis as planned M W and F.  - Paracentesis once per three weeks as outpatient.   - Blood cultures remain negative, no need to abx currently. Does not have subjective symptoms or objective evidence for an infection.  - SW consult to help with obtaining bosentan.   - FEN: cardiac/dialysis diet with 2-gram sodium limit  - Prophylaxis: home PPI, SCD  - Lines: PIV x 2    Plan discussed with Dr Phillip        Objective  Temp:  [94  F (34.4  C)-98.3  F (36.8  C)] 97.8   F (36.6  C)  Heart Rate:  [58-67] 67  Resp:  [16-20] 20  BP: ()/(47-73) 87/55  SpO2:  [89 %-99 %] 92 %    Intake/Output Summary (Last 24 hours) at 06/05/17 0653  Last data filed at 06/05/17 0000   Gross per 24 hour   Intake            844.8 ml   Output                0 ml   Net            844.8 ml     Wt Readings from Last 5 Encounters:   06/05/17 74.3 kg (163 lb 14.4 oz)   05/31/17 71.3 kg (157 lb 3 oz)   05/30/17 75.1 kg (165 lb 8 oz)   05/04/17 70.5 kg (155 lb 8 oz)   04/28/17 71.2 kg (157 lb)     HEENT: AT/NC  Neck: Prominent JV pulsations below the angle of the jaw possibly related to TR.  Resp: mild crackles at the bases.  Cardiac: regular, with 2/6 systolic murmur best heard along left sternal border with wide s2 split  Abdomen: mildly distended, midly tender in epigastrium without guarding or rebound; hepatomegaly  Extremities: 3+ edema bilaterally to the knee  Skin: Warm and dry    Current Medications    Cardiac: asa 81, bosentan 125 mg bid, midodrine 10 mg tid, sildenafil 40 mg tid, torsemide 60 mg bid.   Non cardiac: loratidine, hydroxyzine PRN, calcium acetate TID.   Lab Values  Labs have been reviewed  BMP  Recent Labs  Lab 06/04/17 0427 06/03/17 2316 06/03/17 2314    140 139   POTASSIUM 4.6 4.2 4.1   CHLORIDE 101  --  99   JAZMINE 9.2  --  9.3   CO2 26  --  28   BUN 33*  --  31*   CR 6.72*  --  6.54*   * 97 96     LFTs  Recent Labs  Lab 06/04/17 0427 06/03/17 2314   ALKPHOS 96 98   AST 30 28   ALT 24 24   BILITOTAL 2.3* 2.4*   PROTTOTAL 7.0 7.2   ALBUMIN 2.9* 3.3*      CBC  Recent Labs  Lab 06/04/17 0427 06/03/17 2314 05/31/17  1420   WBC 5.4  --  4.5  --    RBC 4.49  --  4.56  --    HGB 14.2  < > 14.6  --    HCT 45.3  --  46.1  --    *  --  101*  --    MCH 31.6  --  32.0  --    MCHC 31.3*  --  31.7  --    RDW 18.6*  --  18.6*  --    *  --  116* 114*   < > = values in this interval not displayed.  INR  Recent Labs  Lab 06/03/17  2314   INR 1.28*     TpnInvalid  input(s): TPN    Discussed w/ Dr Marjan Blair MD  Cardiology Fellow  June 5, 2017

## 2017-06-05 NOTE — CONSULTS
Brief Social Work Consult Note:    Consult Ordered:   Sw consult was ordered for medication coverage.      Outcome:   Discussed with RNCC.  No social work needs identified for this issue at this time.  Please reconsult if other needs or issues arise.  Please see RNCC notes for information related to this consult.    SW will clear orders.    MADDY Florentino, JOSE ALFREDOW  6C Unit   Pager: [phone]  Phone: [phone]

## 2017-06-06 NOTE — PROGRESS NOTES
Hemodialysis Note      Patient arrived to unit hypotensive (asymptomatic) with SBPs <60. HR<60 with two brief episodes of A-Fib with HR as high 130. Nephrology (Dr. Alvarado) and notified. Unable to dialyze patient. Report given to primary RN on 6C.

## 2017-06-06 NOTE — PROGRESS NOTES
Patient transferred to  from . Patient hypotensive (see flowsheets). Dopamine gtt started on floor, MAPs now in 60s. Patient settled in room, oriented to unit, VENESSA Sharma at bedside and updated on plan of care. Plan for ultrafiltration today.

## 2017-06-06 NOTE — PROGRESS NOTES
[facility]  CARDIOLOGY DAILY PROGRESS NOTE    Interval History:  Complained of some chest pressure. Both revatio and torsemide held, did not receive night dose due to MAPs in the 59. There was one number in the 49. Trop 0.18-0.5.    Jayy Gill is a 46 year old male    Assessment/Plan  ASSESSMENT:    This is a 45 YO M with     - Chronic severe type I pulmonary arterial hypertension with moderate to severe tricuspid insufficiency and mild RV dysfunction.  - Probable pulmonary veno occlusive disease  - Possible sarcoidosis seen on PET scan  - Possible myocarditis seen on PET scan, cannot obtain MRI  - Chronic hypotension  - ESRD on dialysis  - Liver cirrhosis with repeated paracentesis  - Chronic thrombocytopenia  - Medication non compliance  - Chronically elevated PCT without subjective signs of infection  - GERD  - Chronic pruiritus     PLAN:  - Continue pulmonary hypertension therapy with revatio and bosentan, revatio doses held overnight. Resume this AM  - Coronary angiogram showing non obstructive coronaries.  - Elevated right sided filling pressures and normal left sided pressures. RA 25 mm Hg. Will plan for UF with nephrology today with goal of 2 L removal.  - No PWCP and LVEDP gradient. Had apical LV gradient of 50 mm Hg and does not have any mid cavitary gradient. RV pacing at 70 and 100 did not show any difference in gradient.   - Continue home dose of diuresis. Resumed torsemide 60 mg TID this AM. Weights stable.  - To avoid RV pressure exceeding systemic pressure and possible R to L shunt, need to keep MAP above Right sided pressures. Increased midodrine to 15 mg three times a day  - Not on coreg as he needs to maintain his MAPs for the reason mentioned above and there is no clear indication for BB currently (has cardiomyopathy but well preserved EF)  - Dialysis as planned M W and F.  - Paracentesis once per three weeks as outpatient.   - Blood cultures remain negative, no need  to abx currently. Does not have subjective symptoms or objective evidence for an infection.  - FEN: cardiac/dialysis diet with 2-gram sodium limit  - Prophylaxis: home PPI, SCD  - Lines: PIV x 2    DISPO  - SW consult to help with obtaining bosentan.       Plan discussed with Dr Phillip        Objective  Temp:  [96.1  F (35.6  C)-97.7  F (36.5  C)] 96.1  F (35.6  C)  Pulse:  [65] 65  Heart Rate:  [56-67] 56  Resp:  [16] 16  BP: (74-92)/(49-67) 84/56  SpO2:  [90 %-97 %] 97 %    Intake/Output Summary (Last 24 hours) at 06/05/17 0653  Last data filed at 06/05/17 0000    Wt Readings from Last 5 Encounters:   06/05/17 74.3 kg (163 lb 14.4 oz)   05/31/17 71.3 kg (157 lb 3 oz)   05/30/17 75.1 kg (165 lb 8 oz)   05/04/17 70.5 kg (155 lb 8 oz)   04/28/17 71.2 kg (157 lb)     HEENT: AT/NC  Neck: Prominent JV pulsations below the right ear lobe.  Resp: mild crackles at the bases.  Cardiac: regular, with 2/6 systolic murmur best heard along left sternal border with wide s2 split  Abdomen: mildly distended, midly tender in epigastrium without guarding or rebound; hepatomegaly  Extremities: 3+ edema bilaterally to the knee  Skin: Warm and dry    Current Medications    Cardiac: asa 81, bosentan 125 mg bid, midodrine 10 mg tid, sildenafil 40 mg tid, torsemide 60 mg bid (held yesterday).   Non cardiac: loratidine, hydroxyzine PRN, calcium acetate TID.       Coronary angiogram and LHC/ RHC numbers:     1. Nonobstructive coronary artery disease.  2. Increased right-sided and normal left-sided filling pressures.  3. Severe pulmonary hypertension with a mean PAP of 60 mmHg.  4. Decreased cardiac output of 3.6 L/min and cardiac index of 1.9.  5. Distal/apical intracavitary LV gradient of 50 mmHg that did not change with apical RV pacing at both 70 and 100 bpm. No mid-cavity gradient seen.  6. No gradient between the LVEDP and PCWP in either the right lower or left lower pulmonary artery branches.  7. No evidence of peripheral arterial  disease.  8. Arterial access site closed with closure device  Lab Values  Labs have been reviewed  BMP    Recent Labs  Lab 06/04/17 0427 06/03/17 2316 06/03/17 2314    140 139   POTASSIUM 4.6 4.2 4.1   CHLORIDE 101  --  99   JAZMINE 9.2  --  9.3   CO2 26  --  28   BUN 33*  --  31*   CR 6.72*  --  6.54*   * 97 96     LFTs    Recent Labs  Lab 06/04/17 0427 06/03/17 2314   ALKPHOS 96 98   AST 30 28   ALT 24 24   BILITOTAL 2.3* 2.4*   PROTTOTAL 7.0 7.2   ALBUMIN 2.9* 3.3*      CBC  Recent Labs  Lab 06/04/17 0427 06/03/17 2314 05/31/17  1420   WBC 5.4  --  4.5  --    RBC 4.49  --  4.56  --    HGB 14.2  < > 14.6  --    HCT 45.3  --  46.1  --    *  --  101*  --    MCH 31.6  --  32.0  --    MCHC 31.3*  --  31.7  --    RDW 18.6*  --  18.6*  --    *  --  116* 114*   < > = values in this interval not displayed.  INR    Recent Labs  Lab 06/03/17 2314   INR 1.28*     TpnInvalid input(s): TPN    Discussed w/ Dr Marjan Blair MD  Cardiology Fellow  June 6, 2017

## 2017-06-06 NOTE — TELEPHONE ENCOUNTER
PA Initiation    Medication: sildenafil 20mg tablet (180 tablets/30 days)  Insurance Company: [employer] - Phone [phone] Fax [phone]  Pharmacy Filling the Rx: [facility] [place], [place] - [address] [place] [address] [id number]  Filling Pharmacy Phone: [phone]  Filling Pharmacy Fax: [phone]  Start Date: 6/6/2017    RHC report and vasodilator challenge were sent with urgent request.

## 2017-06-06 NOTE — PROCEDURES
PRELIMINARY CARDIAC CATH REPORT:     PROCEDURES PERFORMED:   1. Selective left and right coronary angiography.  2.  Right heart catheterization.  3.  Left heart catheterization with LV intracavitary pressure measurements  4.  Temporary transvenous pacing  5.  Femoral angiography.  6.  Arteriotomy closed with a closure device.    PHYSICIANS:  1. Attending Interventional Cardiology Staff: Deshawn Mead MD  2. Cardiology Fellow: Sterling Silva MD     INDICATION:  Jayy Gill is a 46 year old male with history including PAH, LV intracavitary gradient on TTE, and concern for PVOD who presents on an elective outpatient basis for coronary angiography, RHC, and LHC.      DESCRIPTION:  1. Consent obtained with discussion of risks.  All questions were answered.  2. Sterile prep and procedure.  3. Location: right common femoral artery and right common femoral vein.  4. Access: Local anesthetic with lidocaine.  A standard (18 g) needle with ultrasound guidance was used to establish vascular access using a modified Seldinger technique.  5. Sheath: 6Fr long sheath and 7Fr standard sheath  6. Catheters: 6FR AL1, 6FR JL4, 6FR 3DRC, 7F Pigtail  7. Fluoroscopy time of 12 min.  8. Estimated blood loss of <5 mL.  9. See below for procedure details.    MEDICATIONS:  1. Contrast 60 mL IV.  2. Conscious sedation with fentanyl 25 mcg and midazolam 0.5 mg.     Right Heart Catheterization:  BP 79/50/59  HR 57  BSA 1.9    RA 25/27/25   /25  /36/60   PCW 10   Kate CO 3.6   Kate CI 1.9   PA sat 64.1%   Hgb 13.7 g/dL   PVR 9.7 Pina units    CORONARY ANGIOGRAM:   1. The coronary arteries are noted to be large caliber and tortuous.  2. Both coronary arteries arise from their respective cusps.  3. Right dominant.  4. LM is without angiographic evidence of disease.   5. LAD supplies the entire apex (type 3) and gives rise to septal perforators, D1, D2, and D3.  The mLAD has a myocadial bridge followed by a 30% stenosis.  6.LCX gives  rise to OM1, OM2, and OM3 vessels. There is no angiographic evidence of disease.  7. RCA gives rise to PL branches and supplies PDA and PL. The mRCA has a 40% stenosis.      LEFT HEART CATHETERIZATION:  1. LVEDP 11 mmHg.  2. Simultaneous LVEDP and PCWP from both the right lower pulmonary artery and left lower pulmonary artery branches revealed no significant gradient (3 mmHg).  3. LV cavitary gradient assessment utilizing a Navus catheter revealed a distal (apical) LV gradient of approximately 50 mmHg. With RV apical pacing at both 70 bpm and 100 bpm there was no significant change in this gradient. There was no gradient seen in the mid LV.    TEMPORARY PACEMAKER:  A 5Fr  PACEL temporary pacemaker was positioned in the right ventricle at a rate of 100 bpm and tested for adequate capture. This was used for the study described above and was removed at the conclusion of the case.    FEMORAL ANGIOGRAPHY  1. The right external iliac and common femoral arteries showed no evidence of significant peripheral artery disease.  Access site was confirmed in the common femoral artery.    ARTERIOTOMY CLOSURE:  1. A 6Fr Angioseal device was used for arteriotomy closure.    COMPLICATIONS:  1. None    SUMMARY:   1. Nonobstructive coronary artery disease.  2. Increased right-sided and normal left-sided filling pressures.  3. Severe pulmonary hypertension with a mean PAP of 60 mmHg.  4. Decreased cardiac output of 3.6 L/min and cardiac index of 1.9.  5. Distal/apical intracavitary LV gradient of 50 mmHg that did not change with apical RV pacing at both 70 and 100 bpm. No mid-cavity gradient seen.  6. No gradient between the LVEDP and PCWP in either the right lower or left lower pulmonary artery branches.  7. No evidence of peripheral arterial disease.  8. Arterial access site closed with closure device      PLAN:   1. Aspirin 81 mg po daily lifelong.  2. Bedrest per protocol.  3. Return to the primary inpatient team for further  evaluation and management.  4. Continued medical management and lifestyle modification for cardiovascular risk factor optimization.     The attending interventional cardiologist was present for the entire procedure.    Findings discussed with the primary inpatient cardiology attending Dr. Phillip.    See CVIS report for final draft.      Sterling Silva MD  Cardiovascular Disease Fellow  [phone]

## 2017-06-06 NOTE — PROGRESS NOTES
Nephrology Progress Note  06/05/2017         Assessment & Recommendations:     Jayy Gill is a 46 year old year old male  with PMHx significant for ESRD on HD M/W/F via RUE AVF, CHF, moderate to severe TR, severe pulmonary hypertension, HTN, liver cirrhosis, recurrent ascitics requires paracentesis every 3 weeks who presented for further evaluation of fatigue, shortness of breath and altered mental status. Upon admission he was found to have blood pressure of 79/48 mmHg and hypoxia with O2 sat of 83% which improved with supplemental oxygen.     1. ESRD on HD at [facility] via left AVF M/W/F. Patient had HD on 06/02  -we will continue maintenance hemodialysis with UF goal 1-2 liters as tolerated. Next HD on Wednesday if the patient is still in the hospital  -daily BMP     2.Electrolytes:  -Normal  Serum Na  -Normal serum K  -Normal serum Ca     3. Volume status: volume expanded     4. Acid/Base status: no acidosis     5. Hypotension-patient states that his SBP normally runs in low 90s and high 80s, and his diastolic tend to run in 60s and 50s.? D/t worsening PHTN in settings of  medications non-compliance. Coreg discontinued on admission. Midodrine dose increase to 10 mg TID. Patient's BP improved.     6. Acute hypoxic respiratory failure- resolved, weaned off supplemental O2 to RA this am     7. Severe Pulmonary hypertension/CHF/moderate to severe TR-On Sildenafil and Bosentan     8. Liver cirrhosis complicated by refractory ascitics. Suspect due to heart failure. Patient requires paracentesis every 3 weeks, last paracentesis was on 5/31 with removal of 3 liters     9. Acute encephalopathy-likely secondary to hypotension and hypoxemia.  CT head showed no evidence of acute pathology. Resolved     The patient was staffed with      Thank you for allowing us to participate in the care of your patient. We will follow with you. Please do not hesitate to contact us with questions.     Milagros  ROSEMARY Alvarado MD  Nephrology Fellow  [facility]  Department of Medicine  Division of Renal Disease and Hypertension  [phone]    Interval History :   In the last 24 hours Jayy Gill had no acute events  Patient was seen during hemodialysis    Review of Systems:   (4 pt ROS reviewed alone is not adequate unless you detail systems you reviewed)  I reviewed the following systems:  GI: +decreased appetite. no nausea or vomiting or diarrhea.   Neuro:  Confusion improved  Constitutional:  no fever or chils  CV: + dyspnea or edema.  No chest pain.    Physical Exam:   I/O last 3 completed shifts:  In: 540 [P.O.:540]  Out: 1800 [Emesis/NG output:400; Other:1400]   BP (!) 85/53 (BP Location: Left leg)  Pulse 65  Temp 97.7  F (36.5  C) (Oral)  Resp 16  Wt 74.3 kg (163 lb 14.4 oz)  SpO2 91%  BMI 24.2 kg/m2     GENERAL APPEARANCE: NAD  EYES:  No scleral icterus, pupils equal  HENT: mouth without ulcers or lesions  PULM: lungs sounds are diminished to auscultation in the lower posterior fields bilaterally, equal air movement, no clubbing  CV: regular rhythm, normal rate, no rub  -+ankle edema b/l  GI: soft, non tender, distended, +ascities bowel sounds are normoactive  INTEGUMENT: no cyanosis, no rash  NEURO: AAOx3    Labs:   All labs reviewed by me  Electrolytes/Renal -   Recent Labs   Lab Test  06/04/17   0427  06/03/17   2316  06/03/17   2314  05/01/17   1300   03/18/16   0805  03/17/16   0644  03/16/16   0712   NA  141  140  139  139   < >  138  139  138   POTASSIUM  4.6  4.2  4.1  4.3   < >  4.5  4.1  4.2   CHLORIDE  101   --   99  98   < >  100  100  101   CO2  26   --   28  30   < >  27  32  30   BUN  33*   --   31*  21   < >  51*  30  42*   CR  6.72*   --   6.54*  4.53*   < >  6.04*  4.54*  5.70*   GLC  104*  97  96  65*   < >  92  83  82   JAZMINE  9.2   --   9.3  8.5   < >  8.6  8.7  8.8   MAG   --    --   2.3   --    --   2.3  1.7  2.0   PHOS   --    --    --    --    --   2.9  2.3*  3.2    < > = values  in this interval not displayed.       CBC -   Recent Labs   Lab Test  06/04/17 0427 06/03/17   2316  06/03/17   2314  05/31/17   1420  04/25/17   1051   WBC  5.4   --   4.5   --   4.1   HGB  14.2  16.7  14.6   --   13.2*   PLT  129*   --   116*  114*  168       LFTs -   Recent Labs   Lab Test  06/04/17 0427 06/03/17 2314 04/25/17   1051   ALKPHOS  96  98  102   BILITOTAL  2.3*  2.4*  1.7*   ALT  24  24  18   AST  30  28  22   PROTTOTAL  7.0  7.2  7.2   ALBUMIN  2.9*  3.3*  2.8*       Iron Panel -   Recent Labs   Lab Test  06/04/17 0427 02/19/16   2310  09/12/13   1455   IRON  71  56  57   IRONSAT  30  18  20   MARANDA   --   369   --          Imaging:  All imaging studies reviewed by me.     Current Medications:    aspirin EC  325 mg Oral Once     midodrine  15 mg Oral TID w/meals     lidocaine viscous 2% 15 mL and maalox/mylanta w/ simethicone 15 mL (GI COCKTAIL)  30 mL Oral Once     aspirin EC  81 mg Oral Daily     bosentan  125 mg Oral BID     calcium acetate  1,334 mg Oral TID w/meals     loratadine  10 mg Oral Daily     omeprazole  40 mg Oral QAM AC     sildenafil  40 mg Oral TID     torsemide  60 mg Oral BID     sodium chloride (PF)  3 mL Intracatheter Q8H       - MEDICATION INSTRUCTIONS -       Mliagros Alvarado MD

## 2017-06-06 NOTE — PLAN OF CARE
Problem: Individualization  Goal: Patient Preferences  Outcome: Declining  Transfer to ICU     Pt had right and left heart cath and was sent to dialysis for ultrafiltration. Dialysis notified writer pt's BP running in the 60's systolic with heart rates in the 50's along with runs of Afib rates in the 100's. Pt was sent back to unit-no filtration done. Pt arrived onto the unit via bed, sleepy but easily arousable. Pt;s BP 70's/40's, heart rate in the 50's, with noted bursts of afib rates in the 100's. MD notified by dialysis and writer. Cardiology team 2 rounded and determined pt to be started on dopamine 2 mcq /kg/min and be sent to ICU for dialysis. Cachorro STONER given report on [employer]Ed[employer], pt's girlfriend called and updated.  notified of pt's desire for POA to be completed.  Pt transferred at 1700 via bed escorted by float RN and floor RN circulator with all pt's belongings.

## 2017-06-06 NOTE — PLAN OF CARE
Problem: Goal Outcome Summary  Goal: Goal Outcome Summary  Outcome: No Change  Neuro: A&Ox4.   Cardiac: VSS, NSR, BP soft, refused VS at 0400.   Respiratory: 2L per nasal cannula   GI/: Voiding spontaneously. No BM this shift.   Diet/appetite: Tolerating NPO diet.  1 emesis around 0330, MD aware, benadryl ordered but pt refused.   Activity: Up ad coral    Pain: . Denies   Skin: Intact, no new deficits noted.  Lines: PIV saline locked. Fistula in L arm      Trop to be drawn this AM with morning labs.   Pt has been resting comfortably throughout night, will continue to monitor and follow plan of care.

## 2017-06-06 NOTE — PROGRESS NOTES
[facility]  CARDIOLOGY DAILY PROGRESS NOTE    Interval History:  Complained of some chest pressure. Both revatio and torsemide held, did not receive night dose due to MAPs in the 59. There was one number in the 49. Trop 0.18-0.5.    Jayy Gill is a 46 year old male    Assessment/Plan  ASSESSMENT:    This is a 47 YO M with     - Chronic severe type I pulmonary arterial hypertension with moderate to severe tricuspid insufficiency and mild RV dysfunction.  - Known biopsy proven pulmonary veno occlusive disease  - Possible sarcoidosis seen on PET scan  - Possible myocarditis seen on PET scan, cannot obtain MRI  - Chronic hypotension  - ESRD on dialysis  - Liver cirrhosis with repeated paracentesis  - Chronic thrombocytopenia  - Medication non compliance  - Chronically elevated PCT without subjective signs of infection  - GERD  - Chronic pruiritus     PLAN:  - Continue pulmonary hypertension therapy with revatio and bosentan, revatio doses held overnight. Resume this AM  - Coronary angiogram showing non obstructive coronaries.  - Elevated right sided filling pressures and normal left sided pressures. RA 25 mm Hg. Will plan for UF with nephrology today with goal of 2 L removal.  - No PWCP and LVEDP gradient. Had apical LV gradient of 50 mm Hg and does not have any mid cavitary gradient. RV pacing at 70 and 100 did not show any difference in gradient.   - Continue home dose of diuresis. Resumed torsemide 60 mg TID this AM. Weights stable.  - To avoid RV pressure exceeding systemic pressure and possible R to L shunt, need to keep MAP above Right sided pressures. Increased midodrine to 15 mg three times a day  - Not on coreg as he needs to maintain his MAPs for the reason mentioned above and there is no clear indication for BB currently (has cardiomyopathy but well preserved EF)  - Dialysis as planned M W and F.  - Paracentesis once per three weeks as outpatient.   - Blood cultures remain  negative, no need to abx currently. Does not have subjective symptoms or objective evidence for an infection.  - FEN: cardiac/dialysis diet with 2-gram sodium limit  - Prophylaxis: home PPI, SCD  - Lines: PIV x 2    DISPO  - SW consult to help with obtaining bosentan.       Plan discussed with Dr Phillip        Objective  Temp:  [96.9  F (36.1  C)-98.3  F (36.8  C)] 97.7  F (36.5  C)  Pulse:  [65] 65  Heart Rate:  [56-67] 64  Resp:  [16] 16  BP: ()/(30-64) 92/57  SpO2:  [90 %-98 %] 93 %    Intake/Output Summary (Last 24 hours) at 06/05/17 0653  Last data filed at 06/05/17 0000    Wt Readings from Last 5 Encounters:   06/05/17 74.3 kg (163 lb 14.4 oz)   05/31/17 71.3 kg (157 lb 3 oz)   05/30/17 75.1 kg (165 lb 8 oz)   05/04/17 70.5 kg (155 lb 8 oz)   04/28/17 71.2 kg (157 lb)     HEENT: AT/NC  Neck: Prominent JV pulsations below the right ear lobe.  Resp: mild crackles at the bases.  Cardiac: regular, with 2/6 systolic murmur best heard along left sternal border with wide s2 split  Abdomen: mildly distended, midly tender in epigastrium without guarding or rebound; hepatomegaly  Extremities: 3+ edema bilaterally to the knee  Skin: Warm and dry    Current Medications    Cardiac: asa 81, bosentan 125 mg bid, midodrine 10 mg tid, sildenafil 40 mg tid, torsemide 60 mg bid (held yesterday).   Non cardiac: loratidine, hydroxyzine PRN, calcium acetate TID.       Coronary angiogram and LHC/ RHC numbers:     1. Nonobstructive coronary artery disease.  2. Increased right-sided and normal left-sided filling pressures.  3. Severe pulmonary hypertension with a mean PAP of 60 mmHg.  4. Decreased cardiac output of 3.6 L/min and cardiac index of 1.9.  5. Distal/apical intracavitary LV gradient of 50 mmHg that did not change with apical RV pacing at both 70 and 100 bpm. No mid-cavity gradient seen.  6. No gradient between the LVEDP and PCWP in either the right lower or left lower pulmonary artery branches.  7. No evidence of  peripheral arterial disease.  8. Arterial access site closed with closure device  Lab Values  Labs have been reviewed  BMP    Recent Labs  Lab 06/04/17 0427 06/03/17 2316 06/03/17 2314    140 139   POTASSIUM 4.6 4.2 4.1   CHLORIDE 101  --  99   JAZMINE 9.2  --  9.3   CO2 26  --  28   BUN 33*  --  31*   CR 6.72*  --  6.54*   * 97 96     LFTs    Recent Labs  Lab 06/04/17 0427 06/03/17 2314   ALKPHOS 96 98   AST 30 28   ALT 24 24   BILITOTAL 2.3* 2.4*   PROTTOTAL 7.0 7.2   ALBUMIN 2.9* 3.3*      CBC  Recent Labs  Lab 06/04/17 0427 06/03/17 2314 05/31/17  1420   WBC 5.4  --  4.5  --    RBC 4.49  --  4.56  --    HGB 14.2  < > 14.6  --    HCT 45.3  --  46.1  --    *  --  101*  --    MCH 31.6  --  32.0  --    MCHC 31.3*  --  31.7  --    RDW 18.6*  --  18.6*  --    *  --  116* 114*   < > = values in this interval not displayed.  INR    Recent Labs  Lab 06/03/17 2314   INR 1.28*     TpnInvalid input(s): TPN    Discussed w/ Dr Marjan Blair MD  Cardiology Fellow  June 6, 2017

## 2017-06-06 NOTE — PLAN OF CARE
Problem: Fluid Volume Excess (Adult,Obstetrics,Pediatric)  Goal: Identify Related Risk Factors and Signs and Symptoms  Related risk factors and signs and symptoms are identified upon initiation of Human Response Clinical Practice Guideline (CPG)   Outcome: No Change  D/I: Returned from CV lab at 1145. Monitor applied and shows SB/SR 50-60s. Right groin with tegaderm dressing D/I. Feet cool, PP +1. Denies pain, numbness or tingling in leg or foot. On bedrest until 1245. Patient turned to left side at 1215 and refused to lie on back. Groin remained clean and dry. Very sleepy and lethargic on return, no change from prior. Cooperative with requests. Refuses lunch at present time. See flowsheets for assessments and additional data.  A: Stable post heart cath.   P: Continue to monitor. Notify providers with questions or concerns.     06/06/17 1323   Fluid Volume Excess   Fluid Volume Excess: Related Risk Factors cardiac changes;disease process;organ failure   Signs and Symptoms (Fluid Volume Excess) ascites;fatigue;oliguria         Patient went to dialysis for short UF run. Report given to Jacqueline STONER.

## 2017-06-06 NOTE — PLAN OF CARE
"Problem: Individualization  Goal: Patient Preferences  Outcome: No Change  Pt very irritable and angry at beginning of shift. Pt requesting to have IV's removed and \"just go home\". Pt had been scheduled to have right and left heart cath today after dialysis but pt refused because he was hungry and wanted to eat. Pt's BP very soft upon arrival from dialysis systolic 70. Pt c/o nausea and vomiting before getting his tray. MD's called to evaluate. MD's talked with pt . Pt agreed to stay and have heart cath tomorrow. Midrone  ordered and given to increase BP. BP increased to 80's after initial 5 mg po pt received 15 mg at 2000. Pt had refused to wear telemetry monitor. MD's called to see pt again. Pt c/o chest pain and nausea. Pt received EKG and troponin check. GI cocktail also ordered and given. Pt  Agreed to put telemetry back on . Troponin level came back elevated at 0.539 increased from previous .189. MD's notified. Additional troponin ordered. Pt's bp continues to run 70's/40's . MD called and notified, Bosentan and sildenafil not given . POC pt to have right and left heart cath tomorrow, continue to monitor VS closely, monitor troponins      "

## 2017-06-07 NOTE — PROGRESS NOTES
Nephrology Progress Note  06/07/2017         Assessment & Recommendations:     Jayy Gill is a 46 year old year old male  with PMHx significant for ESRD on HD M/W/F via RUE AVF, CHF, moderate to severe TR, severe pulmonary hypertension, HTN, liver cirrhosis, recurrent ascitess requires paracentesis every 3 weeks who presented for further evaluation of fatigue, shortness of breath and altered mental status.    1. ESRD on HD at [facility] via left AVF M/W/F.   - UF attempted last night but patient developed a cramp and asked for it to be stopped after 1.2 L UF  - will plan HD today  - patient does not want dopamine drip because it makes him feel lousy which may make it difficult to do dialysis but will try     2.Electrolytes:  -Normal  Serum Na  -Normal serum K  -Normal serum Ca     3. Volume status: volume expanded     4. Acid/Base status: no acidosis     Thank you for allowing us to participate in the care of your patient. We will follow with you. Please do not hesitate to contact us with questions.     Rodger Lo MD  Nephrology Staff  [phone]    Interval History :   Transferred to ICU last evening and started on dopamine drip. UF attempted and patient developed cramps and asked that it be stopped after 1.2 liters off. Willing to try again today. Also stopped dopamine drip because it made him feel lousy. C/o abdominal pain - diffuse.     Review of Systems:   I reviewed the following systems:  GI: diffuse abd discomfort, +decreased appetite. no nausea or vomiting or diarrhea.   Neuro:  No confusion   Constitutional:  no fever or chils  CV: + dyspnea and edema.  No chest pain.    Physical Exam:   I/O last 3 completed shifts:  In: 236.41 [P.O.:120; I.V.:66.41; IV Piggyback:50]  Out: 1200 [Other:1200]   BP 90/56 (BP Location: Right arm)  Pulse 115  Temp 98  F (36.7  C) (Oral)  Resp 16  Wt 74.3 kg (163 lb 14.4 oz)  SpO2 92%  BMI 24.2 kg/m2     GENERAL APPEARANCE: NAD  EYES:  No scleral  icterus, pupils equal  HENT: mouth without ulcers or lesions, dilated veins on forehead  PULM: lungs sounds are diminished to auscultation in the lower posterior fields bilaterally, equal air movement, no clubbing  CV: regular rhythm, normal rate, no rub, +JVD  -+ankle edema b/l  GI: soft, non tender, distended, +ascities bowel sounds are normoactive  INTEGUMENT: no cyanosis, no rash  NEURO: AAOx3    Labs:   All labs reviewed by me  Electrolytes/Renal -   Recent Labs   Lab Test  06/06/17 2240 06/04/17 0427 06/03/17 2316 06/03/17   2314   03/18/16   0805  03/17/16   0644  03/16/16   0712   NA  135  141  140  139   < >  138  139  138   POTASSIUM  4.6  4.6  4.2  4.1   < >  4.5  4.1  4.2   CHLORIDE  97  101   --   99   < >  100  100  101   CO2  25  26   --   28   < >  27  32  30   BUN  46*  33*   --   31*   < >  51*  30  42*   CR  7.57*  6.72*   --   6.54*   < >  6.04*  4.54*  5.70*   GLC  127*  104*  97  96   < >  92  83  82   JAZMINE  9.5  9.2   --   9.3   < >  8.6  8.7  8.8   MAG   --    --    --   2.3   --   2.3  1.7  2.0   PHOS   --    --    --    --    --   2.9  2.3*  3.2    < > = values in this interval not displayed.       CBC -   Recent Labs   Lab Test  06/06/17 2240 06/04/17 0427 06/03/17 2316 06/03/17   2314   WBC  7.1  5.4   --   4.5   HGB  14.7  14.2  16.7  14.6   PLT  166  129*   --   116*       LFTs -   Recent Labs   Lab Test  06/06/17 2240 06/04/17 0427 06/03/17   2314   ALKPHOS  88  96  98   BILITOTAL  2.4*  2.3*  2.4*   ALT  23  24  24   AST  45  30  28   PROTTOTAL  6.6*  7.0  7.2   ALBUMIN  2.7*  2.9*  3.3*       Iron Panel -   Recent Labs   Lab Test  06/04/17   0427  02/19/16   2310  09/12/13   1455   IRON  71  56  57   IRONSAT  30  18  20   MARANDA   --   369   --          Imaging:  All imaging studies reviewed by me.     Current Medications:    midodrine  15 mg Oral TID w/meals     aspirin EC  81 mg Oral Daily     bosentan  125 mg Oral BID     calcium acetate  1,334 mg Oral TID  w/meals     loratadine  10 mg Oral Daily     omeprazole  40 mg Oral QAM AC     sildenafil  40 mg Oral TID     torsemide  60 mg Oral BID     sodium chloride (PF)  3 mL Intracatheter Q8H       DOPamine Stopped (06/07/17 1000)     - MEDICATION INSTRUCTIONS -       Rodger Lo MD

## 2017-06-07 NOTE — PROGRESS NOTES
Nephrology Progress Note  06/06/2017         Assessment & Recommendations:     Jayy Gill is a 46 year old year old male  with PMHx significant for ESRD on HD M/W/F via RUE AVF, CHF, moderate to severe TR, severe pulmonary hypertension, HTN, liver cirrhosis, recurrent ascitics requires paracentesis every 3 weeks who presented for further evaluation of fatigue, shortness of breath and altered mental status.    1. ESRD on HD at [facility] via left AVF M/W/F.     -had 3 hrs of HD on 06/05, 1.4 kg removed w/o complications.   -Unable to do UF this afternoon due to SBP.  Started on Dopamine drip by cardiology. We will attempt UF with 2 liters of volume removal this evening if patient's BP allows.   -We will continue maintenance hemodialysis with UF goal 1-2 liters as tolerated. Next HD on Wednesday   -daily BMP     2.Electrolytes:  -Normal  Serum Na  -Normal serum K  -Normal serum Ca     3. Volume status: volume expanded     4. Acid/Base status: no acidosis     5. Persistent Hypotension-patient states that his SBP normally runs in low 90s and high 80s, and his diastolic tend to run in 60s and 50s.? D/t worsening PHTN in settings of  medications non-compliance. On Midodrine 10 mg TID. Started on Dopamine drip    6. Acute hypoxic respiratory failure- resolved     7. Severe Pulmonary hypertension/CHF/moderate to severe TR-On Sildenafil and Bosentan     8. Liver cirrhosis complicated by refractory ascitics. Suspect due to heart failure. Patient requires paracentesis every 3 weeks, last paracentesis was on 5/31 with removal of 3 liters     9. Acute encephalopathy-likely secondary to hypotension and hypoxemia.  CT head showed no evidence of acute pathology. Resolved     The patient was staffed with      Thank you for allowing us to participate in the care of your patient. We will follow with you. Please do not hesitate to contact us with questions.     Milagros Alvarado MD  Nephrology  Fellow  [facility]  Department of Medicine  Division of Renal Disease and Hypertension  [phone]    Interval History :   In the last 24 hours Jayy Gill had no acute events. Underwent cardiac cath this am , found to have Increased right-sided, normal left-sided filling pressures, severe pulmonary hypertension with a mean PAP of 60 mmHg and decreased cardiac output of 3.6 L/min and cardiac index of 1.9.     Review of Systems:   (4 pt ROS reviewed alone is not adequate unless you detail systems you reviewed)  I reviewed the following systems:  GI: +decreased appetite. no nausea or vomiting or diarrhea.   Neuro:  Confusion resolved  Constitutional:  no fever or chils  CV: + dyspnea and edema.  No chest pain.    Physical Exam:   I/O last 3 completed shifts:  In: 170 [P.O.:120; IV Piggyback:50]  Out: -    BP 93/62  Pulse 115  Temp 97.6  F (36.4  C) (Oral)  Resp 20  Wt 74.3 kg (163 lb 14.4 oz)  SpO2 98%  BMI 24.2 kg/m2     GENERAL APPEARANCE: NAD  EYES:  No scleral icterus, pupils equal  HENT: mouth without ulcers or lesions, dilated veins on forehead  PULM: lungs sounds are diminished to auscultation in the lower posterior fields bilaterally, equal air movement, no clubbing  CV: regular rhythm, normal rate, no rub, +JVD  -+ankle edema b/l  GI: soft, non tender, distended, +ascities bowel sounds are normoactive  INTEGUMENT: no cyanosis, no rash  NEURO: AAOx3    Labs:   All labs reviewed by me  Electrolytes/Renal -   Recent Labs   Lab Test  06/04/17   0427  06/03/17   2316  06/03/17   2314  05/01/17   1300   03/18/16   0805  03/17/16   0644  03/16/16   0712   NA  141  140  139  139   < >  138  139  138   POTASSIUM  4.6  4.2  4.1  4.3   < >  4.5  4.1  4.2   CHLORIDE  101   --   99  98   < >  100  100  101   CO2  26   --   28  30   < >  27  32  30   BUN  33*   --   31*  21   < >  51*  30  42*   CR  6.72*   --   6.54*  4.53*   < >  6.04*  4.54*  5.70*   GLC  104*  97  96  65*   < >  92  83  82   JAZMINE  9.2    --   9.3  8.5   < >  8.6  8.7  8.8   MAG   --    --   2.3   --    --   2.3  1.7  2.0   PHOS   --    --    --    --    --   2.9  2.3*  3.2    < > = values in this interval not displayed.       CBC -   Recent Labs   Lab Test  06/04/17 0427 06/03/17 2316 06/03/17 2314 05/31/17   1420  04/25/17   1051   WBC  5.4   --   4.5   --   4.1   HGB  14.2  16.7  14.6   --   13.2*   PLT  129*   --   116*  114*  168       LFTs -   Recent Labs   Lab Test  06/04/17 0427 06/03/17 2314 04/25/17   1051   ALKPHOS  96  98  102   BILITOTAL  2.3*  2.4*  1.7*   ALT  24  24  18   AST  30  28  22   PROTTOTAL  7.0  7.2  7.2   ALBUMIN  2.9*  3.3*  2.8*       Iron Panel -   Recent Labs   Lab Test  06/04/17 0427 02/19/16   2310  09/12/13   1455   IRON  71  56  57   IRONSAT  30  18  20   MARANDA   --   369   --          Imaging:  All imaging studies reviewed by me.     Current Medications:    sodium chloride 0.9%  1,000-2,000 mL Hemodialysis Machine Once     sodium chloride 0.9%  250 mL Intravenous Once in dialysis     sodium chloride 0.9%  1,000-2,000 mL Hemodialysis Machine Once     gelatin absorbable  1 each Topical During Hemodialysis (from stock)     - MEDICATION INSTRUCTIONS -   Does not apply Once     [START ON 6/7/2017] sodium chloride 0.9%  250 mL Intravenous Once in dialysis     sodium chloride 0.9%  1,000-2,000 mL Hemodialysis Machine Once     gelatin absorbable  1 each Topical During Hemodialysis (from stock)     [START ON 6/7/2017] - MEDICATION INSTRUCTIONS -   Does not apply Once     midodrine  15 mg Oral TID w/meals     aspirin EC  81 mg Oral Daily     bosentan  125 mg Oral BID     calcium acetate  1,334 mg Oral TID w/meals     loratadine  10 mg Oral Daily     omeprazole  40 mg Oral QAM AC     sildenafil  40 mg Oral TID     torsemide  60 mg Oral BID     sodium chloride (PF)  3 mL Intracatheter Q8H       DOPamine 2 mcg/kg/min (06/06/17 1900)     - MEDICATION INSTRUCTIONS -       Milagros Alvarado MD

## 2017-06-07 NOTE — PROGRESS NOTES
HEMODIALYSIS TREATMENT NOTE    Date: 6/7/2017  Time: 2:39 PM    Data:   Pre Wt:  kg 74.3   Desired Wt: kg 72.3   Post Wt: kg     Approximate (Net) Weight Removed: kg 1 Kg   Vascular Access Status: Access/lines visible and secure, Prescribed BFR achieved   Dialyzer Rinse: Clear   Total Blood Volume Processed:     Total Dialysis (Treatment) Time: 2    Interventions:  2 hours of HD with 1000 mls pulled. BPs/MAPs were too low to pull more. MAP< 50 at times. Last BP 75/51 with MAP 51    Assessment:  ESRD patient with run cut short yesterday requiring 2 hours of HD today. Refused IV medication to keep BP up.      Plan:    Per renal

## 2017-06-07 NOTE — PROGRESS NOTES
[facility]  CARDIOLOGY DAILY PROGRESS NOTE    Interval History:  Clean cors and elevated RA pressure, was started on dopamine for RV support to maintain MAPs > 60 and to help dialyze. Had UF but got cramps after 40 min and had to stop this. 1.2 Kg fluid pulled.    Jayy Gill is a 46 year old male    Assessment/Plan  ASSESSMENT:    This is a 45 YO M with     - Chronic severe type I pulmonary arterial hypertension with moderate to severe tricuspid insufficiency and mild RV dysfunction.  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 He does not like the drip and would like this to be stopped however his MAPs are too low for Dialysis. May try UF today. Patient insists to be not on dopamine and wants to leave. Will consult psych to make sure patient has good understanding of the disease process.  - Continue pulmonary hypertension therapy with revatio and bosentan, revatio doses held overnight. Resume this AM  - Coronary angiogram showing non obstructive coronaries.  - Elevated right sided filling pressures and normal left sided pressures. RA 25 mm Hg. UF ended early with net 1.2 Kg fluid removal.  - No PWCP and LVEDP gradient. Had apical LV gradient of 50 mm Hg and does not have any mid cavitary gradient. RV pacing at 70 and 100 did not show any difference in gradient.   - Continue home dose of diuresis. Resumed torsemide 60 mg TID this AM (held yesterday nakul dose). Weights stable.  - To avoid RV pressure exceeding systemic pressure and possible R to L shunt, need to keep MAP  above Right sided pressures. On increased dose of midodrine 15 mg three times a day  - Not on coreg as he needs to maintain his MAPs for the reason mentioned above and there is no clear indication for BB currently (has cardiomyopathy but well preserved EF)  - Dialysis as planned M W and F, may need the run on dopamine this AM  - Paracentesis once per three weeks as outpatient.   - Blood cultures remain negative, no need to abx currently. Does not have subjective symptoms or objective evidence for an infection.  - FEN: cardiac/dialysis diet with 2-gram sodium limit  - Prophylaxis: home PPI, SCD  - Lines: PIV x 2    DISPO  - SW consult to help with obtaining bosentan.       Plan discussed with Dr Phillip        Objective  Temp:  [96.1  F (35.6  C)-98.2  F (36.8  C)] 97.9  F (36.6  C)  Pulse:  [115] 115  Heart Rate:  [56-97] 87  Resp:  [15-20] 18  BP: ()/(35-80) 107/74  Cuff Mean (mmHg):  [71-86] 86  SpO2:  [86 %-100 %] 92 %    Intake/Output Summary (Last 24 hours) at 06/05/17 0653  Last data filed at 06/05/17 0000    Wt Readings from Last 5 Encounters:   06/05/17 74.3 kg (163 lb 14.4 oz)   05/31/17 71.3 kg (157 lb 3 oz)   05/30/17 75.1 kg (165 lb 8 oz)   05/04/17 70.5 kg (155 lb 8 oz)   04/28/17 71.2 kg (157 lb)     HEENT: AT/NC  Neck: Prominent JV pulsations below the right ear lobe.  Resp: mild crackles at the bases.  Cardiac: regular, with 2/6 systolic murmur best heard along left sternal border with wide s2 split  Abdomen: mildly distended, midly tender in epigastrium without guarding or rebound; hepatomegaly  Extremities: 3+ edema bilaterally to the knee  Skin: Warm and dry    Current Medications    Cardiac: asa 81, bosentan 125 mg bid, midodrine 10 mg tid, sildenafil 40 mg tid, torsemide 60 mg bid (held yesterday).   Non cardiac: loratidine, hydroxyzine PRN, calcium acetate TID.       Coronary angiogram and [facility]/ RHC numbers:     1. Nonobstructive coronary artery disease.  2. Increased right-sided and normal  left-sided filling pressures.  3. Severe pulmonary hypertension with a mean PAP of 60 mmHg.  4. Decreased cardiac output of 3.6 L/min and cardiac index of 1.9.  5. Distal/apical intracavitary LV gradient of 50 mmHg that did not change with apical RV pacing at both 70 and 100 bpm. No mid-cavity gradient seen.  6. No gradient between the LVEDP and PCWP in either the right lower or left lower pulmonary artery branches.  7. No evidence of peripheral arterial disease.  8. Arterial access site closed with closure device  Lab Values  Labs have been reviewed  BMP    Recent Labs  Lab 06/06/17 2240 06/04/17 0427 06/03/17 2316 06/03/17 2314    141 140 139   POTASSIUM 4.6 4.6 4.2 4.1   CHLORIDE 97 101  --  99   JAZMINE 9.5 9.2  --  9.3   CO2 25 26  --  28   BUN 46* 33*  --  31*   CR 7.57* 6.72*  --  6.54*   * 104* 97 96     LFTs    Recent Labs  Lab 06/06/17 2240 06/04/17 0427 06/03/17 2314   ALKPHOS 88 96 98   AST 45 30 28   ALT 23 24 24   BILITOTAL 2.4* 2.3* 2.4*   PROTTOTAL 6.6* 7.0 7.2   ALBUMIN 2.7* 2.9* 3.3*      CBC  Recent Labs  Lab 06/06/17 2240 06/04/17 0427 06/03/17 2314 05/31/17  1420   WBC 7.1 5.4  --  4.5  --    RBC 4.64 4.49  --  4.56  --    HGB 14.7 14.2  < > 14.6  --    HCT 45.1 45.3  --  46.1  --    MCV 97 101*  --  101*  --    MCH 31.7 31.6  --  32.0  --    MCHC 32.6 31.3*  --  31.7  --    RDW 18.2* 18.6*  --  18.6*  --     129*  --  116* 114*   < > = values in this interval not displayed.  INR    Recent Labs  Lab 06/03/17 2314   INR 1.28*     TpnInvalid input(s): TPN    Discussed w/ Dr Marjan Blair MD  Cardiology Fellow  June 7, 2017

## 2017-06-07 NOTE — CONSULTS
Social Work Services Progress Note    Hospital Day: 5  Date of Initial Social Work Evaluation:  Not yet completed  Collaborated with:  Team rounds, chart review    Data:    Pt transferred to the ICU overnight for hypotension following dialysis and need for Dopamine.  Per team rounds and chart review, pt has had bottles of outside medication in his bed with him and has refused to give it to Nursing to lock up.  It is unclear what medications are in the bottles, but he has used the medication during this admission.  Also noted per chart review that pt has refused nursing care and other recommendations.    SW consult received d/t pt's request to complete a HCD and also discuss it with the mother of his child Zayda ([phone]).    Intervention:    Attempted to meet with pt today to address HCD request and complete psychosocial assessment, but pt was occupied with several other providers during attempt.    Assessment:  Unable to assess today.    Plan:    Anticipated Disposition:  TBD pending medical course.    Barriers to d/c plan:  Pt is not yet medically stable.    Follow Up:  SW will continue to follow to complete SW assessment and to make arrangements for a safe d/c plan.    HUE Rain, [facility]  Adult ICU Clinical   Pager [phone]

## 2017-06-07 NOTE — PROGRESS NOTES
Patient continued to be noncompliant with a few medications during shift. At 1000, he refused the dopamine drip after the nurse and MDs told him why he needed it. He stated that it made him nauseous and refused Zofran as well. Dopamine was shut off at 1000. Continues to be alert and oriented but flat and uncooperative most of the shift. Oliguric, 3 BMs that were dark. MDs notified but no new orders at this time. Received HD today and took off 1 L. MAPs remained 48-51 during dialysis. MDs were notified and added 15 mg midodrine but could not start dopamine per patient refusal. Patient continues to refuse BP cuff readings, O2 sats, and lab sticks. SpO2 was spot checked throughout the day and was approximately 88%-94% on room air. Patient in NSR, HR low 60s-80s, BP 70-80s/40-50s, MAP 48-60, RR teens. Psychiatry was consulted and spoke with the patient; patient seems a little more cooperative after speaking with psychiatrist. Will continue to monitor patient closely and update MDs as needed.

## 2017-06-07 NOTE — PROGRESS NOTES
HEMODIALYSIS TREATMENT NOTE    Date: 6/6/2017  Time: 11:29 PM    Data:   Pre Wt:  kg 74.3   Desired Wt: kg 73.3   Approximate (Net) Weight Removed: kg 1.2   Vascular Access Status: Access/lines visible and secure, Prescribed BFR achieved   Dialyzer Rinse: Streaked, Light   Total Blood Volume Processed:  0, UF only   Total Dialysis (Treatment) Time: 1 hr 22 mins    Assessment/Interventions:  AVF cannulated with 16 ga and no concerns. Used Lido but patient reports he will not be able to have dialysis again without EMLA cream. MAP was maintained above given parameters of 50. With 40 minutes of treatment remaining patient reported cramping in his leg. Writer turned off UF, patient refused this stating it was not going to help that the only thing that helps in walking around. Writer explained to patient that due to BP's it was unsafe at the time and he would have to wait until his treatment was over. Again writer offered to adjust UF goal, patient declined this saying it wouldn't work and that we would just need to end the treatment. Treatment ended with 38 minutes remaining. ICU RN Donald updated. [place] of lab tubes drawn for ICU team.      Plan:    Orders per Nephrology.

## 2017-06-07 NOTE — PLAN OF CARE
Assessment: Heart failure, ESRD, poor coping mechanisms.   Neuro: A&O x4, flat affect, uncooperative with basic nursing cares (i.e. assessments, vitals, ADLs etc). Denies pain, numbness, or tingling. PERRLA, sclera jaundiced, MENDIOLA, able to make needs known.   Cardiac: SR, 12 lead EKG ordered for ECG changes. 12 lead shows inverted T waves, C2 MD aware. Patient remains asymptomatic at this time. HR 80s. MAPs varied between 50s-70s, better readings with smaller BP cuff. Pulses: +2 b/l radial, +1 b/l pedal.  Respiratory: LS coarse, SpO2 88% on RA, >95% on 5LPM. Pt refused supplemental oxygen overnight, refused SpO2 monitoring while asleep.   GI: +BS, had a BM this shift. Very poor appetite. Abd distended, soft.   : Oliguric. HD run terminated early per pt request, only 1.2 kg removed (goal 2 kg).   Skin: CRT > 3 secs, extremities cool. No other skin concerns, patient able to transfer independently.  POC: Dobutamine increased to 3 mcg to keep MAPs >60.

## 2017-06-08 NOTE — CONSULTS
Social Work: Assessment with Discharge Plan    Patient Name:  Jayy Gill  :  1970  Age:  46 year old  MRN:  [id number]  Risk/Complexity Score:  Filed Complexity Screen Score: 9  Completed assessment with:  Patient, team rounds, chart review    Presenting Information   Reason for Referral:  HCD request  Date of Intake:  2017  Referral Source:  Nurse  Decision Maker:  self  Alternate Decision Maker:  Pt will be designating his HORACE [place] ([phone])  Health Care Directive:  Provided Copy and it is ready to sign.  Called Notary who will come up to unit.  Living Situation:  House with HORACE Priest and children (ages 5 and 16)  Previous Functional Status:  Independent  Patient and family understanding of hospitalization:  restorative  Cultural/Language/Spiritual Considerations:  undesignated  Adjustment to Illness:  Difficult to assess as pt was not very talkative, though did answer questions in a straight-forward fashion.    Physical Health  Reason for Admission:    1. Cardiogenic shock (H)    2. CHF (congestive heart failure), NYHA class I, acute, systolic (H)    3. Disorder of liver    4. Dyspnea, unspecified type    5. ESRD (end stage renal disease) on dialysis    6. Cardiomyopathy (H)    7. Pulmonary hypertension    8. Acute pulmonary edema (H)    9. Altered mental status, unspecified altered mental status type      Services Needed/Recommended:  TBD pending medical course, though likely to home.    Mental Health/Chemical Dependency  Diagnosis:  Unclear.  No hx by H&P but Nursing has been concerned re: pt's outside medication that he insists on keeping by his bed rather than locking up.  Support/Services in Place:  none  Services Needed/Recommended:  none    Support System  Significant relationship at present time:  HORACE Zayda Art who is involved and supportive.  Family of origin is available for support:  Pt denies.  Other support available:  Pt denies any other supports.  Gaps in  support system:  Pt has limited support.  Patient is caregiver to:  Child:  5 and 16 y.o. children live in the home.     Provider Information   Primary Care Physician:  Milly Healy   [phone]   Clinic:  [address] 7[address] / [facility] 5*      :  unknown    Financial   Income Source:  SSI  Financial Concerns:  None mentioned  Insurance:    Payor/Plan Subscriber Name Rel Member # Group #   [employer] BRANDIN AWAN  [id number]       [id number]       Discharge Plan   Patient and family discharge goal:  Pt continues to request to be discharged to home.  Barriers to discharge:  Pt is not medically stable    Additional comments   SW met with pt to introduce SW role and discuss HCD.  HCD is ready for completion and await Notary who has been called.  Pt denies having any SW needs, questions, or concerns at this time.  SW continues to follow for support to pt and family, resource referral, and d/c planning.    HUE Rain, [facility]  Adult ICU Clinical   Pager [phone]

## 2017-06-08 NOTE — PLAN OF CARE
Problem: Goal Outcome Summary  Goal: Goal Outcome Summary  Outcome: No Change  VSS, afebrile. Pt dialyzed this shift to pull fluid off. Pt non-complaint with some meds and cares this shift. He is going to be discharged home tonight. Paperwork is already filled out and signed. Waiting for a ride for the pt. Will continue to monitor and update as needed.

## 2017-06-08 NOTE — DISCHARGE SUMMARY
Discharge Summary  Jayy Gill MRN# [id number]   YOB: 1970 Age: 46 year old     Date of Admission:  6/3/2017  Date of Discharge:  6/8/2017  Admitting Physician:  Lindsey Kamara MD  Discharge Physician:  Brittney Phillip MD       (Contact: Roly Isabeldutch, Cardiology Fellow, [phone])  Discharging Service:  Cardiology       Primary Provider: Dr Healy     Discharge Diagnosis:  Jayy Gill is a 46 year old year old male w/ h/o       Chronic severe type I pulmonary arterial hypertension with moderate to severe tricuspid insufficiency and mild RV dysfunction, unlikely pulmonary veno occlusive disease    Cardiogenic shock with Cardiac index of 1.9 secondary to RV faliure    Acute exacerbation of chronic RV failure secondary to pulmonary hypertension    Possible sarcoidosis seen on PET scan    Possible myocarditis seen on PET scan, cannot obtain MRI    Acute hypotensive levels on chronic hypotension    ESRD on dialysis    Liver cirrhosis with repeated paracentesis    Chronic thrombocytopenia    Medication non compliance    Chronically elevated PCT without subjective signs of infection    GERD    Chronic pruiritus     Brief History of Illness:  This is a 47 YO M with chronic severe pulmonary hypertension, who presented to the ED because he ran out of sildenafil prescription. He said he was taking his sildenafil. He was also on a BB when he came along with PRN midodrine.    Hospital Course:  Patient was retaining fluid when he was admitted. He was found to be in cardiogenic shock from RV failure on RHC. He did not have any gradient suggestive of pulmonary veno occlusive disease wedge from two different spots. He also did not havegradient at the ventricular apex and was 50 mm Hg, RV pacing had no effect. He was started on bosentan and continued on sildenafil. His coreg was stopped considering his acute decompensated failure and low MAPs. His midodrine dose was increased to help with MAPs. He was  eventually started on dopamine to increase his MAPs and obtain dialysis. He got 1 L pulled on dopamine and 1L the next day but then he refused to take dopamine or any further labs. Dr Blank spoke to the patient and he agreed to get some labs. Patient will be discharged on macitentan, he is being given a supply for 1 week.     Patient is really sick and we recommended staying but patient preferred to leave. We had psychiatry consult to see if patient has insight into his disease process. Psychiatry advised that patient has capacity to decide for himself. We clearly advised the patient that he is at risk of death secondary to heart failure from RV faliure, arrhythmias, congestion, renal failure but he clearly understood the significance and still wanted to leave and so we discharged him home with prescription for macitentan. Patient knows to call Dr. Blank on Monday to come see him in clinic. We convinced to obtain some labs today and he got CBC, CMP and Lactic acid. NO signs of infection, BUN 40 and lytes OK, lactic acid 2.2, improved from last time.      Please refer to the progress note from this AM for his plans.     Procedures:  Right Heart Catheterization:  BP 79/50/59  HR 57  BSA 1.9     RA 25/27/25   /25  /36/60   PCW 10   Kate CO 3.6   Kate CI 1.9   PA sat 64.1%   Hgb 13.7 g/dL   PVR 9.7 Pina units     CORONARY ANGIOGRAM:   1. The coronary arteries are noted to be large caliber and tortuous.  2. Both coronary arteries arise from their respective cusps.  3. Right dominant.  4. LM is without angiographic evidence of disease.   5. LAD supplies the entire apex (type 3) and gives rise to septal perforators, D1, D2, and D3.  The mLAD has a myocadial bridge followed by a 30% stenosis.  6.LCX gives rise to OM1, OM2, and OM3 vessels. There is no angiographic evidence of disease.  7. RCA gives rise to PL branches and supplies PDA and PL. The mRCA has a 40% stenosis.       LEFT HEART  CATHETERIZATION:  1. LVEDP 11 mmHg.  2. Simultaneous LVEDP and PCWP from both the right lower pulmonary artery and left lower pulmonary artery branches revealed no significant gradient (3 mmHg).  3. LV cavitary gradient assessment utilizing a Navus catheter revealed a distal (apical) LV gradient of approximately 50 mmHg. With RV apical pacing at both 70 bpm and 100 bpm there was no significant change in this gradient. There was no gradient seen in the mid LV.     TEMPORARY PACEMAKER:  A 5Fr  PACEL temporary pacemaker was positioned in the right ventricle at a rate of 100 bpm and tested for adequate capture. This was used for the study described above and was removed at the conclusion of the case.     FEMORAL ANGIOGRAPHY  1. The right external iliac and common femoral arteries showed no evidence of significant peripheral artery disease.  Access site was confirmed in the common femoral artery.     ARTERIOTOMY CLOSURE:  1. A 6Fr Angioseal device was used for arteriotomy closure.     COMPLICATIONS:  1. None     SUMMARY:   1. Nonobstructive coronary artery disease.  2. Increased right-sided and normal left-sided filling pressures.  3. Severe pulmonary hypertension with a mean PAP of 60 mmHg.  4. Decreased cardiac output of 3.6 L/min and cardiac index of 1.9.  5. Distal/apical intracavitary LV gradient of 50 mmHg that did not change with apical RV pacing at both 70 and 100 bpm. No mid-cavity gradient seen.  6. No gradient between the LVEDP and PCWP in either the right lower or left lower pulmonary artery branches.  7. No evidence of peripheral arterial disease.  8. Arterial access site closed with closure device        PLAN:   1. Aspirin 81 mg po daily lifelong.  2. Bedrest per protocol.  3. Return to the primary inpatient team for further evaluation and management.  4. Continued medical management and lifestyle modification for cardiovascular risk factor optimization.        Condition on Discharge:  Discharge condition:  Guarded   Discharge vitals: Blood pressure 90/59, pulse 115, temperature 97.4  F (36.3  C), temperature source Oral, resp. rate 16, weight 74.3 kg (163 lb 14.4 oz), SpO2 93 %.     Code status on discharge: Full Code      Please see today's progress note for exam    Discharge Medications:  Current Discharge Medication List      START taking these medications    Details   macitentan (OPSUMIT) 10 MG tablet Take 1 tablet (10 mg) by mouth daily  Qty: 30 tablet, Refills: 3    Associated Diagnoses: Pulmonary hypertension (H)         CONTINUE these medications which have CHANGED    Details   midodrine HCl 10 MG TABS Take 20 mg by mouth 3 times daily  Qty: 90 tablet, Refills: 3    Associated Diagnoses: Pulmonary hypertension (H)         CONTINUE these medications which have NOT CHANGED    Details   sildenafil (REVATIO/VIAGRA) 20 MG tablet Take 2 tablets (40 mg) by mouth 3 times daily  Qty: 180 tablet, Refills: 11    Associated Diagnoses: Pulmonary hypertension (H)      loratadine (CLARITIN) 10 MG tablet Take 1 tablet (10 mg) by mouth daily  Qty: 90 tablet, Refills: 3    Associated Diagnoses: Itching      hydrOXYzine (VISTARIL) 25 MG capsule Take 2 capsules (50 mg) by mouth 3 times daily as needed for itching  Qty: 120 capsule, Refills: 0      torsemide (DEMADEX) 20 MG tablet Take 3 tablets (60 mg) by mouth 2 times daily  Qty: 90 tablet, Refills: 3    Associated Diagnoses: PAH (pulmonary artery hypertension) (H); Cardiomyopathy (H)      omeprazole (PRILOSEC) 40 MG capsule Take 1 capsule (40 mg) by mouth daily Take 30-60 minutes before a meal.  Qty: 90 capsule, Refills: 3    Associated Diagnoses: Gastroesophageal reflux disease, esophagitis presence not specified      aspirin 81 MG tablet Take 81 mg by mouth daily      B Complex-C-Folic Acid (RENAL) 1 MG CAPS Take 1 mg by mouth Take 1 capsule (1 mg) by mouth daily in the afternoon.  Indications: Nutritional Support    Associated Diagnoses: Esophageal reflux; Pulmonary  hypertension (H); Renal failure      paricalcitol (ZEMPLAR) 5 MCG/ML injection 2 mcg 2 mcg by IV Push route dialysis.    Associated Diagnoses: Esophageal reflux; Pulmonary hypertension (H); Renal failure      calcium acetate (PHOSLO) 667 MG CAPS Take 2 capsules (1,334 mg) by mouth 3 times daily (with meals)  Qty: 180 capsule    Associated Diagnoses: Other dyspnea and respiratory abnormality         STOP taking these medications       bosentan (TRACLEER) 125 MG tablet Comments:   Reason for Stopping:         carvedilol (COREG) 25 MG tablet Comments:   Reason for Stopping:               Discharge diet: Cardiac   Discharge activity: Bedrest   Discharge follow-up: Follow up with consultant in 4 days on Monday   Outpatient therapy: None    Home Care agency: None    Supplies and equipment: Given macitentan supplies   Lines and drains: None    Wound care: None   Other instructions: None                         Future Appointments  Future Appointments  Date Time Provider Department Center   6/22/2017 12:00 PM Provider, [place] Spec Inf Para [facility]   8/29/2017 9:00 AM  LAB [facility]   8/29/2017 10:00 AM Rocky Rojo MD [facility]       Smoking: Patient has been advised to quit if currently smoking or to remain tobacco-free if quit within the last 12 months.      It was a pleasure participating in the care of Jayy Gill. Please do not hesitate to contact our medical team with any questions or concerns.    Pedro Blair MD  Cardiology Fellow  [phone]    June 8, 2017

## 2017-06-08 NOTE — PROGRESS NOTES
HEMODIALYSIS TREATMENT NOTE    Date: 6/8/2017  Time: 3:25 PM    Data:   Pre Wt:  kg 74.3   Desired Wt: kg 72.3   Approximate (Net) Weight Removed:    Vascular Access Status: Access/lines visible and secure, Prescribed BFR achieved   Dialyzer Rinse: Clear   Total Blood Volume Processed:  0L   Total Dialysis (Treatment) Time: 2hrs    Interventions&Assessment:  Report given to writer of this note by Elaine Dialysis RN at 1515.  Per report,  Pt reported preferring to use EMLA cream on fistula 30 prior to tx.  Cannulated 16G needles, per pt request.  Per report, pt did not tolerate 3kg fluid removal, pt reported nausea.  100ml NS bolus given for nausea.  MAP >50, per orders. Sites clotted within 15 minutes. Report to primary RN post treatment.       Plan:    Per nephrology team

## 2017-06-08 NOTE — PROGRESS NOTES
Nephrology Progress Note  06/08/2017         Assessment & Recommendations:     Jayy Gill is a 46 year old year old male  with PMHx significant for ESRD on HD M/W/F via RUE AVF, CHF, moderate to severe TR, severe pulmonary hypertension, HTN, liver cirrhosis, recurrent ascitics requires paracentesis every 3 weeks who presented for further evaluation of fatigue, shortness of breath and altered mental status. Patient underwent right sided cardiac cath which showed elevated right sided filling pressurees and normal left sided pressure. Nephrology service is following for volume management    1. ESRD on HD at [facility] via left AVF M/W/F.   -we will do UF today with goal 1-2 liters as tolerated.  -We will do maintenance hemodialysis tomoorw   -BMP in am tomorrow     2.Electrolytes:  -BMP was not check today     3. Volume status: still volume expanded     4. Acid/Base status:BMP was not checked today     5. Worsening of persistent Hypotension- likely die to worsening PHTN in settings of  medications non-compliance.  Midodrine dose increased to 15 mg TID. Management defer to cardiology service    6. Acute hypoxic respiratory failure- resolved     7. Severe Pulmonary hypertension/CHF/moderate to severe TR-On Revatio and Bosentan     8. Liver cirrhosis complicated by refractory ascitics. Suspect due to heart failure. Patient requires paracentesis every 3 weeks, last paracentesis was on 5/31 with removal of 3 liters     9. Acute encephalopathy-likely secondary to hypotension and hypoxemia.  CT head showed no evidence of acute pathology. Resolved     The patient was staffed with      Thank you for allowing us to participate in the care of your patient. We will follow with you. Please do not hesitate to contact us with questions.     Milagros Alvarado MD  Nephrology Fellow  [facility]  Department of Medicine  Division of Renal Disease and Hypertension  [phone]    Interval History :    In the last 24 hours Jayy Gill had no acute events.   Patient completed 2 hrs of HD yesterday with 1 Liter pulled. The patient refused Dopamine because he did not like the way dopamine makes him feel  Patient is lying in bed this afternoon. Patient c/o generalized fatigue.    Review of Systems:   (4 pt ROS reviewed alone is not adequate unless you detail systems you reviewed)  I reviewed the following systems:  GI: +decreased appetite. No nausea or vomiting or diarrhea.   Neuro:  No confusion   Constitutional:  no fever or chils  CV: + dyspnea. No edema.  No chest pain.  Psych: flat affect, depressed mood    Physical Exam:       BP (!) 76/43  Pulse 115  Temp 97.3  F (36.3  C) (Oral)  Resp 18  Wt 74.3 kg (163 lb 14.4 oz)  SpO2 (!) 86%  BMI 24.2 kg/m2     GENERAL APPEARANCE: NAD  EYES:  No scleral icterus, pupils equal  HENT: mouth without ulcers or lesions, dilated veins on forehead, jugular pulse  PULM: lungs sounds are diminished to auscultation in the lower posterior fields bilaterally, equal air movement, no clubbing  CV: regular rhythm, tachycardia, no rub, +JVD  -improved ankle edema b/l  GI: soft, non tender, distended, +ascities bowel sounds are normoactive  INTEGUMENT: no cyanosis, no rash  NEURO: AAOx3    Labs:   All labs reviewed by me  Electrolytes/Renal -   Recent Labs   Lab Test  06/07/17   0715  06/06/17   2240  06/04/17   0427   06/03/17   2314   03/18/16   0805  03/17/16   0644  03/16/16   0712   NA  Canceled, Test credited   Patient refused collection  NOTIFIED LUIS HANSON RN 4E 2332 6/7/17 AM    135  141   < >  139   < >  138  139  138   POTASSIUM  Canceled, Test credited   Patient refused collection  NOTIFIED LUIS HANSON RN 4E 2332 6/7/17 AM    4.6  4.6   < >  4.1   < >  4.5  4.1  4.2   CHLORIDE  Canceled, Test credited   Patient refused collection  NOTIFIED LUIS HANSON RN 4E 2332 6/7/17 AM    97  101   --   99   < >  100  100  101   CO2  Canceled, Test credited   Patient refused  collection  NOTIFIED LUIS HANSON RN [address] [place] [address] 6/7/17 AM    25  26   --   28   < >  27  32  30   BUN  Canceled, Test credited   Patient refused collection  NOTIFIED LUIS HANSON RN [address] [place] [address] 6/7/17 AM    46*  33*   --   31*   < >  51*  30  42*   CR  Canceled, Test credited   Patient refused collection  NOTIFIED LUIS HANSON RN [address] [place] [address] 6/7/17 AM    7.57*  6.72*   --   6.54*   < >  6.04*  4.54*  5.70*   GLC  Canceled, Test credited   Patient refused collection  NOTIFIED LUIS HANSON [place] [address] 6/7/17 AM    127*  104*   < >  96   < >  92  83  82   JAZMINE  Canceled, Test credited   Patient refused collection  NOTIFIED LUIS HANSON [place] [address] 6/7/17 AM    9.5  9.2   --   9.3   < >  8.6  8.7  8.8   MAG   --    --    --    --   2.3   --   2.3  1.7  2.0   PHOS   --    --    --    --    --    --   2.9  2.3*  3.2    < > = values in this interval not displayed.       CBC -   Recent Labs   Lab Test  06/06/17 2240 06/04/17 0427 06/03/17 2316 06/03/17   2314   WBC  7.1  5.4   --   4.5   HGB  14.7  14.2  16.7  14.6   PLT  166  129*   --   116*       LFTs -   Recent Labs   Lab Test  06/06/17 2240 06/04/17 0427 06/03/17   2314   ALKPHOS  88  96  98   BILITOTAL  2.4*  2.3*  2.4*   ALT  23  24  24   AST  45  30  28   PROTTOTAL  6.6*  7.0  7.2   ALBUMIN  2.7*  2.9*  3.3*       Iron Panel -   Recent Labs   Lab Test  06/04/17 0427 02/19/16 2310 09/12/13   1455   IRON  71  56  57   IRONSAT  30  18  20   MARANDA   --   369   --          Imaging:  All imaging studies reviewed by me.     Current Medications:    midodrine  20 mg Oral TID w/meals     gelatin absorbable  1 each Topical During Hemodialysis (from stock)     lidocaine viscous 2% 15 mL and maalox/mylanta w/ simethicone 15 mL (GI COCKTAIL)  30 mL Oral Once     aspirin EC  81 mg Oral Daily     bosentan  125 mg Oral BID     calcium acetate  1,334 mg Oral TID w/meals     loratadine  10 mg Oral Daily     omeprazole  40 mg Oral QAM AC     sildenafil  40 mg Oral TID      torsemide  60 mg Oral BID     sodium chloride (PF)  3 mL Intracatheter Q8H       DOPamine Stopped (06/07/17 1000)     - MEDICATION INSTRUCTIONS -       Milagros Alvarado MD

## 2017-06-08 NOTE — CONSULTS
PSYCHIATRIC CONSULTATION      DATE OF SERVICE:  06/07/2017      REASON FOR CONSULTATION:  The Cardiology Service requested a psychiatric consultation to evaluate capacity issues and understanding of illness in this patient, Jayy Gill, with severe pulmonary hypertension and who is refusing recommended dopamine treatment.      HISTORY OF PRESENT ILLNESS:  The patient is a 46-year-old gentleman with pulmonary hypertension, right ventricular failure, cirrhosis, endstage renal disease who was presenting initially for medication renewal and fatigue.  He had been out of his bosentan for about 2-3 weeks, presented to the ED on 05/30/2017 and received a dose but has not had any subsequently.  He states he is taking all his other medication.  He states he has noted increase in fatigue over the last few weeks prior to admission.  Of note, he underwent a paracentesis 05/30/2017 with removal of 3 liters, was last seen in [facility] 04/25/2017.  They recommended left and right heart catheterization in the ED.  When he was admitted on the 06/04/2017, he was found to be hypoxic in the 80s, was placed on oxygen, given bosentan and normal saline.  Currently, the patient is in the Intensive Care Unit.  He had a recent coronary angiogram.  His team is recommending that he have dialysis with dopamine and he was having trouble with low pressures.  The team wanted a psych consult to make sure the patient had a good understanding of his disease process.      I initially discussed the case with Dr. Bentley from Cardiology who requested the consultation.  I did review the patient's medical condition with him.  The recommendations at the time were the patient to stay and get dialyzed, to get put back on his pulmonary hypertension medications.  He had significant fluid overload and very low pressures.  He was experiencing some nausea.  Per the Cardiology fellow, the patient was a potential imminent risk of death.   "When I initially met the patient, I reviewed the concerns with the Cardiology team.  He stated he did not want to take dopamine under any circumstances because it made him feel \"lousy\" and he described nausea and vomiting.  I described that the opinion of the team was that if he did not comply with this dopamine and his dialysis and other treatments, he is an imminent risk of death.  He stated, \"I'm just not going to take it.\"  He denied depression, denied wanting to die.  I did discuss with him the issue with putting up with some moderate discomfort for a limited period of time from the dopamine in order to be healthy later.  He seemed to understand this concept but stated he just did not want to take the dopamine; he did not think he needed it and that he disagreed with his doctors.  I asked him if he trusted his doctors and he said \"somewhat.\"        I then went and got Dr. Lyon from the Cardiology team.  I wanted to make sure that Dr. Lyon explained to the patient the rationale behind the medication and discussed medical issues so both the patient, myself and Dr. Lyon were clear about the issues.  Dr. Lyon described the indications for the dopamine, the rationale for dialysis and went over the patient's illness in a way that was very easy to understand.  The patient did understand the issues but then disagreed with the recommendations.  He stated, \"I've been living with this for 3 to 4 years.\"  He stated that he did not want to take any pills to potentially counteract some of the nausea and that he felt he would be okay and would survive in spite of what the doctors were saying.  I then went and discussed the case at length with his attending cardiologist, Dr. Phillip and when in the room with her, she again explained the rationale for the dopamine, explained the risks of noncompliance including death.  Did relay to him that his blood pressure was dangerously low.  We discussed the issues of lactate " levels and things of that sort that were pointing towards poor perfusion.  The patient stated that he understood this but that he did not feel that he wanted to put up with the dopamine and thought that things would resolve and he would feel better.  Dr. Phillip answered all of his questions and he continued to refuse the medication.      I did a psychiatric review of systems with the patient.  He denied depression, denied any psychotic symptoms, denied any psychiatric history.  Denied any chemical dependence history.  Denied any family psychiatric history.  Patient stated that he did not want to die, he was not depressed and that he thought that essentially the doctors were exaggerating his need for the dopamine and these other treatments.  The patient showed no evidence of paranoia toward the treating team, showed no evidence of psychosis.  I did a cognitive exam.  The patient was oriented to person, place, date, situation.  He was able to spell world forward and backward very easily. was able to do math 7 x 3 equals 21 and 3 x 14 equals 42.  His similarities and proverbs were normal.  He knew who the president was.  He was able to repeat 3 items and remember 2 out of 3 after 5 minutes.      PAST MEDICAL HISTORY:  Cardiomyopathy, dialysis patient, endstage renal disease, hyperlipidemia, hypertension, pulmonary hypertension.      SOCIAL HISTORY:  The patient is single.  He used to work in the automobile industry selling cars, working on cars.  He currently is unemployed.  He does have a girlfriend who he identifies as the person he is closest to.      FAMILY PSYCHIATRIC HISTORY:  Negative per patient.      PERSONAL PSYCHIATRIC HISTORY:  Negative.      CHEMICAL DEPENDENCE HISTORY:  Negative for patient.      MEDICATIONS:  Reviewed per Epic.      REVIEW OF SYSTEMS:  The patient describes some abdominal discomfort and nausea.  He described having some muscle cramping, denied chest pain, denied marked shortness of  "breath, denied localizing neurological symptoms.  Denied fevers, chills.  The patient does describe some dyspnea and edema.  No chest pain.  Rest of 10-point review of systems was negative.        VITAL SIGNS:  Blood pressure 90/56, pulse 115, temperature 98, respirations 16.      MENTAL STATUS EXAM:  The patient is a thin gentleman.  The patient is a thin gentleman.  He is engageable and generally cooperative but somewhat frustrated by her continuing questioning.  His mood is described as \"okay.\"  His affect is blunted.  His thought processes are goal directed.  There is no evidence of looseness of associations.   Thought content:  The patient denies auditory or visual hallucinations, denies suicidal ideation.  Patient is able to understand the nature of his illness, the recommended treatments and the context around the recommendations in an appropriate manner.  Speech and language:  Speech is normal rate.  Language is appropriate.  Attention and concentration are intact.  The patient is alert and oriented in all spheres.  Short and long-term memory are intact.  Fund of knowledge appears average.   Judgment and insight appears good in the sense that the patient understands his illness. He understands the potential consequences including death of refusing dopamine. He does not appear depressed.  He does not appear psychotic.  He is making a decision based on his beliefs and experience with his illness.  His decisions are not based on psychosis, depression or cognitive deficits.  He has decisional capacity in terms of making decisions about his own health care.  Muscle bulk and tone:  The patient is quite thin.  Abnormal movements:  There were none noted.      IMPRESSION:  The patient is a 46-year-old gentleman with a history of what appears to be idiopathic pulmonary hypertension with complications including renal failure and cirrhosis who currently is in the hospital after being noncompliant with some of his " medications.  He is hypotensive and fluid overloaded.  The Cardiology team is recommending dopamine so that he can keep his pressure up and so he may be dialyzed.  The patient is refusing this based in part on the fact that he does not like the way dopamine makes him feel and on another part, he does not agree with the judgment of the Cardiology team about the seriousness of this situation and is willing to accept the fact that he may be at risk for imminent death from their opinion but continues to state that he does not wish to have dopamine.  He has decisional capacity as above and we are not in a position to force treatments on a patient with decisional capacity.  Case was discussed with cardiology attending Dr. Phillip at length.      TREATMENT RECOMMENDATIONS:   1.  Would continue to attempt to engage the patient around treatment recommendations.   2.  It may be helpful to readdress the dopamine issue if his condition starts to deteriorate.   3.  Could consider having his girlfriend, who he identifies as a person he is closest to, come in and engage in discussion.   4.  Psychiatry will b e available for further evaluation.   5.  Please call or reconsult with any questions, concerns or change in the patient's status.  My number is [phone].         PAVITHRA BRIONES MD             D: 2017 18:58   T: 2017 23:20   MT: MARIA ESTHER      Name:     BRANDIN AWAN   MRN:      [id number]        Account:       [id number]   :      1970           Consult Date:  2017      Document: [id number]

## 2017-06-08 NOTE — PROGRESS NOTES
[facility]  CARDIOLOGY DAILY PROGRESS NOTE    Interval History:  Clean cors and elevated RA pressure, was started on dopamine for RV support to maintain MAPs > 60 and to help dialyze. Had UF but got cramps after 40 min and had to stop this. 1.2 Kg fluid pulled.    Jayy Gill is a 46 year old male    Assessment/Plan  ASSESSMENT:    This is a 45 YO M with     - Chronic severe type I pulmonary arterial hypertension with moderate to severe tricuspid insufficiency and mild RV dysfunction, unlikely pulmonary veno occlusive disease  - Possible sarcoidosis seen on PET scan  - Possible myocarditis seen on PET scan, cannot obtain MRI  - Chronic hypotension  - ESRD on dialysis  - Liver cirrhosis with repeated paracentesis  - Chronic thrombocytopenia  - Medication non compliance  - Chronically elevated PCT without subjective signs of infection  - GERD  - Chronic pruiritus     PLAN:   - Could not dialyze today secondary to low MAPs, did not want to be on dopamine. Refused labs. He does not like the drip and would like this to be stopped however his MAPs are too low for Dialysis. May try UF today. Patient insists to be not on dopamine and wants to leave.   - Appreciate psych follow up and recs.   - Continued pulmonary hypertension therapy with revatio and bosentan.  - Coronary angiogram showing non obstructive coronaries.  - Elevated right sided filling pressures and normal left sided pressures. RA 25 mm Hg. UF ended early with net 1.2 Kg fluid removal Tuesday and 1 L Wedneday.  - No PWCP and LVEDP gradient. Had apical LV gradient of 50 mm Hg and does not have any mid cavitary gradient. RV pacing at 70 and 100 did not show any difference in gradient.   - Continue home dose of diuresis. Resumed torsemide 60 mg TID. Weights stable.  - To avoid RV pressure exceeding systemic pressure and possible R to L shunt, need to keep MAP above Right sided pressures. On increased dose of midodrine 20 mg three times  a day  - Not on coreg as he needs to maintain his MAPs for the reason mentioned above and there is no clear indication for BB currently (has cardiomyopathy but well preserved EF)  - Paracentesis once per three weeks as outpatient.   - Blood cultures remain negative, no need to abx currently. Does not have subjective symptoms or objective evidence for an infection.  - FEN: cardiac/dialysis diet with 2-gram sodium limit  - Prophylaxis: home PPI, SCD  - Lines: PIV x 2    DISPO  - SW consult to help with obtaining bosentan. Plan to discharge patient on macitentan 30 day supply today.       Plan discussed with Dr Phillip    Objective  Temp:  [97.9  F (36.6  C)-98.7  F (37.1  C)] 98.7  F (37.1  C)  Heart Rate:  [63-85] 67  Resp:  [13-31] 16  BP: (68-90)/(37-56) 78/53  SpO2:  [83 %-98 %] 93 %    Intake/Output Summary (Last 24 hours) at 06/05/17 0653  Last data filed at 06/05/17 0000  I/O - 1 L out, 181 in.   Wt Readings from Last 5 Encounters:   06/05/17 74.3 kg (163 lb 14.4 oz)   05/31/17 71.3 kg (157 lb 3 oz)   05/30/17 75.1 kg (165 lb 8 oz)   05/04/17 70.5 kg (155 lb 8 oz)   04/28/17 71.2 kg (157 lb)     HEENT: AT/NC  Neck: Prominent JV pulsations below the right ear lobe.  Resp: mild crackles at the bases.  Cardiac: regular, with 2/6 systolic murmur best heard along left sternal border with wide s2 split  Abdomen: mildly distended, midly tender in epigastrium without guarding or rebound; hepatomegaly  Extremities: 2+ edema bilaterally to the knee  Skin: Warm and dry    Current Medications    Cardiac: asa 81, bosentan 125 mg bid, midodrine 10 mg tid, sildenafil 40 mg tid, torsemide 60 mg bid (held yesterday).   Non cardiac: loratidine, hydroxyzine PRN, calcium acetate TID.     Coronary angiogram and LHC/ RHC numbers:     1. Nonobstructive coronary artery disease.  2. Increased right-sided and normal left-sided filling pressures.  3. Severe pulmonary hypertension with a mean PAP of 60 mmHg.  4. Decreased cardiac output of  3.6 L/min and cardiac index of 1.9.  5. Distal/apical intracavitary LV gradient of 50 mmHg that did not change with apical RV pacing at both 70 and 100 bpm. No mid-cavity gradient seen.  6. No gradient between the LVEDP and PCWP in either the right lower or left lower pulmonary artery branches.  7. No evidence of peripheral arterial disease.  8. Arterial access site closed with closure device  Lab Values  Labs have been reviewed  BMP    Recent Labs  Lab 06/07/17 0715 06/06/17 2240 06/04/17 0427 06/03/17 2316 06/03/17  2314   NA Canceled, Test credited Patient refused collectionNOTIFIED LUIS GUEVRAAUAX RN [address] [place] [address] 6/7/17  141 140 139   POTASSIUM Canceled, Test credited Patient refused collectionNOTIFIED LUIS TREUAX RN  233 6/7/17 AM 4.6 4.6 4.2 4.1   CHLORIDE Canceled, Test credited Patient refused collectionNOTIFIED LUIS GUEVARAUAX RN [address] [place] [address] 6/7/17 AM 97 101  --  99   JAZMINE Canceled, Test credited Patient refused collectionNOTIFIED LUIS TREUAX RN [address] [place] [address] 6/7/17 AM 9.5 9.2  --  9.3   CO2 Canceled, Test credited Patient refused collectionNOTIFIED LUIS TREUAX RN [address] [place] [address] 6/7/17 AM 25 26  --  28   BUN Canceled, Test credited Patient refused collectionNOTIFIED LUIS ISMAELUAX RN [address] [place] [address] 6/7/17 AM 46* 33*  --  31*   CR Canceled, Test credited Patient refused collectionNOTIFIED LUIS GUEVARAUAX RN [address] [place] [address] 6/7/17 AM 7.57* 6.72*  --  6.54*   GLC Canceled, Test credited Patient refused collectionNOTIFIED LUIS TREUAX RN [facility] 6/7/17 * 104* 97 96     LFTs    Recent Labs  Lab 06/06/17 2240 06/04/17 0427 06/03/17  2314   ALKPHOS 88 96 98   AST 45 30 28   ALT 23 24 24   BILITOTAL 2.4* 2.3* 2.4*   PROTTOTAL 6.6* 7.0 7.2   ALBUMIN 2.7* 2.9* 3.3*      CBC  Recent Labs  Lab 06/06/17 2240 06/04/17  0427  06/03/17  2314   WBC 7.1 5.4  --  4.5   RBC 4.64 4.49  --  4.56   HGB 14.7 14.2  < > 14.6   HCT 45.1 45.3  --  46.1   MCV 97 101*  --  101*   MCH 31.7 31.6  --  32.0   MCHC 32.6 31.3*  --  31.7   RDW 18.2* 18.6*  --   18.6*    129*  --  116*   < > = values in this interval not displayed.  INR    Recent Labs  Lab 06/03/17  2314   INR 1.28*     TpnInvalid input(s): TPN    Discussed w/ Dr Marjan Blair MD  Cardiology Fellow  June 8, 2017

## 2017-06-08 NOTE — PLAN OF CARE
Problem: Goal Outcome Summary  Goal: Goal Outcome Summary  SR. BP with MAPs mostly in 60's. MAP in 40's for one pressure and then came up shortly after, asymptomatic.  Pt taking off monitoring equipment and unwilling to cooperate with all cares. States he wants to go home and is sick of this place.   Assessments and other cares done as able.

## 2017-06-09 NOTE — TELEPHONE ENCOUNTER
Prior Authorization Approval    Authorization Effective Date: 4/1/2017  Authorization Expiration Date: 5/1/2018  Medication: sildenafil 20mg tablet (180 tablets/30 days) - Approved  Approved Dose/Quantity: 180 tabs/30 days.  Reference #: [id number]/A    Insurance Company: [employer] - Phone [phone] Fax [phone]  Expected CoPay: $1.00        Which Pharmacy is filling the prescription (Not needed for infusion/clinic administered): [facility] [address]  Pharmacy Notified:  Yes  Patient Notified:  Yes

## 2017-06-12 NOTE — PROGRESS NOTES
SUBJECTIVE/OBJECTIVE:                Jayy Gill is a 46 year old male called for a transitions of care visit.  He was discharged from [facility] on 6/8/17 for cardiogenic shock/RV Failure exacerbation/PAH.  Patient called back about 40 minutes after initial scheduled appointment.     Chief Complaint: Hospital Follow-Up. Pt did not have questions other than where his prescriptions were sent.    Allergies/ADRs: None  Tobacco: No tobacco use   Alcohol: none  Caffeine:  1 cups/day of coffee  Activity: Doing pretty good since he   PMH: Reviewed in Epic    Medication Adherence: no issues reported    PAH/RV Failure: Current medications include sildenafil 40 mg three times daily, torsemide 60 mg twice daily, aspirin 81 mg daily, midodrine 20 mg three times daily (dose increase), and macitentan 10 mg daily (new). Pt is not taking carvedilol due to recent decompensation.  Pt states that he only has a small supply of sildenafil, midodrine (due to dose increase) and macitentan. Prescriptions written in hospital were local printed and not sent to pharmacy and sildenafil had a PA that was approved on 6/9/17 per chart. Pt was supposed to contact cardiology office today per discharge summary.  Patient had run out of sildenafil prior to admission. Pt states that he is feeling much better since home. Patient reports no current medication side effects.     Pt is not complaining of sx of HF.  Does note some swelling if standing a lot, but states this is transient. Does not produce much urine, but can have multiple bowel movements overnight. Pt is not measuring daily weights.   Patient does self-monitor BP. Home BP monitoring in range of 80-90's systolic over 50's diastolic.   Pt is somewhat following a low sodium diet, is avoiding EtOH.    ESRD on dialysis: Pt is on dialysis on a MWF schedule.  Pt is taking a renal vitamin, calcium acetate 1334 mg three times daily with meals, and paricalcitol 2 mcg with dialysis. Pt states that weights  seem to be stable with dialysis but he does not weigh himself at home. Denies any issues.    GERD: Current medications include: Prilosec (omeprazole) 40 mg once daily. Pt c/o no current symptoms.  Patient feels that current regimen is effective.    Pruritis: Pt is taking loratadine 10 mg daily and hydroxyzine 50 mg three times daily PRN (states uses twice to three times daily most days). Pt finds current therapy to be effective. States the will sometimes get a little drowsy, but it is not disruptive.    Current labs include:  BP Readings from Last 3 Encounters:   06/08/17 90/59   05/31/17 (!) 78/47   05/31/17 104/64     Today's Vitals: There were no vitals taken for this visit. - telephone encounter  Lab Results   Component Value Date    A1C 6.0 03/17/2016   .  Lab Results   Component Value Date    CHOL 68 03/15/2016     Lab Results   Component Value Date    TRIG 64 03/15/2016     Lab Results   Component Value Date    HDL 30 03/15/2016     Lab Results   Component Value Date    LDL 25 03/15/2016       Liver Function Studies -   Recent Labs   Lab Test  06/08/17   1803   PROTTOTAL  7.5   ALBUMIN  3.0*   BILITOTAL  3.0*   ALKPHOS  94   AST  33   ALT  23     Last Basic Metabolic Panel:  Lab Results   Component Value Date     06/08/2017      Lab Results   Component Value Date    POTASSIUM 4.6 06/08/2017     Lab Results   Component Value Date    CHLORIDE 94 06/08/2017     Lab Results   Component Value Date    BUN 40 06/08/2017     Lab Results   Component Value Date    CR 7.92 06/08/2017     GFR Estimate If Black   Date Value Ref Range Status   06/08/2017 9 (L) >60 mL/min/1.7m2 Final     Comment:      GFR Calc   06/07/2017  >60 mL/min/1.7m2 Final    Canceled, Test credited   Patient refused collection  NOTIFIED LUIS HANSON RN [id number] 6/7/17 AM     06/06/2017 9 (L) >60 mL/min/1.7m2 Final     Comment:      GFR Calc     TSH   Date Value Ref Range Status   05/04/2017 8.20 (H) 0.40 - 4.00  mU/L Final     T4 Free   Date Value Ref Range Status   05/04/2017 1.08 0.76 - 1.46 ng/dL Final     Most Recent Immunizations   Administered Date(s) Administered     Hepatitis B 09/06/2013     Influenza (IIV3) 10/01/2015     Pneumococcal 23 valent 07/30/2013       ASSESSMENT:                 Current medications were reviewed today.      Medication Adherence: needs improvement - see below    PAH/RV Failure: Needs Improvement. Pt is running low on supply of medications. Alerted patient that sildenafil prior authorization had been approved.  Pt would also benefit from reaching out to cardiology/Dr. Blank as instructed in discharge summary.     ESRD on dialysis: Stable.    GERD: Stable per patient.    Pruritis: Stable per patient.     PLAN:                Post Discharge Medication Reconciliation Status: discharge medications reconciled, continue medications without change.    1. Pt to contact Dr. Dye/cardiology clinic to follow-up after recent hospital stay/obtain necessary prescriptions.  2. Alerted patient that sildenafil prior authorization had been approved per Epic chart.     I spent 20 minutes with this patient today. A copy of the visit note was provided to the patient's primary care provider.    Will follow up in 1 month or sooner if needed.    The patient was mailed a summary of these recommendations as an after visit summary.    Remy Hayden PharmD  Medication Therapy Management Provider  Pager (voicemail): [phone]

## 2017-06-12 NOTE — MR AVS SNAPSHOT
After Visit Summary   6/12/2017    Jayy Gill    MRN: [id number]           Patient Information     Date Of Birth          1970        Visit Information        Provider Department      6/12/2017 2:30 PM Graciela Penn APRN [facility]        Today's Diagnoses     Hemodialysis-associated hypotension    -  1    Pulmonary hypertension          Care Instructions    Medication Changes:  Take Midodrine 10 mg, three times a day for your blood pressures.    Continue Opsumit medication, 10 mg daily.    Patient Instructions:  Please call us with any changes in your symptoms - including increased diarrhea, dizziness, chest pain, shortness of breath, or anything else that feels like it is worsening for you.    Follow up Appointment Information:  Please return to clinic next week on June 21st, 2017.      We are located on the third floor of the [facility] on the [facility].  Our address is     [address] [place] [address] [place] [address] [place] [address]      Thank you for allowing us to be a part of your care here at the [facility]    If you have questions or concerns please contact us at:    Kelsi Stover RN, BSN    Hermann Laguerre (Schedule,P.A.)  Nurse Coordinator     Clinic   Pulmonary Hypertension   Pulmonary Hypertension  [facility]  (P)[phone]    (P) [phone]        (F)[phone]                ** Please note that you will NOT receive a reminder call regarding your scheduled testing, reminder calls are for provider appointments only.  If you are scheduled for testing within the [facility] system you may receive a call regarding pre-registration for billing purposes only.**     Remember to weigh yourself daily after voiding and before you consume any food or beverages and log the numbers.  If you have  gained/lost 2 pounds overnight or 5 pounds in a week contact us immediately for medication adjustments or further instructions.  **Please call us immediately if you have any syncope, chest pain, edema, or decline in your functional status.            Follow-ups after your visit        Follow-up notes from your care team     Return in about 9 days (around 6/21/2017) for 9:30 Graciela Penn with labs.      Your next 10 appointments already scheduled     Jun 22, 2017 12:00 PM CDT   Paracentesis Visit with  Spec Inf Para Provider, UC 39 ATC   [facility] [place] [facility] ([facility])    [address] [place] [address]  2nd Floor  [facility] [id number] [phone]            Aug 29, 2017  9:00 AM CDT   Lab with [facility]   [facility] ([facility])    [address] [place] [address]  1st Floor  [facility] [id number] [phone]            Aug 29, 2017 10:00 AM CDT   (Arrive by 9:45 AM)   Return General Liver with Rocky Rojo MD   [facility] Hepatology ([facility])    [address] [place] [address]  3rd Floor  [facility] [id number] [phone]              Who to contact     If you have questions or need follow up information about today's clinic visit or your schedule please contact [facility] directly at [phone].  Normal or non-critical lab and imaging results will be communicated to you by MyChart, letter or phone within 4 business days after the clinic has received the results. If you do not hear from us within 7 days, please contact the clinic through MyChart or phone. If you have a critical or abnormal lab result, we will notify you by phone as soon as possible.  Submit refill requests through [facility] or call your pharmacy and they will forward the refill request to us. Please allow 3 business days for your refill to be completed.          Additional Information About Your Visit        MyChart Information   "   [facility] lets you send messages to your doctor, view your test results, renew your prescriptions, schedule appointments and more. To sign up, go to [email or web address] . Click on \"Log in\" on the left side of the screen, which will take you to the Welcome page. Then click on \"Sign up Now\" on the right side of the page.     You will be asked to enter the access code listed below, as well as some personal information. Please follow the directions to create your username and password.     Your access code is: [email or web address]  Expires: 2017  3:55 PM     Your access code will  in 90 days. If you need help or a new code, please call your [facility] clinic or [phone].        Care EveryWhere ID     This is your Care EveryWhere ID. This could be used by other organizations to access your [facility] medical records  [id number]        Your Vitals Were     Pulse Height Pulse Oximetry BMI (Body Mass Index)          59 1.753 m (5' 9\") 93% 21.97 kg/m2         Blood Pressure from Last 3 Encounters:   17 (!) 84/54   17 90/59   17 (!) 78/47    Weight from Last 3 Encounters:   17 67.5 kg (148 lb 12.8 oz)   17 74.3 kg (163 lb 14.4 oz)   17 71.3 kg (157 lb 3 oz)              Today, you had the following     No orders found for display         Today's Medication Changes          These changes are accurate as of: 17 11:59 PM.  If you have any questions, ask your nurse or doctor.               Start taking these medicines.        Dose/Directions    midodrine HCl 10 MG Tabs   Used for:  Hemodialysis-associated hypotension   Started by:  Graciela Penn APRN CNP        Dose:  10 mg   Take 10 mg by mouth 3 times daily   Quantity:  180 tablet   Refills:  3            Where to get your medicines      These medications were sent to [facility] [place], [place] - [address] [facility] [address] [place] [address] [address] [address]    Hours:  " TRANSPLANT PHONE NUMBER [phone] Phone:  [phone]     midodrine HCl 10 MG Tabs                Primary Care Provider Office Phone # Fax #    Milly Eve Healy [phone]       [address]  [facility] [address]        Thank you!     Thank you for choosing [facility]  for your care. Our goal is always to provide you with excellent care. Hearing back from our patients is one way we can continue to improve our services. Please take a few minutes to complete the written survey that you may receive in the mail after your visit with us. Thank you!             Your Updated Medication List - Protect others around you: Learn how to safely use, store and throw away your medicines at www.disposemymeds.org.          This list is accurate as of: 6/12/17 11:59 PM.  Always use your most recent med list.                   Brand Name Dispense Instructions for use    aspirin 81 MG tablet      Take 81 mg by mouth daily       hydrOXYzine 25 MG capsule    VISTARIL    120 capsule    Take 2 capsules (50 mg) by mouth 3 times daily as needed for itching       loratadine 10 MG tablet    CLARITIN    90 tablet    Take 1 tablet (10 mg) by mouth daily       macitentan 10 MG tablet    OPSUMIT    30 tablet    Take 1 tablet (10 mg) by mouth daily       midodrine HCl 10 MG Tabs     180 tablet    Take 10 mg by mouth 3 times daily       omeprazole 40 MG capsule    priLOSEC    90 capsule    Take 1 capsule (40 mg) by mouth daily Take 30-60 minutes before a meal.       paricalcitol 5 MCG/ML injection    ZEMPLAR     2 mcg 2 mcg by IV Push route dialysis.       PHOSLO 667 MG Caps capsule   Generic drug:  calcium acetate     180 capsule    Take 2 capsules (1,334 mg) by mouth 3 times daily (with meals)       RENAL 1 MG Caps      Take 1 mg by mouth Take 1 capsule (1 mg) by mouth daily in the afternoon.  Indications: Nutritional Support       sildenafil 20 MG tablet    REVATIO/VIAGRA    180  tablet    Take 2 tablets (40 mg) by mouth 3 times daily       torsemide 20 MG tablet    DEMADEX    90 tablet    Take 3 tablets (60 mg) by mouth 2 times daily

## 2017-06-12 NOTE — TELEPHONE ENCOUNTER
Called twice to try to connect with patient for scheduled 8 AM phone visit.  Patient did not answer both instances.  Left message first call stating that I would call back in 10 minutes. After second attempt, left message with [facility] Scheduling Line to reschedule and reminded patient to contact Dr. Blank as stated in discharge summary.    Emily MoralesD  Medication Therapy Management Provider  Pager (voicemail): [phone]

## 2017-06-12 NOTE — LETTER
"6/12/2017      RE: Jayy Gill  [address]  [place] [address]       Dear Colleague,    Thank you for the opportunity to participate in the care of your patient, Jayy Gill, at the [facility]. Please see a copy of my visit note below.    June 12, 2017      Mr. Jayy Gill is a pleasant 46 year old male with a past medical history of hypertensive cardiomyopathy (versus cardiac cirrhosis), pulmonary hypertension, right heart failure, end-stage kidney disease currently on dialysis and PVOD. Patient has recently transitioned from Tracleer to Opsumit, and remains on sildenafil therapy.    Today, he is accompanied by: no one; here alone      Current Symptoms  1. Any ER visits or hospital admissions since last appointment: Yes: was in the hospital here for cardiogenic shock and right heart failure.  He was stabilized and then insisted on being discharged several days ago.  He did not eat in hospital or afterwards.  Has been tolerating food for the past few days.    2. Shortness of breath: None; with extended exertion.  3. Dizziness or lightheadedness: No  4. Any syncopal episodes or falls: No  5. Chest pain or chest tightness: No  6. Nausea, vomiting, diarrhea, constipation: Yes: diarrhea; states he has always had it but can sometimes go every 1-3 hours.    7. Swelling in the legs: No  8. Bloating in the abdomen: No  9. PND; Waking up at night coughing, choking or SOB: No; states however that he has never been a good sleeper.    10. Any medication intolerances: No; feels \"better' with starting Opsumit.    11. Reported headaches: No  12. Jaw pain or bone/joint pain: Yes: reports a mild pain in the jaw on both sides.    13. Current level of activity: limited by tolerance for exertion and fatigue from HD.      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 Asymmetric ventricular hypertrophy with mid cavity gradient  3. Pulmonary hypertension  4. ESRD with dialysis " dependence  5. Hypotension  6. Exertional lightheadedness  7. Pruritis     PAST MEDICAL HISTORY:  Past Medical History:   Diagnosis Date     Cardiomyopathy (H)      Dialysis patient (H)     M-W-F     Diarrhea     C diff     ESRD (end stage renal disease) (H)      HLD (hyperlipidemia)      Hypertension      Pulmonary hypertension (H) 11/21/2013       FAMILY HISTORY:  Family History   Problem Relation Age of Onset     Hypertension Mother      DIABETES Father        SOCIAL HISTORY:  Social History   Substance Use Topics     Smoking status: Never Smoker     Smokeless tobacco: Never Used     Alcohol use No       CURRENT MEDICATIONS:  Current Outpatient Prescriptions   Medication Sig Dispense Refill     midodrine HCl 10 MG TABS Take 20 mg by mouth 3 times daily 90 tablet 3     macitentan (OPSUMIT) 10 MG tablet Take 1 tablet (10 mg) by mouth daily 30 tablet 3     sildenafil (REVATIO/VIAGRA) 20 MG tablet Take 2 tablets (40 mg) by mouth 3 times daily 180 tablet 11     loratadine (CLARITIN) 10 MG tablet Take 1 tablet (10 mg) by mouth daily 90 tablet 3     hydrOXYzine (VISTARIL) 25 MG capsule Take 2 capsules (50 mg) by mouth 3 times daily as needed for itching 120 capsule 0     torsemide (DEMADEX) 20 MG tablet Take 3 tablets (60 mg) by mouth 2 times daily 90 tablet 3     omeprazole (PRILOSEC) 40 MG capsule Take 1 capsule (40 mg) by mouth daily Take 30-60 minutes before a meal. 90 capsule 3     aspirin 81 MG tablet Take 81 mg by mouth daily       B Complex-C-Folic Acid (RENAL) 1 MG CAPS Take 1 mg by mouth Take 1 capsule (1 mg) by mouth daily in the afternoon.  Indications: Nutritional Support       paricalcitol (ZEMPLAR) 5 MCG/ML injection 2 mcg 2 mcg by IV Push route dialysis.       calcium acetate (PHOSLO) 667 MG CAPS Take 2 capsules (1,334 mg) by mouth 3 times daily (with meals) 180 capsule      [DISCONTINUED] SILDENAFIL CITRATE PO Take 20 mg by mouth 3 times daily           ROS:   10 point ROS of systems including  "Constitutional, Eyes, Respiratory, Cardiovascular, Gastroenterology, Genitourinary, Integumentary, Muscularskeletal, Psychiatric were all negative except for pertinent positives noted in my HPI.    EXAM:  BP (!) 84/54 (BP Location: Right arm, Patient Position: Chair, Cuff Size: Adult Regular)  Pulse 59  Ht 1.753 m (5' 9\")  Wt 67.5 kg (148 lb 12.8 oz)  SpO2 93%  BMI 21.97 kg/m2  General: appears comfortable, alert and articulate  Head: normocephalic, atraumatic  Eyes: anicteric sclera, EOMI  Neck: no adenopathy  Orophyarynx: moist mucosa, no lesions, dentition intact  Heart: regular, systolic S1 murmur +2/6, loud P2, no gallop, no rub, estimated JVP at the clavicle  Lungs: clear, no rales or wheezing  Abdomen: soft, non-tender, bowel sounds present, no hepatosplenomegaly  Extremities: no clubbing, cyanosis or edema  Neurological: normal speech and affect, no gross motor deficits      Weight  Wt Readings from Last 10 Encounters:   06/12/17 67.5 kg (148 lb 12.8 oz)   06/05/17 74.3 kg (163 lb 14.4 oz)   05/31/17 71.3 kg (157 lb 3 oz)   05/30/17 75.1 kg (165 lb 8 oz)   05/04/17 70.5 kg (155 lb 8 oz)   04/28/17 71.2 kg (157 lb)   04/25/17 73 kg (161 lb)   04/25/17 73.2 kg (161 lb 6.4 oz)   04/12/17 70.9 kg (156 lb 4.9 oz)   04/06/17 74.2 kg (163 lb 9.6 oz)         Labs:    CBC RESULTS:  Lab Results   Component Value Date    WBC 5.5 06/08/2017    RBC 4.69 06/08/2017    HGB 14.8 06/08/2017    HCT 46.7 06/08/2017     06/08/2017    MCH 31.6 06/08/2017    MCHC 31.7 06/08/2017    RDW 18.4 (H) 06/08/2017     06/08/2017       CMP RESULTS:  Lab Results   Component Value Date     06/08/2017    POTASSIUM 4.6 06/08/2017    CHLORIDE 94 06/08/2017    CO2 26 06/08/2017    ANIONGAP 14 06/08/2017    GLC 74 06/08/2017    BUN 40 (H) 06/08/2017    CR 7.92 (H) 06/08/2017    GFRESTIMATED 7 (L) 06/08/2017    GFRESTBLACK 9 (L) 06/08/2017    JAZMINE 9.8 06/08/2017    BILITOTAL 3.0 (H) 06/08/2017    ALBUMIN 3.0 (L) 06/08/2017 "    ALKPHOS 94 06/08/2017    ALT 23 06/08/2017    AST 33 06/08/2017        INR RESULTS:  Lab Results   Component Value Date    INR 1.28 (H) 06/03/2017       BNP RESULTS:  Lab Results   Component Value Date    NTBNPI 03954 (H) 06/03/2017     No results found for: BNP      Recent Tests:      Echocardiogram (2/16/2016):  Interpretation Summary  Global and regional left ventricular function is normal with an EF of 60-65%.  Severe asymmetric LV hypertrophy (septum 1.4, PW 2.0). There is a dynamic mid  -ventricular gradient of 36 mmHg due to this.  Severe pulmonary hypertension is present. Right ventricular systolic pressure  is 83mmHg above the right atrial pressure. Paradoxical septal motion  consistent with right ventricular pressure and volume overload is present.  Estimated right atrial pressure is > 15 mmHg.  Global right ventricular function is mildly reduced. Mild right ventricular  dilation is present.  Moderate to severe functional tricuspid insufficiency is present. Mechanism  is annular dilation (5.5 cm).  Small circumferential pericardial effusion is present without any hemodynamic  significance.        US Abdomen Complete (2/02/2017):  Impression:   1. No focal liver lesion or parenchymal abnormality. There is  hepatosplenomegaly with decrease in mild ascites, likely sequela of  portal hypertension.   2. Adenomyomatosis/cholesterolosis of the gallbladder, otherwise  unremarkable.   3. Echogenic atrophic kidneys consistent with medical renal disease.  Bilateral renal cysts with simple appearance.  4. Patent Doppler evaluation with antegrade flow.      I have personally reviewed the examination and initial interpretation  and I agree with the findings.       RHC (6/3/2017):  RA- 25, 27, 25  RV-100, 25  PA- 105/45, 60  PAW- 8, 12, 10  PVR- 13.8  Kate CO/CI- 3.6 (1.9)       RHC (2/16/2016):  RA- 16, 17, 13  RV-85, 10  PA-85/20, 48  PAW-10  PVR-5.9  CO/CI-6.4 (3.4)  Kate CO/CI-5.7 (3.0)        Assessment and Plan:    Mr. Gill is a 46 year old male with a past medical history of pulmonary hypertension of unclear etiology (likely PAH) complicated by right ventricular failure; WHO Group I, NYHA Functional Class III.      1. PAH with RV failure.  Continue sildenafil 40 mg PO TID and Opsumit 10 mg daily. Return to clinic next week for close follow up.     2. Hypotension; lightheadedness upon activity.  Midodrine dosing in the hospital was 20 mg TID.  We changed this to 10 mg TID as Mr. Gill ran out over the weekend and had a stable blood pressure consistent for where he runs, today in clinic.  We would like to avoid supine hypertension and therefore reduced the dose.      3. ESRD on HD: followed by nephrology.      It was a pleasure seeing Mr. Gill at the [facility].    Please contact us with any questions or concerns that you may have.     Sincerely,  CAROLYN Villalpando, CNP.

## 2017-06-12 NOTE — PROGRESS NOTES
"June 12, 2017      Mr. Jayy Gill is a pleasant 46 year old male with a past medical history of hypertensive cardiomyopathy (versus cardiac cirrhosis), pulmonary hypertension, right heart failure, end-stage kidney disease currently on dialysis and PVOD. Patient has recently transitioned from Tracleer to Opsumit, and remains on sildenafil therapy.    Today, he is accompanied by: no one; here alone      Current Symptoms  1. Any ER visits or hospital admissions since last appointment: Yes: was in the hospital here for cardiogenic shock and right heart failure.  He was stabilized and then insisted on being discharged several days ago.  He did not eat in hospital or afterwards.  Has been tolerating food for the past few days.    2. Shortness of breath: None; with extended exertion.  3. Dizziness or lightheadedness: No  4. Any syncopal episodes or falls: No  5. Chest pain or chest tightness: No  6. Nausea, vomiting, diarrhea, constipation: Yes: diarrhea; states he has always had it but can sometimes go every 1-3 hours.    7. Swelling in the legs: No  8. Bloating in the abdomen: No  9. PND; Waking up at night coughing, choking or SOB: No; states however that he has never been a good sleeper.    10. Any medication intolerances: No; feels \"better' with starting Opsumit.    11. Reported headaches: No  12. Jaw pain or bone/joint pain: Yes: reports a mild pain in the jaw on both sides.    13. Current level of activity: limited by tolerance for exertion and fatigue from HD.      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 Asymmetric ventricular hypertrophy with mid cavity gradient  3. Pulmonary hypertension  4. ESRD with dialysis dependence  5. Hypotension  6. Exertional lightheadedness  7. Pruritis     PAST MEDICAL HISTORY:  Past Medical History:   Diagnosis Date     Cardiomyopathy (H)      Dialysis patient (H)     M-W-F     Diarrhea     C diff     ESRD (end stage renal disease) (H)      HLD (hyperlipidemia)      Hypertension      " Pulmonary hypertension (H) 11/21/2013       FAMILY HISTORY:  Family History   Problem Relation Age of Onset     Hypertension Mother      DIABETES Father        SOCIAL HISTORY:  Social History   Substance Use Topics     Smoking status: Never Smoker     Smokeless tobacco: Never Used     Alcohol use No       CURRENT MEDICATIONS:  Current Outpatient Prescriptions   Medication Sig Dispense Refill     midodrine HCl 10 MG TABS Take 20 mg by mouth 3 times daily 90 tablet 3     macitentan (OPSUMIT) 10 MG tablet Take 1 tablet (10 mg) by mouth daily 30 tablet 3     sildenafil (REVATIO/VIAGRA) 20 MG tablet Take 2 tablets (40 mg) by mouth 3 times daily 180 tablet 11     loratadine (CLARITIN) 10 MG tablet Take 1 tablet (10 mg) by mouth daily 90 tablet 3     hydrOXYzine (VISTARIL) 25 MG capsule Take 2 capsules (50 mg) by mouth 3 times daily as needed for itching 120 capsule 0     torsemide (DEMADEX) 20 MG tablet Take 3 tablets (60 mg) by mouth 2 times daily 90 tablet 3     omeprazole (PRILOSEC) 40 MG capsule Take 1 capsule (40 mg) by mouth daily Take 30-60 minutes before a meal. 90 capsule 3     aspirin 81 MG tablet Take 81 mg by mouth daily       B Complex-C-Folic Acid (RENAL) 1 MG CAPS Take 1 mg by mouth Take 1 capsule (1 mg) by mouth daily in the afternoon.  Indications: Nutritional Support       paricalcitol (ZEMPLAR) 5 MCG/ML injection 2 mcg 2 mcg by IV Push route dialysis.       calcium acetate (PHOSLO) 667 MG CAPS Take 2 capsules (1,334 mg) by mouth 3 times daily (with meals) 180 capsule      [DISCONTINUED] SILDENAFIL CITRATE PO Take 20 mg by mouth 3 times daily           ROS:   10 point ROS of systems including Constitutional, Eyes, Respiratory, Cardiovascular, Gastroenterology, Genitourinary, Integumentary, Muscularskeletal, Psychiatric were all negative except for pertinent positives noted in my HPI.    EXAM:  BP (!) 84/54 (BP Location: Right arm, Patient Position: Chair, Cuff Size: Adult Regular)  Pulse 59  Ht 1.753 m  "(5' 9\")  Wt 67.5 kg (148 lb 12.8 oz)  SpO2 93%  BMI 21.97 kg/m2  General: appears comfortable, alert and articulate  Head: normocephalic, atraumatic  Eyes: anicteric sclera, EOMI  Neck: no adenopathy  Orophyarynx: moist mucosa, no lesions, dentition intact  Heart: regular, systolic S1 murmur +2/6, loud P2, no gallop, no rub, estimated JVP at the clavicle  Lungs: clear, no rales or wheezing  Abdomen: soft, non-tender, bowel sounds present, no hepatosplenomegaly  Extremities: no clubbing, cyanosis or edema  Neurological: normal speech and affect, no gross motor deficits      Weight  Wt Readings from Last 10 Encounters:   06/12/17 67.5 kg (148 lb 12.8 oz)   06/05/17 74.3 kg (163 lb 14.4 oz)   05/31/17 71.3 kg (157 lb 3 oz)   05/30/17 75.1 kg (165 lb 8 oz)   05/04/17 70.5 kg (155 lb 8 oz)   04/28/17 71.2 kg (157 lb)   04/25/17 73 kg (161 lb)   04/25/17 73.2 kg (161 lb 6.4 oz)   04/12/17 70.9 kg (156 lb 4.9 oz)   04/06/17 74.2 kg (163 lb 9.6 oz)         Labs:    CBC RESULTS:  Lab Results   Component Value Date    WBC 5.5 06/08/2017    RBC 4.69 06/08/2017    HGB 14.8 06/08/2017    HCT 46.7 06/08/2017     06/08/2017    MCH 31.6 06/08/2017    MCHC 31.7 06/08/2017    RDW 18.4 (H) 06/08/2017     06/08/2017       CMP RESULTS:  Lab Results   Component Value Date     06/08/2017    POTASSIUM 4.6 06/08/2017    CHLORIDE 94 06/08/2017    CO2 26 06/08/2017    ANIONGAP 14 06/08/2017    GLC 74 06/08/2017    BUN 40 (H) 06/08/2017    CR 7.92 (H) 06/08/2017    GFRESTIMATED 7 (L) 06/08/2017    GFRESTBLACK 9 (L) 06/08/2017    JAZMINE 9.8 06/08/2017    BILITOTAL 3.0 (H) 06/08/2017    ALBUMIN 3.0 (L) 06/08/2017    ALKPHOS 94 06/08/2017    ALT 23 06/08/2017    AST 33 06/08/2017        INR RESULTS:  Lab Results   Component Value Date    INR 1.28 (H) 06/03/2017       BNP RESULTS:  Lab Results   Component Value Date    NTBNPI 61033 (H) 06/03/2017     No results found for: BNP      Recent Tests:      Echocardiogram " (2/16/2016):  Interpretation Summary  Global and regional left ventricular function is normal with an EF of 60-65%.  Severe asymmetric LV hypertrophy (septum 1.4, PW 2.0). There is a dynamic mid  -ventricular gradient of 36 mmHg due to this.  Severe pulmonary hypertension is present. Right ventricular systolic pressure  is 83mmHg above the right atrial pressure. Paradoxical septal motion  consistent with right ventricular pressure and volume overload is present.  Estimated right atrial pressure is > 15 mmHg.  Global right ventricular function is mildly reduced. Mild right ventricular  dilation is present.  Moderate to severe functional tricuspid insufficiency is present. Mechanism  is annular dilation (5.5 cm).  Small circumferential pericardial effusion is present without any hemodynamic  significance.        US Abdomen Complete (2/02/2017):  Impression:   1. No focal liver lesion or parenchymal abnormality. There is  hepatosplenomegaly with decrease in mild ascites, likely sequela of  portal hypertension.   2. Adenomyomatosis/cholesterolosis of the gallbladder, otherwise  unremarkable.   3. Echogenic atrophic kidneys consistent with medical renal disease.  Bilateral renal cysts with simple appearance.  4. Patent Doppler evaluation with antegrade flow.      I have personally reviewed the examination and initial interpretation  and I agree with the findings.       RHC (6/3/2017):  RA- 25, 27, 25  RV-100, 25  PA- 105/45, 60  PAW- 8, 12, 10  PVR- 13.8  Kate CO/CI- 3.6 (1.9)       RHC (2/16/2016):  RA- 16, 17, 13  RV-85, 10  PA-85/20, 48  PAW-10  PVR-5.9  CO/CI-6.4 (3.4)  Kate CO/CI-5.7 (3.0)        Assessment and Plan:   Mr. Gill is a 46 year old male with a past medical history of pulmonary hypertension of unclear etiology (likely PAH) complicated by right ventricular failure; WHO Group I, NYHA Functional Class III.      1. PAH with RV failure.  Continue sildenafil 40 mg PO TID and Opsumit 10 mg daily. Return to  clinic next week for close follow up.     2. Hypotension; lightheadedness upon activity.  Midodrine dosing in the hospital was 20 mg TID.  We changed this to 10 mg TID as Mr. Gill ran out over the weekend and had a stable blood pressure consistent for where he runs, today in clinic.  We would like to avoid supine hypertension and therefore reduced the dose.      3. ESRD on HD: followed by nephrology.      It was a pleasure seeing Mr. Gill at the [facility].    Please contact us with any questions or concerns that you may have.     Sincerely,  Graciela Penn, APRN, CNP.

## 2017-06-12 NOTE — PATIENT INSTRUCTIONS
Medication Changes:  Take Midodrine 10 mg, three times a day for your blood pressures.    Continue Opsumit medication, 10 mg daily.    Patient Instructions:  Please call us with any changes in your symptoms - including increased diarrhea, dizziness, chest pain, shortness of breath, or anything else that feels like it is worsening for you.    Follow up Appointment Information:  Please return to clinic next week on June 21st, 2017.      We are located on the third floor of [facility] on the [facility].  Our address is     [address] [place] [address] on [address] [place] [address]      Thank you for allowing us to be a part of your care here at the [facility]    If you have questions or concerns please contact us at:    Kelsi Stover RN, BSN    Hermann Laguerre (Schedule,P.A.)  Nurse Coordinator     Clinic   Pulmonary Hypertension   Pulmonary Hypertension  [facility]  (P)[phone]    (P) [phone]        (F)[phone]                ** Please note that you will NOT receive a reminder call regarding your scheduled testing, reminder calls are for provider appointments only.  If you are scheduled for testing within the [facility] system you may receive a call regarding pre-registration for billing purposes only.**     Remember to weigh yourself daily after voiding and before you consume any food or beverages and log the numbers.  If you have gained/lost 2 pounds overnight or 5 pounds in a week contact us immediately for medication adjustments or further instructions.  **Please call us immediately if you have any syncope, chest pain, edema, or decline in your functional status.

## 2017-06-12 NOTE — LETTER
[facility]  [address] [place] [address]  [phone]      June 12, 2017    Jayy Gill                                                                                                                     [address]  [place] [address]      Dear Jayy,    It was so nice to speak with you on 6/12/17.  I hope I was able to give you some useful information during our Medication Therapy Management (MTM) visit. The purpose of this visit with a clinical pharmacist was to review the medicines you received when discharged from the hospital. We want to make sure that you know which medicines to take and what they are for. We also want to make sure all your medicines are working, safe, and as easy to take as possible.    If you have not already, remember to contact your cardiologist Dr. Blank to follow-up on your most recent hospital stay and to discuss refills of those medications that were just changed. This information has also been shared with your primary care provider.    Feel free to call if you have any questions or concerns. By working together with you and your doctor, I hope to help you feel confident managing your medicines and improving your quality of life.       Geoff kessler,         Remy Hayden, PharmD  Medication Therapy Management Provider  Pager (voicemail): [phone]

## 2017-06-12 NOTE — Clinical Note
CAMILOI - reminded patient to contact Dr. Blank's clinic today as instructed in discharge summary  Remy Hayden PharmD Medication Therapy Management Provider Pager (voicemail): [phone]

## 2017-06-22 NOTE — MR AVS SNAPSHOT
"                  MRN:[id number]                      After Visit Summary   2017    Jayy Gill    MRN: [id number]           Visit Information        Provider Department      2017 12:00 PM Provider, [facility]; [facility] Specialty and Procedure        Your next 10 appointments already scheduled     Aug 29, 2017  9:00 AM CDT   Lab with  LAB   [facility] ([facility])    [address]  1st Floor  [facility] [address] [phone]            Aug 29, 2017 10:00 AM CDT   (Arrive by 9:45 AM)   Return General Liver with Rocky Rojo MD   [facility] ([facility])    [address]  3rd Floor  [facility] [address] [phone]              MyChart Information     [employer] lets you send messages to your doctor, view your test results, renew your prescriptions, schedule appointments and more. To sign up, go to [email or web address] . Click on \"Log in\" on the left side of the screen, which will take you to the Welcome page. Then click on \"Sign up Now\" on the right side of the page.     You will be asked to enter the access code listed below, as well as some personal information. Please follow the directions to create your username and password.     Your access code is: [email or web address]  Expires: 2017  3:55 PM     Your access code will  in 90 days. If you need help or a new code, please call your [facility] clinic or [phone].        Care EveryWhere ID     This is your Care EveryWhere ID. This could be used by other organizations to access your [facility] medical records  [id number]        Equal Access to Services     NONI WYLIE : Hadii sarthak Gary, koki huggins, chantal soriano, isa driver. So [facility] [phone].    ATENCIÓN: Si habla español, tiene a richardson disposición servicios gratuitos de asistencia lingüística. Llame al " [phone].    We comply with applicable federal civil rights laws and [place] laws. We do not discriminate on the basis of race, color, national origin, age, disability sex, sexual orientation or gender identity.

## 2017-06-22 NOTE — PROGRESS NOTES
Patient seen today by me in ATC. When I arrived I was informed by RN that patient's O2 at 80% when he arrived. She put a nasal cannula on him. His O2 was on and was at 92% when I saw him. RN had communicated with Dr. Freitas at this point who suggested to proceed with the paracentesis then send the patient to the ED. I agreed, I told Jayy that given his recent hospitalization where he left AMA and complex medical history, we are concerned of his poor vital sign. During our discussion, he tore off the nasal cannula and his O2 immediately dropped down to low 80%. Patient stated that he was not going to go to the ED although he understood that was my strong recommendation. Paracentesis procedure done by Malachi Strickland PA-C who stated the procedure itself was uncomplicated. I discussed with him the risks of not going to the ED. He denied chest pain, SOB, fevers, chills, or pain otherwise.  Pt signed AMA form and left.

## 2017-06-26 NOTE — PROGRESS NOTES
SUBJECTIVE/OBJECTIVE:                Jayy Gill is a 46 year old male called for a follow-up visit for Medication Therapy Management.  He was referred to me from transitions of care.     Chief Complaint: Follow up from our visit on 6/12/17.  Hospital Follow-up check in.   Allergies/ADRs: None  Tobacco: No tobacco use   Alcohol: none  Caffeine:  1 cups/day of coffee  Activity: Doing pretty good since he   PMH: Reviewed in Epic    Medication Adherence: no issues reported    PAH/RV Failure: Current medications include sildenafil 40 mg three times daily, torsemide 60 mg twice daily, aspirin 81 mg daily, midodrine 20 mg three times daily (this dose appears to have been changed to 10 mg TID, but patient states he is still taking 20 mg TID), and Opsumit 10 mg daily.  Pt states since starting the Opsumit he has noticed increase in coughing and has a constant runny nose.  States that these symptoms are very bothersome, but he does feel much better overall. Seen by Graciela Penn in the Pulmonary Hypertension Clinic on 6/12/17 and per plan was supposed to follow-up a week later.  Patient reports no current medication side effects.     Pt is not complaining of sx of HF.  Does note some swelling if standing a lot, but states this is transient. Does not produce much urine, but can have multiple bowel movements overnight. Pt is not measuring daily weights.   Patient does self-monitor BP. Home BP monitoring in range of 80-90's systolic over 50's diastolic (this is normal for him - denies any symptoms of hypotension)  Pt is somewhat following a low sodium diet, is avoiding EtOH.    Current labs include:  BP Readings from Last 3 Encounters:   06/22/17 (!) 79/58   06/12/17 (!) 84/54   06/08/17 90/59     Today's Vitals: There were no vitals taken for this visit. - telephone visit  Lab Results   Component Value Date    A1C 6.0 03/17/2016   .  Lab Results   Component Value Date    CHOL 68 03/15/2016     Lab Results   Component  Value Date    TRIG 64 03/15/2016     Lab Results   Component Value Date    HDL 30 03/15/2016     Lab Results   Component Value Date    LDL 25 03/15/2016       Liver Function Studies -   Recent Labs   Lab Test  06/08/17   1803   PROTTOTAL  7.5   ALBUMIN  3.0*   BILITOTAL  3.0*   ALKPHOS  94   AST  33   ALT  23     Last Basic Metabolic Panel:  Lab Results   Component Value Date     06/08/2017      Lab Results   Component Value Date    POTASSIUM 4.6 06/08/2017     Lab Results   Component Value Date    CHLORIDE 94 06/08/2017     Lab Results   Component Value Date    BUN 40 06/08/2017     Lab Results   Component Value Date    CR 7.92 06/08/2017     GFR Estimate If Black   Date Value Ref Range Status   06/08/2017 9 (L) >60 mL/min/1.7m2 Final     Comment:      GFR Calc   06/07/2017  >60 mL/min/1.7m2 Final    Canceled, Test credited   Patient refused collection  NOTIFIED LUIS HANSON RN [id number] 6/7/17 AM     06/06/2017 9 (L) >60 mL/min/1.7m2 Final     Comment:      GFR Calc     Most Recent Immunizations   Administered Date(s) Administered     Hepatitis B 09/06/2013     Influenza (IIV3) 10/01/2015     Pneumococcal 23 valent 07/30/2013       ASSESSMENT:              Current medications were reviewed today as discussed above.      Medication Adherence: needs improvement - see below    PAH/RV Failure: Needs Improvement. Pt is experiencing common side effects of Opsumit, but he is doing well and we discussed the risk vs benefit.  Pt is not taking midodrine as prescribed and may run out of medication if he continues to take as he is. Pt may benefit from follow-up as originally recommended during 6/12/17 cardiology visit.      PLAN:                  1. Discussed dosing of midodrine with patient.   2. Encouraged patient to follow-up with pulmonary hypertension clinic.     I spent 15 minutes with this patient today. A copy of the visit note was provided to the patient's primary care provider.      Will follow up as needed.    The patient was mailed a summary of these recommendations as an after visit summary.    Remy Hayden PharmD  Medication Therapy Management Provider  Pager (voicemail): [phone]

## 2017-06-26 NOTE — MR AVS SNAPSHOT
After Visit Summary   6/26/2017    Jayy Gill    MRN: [id number]           Patient Information     Date Of Birth          1970        Visit Information        Provider Department      6/26/2017 2:00 PM Sean Hayden [facility]        Today's Diagnoses     Congestive heart failure, unspecified congestive heart failure chronicity, unspecified congestive heart failure type (H)    -  1    Pulmonary hypertension          Care Instructions    Sent via Letter          Follow-ups after your visit        Your next 10 appointments already scheduled     Aug 29, 2017  9:00 AM CDT   Lab with  LAB   [facility] ([facility])    [address]  1st Floor  [place] [address] [phone]            Aug 29, 2017 10:00 AM CDT   (Arrive by 9:45 AM)   Return General Liver with Rocky Rojo MD   [facility]atology ([facility])    [address]  3rd Floor  [place] [address] [phone]              Who to contact     If you have questions or need follow up information about today's clinic visit or your schedule please contact [facility] directly at [phone].  Normal or non-critical lab and imaging results will be communicated to you by MyChart, letter or phone within 4 business days after the clinic has received the results. If you do not hear from us within 7 days, please contact the clinic through [employer] or phone. If you have a critical or abnormal lab result, we will notify you by phone as soon as possible.  Submit refill requests through [employer] or call your pharmacy and they will forward the refill request to us. Please allow 3 business days for your refill to be completed.          Additional Information About Your Visit        MyChart Information     [employer] lets you send messages to your doctor, view your test results, renew your prescriptions, schedule appointments and  "more. To sign up, go to [email or web address] . Click on \"Log in\" on the left side of the screen, which will take you to the Welcome page. Then click on \"Sign up Now\" on the right side of the page.     You will be asked to enter the access code listed below, as well as some personal information. Please follow the directions to create your username and password.     Your access code is: [email or web address]  Expires: 2017  3:55 PM     Your access code will  in 90 days. If you need help or a new code, please call your [facility] clinic or [phone].        Care EveryWhere ID     This is your Care EveryWhere ID. This could be used by other organizations to access your [facility] medical records  [id number]         Blood Pressure from Last 3 Encounters:   17 (!) 79/58   17 (!) 84/54   17 90/59    Weight from Last 3 Encounters:   17 148 lb 12.8 oz (67.5 kg)   17 163 lb 14.4 oz (74.3 kg)   17 157 lb 3 oz (71.3 kg)              Today, you had the following     No orders found for display       Primary Care Provider Office Phone # Fax #    Milly Eve Healy [phone]       [address]  [facility] [address]        Equal Access to Services     COLLINS WYLIE : Hadii aad ku hadasho Soomaali, waaxda luqadaha, qaybta kaalmada adeegyada, isa sanchez . So [facility] [phone].    ATENCIÓN: Si habla emiañol, tiene a richardson disposición servicios gratuitos de asistencia lingüística. Llame al [phone].    We comply with applicable federal civil rights laws and [place] laws. We do not discriminate on the basis of race, color, national origin, age, disability sex, sexual orientation or gender identity.            Thank you!     Thank you for choosing [facility]  for your care. Our goal is always to provide you with excellent care. Hearing back from our patients is one way we can continue to improve " our services. Please take a few minutes to complete the written survey that you may receive in the mail after your visit with us. Thank you!             Your Updated Medication List - Protect others around you: Learn how to safely use, store and throw away your medicines at www.disposemymeds.org.          This list is accurate as of: 6/26/17  5:12 PM.  Always use your most recent med list.                   Brand Name Dispense Instructions for use Diagnosis    aspirin 81 MG tablet      Take 81 mg by mouth daily        hydrOXYzine 25 MG capsule    VISTARIL    120 capsule    Take 2 capsules (50 mg) by mouth 3 times daily as needed for itching        loratadine 10 MG tablet    CLARITIN    90 tablet    Take 1 tablet (10 mg) by mouth daily    Itching       macitentan 10 MG tablet    OPSUMIT    30 tablet    Take 1 tablet (10 mg) by mouth daily    Pulmonary hypertension (H)       midodrine HCl 10 MG Tabs     180 tablet    Take 10 mg by mouth 3 times daily    Hemodialysis-associated hypotension       omeprazole 40 MG capsule    priLOSEC    90 capsule    Take 1 capsule (40 mg) by mouth daily Take 30-60 minutes before a meal.    Gastroesophageal reflux disease, esophagitis presence not specified       paricalcitol 5 MCG/ML injection    ZEMPLAR     2 mcg 2 mcg by IV Push route dialysis.    Esophageal reflux, Pulmonary hypertension (H), Renal failure       PHOSLO 667 MG Caps capsule   Generic drug:  calcium acetate     180 capsule    Take 2 capsules (1,334 mg) by mouth 3 times daily (with meals)    Other dyspnea and respiratory abnormality       RENAL 1 MG Caps      Take 1 mg by mouth Take 1 capsule (1 mg) by mouth daily in the afternoon.  Indications: Nutritional Support    Esophageal reflux, Pulmonary hypertension (H), Renal failure       sildenafil 20 MG tablet    REVATIO/VIAGRA    180 tablet    Take 2 tablets (40 mg) by mouth 3 times daily    Pulmonary hypertension (H)       torsemide 20 MG tablet    DEMADEX    90 tablet     Take 3 tablets (60 mg) by mouth 2 times daily    PAH (pulmonary artery hypertension) (H), Cardiomyopathy (H)

## 2017-06-26 NOTE — LETTER
[facility]  [address] [place] [address]  [phone]      June 26, 2017    Jayy Gill                                                                                                                     [address]  [place] [address]      Dear Jayy,    Thank you for taking the time to review your medications with me again.  Based upon our conversation and your 6/12/17 visit with Graciela Penn, the cardiology nurse practitioner, there may be a discrepancy between what you are taking and what you are prescribed for your midodrine dose (you stated you were taking midodrine 20 mg three times daily, but your chart states midodrine 10 mg three times daily).  During your 6/12/17 visit she also wrote in her plan to have you follow-up in one week, so I encourage you to schedule a follow-up to address this and any other concerns that you have.  Please feel free to contact me regarding any medication questions that you have in the future.      Sincerely,      Remy Hayden, [place]  Medication Therapy Management Provider  Pager (voicemail): [phone]

## 2017-06-26 NOTE — Clinical Note
FYI - encourage patient to follow-up with pulmonary hypertension clinic as previously directed.    Remy Hayden, EmilyD Medication Therapy Management Provider Pager (voicemail): [phone]

## 2017-06-29 NOTE — TELEPHONE ENCOUNTER
PA Initiation    Medication: Opsumit  Insurance Company: [employer] - Phone [phone] Fax [phone]  Pharmacy Filling the Rx: [facility] - [place], [place] - [address]  Filling Pharmacy Phone: [phone]  Filling Pharmacy Fax: [phone]  Start Date: 6/29/2017    [facility] Report was included with request.

## 2017-06-29 NOTE — TELEPHONE ENCOUNTER
Prior Authorization Specialty Medication Request    Medication/Dose: Opsumit  Frequency: Daily    Route: PO  Take one tablet (10 mg) by mouth daily. Quantity 30 tablets/30 days  Diagnosis and ICD: Idiopathic PAH   WHO Group: 1 NYHA FC: 3  New/Renewal/Insurance Change PA: New  Previously Tried and Failed Therapies:   *Revatio initiated: prior to 09/2013  *Tracleer initiated: 12/04/13 (stopped June 2017 - due to LFT noncompliance)  *Orenitram Initiated: 6/12/15 (stopped July 2015)  *Opsumit Initiated: 6/8/17

## 2017-07-06 NOTE — TELEPHONE ENCOUNTER
Prior Authorization Approval    Authorization Effective Date: 5/29/2017  Authorization Expiration Date: 6/29/2018  Medication: Opsumit - Approved  Approved Dose/Quantity: 30 tabs/30 days.  Reference #: [id number]/A    Insurance Company: [employer] - Phone [phone] Fax [phone]  Expected CoPay: $3.00  Which Pharmacy is filling the prescription (Not needed for infusion/clinic administered): [facility] - [place], [place] - [address] [place] [address]  Pharmacy Notified: Yes  Patient Notified: Yes    [facility] states medication is set to be delivered on 7/6/17. Pt is aware.

## 2017-07-10 NOTE — LETTER
"7/10/2017      RE: Jayy Gill  [address]  [place] [address]       Dear Colleague,    Thank you for the opportunity to participate in the care of your patient, Jayy Gill, at the [facility]. Please see a copy of my visit note below.    July 10, 2017      Mr. Jayy Gill is a pleasant 46 year old male with a past medical history of hypertensive cardiomyopathy (versus cardiac cirrhosis), pulmonary hypertension, right heart failure, end-stage kidney disease currently on dialysis and PVOD. Patient has recently transitioned from Tracleer to Opsumit, and remains on sildenafil therapy.     Today, he is accompanied by: no one; here alone      Current Symptoms  1. Any ER visits or hospital admissions since last appointment: No    2. Shortness of breath: No; more so will feel short of energy. Feels he cannot even climb the stairs if he has nothing to hold on to.    3. Dizziness or lightheadedness: No  4. Any syncopal episodes or falls: No  5. Chest pain or chest tightness: Yes: chest tightness occurs while laying down at times, intermittently and mild. He in general will feel tightness if he is lying flat.  He also feels like he \"can't exhale as good as he used to\" or breathe as deep as he used to.    6. Nausea, vomiting, diarrhea, constipation: Yes: vomiting every once in a while, mainly happens when he eats too much, or when food does not agree with him. No correlation to HD.    7. Swelling in the legs: Yes: has noticed a little more than normal, states he has been on his feet more working on cars, or riding in the car for extended periods of time.    8. Bloating in the abdomen: Yes: slight bloating in his stomach.  9. PND; Waking up at night coughing, choking or SOB: No  10. Any medication intolerances: No  11. Reported headaches: No      PAST MEDICAL HISTORY:  Past Medical History:   Diagnosis Date     Cardiomyopathy (H)      Dialysis patient (H)  "    M-W-F     Diarrhea     C diff     ESRD (end stage renal disease) (H)      HLD (hyperlipidemia)      Hypertension      Pulmonary hypertension (H) 11/21/2013         FAMILY HISTORY:  Family History   Problem Relation Age of Onset     Hypertension Mother      DIABETES Father          SOCIAL HISTORY:  Social History   Substance Use Topics     Smoking status: Never Smoker     Smokeless tobacco: Never Used     Alcohol use No         CURRENT MEDICATIONS:  Current Outpatient Prescriptions   Medication Sig Dispense Refill     midodrine HCl 10 MG TABS Take 10 mg by mouth 3 times daily (Patient taking differently: Take 20 mg by mouth 3 times daily ) 180 tablet 3     macitentan (OPSUMIT) 10 MG tablet Take 1 tablet (10 mg) by mouth daily 30 tablet 3     sildenafil (REVATIO/VIAGRA) 20 MG tablet Take 2 tablets (40 mg) by mouth 3 times daily 180 tablet 11     loratadine (CLARITIN) 10 MG tablet Take 1 tablet (10 mg) by mouth daily 90 tablet 3     hydrOXYzine (VISTARIL) 25 MG capsule Take 2 capsules (50 mg) by mouth 3 times daily as needed for itching 120 capsule 0     torsemide (DEMADEX) 20 MG tablet Take 3 tablets (60 mg) by mouth 2 times daily 90 tablet 3     omeprazole (PRILOSEC) 40 MG capsule Take 1 capsule (40 mg) by mouth daily Take 30-60 minutes before a meal. 90 capsule 3     aspirin 81 MG tablet Take 81 mg by mouth daily       B Complex-C-Folic Acid (RENAL) 1 MG CAPS Take 1 mg by mouth Take 1 capsule (1 mg) by mouth daily in the afternoon.  Indications: Nutritional Support       paricalcitol (ZEMPLAR) 5 MCG/ML injection 2 mcg 2 mcg by IV Push route dialysis.       calcium acetate (PHOSLO) 667 MG CAPS Take 2 capsules (1,334 mg) by mouth 3 times daily (with meals) 180 capsule      [DISCONTINUED] SILDENAFIL CITRATE PO Take 20 mg by mouth 3 times daily           ROS:   10 point ROS of systems including Constitutional, Eyes, Respiratory, Cardiovascular, Gastroenterology, Genitourinary, Integumentary, Muscularskeletal,  "Psychiatric were all negative except for pertinent positives noted in my HPI.    EXAM:  BP 93/57 (BP Location: Left arm, Patient Position: Chair, Cuff Size: Adult Small)  Pulse 56  Ht 1.753 m (5' 9\")  Wt 67.1 kg (148 lb)  SpO2 (!) 85%  BMI 21.86 kg/m2  General: appears comfortable, alert and articulate  Head: normocephalic, atraumatic  Eyes: anicteric sclera, EOMI  Neck: no adenopathy  Orophyarynx: moist mucosa, no lesions, dentition intact  Heart: regular, S1, loud P2, systolic +2/6 murmur, estimated JVP wnl  Lungs: clear, no rales or wheezing  Abdomen: soft, non-tender, bowel sounds present, no hepatosplenomegaly  Extremities: no clubbing, cyanosis or edema  Neurological: normal speech and affect, no gross motor deficits      Weight  Wt Readings from Last 10 Encounters:   07/10/17 67.1 kg (148 lb)   06/12/17 67.5 kg (148 lb 12.8 oz)   06/05/17 74.3 kg (163 lb 14.4 oz)   05/31/17 71.3 kg (157 lb 3 oz)   05/30/17 75.1 kg (165 lb 8 oz)   05/04/17 70.5 kg (155 lb 8 oz)   04/28/17 71.2 kg (157 lb)   04/25/17 73 kg (161 lb)   04/25/17 73.2 kg (161 lb 6.4 oz)   04/12/17 70.9 kg (156 lb 4.9 oz)         Labs:    CBC RESULTS:  Lab Results   Component Value Date    WBC 5.1 07/10/2017    RBC 4.51 07/10/2017    HGB 14.0 07/10/2017    HCT 44.1 07/10/2017    MCV 98 07/10/2017    MCH 31.0 07/10/2017    MCHC 31.7 07/10/2017    RDW 20.2 (H) 07/10/2017     (L) 07/10/2017       CMP RESULTS:  Lab Results   Component Value Date     06/08/2017    POTASSIUM 4.6 06/08/2017    CHLORIDE 94 06/08/2017    CO2 26 06/08/2017    ANIONGAP 14 06/08/2017    GLC 74 06/08/2017    BUN 40 (H) 06/08/2017    CR 7.92 (H) 06/08/2017    GFRESTIMATED 7 (L) 06/08/2017    GFRESTBLACK 9 (L) 06/08/2017    JAZMINE 9.8 06/08/2017    BILITOTAL 3.0 (H) 06/08/2017    ALBUMIN 3.0 (L) 06/08/2017    ALKPHOS 94 06/08/2017    ALT 23 06/08/2017    AST 33 06/08/2017        INR RESULTS:  Lab Results   Component Value Date    INR 1.28 (H) 06/03/2017       BNP " RESULTS:  Lab Results   Component Value Date    NTBNPI 55790 (H) 06/03/2017     No results found for: BNP      Recent Tests:    Echocardiogram (2/16/2016):  Interpretation Summary  Global and regional left ventricular function is normal with an EF of 60-65%.  Severe asymmetric LV hypertrophy (septum 1.4, PW 2.0). There is a dynamic mid  -ventricular gradient of 36 mmHg due to this.  Severe pulmonary hypertension is present. Right ventricular systolic pressure  is 83mmHg above the right atrial pressure. Paradoxical septal motion  consistent with right ventricular pressure and volume overload is present.  Estimated right atrial pressure is > 15 mmHg.  Global right ventricular function is mildly reduced. Mild right ventricular  dilation is present.  Moderate to severe functional tricuspid insufficiency is present. Mechanism  is annular dilation (5.5 cm).  Small circumferential pericardial effusion is present without any hemodynamic  significance.          US Abdomen Complete (2/02/2017):  Impression:   1. No focal liver lesion or parenchymal abnormality. There is  hepatosplenomegaly with decrease in mild ascites, likely sequela of  portal hypertension.   2. Adenomyomatosis/cholesterolosis of the gallbladder, otherwise  unremarkable.   3. Echogenic atrophic kidneys consistent with medical renal disease.  Bilateral renal cysts with simple appearance.  4. Patent Doppler evaluation with antegrade flow.      I have personally reviewed the examination and initial interpretation  and I agree with the findings.         RHC (6/3/2017):  RA- 25, 27, 25  RV-100, 25  PA- 105/45, 60  PAW- 8, 12, 10  PVR- 13.8  Kate CO/CI- 3.6 (1.9)         RHC (2/16/2016):  RA- 16, 17, 13  RV-85, 10  PA-85/20, 48  PAW-10  PVR-5.9  CO/CI-6.4 (3.4)  Kate CO/CI-5.7 (3.0)          Assessment and Plan:   Mr. Gill is a 46 year old male with a past medical history of pulmonary hypertension of unclear etiology (likely PAH) complicated by right  ventricular failure; WHO Group I, NYHA Functional Class III.      1. PAH with RV failure.  Continue sildenafil 40 mg PO TID and Opsumit 10 mg daily. A referral for pulmonary hypertension was sent to [facility], as Mr. Gill expressed wanting to start up the rehab program again. He will otherwise follow up with our clinic in 4 weeks or sooner if his condition should worsen.      2. Hypotension; lightheadedness upon activity.  Midodrine dosing in the hospital was 20 mg TID.   Upon discharge we sent Mr. Gill home on 10 mg TID.  However, he continued to take 20 mg TID as he felt 10 mg dosing was not enough to keep his dizziness and lightheadedness under control. His BP today in the clinic was >90 systolic.  A new prescription was sent for 20 mg TID      3. ESRD on HD: followed by nephrology.       It was a pleasure seeing Mr. Gill at the [facility].    Please contact us with any questions or concerns that you may have.      Sincerely,  Graciela Penn, APRN, CNP.

## 2017-07-10 NOTE — NURSING NOTE
Chief Complaint   Patient presents with     Follow Up For     Return for PH F/U with Labs prior     Vitals were taken and medications were reconciled.     Keri Young MA  3:08 PM

## 2017-07-10 NOTE — PATIENT INSTRUCTIONS
Medication Changes:  1. We reordered 20 mg Midodrine, three times a day, as you were last taking the medication in the hospital.    Patient Instructions:  1. Please call us if you should start to have an increase in headaches, dizziness or lightheadedness.    2. Someone from pulmonary rehabilitation will be calling you to set up an appointment.    Follow up Appointment Information:  1 Four week follow up with Dr. Blank or Graciela LEON, with labs.    We are located on the third [facility] on [facility].  Our address is     [address] [place] [address] on [address]rd [place] [address]      Thank you for allowing us to be a part of your care here at the [facility]    If you have questions or concerns please contact us at:    Kelsi Stover RN, BSN    Hermann Laguerre (Schedule,P.A.)  Nurse Coordinator     Clinic   Pulmonary Hypertension   Pulmonary Hypertension  [facility]  (P)[phone]    (P) [phone]        (F)[phone]                ** Please note that you will NOT receive a reminder call regarding your scheduled testing, reminder calls are for provider appointments only.  If you are scheduled for testing within the [employer] system you may receive a call regarding pre-registration for billing purposes only.**     Remember to weigh yourself daily after voiding and before you consume any food or beverages and log the numbers.  If you have gained/lost 2 pounds overnight or 5 pounds in a week contact us immediately for medication adjustments or further instructions.  **Please call us immediately if you have any syncope, chest pain, edema, or decline in your functional status.

## 2017-07-10 NOTE — MR AVS SNAPSHOT
After Visit Summary   7/10/2017    Jayy Gill    MRN: [id number]           Patient Information     Date Of Birth          1970        Visit Information        Provider Department      7/10/2017 3:00 PM Graciela Penn APRN [facility]        Today's Diagnoses     Primary pulmonary hypertension (H)    -  1    Hemodialysis-associated hypotension          Care Instructions    Medication Changes:  1. We reordered 20 mg Midodrine, three times a day, as you were last taking the medication in the hospital.    Patient Instructions:  1. Please call us if you should start to have an increase in headaches, dizziness or lightheadedness.    2. Someone from pulmonary rehabilitation will be calling you to set up an appointment.    Follow up Appointment Information:  1 Four week follow up with Dr. Blank or Graciela LEON, with labs.    We are located on the third floor of the [facility] ([facility] on the [facility].  Our address is     [address] [place] [address] [place] [address] [place] [address]      Thank you for allowing us to be a part of your care here at the [facility]    If you have questions or concerns please contact us at:    Kelsi Stover RN, BSN    Hermann Laguerre (Schedule,P.A.)  Nurse Coordinator     Clinic   Pulmonary Hypertension   Pulmonary Hypertension  [facility]  (P)[phone]    (P) [phone]        (F)[phone]                ** Please note that you will NOT receive a reminder call regarding your scheduled testing, reminder calls are for provider appointments only.  If you are scheduled for testing within the [facility] system you may receive a call regarding pre-registration for billing purposes only.**     Remember to weigh yourself daily after voiding and before you consume any food or beverages  and log the numbers.  If you have gained/lost 2 pounds overnight or 5 pounds in a week contact us immediately for medication adjustments or further instructions.  **Please call us immediately if you have any syncope, chest pain, edema, or decline in your functional status.            Follow-ups after your visit        Additional Services     PULMONARY REHAB REFERRAL                 Follow-up notes from your care team     Return in about 4 weeks (around 8/7/2017) for with Graciela, Return PH, Labs, with Rosamaria.      Your next 10 appointments already scheduled     Aug 21, 2017  1:30 PM CDT   Lab with [facility] LAB   [facility] ([facility])    [address] [place] [id number] [phone]            Aug 21, 2017  2:00 PM CDT   (Arrive by 1:45 PM)   RETURN PRIMARY PULMONARY with CAROLYN Pat [facility] ([facility])    [address] [place] [id number] [phone]            Aug 29, 2017  9:00 AM CDT   Lab with UC LAB   [facility] ([facility])    [address] [place] [id number]   [phone]            Aug 29, 2017 10:00 AM CDT   (Arrive by 9:45 AM)   Return General Liver with Rocky Rojo MD   [facility] ([facility])    [address] [place] [id number] [phone]              Future tests that were ordered for you today     Open Future Orders        Priority Expected Expires Ordered    PULMONARY REHAB REFERRAL Routine  7/10/2018 7/10/2017            Who to contact     If you have questions or need follow up information about today's clinic visit or your schedule please contact [facility] directly at [phone].  Normal or non-critical lab and imaging results will be communicated to you by MyChart, letter or phone within 4 business days after the clinic has  "received the results. If you do not hear from us within 7 days, please contact the clinic through [employer] or phone. If you have a critical or abnormal lab result, we will notify you by phone as soon as possible.  Submit refill requests through [employer] or call your pharmacy and they will forward the refill request to us. Please allow 3 business days for your refill to be completed.          Additional Information About Your Visit        [employer] Information     [employer] lets you send messages to your doctor, view your test results, renew your prescriptions, schedule appointments and more. To sign up, go to [email or web address] . Click on \"Log in\" on the left side of the screen, which will take you to the Welcome page. Then click on \"Sign up Now\" on the right side of the page.     You will be asked to enter the access code listed below, as well as some personal information. Please follow the directions to create your username and password.     Your access code is: [email or web address]  Expires: 10/5/2017  6:30 AM     Your access code will  in 90 days. If you need help or a new code, please call your [facility] clinic or [phone].        Care EveryWhere ID     This is your Care EveryWhere ID. This could be used by other organizations to access your [facility] medical records  [id number]        Your Vitals Were     Pulse Height Pulse Oximetry BMI (Body Mass Index)          56 1.753 m (5' 9\") 85% 21.86 kg/m2         Blood Pressure from Last 3 Encounters:   07/10/17 93/57   17 (!) 79/58   17 (!) 84/54    Weight from Last 3 Encounters:   07/10/17 67.1 kg (148 lb)   17 67.5 kg (148 lb 12.8 oz)   17 74.3 kg (163 lb 14.4 oz)                 Where to get your medicines      These medications were sent to [facility] [address] [facility] [place] - [address]  [address] [place] [address]     Phone:  [phone]     midodrine HCl 10 MG Tabs          Primary Care Provider " Office Phone # Fax #    Milly Eve Healy [phone]       [address]  [facility] [address]        Equal Access to Services     COLLINS DRIVER: Hadmaya sarthak donald kristinao Somyahali, waaxda luqadaha, qaybta kaalmada adeegyada, isa tabaresn liliam frazier laLylejalil driver. So [facility] [phone].    ATENCIÓN: Si habla español, tiene a richardson disposición servicios gratuitos de asistencia lingüística. Llame al [phone].    We comply with applicable federal civil rights laws and [place] laws. We do not discriminate on the basis of race, color, national origin, age, disability sex, sexual orientation or gender identity.            Thank you!     Thank you for choosing [facility]  for your care. Our goal is always to provide you with excellent care. Hearing back from our patients is one way we can continue to improve our services. Please take a few minutes to complete the written survey that you may receive in the mail after your visit with us. Thank you!             Your Updated Medication List - Protect others around you: Learn how to safely use, store and throw away your medicines at www.disposemymeds.org.          This list is accurate as of: 7/10/17  4:10 PM.  Always use your most recent med list.                   Brand Name Dispense Instructions for use Diagnosis    aspirin 81 MG tablet      Take 81 mg by mouth daily        hydrOXYzine 25 MG capsule    VISTARIL    120 capsule    Take 2 capsules (50 mg) by mouth 3 times daily as needed for itching        loratadine 10 MG tablet    CLARITIN    90 tablet    Take 1 tablet (10 mg) by mouth daily    Itching       macitentan 10 MG tablet    OPSUMIT    30 tablet    Take 1 tablet (10 mg) by mouth daily    Pulmonary hypertension (H)       midodrine HCl 10 MG Tabs     180 tablet    Take 20 mg by mouth 3 times daily    Hemodialysis-associated hypotension       omeprazole 40 MG capsule    priLOSEC    90 capsule    Take 1  capsule (40 mg) by mouth daily Take 30-60 minutes before a meal.    Gastroesophageal reflux disease, esophagitis presence not specified       paricalcitol 5 MCG/ML injection    ZEMPLAR     2 mcg 2 mcg by IV Push route dialysis.    Esophageal reflux, Pulmonary hypertension (H), Renal failure       PHOSLO 667 MG Caps capsule   Generic drug:  calcium acetate     180 capsule    Take 2 capsules (1,334 mg) by mouth 3 times daily (with meals)    Other dyspnea and respiratory abnormality       RENAL 1 MG Caps      Take 1 mg by mouth Take 1 capsule (1 mg) by mouth daily in the afternoon.  Indications: Nutritional Support    Esophageal reflux, Pulmonary hypertension (H), Renal failure       sildenafil 20 MG tablet    REVATIO/VIAGRA    180 tablet    Take 2 tablets (40 mg) by mouth 3 times daily    Pulmonary hypertension (H)       torsemide 20 MG tablet    DEMADEX    90 tablet    Take 3 tablets (60 mg) by mouth 2 times daily    PAH (pulmonary artery hypertension) (H), Cardiomyopathy (H)

## 2017-07-10 NOTE — PROGRESS NOTES
"July 10, 2017      Mr. Jayy Gill is a pleasant 46 year old male with a past medical history of hypertensive cardiomyopathy (versus cardiac cirrhosis), pulmonary hypertension, right heart failure, end-stage kidney disease currently on dialysis and PVOD. Patient has recently transitioned from Tracleer to Opsumit, and remains on sildenafil therapy.     Today, he is accompanied by: no one; here alone      Current Symptoms  1. Any ER visits or hospital admissions since last appointment: No    2. Shortness of breath: No; more so will feel short of energy. Feels he cannot even climb the stairs if he has nothing to hold on to.    3. Dizziness or lightheadedness: No  4. Any syncopal episodes or falls: No  5. Chest pain or chest tightness: Yes: chest tightness occurs while laying down at times, intermittently and mild. He in general will feel tightness if he is lying flat.  He also feels like he \"can't exhale as good as he used to\" or breathe as deep as he used to.    6. Nausea, vomiting, diarrhea, constipation: Yes: vomiting every once in a while, mainly happens when he eats too much, or when food does not agree with him. No correlation to HD.    7. Swelling in the legs: Yes: has noticed a little more than normal, states he has been on his feet more working on cars, or riding in the car for extended periods of time.    8. Bloating in the abdomen: Yes: slight bloating in his stomach.  9. PND; Waking up at night coughing, choking or SOB: No  10. Any medication intolerances: No  11. Reported headaches: No      PAST MEDICAL HISTORY:  Past Medical History:   Diagnosis Date     Cardiomyopathy (H)      Dialysis patient (H)     M-W-F     Diarrhea     C diff     ESRD (end stage renal disease) (H)      HLD (hyperlipidemia)      Hypertension      Pulmonary hypertension (H) 11/21/2013         FAMILY HISTORY:  Family History   Problem Relation Age of Onset     Hypertension Mother      DIABETES Father          SOCIAL HISTORY:  Social " "History   Substance Use Topics     Smoking status: Never Smoker     Smokeless tobacco: Never Used     Alcohol use No         CURRENT MEDICATIONS:  Current Outpatient Prescriptions   Medication Sig Dispense Refill     midodrine HCl 10 MG TABS Take 10 mg by mouth 3 times daily (Patient taking differently: Take 20 mg by mouth 3 times daily ) 180 tablet 3     macitentan (OPSUMIT) 10 MG tablet Take 1 tablet (10 mg) by mouth daily 30 tablet 3     sildenafil (REVATIO/VIAGRA) 20 MG tablet Take 2 tablets (40 mg) by mouth 3 times daily 180 tablet 11     loratadine (CLARITIN) 10 MG tablet Take 1 tablet (10 mg) by mouth daily 90 tablet 3     hydrOXYzine (VISTARIL) 25 MG capsule Take 2 capsules (50 mg) by mouth 3 times daily as needed for itching 120 capsule 0     torsemide (DEMADEX) 20 MG tablet Take 3 tablets (60 mg) by mouth 2 times daily 90 tablet 3     omeprazole (PRILOSEC) 40 MG capsule Take 1 capsule (40 mg) by mouth daily Take 30-60 minutes before a meal. 90 capsule 3     aspirin 81 MG tablet Take 81 mg by mouth daily       B Complex-C-Folic Acid (RENAL) 1 MG CAPS Take 1 mg by mouth Take 1 capsule (1 mg) by mouth daily in the afternoon.  Indications: Nutritional Support       paricalcitol (ZEMPLAR) 5 MCG/ML injection 2 mcg 2 mcg by IV Push route dialysis.       calcium acetate (PHOSLO) 667 MG CAPS Take 2 capsules (1,334 mg) by mouth 3 times daily (with meals) 180 capsule      [DISCONTINUED] SILDENAFIL CITRATE PO Take 20 mg by mouth 3 times daily           ROS:   10 point ROS of systems including Constitutional, Eyes, Respiratory, Cardiovascular, Gastroenterology, Genitourinary, Integumentary, Muscularskeletal, Psychiatric were all negative except for pertinent positives noted in my HPI.    EXAM:  BP 93/57 (BP Location: Left arm, Patient Position: Chair, Cuff Size: Adult Small)  Pulse 56  Ht 1.753 m (5' 9\")  Wt 67.1 kg (148 lb)  SpO2 (!) 85%  BMI 21.86 kg/m2  General: appears comfortable, alert and articulate  Head: " normocephalic, atraumatic  Eyes: anicteric sclera, EOMI  Neck: no adenopathy  Orophyarynx: moist mucosa, no lesions, dentition intact  Heart: regular, S1, loud P2, systolic +2/6 murmur, estimated JVP wnl  Lungs: clear, no rales or wheezing  Abdomen: soft, non-tender, bowel sounds present, no hepatosplenomegaly  Extremities: no clubbing, cyanosis or edema  Neurological: normal speech and affect, no gross motor deficits      Weight  Wt Readings from Last 10 Encounters:   07/10/17 67.1 kg (148 lb)   06/12/17 67.5 kg (148 lb 12.8 oz)   06/05/17 74.3 kg (163 lb 14.4 oz)   05/31/17 71.3 kg (157 lb 3 oz)   05/30/17 75.1 kg (165 lb 8 oz)   05/04/17 70.5 kg (155 lb 8 oz)   04/28/17 71.2 kg (157 lb)   04/25/17 73 kg (161 lb)   04/25/17 73.2 kg (161 lb 6.4 oz)   04/12/17 70.9 kg (156 lb 4.9 oz)         Labs:    CBC RESULTS:  Lab Results   Component Value Date    WBC 5.1 07/10/2017    RBC 4.51 07/10/2017    HGB 14.0 07/10/2017    HCT 44.1 07/10/2017    MCV 98 07/10/2017    MCH 31.0 07/10/2017    MCHC 31.7 07/10/2017    RDW 20.2 (H) 07/10/2017     (L) 07/10/2017       CMP RESULTS:  Lab Results   Component Value Date     06/08/2017    POTASSIUM 4.6 06/08/2017    CHLORIDE 94 06/08/2017    CO2 26 06/08/2017    ANIONGAP 14 06/08/2017    GLC 74 06/08/2017    BUN 40 (H) 06/08/2017    CR 7.92 (H) 06/08/2017    GFRESTIMATED 7 (L) 06/08/2017    GFRESTBLACK 9 (L) 06/08/2017    JAZMINE 9.8 06/08/2017    BILITOTAL 3.0 (H) 06/08/2017    ALBUMIN 3.0 (L) 06/08/2017    ALKPHOS 94 06/08/2017    ALT 23 06/08/2017    AST 33 06/08/2017        INR RESULTS:  Lab Results   Component Value Date    INR 1.28 (H) 06/03/2017       BNP RESULTS:  Lab Results   Component Value Date    NTBNPI 30415 (H) 06/03/2017     No results found for: BNP      Recent Tests:    Echocardiogram (2/16/2016):  Interpretation Summary  Global and regional left ventricular function is normal with an EF of 60-65%.  Severe asymmetric LV hypertrophy (septum 1.4, PW 2.0).  There is a dynamic mid  -ventricular gradient of 36 mmHg due to this.  Severe pulmonary hypertension is present. Right ventricular systolic pressure  is 83mmHg above the right atrial pressure. Paradoxical septal motion  consistent with right ventricular pressure and volume overload is present.  Estimated right atrial pressure is > 15 mmHg.  Global right ventricular function is mildly reduced. Mild right ventricular  dilation is present.  Moderate to severe functional tricuspid insufficiency is present. Mechanism  is annular dilation (5.5 cm).  Small circumferential pericardial effusion is present without any hemodynamic  significance.          US Abdomen Complete (2/02/2017):  Impression:   1. No focal liver lesion or parenchymal abnormality. There is  hepatosplenomegaly with decrease in mild ascites, likely sequela of  portal hypertension.   2. Adenomyomatosis/cholesterolosis of the gallbladder, otherwise  unremarkable.   3. Echogenic atrophic kidneys consistent with medical renal disease.  Bilateral renal cysts with simple appearance.  4. Patent Doppler evaluation with antegrade flow.      I have personally reviewed the examination and initial interpretation  and I agree with the findings.         RHC (6/3/2017):  RA- 25, 27, 25  RV-100, 25  PA- 105/45, 60  PAW- 8, 12, 10  PVR- 13.8  Kate CO/CI- 3.6 (1.9)         RHC (2/16/2016):  RA- 16, 17, 13  RV-85, 10  PA-85/20, 48  PAW-10  PVR-5.9  CO/CI-6.4 (3.4)  Kate CO/CI-5.7 (3.0)          Assessment and Plan:   Mr. Gill is a 46 year old male with a past medical history of pulmonary hypertension of unclear etiology (likely PAH) complicated by right ventricular failure; WHO Group I, NYHA Functional Class III.      1. PAH with RV failure.  Continue sildenafil 40 mg PO TID and Opsumit 10 mg daily. A referral for pulmonary hypertension was sent to [facility], as Mr. Gill expressed wanting to start up the rehab program again. He will otherwise follow up with our  clinic in 4 weeks or sooner if his condition should worsen.      2. Hypotension; lightheadedness upon activity.  Midodrine dosing in the hospital was 20 mg TID.  Upon discharge we sent Mr. Gill home on 10 mg TID.  However, he continued to take 20 mg TID as he felt 10 mg dosing was not enough to keep his dizziness and lightheadedness under control. His BP today in the clinic was >90 systolic.  A new prescription was sent for 20 mg TID      3. ESRD on HD: followed by nephrology.       It was a pleasure seeing Mr. Gill at the [facility].    Please contact us with any questions or concerns that you may have.      Sincerely,  Graciela Penn, APRN, CNP.

## 2017-08-16 NOTE — TELEPHONE ENCOUNTER
PA Initiation    Medication: High Dose Sildenafil  Insurance Company: [place] Medicaid ([facility]) - Phone [phone] Fax [phone]  Pharmacy Filling the Rx: [facility] [place], [place] - [address] [place] [address] [phone]  Filling Pharmacy Phone: [phone]  Filling Pharmacy Fax: [phone]  Start Date: 8/16/2017    Urgent request has been faxed to [facility] for Sildenafil PA. C report and most recent clinic note were included with request.

## 2017-08-16 NOTE — TELEPHONE ENCOUNTER
Prior Authorization Specialty Medication Request     Medication/Dose: Sildenafil  Frequency: Daily    Route: PO  Take 2 tablets (40 mg) by mouth three times daily. Quantity: 180 tabs/30 Days  Diagnosis and ICD: Idiopathic PAH   WHO Group: 1 NYHA FC: 3  New/Renewal/Insurance Change PA: New  Previously Tried and Failed Therapies:   *Revatio initiated: prior to 09/2013  *Tracleer initiated: 12/04/13 (stopped June 2017 - due to LFT noncompliance)  *Orenitram Initiated: 6/12/15 (stopped July 2015)  *Opsumit Initiated: 6/8/17

## 2017-08-16 NOTE — TELEPHONE ENCOUNTER
Prior Authorization Approval    Authorization Effective Date: 8/1/2017  Authorization Expiration Date: 7/26/2018  Medication: Opsumit - Approved  Approved Dose/Quantity: 30 Tabs/30 Days  Reference #: [id number]   Insurance Company: [place] Medicaid ([facility]) - Phone [phone] Fax [phone]  Expected CoPay: $3.00     Which Pharmacy is filling the prescription (Not needed for infusion/clinic administered): [facility] - [place], [place] - [address] [place] [address]  Pharmacy Notified: Yes  Patient Notified: Yes    [facility] states medication has been processed and is to be delivered on 8/17/17. "[employer] was contacted and notified that PA was approved. At this time the pt will not be enrolled in the Bridge Program as he will receive his medication from [facility].

## 2017-08-16 NOTE — TELEPHONE ENCOUNTER
PA Initiation    Medication: Opsumit  Insurance Company: [place] Medicaid ([facility]) - Phone [phone] Fax [phone]  Pharmacy Filling the Rx: [facility] - [place], [place] - [address] [facility]  Filling Pharmacy Phone: [phone]  Filling Pharmacy Fax: [phone]  Start Date: 8/16/2017    Urgent PA request was faxed to [facility]. Most recent clinic note and RHC report were included with request. Additionally, [employer] was contacted to enroll pt in bridge program while PA is being processed as the pt states he is currently out of medication.

## 2017-08-18 NOTE — TELEPHONE ENCOUNTER
Prior Authorization Approval    Authorization Effective Date: 8/1/2017  Authorization Expiration Date: 7/26/2018  Medication: High Dose Sildenafil - Approved  Approved Dose/Quantity: 180 tabs/30 Days  Reference #: [id number]   Insurance Company: [place] Medicaid ([facility]) - Phone [phone] Fax [phone]  Expected CoPay: N/A - Pharmacy states refill too soon. Can be refilled 8/20/17.       CoPay Card Available:      Foundation Assistance Needed:    Which Pharmacy is filling the prescription (Not needed for infusion/clinic administered): [facility] [place], [place] - [address] [facility] [phone]  Pharmacy Notified: Yes  Patient Notified: Yes

## 2017-08-24 NOTE — PROGRESS NOTES
Paracentesis Nursing Note  Jayy Gill [facility].    During today's appointment orders from Rocky Rojo MD were completed.    Progress Note:  Patient identification verified by name and date of birth.  Assessment completed.  Vitals monitored throughout appointment and recorded in Doc Flowsheets.  See proceduralist note in ultrasound.    Date of consent or authorization: 08/24/2017.  Invasive Procedure Safety Checklist was completed and sent for scanning.     Paracentesis performed by Cristino Strickland PA-C Radiology.    The following labs were communicated to provider performing paracentesis:  Lab Results   Component Value Date     08/24/2017       Total amount of ascites fluid drained: 3.6 liters.  Color of ascites fluid: yellow.  Total amount of albumin given: 50  grams.    Patient tolerated procedure well.    Post procedure,denies pain or discomfort post paracentesis.      Discharge Plan:  Discharge instructions were reviewed with patient.  Patient/Representative verbalized understanding and all questions were answered.   Discharged from [facility] in stable condition.    Prieto Nieves RN       Administrations This Visit     albumin human 25 % injection 12.5 g     Admin Date Action Dose Route Administered By             08/24/2017 New Bag 12.5 g Intravenous Prieto Nieves RN              Admin Date Action Dose Route Administered By             08/24/2017 New Bag 12.5 g Intravenous Prieto Nieves RN              Admin Date Action Dose Route Administered By             08/24/2017 New Bag 12.5 g Intravenous Prieto Nieves RN              Admin Date Action Dose Route Administered By             08/24/2017 New Bag 12.5 g Intravenous Prieto Nieves RN                    lidocaine 1 % 20 mL     Admin Date Action Dose Route Administered By             08/24/2017 Given by Other Clinician 20 mL  Injection Prieto Nieves, RN                            BP 93/65  Temp 97.3  F (36.3  C) (Oral)  Resp 16  Wt 66.5 kg (146 lb 11.2 oz)  BMI 21.66 kg/m2

## 2017-08-24 NOTE — LETTER
August 25, 2017       TO: Jayy Gill  [address]  [place] [address]       Dear Mr. Gill,    We are writing to inform you of your test results.  No significant change.    Resulted Orders   Platelet count   Result Value Ref Range    Platelet Count 131 (L) 150 - 450 10e9/L   Comprehensive metabolic panel   Result Value Ref Range    Sodium 136 133 - 144 mmol/L    Potassium 3.7 3.4 - 5.3 mmol/L    Chloride 97 94 - 109 mmol/L    Carbon Dioxide 31 20 - 32 mmol/L    Anion Gap 8 3 - 14 mmol/L    Glucose 95 70 - 99 mg/dL    Urea Nitrogen 17 7 - 30 mg/dL    Creatinine 4.47 (H) 0.66 - 1.25 mg/dL    GFR Estimate 14 (L) >60 mL/min/1.7m2      Comment:      Non  GFR Calc    GFR Estimate If Black 17 (L) >60 mL/min/1.7m2      Comment:       GFR Calc    Calcium 8.8 8.5 - 10.1 mg/dL    Bilirubin Total 2.4 (H) 0.2 - 1.3 mg/dL    Albumin 3.0 (L) 3.4 - 5.0 g/dL    Protein Total 7.7 6.8 - 8.8 g/dL    Alkaline Phosphatase 115 40 - 150 U/L    ALT 25 0 - 70 U/L    AST 29 0 - 45 U/L   N terminal pro BNP outpatient   Result Value Ref Range    N-Terminal Pro Bnp 81166 (H) 0 - 125 pg/mL      Comment:         Reference range shown and results flagged as abnormal are for the outpatient,   non acute settings. Establishing a baseline value for each individual patient   is useful for follow-up.  Suggested inpatient cut points for confirming diagnosis of CHF in an acute   setting are:   >450 pg/mL (age 18 to less than 50)   >900 pg/mL (age 50 to less than 75)   >1800 pg/mL (75 yrs and older)  An inpatient or emergency department NT-proPBNP <300 pg/mL effectively rules   out acute CHF, with 99% negative predictive value.            It was a pleasure to see you at your recent visit. Please let me know if you have any questions or concerns.     Clinic Staff - [phone]     Sincerely,     Rocky Rojo MD  9 [facility] [address] [place] [address]

## 2017-08-24 NOTE — MR AVS SNAPSHOT
After Visit Summary   8/24/2017    Jayy Gill    MRN: [id number]           Patient Information     Date Of Birth          1970        Visit Information        Provider Department      8/24/2017 7:30 AM Provider, Uc Spec Inf Para;  39 ATC [facility] Special[facility] Procedure        Today's Diagnoses     Disorder of liver    -  1    CHF (congestive heart failure) (H)        Pulmonary hypertension           Follow-ups after your visit        Your next 10 appointments already scheduled     Aug 24, 2017 12:00 PM CDT   Pulmonary Treatment with  Pulmonary Rehab 2   [facility] ([facility])    [address]  [facility] [address]   [phone]            Aug 29, 2017  9:00 AM CDT   Lab with [facility]   [facility] ([facility])    [address] [place] [address]  1st Floor  [facility] [address]   [phone]            Aug 29, 2017 10:00 AM CDT   (Arrive by 9:45 AM)   Return General Liver with Rocky Rojo MD   [facility] Hepatology ([facility])    [address] [place] [address]  3rd Floor  [facility] [address]   [phone]            Aug 29, 2017 12:00 PM CDT   Pulmonary Treatment with  Pulmonary Rehab 2   [facility] ([facility])    [address]  [facility] [address]   [phone]            Aug 31, 2017  7:30 AM CDT   Paracentesis Visit with Uc Spec Inf Para Provider,  39 ATC   [facility] Specialty [facility] Procedure ([facility])    [address] [facility]  2nd Floor  [facility] [address]   [phone]            Aug 31, 2017 12:00 PM CDT   Pulmonary Treatment with Sh Pulmonary Rehab 2   [facility] ([facility])    [address]  [facility] [address]   [phone]            Sep 05, 2017 12:00 PM CDT   Pulmonary Treatment with  "Sh Pulmonary Rehab 2   [facility] ([facility])    [address]  [place] [address]   [phone]            Sep 07, 2017 12:00 PM CDT   Pulmonary Treatment with Sh Pulmonary Rehab 2   [facility] ([facility])    [address]  [place] [address]   [phone]            Sep 12, 2017 12:00 PM CDT   Pulmonary Treatment with Sh Pulmonary Rehab 2   [facility] ([facility])    [address]  [place] [address]   [phone]              Who to contact     If you have questions or need follow up information about today's clinic visit or your schedule please contact [facility] SPECIAL[facility] PROCED[facility] directly at [phone].  Normal or non-critical lab and imaging results will be communicated to you by [employer], letter or phone within 4 business days after the clinic has received the results. If you do not hear from us within 7 days, please contact the clinic through [employer] or phone. If you have a critical or abnormal lab result, we will notify you by phone as soon as possible.  Submit refill requests through [employer] or call your pharmacy and they will forward the refill request to us. Please allow 3 business days for your refill to be completed.          Additional Information About Your Visit        [employer] Information     [employer] lets you send messages to your doctor, view your test results, renew your prescriptions, schedule appointments and more. To sign up, go to [email or web address] . Click on \"Log in\" on the left side of the screen, which will take you to the Welcome page. Then click on \"Sign up Now\" on the right side of the page.     You will be asked to enter the access code listed below, as well as some personal information. Please follow the directions to create your username and password.     Your access code " is: [email or web address]  Expires: 10/5/2017  6:30 AM     Your access code will  in 90 days. If you need help or a new code, please call your [place] clinic or [phone].        Care EveryWhere ID     This is your Care EveryWhere ID. This could be used by other organizations to access your [place] medical records  [id number]        Your Vitals Were     Temperature Respirations BMI (Body Mass Index)             97.3  F (36.3  C) (Oral) 16 21.66 kg/m2          Blood Pressure from Last 3 Encounters:   17 91/61   07/10/17 93/57   17 (!) 79/58    Weight from Last 3 Encounters:   17 66.5 kg (146 lb 11.2 oz)   07/10/17 67.1 kg (148 lb)   17 67.5 kg (148 lb 12.8 oz)              We Performed the Following     CBC with platelets     Comprehensive metabolic panel     N terminal pro BNP outpatient     Platelet count     US Paracentesis        Primary Care Provider Office Phone # Fax #    Milly Eve Healy [phone]       [address] [place] [address] 741  [facility] [address]        Equal Access to Services     [facility]LL[facility] WYLIE AH: Hadii sarthak acevedoo Sorhonda, waaxda luqadaha, qaybta kaalmada adeegyada, isa driver. So [place] [phone].    ATENCIÓN: Si habla español, tiene a richardson disposición servicios gratuitos de asistencia lingüística. Llame al [phone].    We comply with applicable federal civil rights laws and [place] laws. We do not discriminate on the basis of race, color, national origin, age, disability sex, sexual orientation or gender identity.            Thank you!     Thank you for choosing [facility] SPECIALTY AND PROCEDURE  for your care. Our goal is always to provide you with excellent care. Hearing back from our patients is one way we can continue to improve our services. Please take a few minutes to complete the written survey that you may receive in the mail after your visit with us. Thank you!              Your Updated Medication List - Protect others around you: Learn how to safely use, store and throw away your medicines at www.disposemymeds.org.          This list is accurate as of: 8/24/17  9:31 AM.  Always use your most recent med list.                   Brand Name Dispense Instructions for use Diagnosis    aspirin 81 MG tablet      Take 81 mg by mouth daily        hydrOXYzine 25 MG capsule    VISTARIL    120 capsule    Take 2 capsules (50 mg) by mouth 3 times daily as needed for itching        loratadine 10 MG tablet    CLARITIN    90 tablet    Take 1 tablet (10 mg) by mouth daily    Itching       macitentan 10 MG tablet    OPSUMIT    30 tablet    Take 1 tablet (10 mg) by mouth daily    Pulmonary hypertension (H)       midodrine HCl 10 MG Tabs     180 tablet    Take 20 mg by mouth 3 times daily    Hemodialysis-associated hypotension       omeprazole 40 MG capsule    priLOSEC    90 capsule    Take 1 capsule (40 mg) by mouth daily Take 30-60 minutes before a meal.    Gastroesophageal reflux disease, esophagitis presence not specified       paricalcitol 5 MCG/ML injection    ZEMPLAR     2 mcg 2 mcg by IV Push route dialysis.    Esophageal reflux, Pulmonary hypertension (H), Renal failure       PHOSLO 667 MG Caps capsule   Generic drug:  calcium acetate     180 capsule    Take 2 capsules (1,334 mg) by mouth 3 times daily (with meals)    Other dyspnea and respiratory abnormality       RENAL 1 MG Caps      Take 1 mg by mouth Take 1 capsule (1 mg) by mouth daily in the afternoon.  Indications: Nutritional Support    Esophageal reflux, Pulmonary hypertension (H), Renal failure       sildenafil 20 MG tablet    REVATIO/VIAGRA    180 tablet    Take 2 tablets (40 mg) by mouth 3 times daily    Pulmonary hypertension (H)       torsemide 20 MG tablet    DEMADEX    90 tablet    Take 3 tablets (60 mg) by mouth 2 times daily    PAH (pulmonary artery hypertension) (H), Cardiomyopathy (H)

## 2017-09-21 NOTE — MR AVS SNAPSHOT
MRN:[id number]                      After Visit Summary   9/21/2017    Jayy Gill    MRN: [id number]           Visit Information        Provider Department      9/21/2017 2:00 PM Provider, [place] Spec Inf Para;  39 [facility] Specialty and Procedure           Review of your medicines          These changes are accurate as of: 9/21/17  3:58 PM.  If you have any questions, ask your nurse or doctor.               CONTINUE these medicines which have NOT CHANGED        Dose / Directions    aspirin 81 MG tablet        Dose:  81 mg   Take 81 mg by mouth daily   Refills:  0       hydrOXYzine 25 MG capsule   Commonly known as:  VISTARIL        Dose:  50 mg   Take 2 capsules (50 mg) by mouth 3 times daily as needed for itching   Quantity:  120 capsule   Refills:  0       loratadine 10 MG tablet   Commonly known as:  CLARITIN   Used for:  Itching        Dose:  10 mg   Take 1 tablet (10 mg) by mouth daily   Quantity:  90 tablet   Refills:  3       macitentan 10 MG tablet   Commonly known as:  OPSUMIT   Used for:  Pulmonary hypertension (H)        Dose:  10 mg   Take 1 tablet (10 mg) by mouth daily   Quantity:  30 tablet   Refills:  3       midodrine HCl 10 MG Tabs   Used for:  Hemodialysis-associated hypotension        Dose:  20 mg   Take 20 mg by mouth 3 times daily   Quantity:  180 tablet   Refills:  3       omeprazole 40 MG capsule   Commonly known as:  priLOSEC   Used for:  Gastroesophageal reflux disease, esophagitis presence not specified        Dose:  40 mg   Take 1 capsule (40 mg) by mouth daily Take 30-60 minutes before a meal.   Quantity:  90 capsule   Refills:  3       paricalcitol 5 MCG/ML injection   Commonly known as:  ZEMPLAR   Used for:  Esophageal reflux, Pulmonary hypertension (H), Renal failure        Dose:  2 mcg   2 mcg 2 mcg by IV Push route dialysis.   Refills:  0       PHOSLO 667 MG Caps capsule   Used for:  Other dyspnea and respiratory abnormality   Generic  drug:  calcium acetate        Dose:  1334 mg   Take 2 capsules (1,334 mg) by mouth 3 times daily (with meals)   Quantity:  180 capsule   Refills:  0       RENAL 1 MG Caps   Used for:  Esophageal reflux, Pulmonary hypertension (H), Renal failure        Dose:  1 mg   Take 1 mg by mouth Take 1 capsule (1 mg) by mouth daily in the afternoon.  Indications: Nutritional Support   Refills:  0       sildenafil 20 MG tablet   Commonly known as:  REVATIO   Used for:  Pulmonary hypertension (H)        Dose:  40 mg   Take 2 tablets (40 mg) by mouth 3 times daily   Quantity:  180 tablet   Refills:  11       torsemide 20 MG tablet   Commonly known as:  DEMADEX   Used for:  PAH (pulmonary artery hypertension) (H), Cardiomyopathy (H)        Dose:  60 mg   Take 3 tablets (60 mg) by mouth 2 times daily   Quantity:  90 tablet   Refills:  3                Protect others around you: Learn how to safely use, store and throw away your medicines at www.disposemymeds.org.         Follow-ups after your visit        Your next 10 appointments already scheduled     Sep 26, 2017 12:00 PM CDT   Pulmonary Treatment with  Pulmonary Rehab 2   [facility])    [address]  [facility] [address]   [phone]            Sep 28, 2017 12:00 PM CDT   Pulmonary Treatment with  Pulmonary Rehab 2   [facility] ([facility])    [address]  [facility] [address]   [phone]            Oct 03, 2017 12:00 PM CDT   Pulmonary Treatment with  Pulmonary Rehab 2   [facility] ([facility])    [address]  [facility] [address]   [phone]            Oct 05, 2017 12:00 PM CDT   Pulmonary Treatment with  Pulmonary Rehab 2   [facility] ([facility])    [address]  [facility] [address]   [phone]            Oct 12, 2017  12:00 PM CDT   Paracentesis Visit with Uc Spec Inf Para Provider, UC 40 ATC   [facility] Specialty and Procedure ([facility])    [address]  2nd Floor  [facility] [address]   [phone]            Nov 02, 2017  9:30 AM CDT   Paracentesis Visit with Uc Spec Inf Para Provider, UC 39 ATC   [facility] Specialty and Procedure ([facility])    [address]  2nd [facility] [address]   [phone]               Care Instructions        Care Instructions      Discharge Instructions for Paracentesis  Paracentesis is a procedure to remove extra fluid from your belly (abdomen). This fluid buildup in the abdomen is called ascites. The procedure may have been done to take a sample of the fluid. Or, it may have been done to drain the extra fluid from your abdomen and help make you more comfortable.     Ascites is buildup of excess fluid in the abdomen.   Home care    If you have pain after the procedure, your healthcare provider can prescribe or recommend pain medicines. Take these exactly as directed. If you stopped taking other medicines before the procedure, ask your provider when you can start them again.    Take it easy for 24 hours after the procedure. Avoid physical activity until your provider says it s OK.    You will have a small bandage over the puncture site. Stitches (sutures), surgical staples, adhesive tapes, adhesive strips, or surgical glue may be used to close the incision. They also help stop bleeding and speed healing. You may take the bandage off in 24 hours.    Check the puncture site for the signs of infection listed below.  Follow-up care  Make a follow-up appointment with your healthcare provider as directed. During your follow-up visit, your provider will check your healing. Let your provider know how you are feeling. You can also discuss the cause of your ascites and if  "you need any further treatment.  When to seek medical advice  Call your healthcare provider if you have any of the following after the procedure:    A fever of 100.4 F (38.0 C) or higher    Trouble breathing    Pain that doesn't go away even after taking pain medicine    Belly pain not caused by having the skin punctured    Bleeding from the puncture site    More than a small amount of fluid leaking from the puncture site    Swollen belly    Signs of infection at the puncture site. These include increased pain, redness, or swelling, warmth, or bad-smelling drainage.    Blood in your urine    Feeling dizzy or lightheaded, or fainting   Date Last Reviewed: 2016-2017 [employer]. [address] [place] [address], [place] [address]. All rights reserved. This information is not intended as a substitute for professional medical care. Always follow your healthcare professional's instructions.                Additional Information About Your Visit        MyChart Information     [employer] lets you send messages to your doctor, view your test results, renew your prescriptions, schedule appointments and more. To sign up, go to [email or web address] . Click on \"Log in\" on the left side of the screen, which will take you to the Welcome page. Then click on \"Sign up Now\" on the right side of the page.     You will be asked to enter the access code listed below, as well as some personal information. Please follow the directions to create your username and password.     Your access code is: [email or web address]  Expires: 10/5/2017  6:30 AM     Your access code will  in 90 days. If you need help or a new code, please call your [place] clinic or [phone].        Care EveryWhere ID     This is your Care EveryWhere ID. This could be used by other organizations to access your [place] medical records  [id number]        Your Vitals Were     Blood Pressure Temperature Weight Pulse Oximetry BMI (Body Mass Index)       " 89/59 100.7  F (38.2  C) 61.2 kg (134 lb 14.4 oz) 86% 19.92 kg/m2        Primary Care Provider Office Phone # Fax #    Milly Eve Healy [phone]      Equal Access to Services     [facility]: Hadii aad ku hadasho Soomaali, waaxda luqadaha, qaybta kaalmada adeegyada, waxay sulmain hayaan vickieeduardo frazier la'annabelaayush neisha. So [facility] [phone].    ATENCIÓN: Si habla español, tiene a richardson disposición servicios gratuitos de asistencia lingüística. Llame al [phone].    We comply with applicable federal civil rights laws and [place] laws. We do not discriminate on the basis of race, color, national origin, age, disability sex, sexual orientation or gender identity.            Thank you!     Thank you for choosing [place] for your care. Our goal is always to provide you with excellent care. Hearing back from our patients is one way we can continue to improve our services. Please take a few minutes to complete the written survey that you may receive in the mail after you visit with us. Thank you!             Medication List: This is a list of all your medications and when to take them. Check marks below indicate your daily home schedule. Keep this list as a reference.          This list is accurate as of: 9/21/17  3:58 PM.  Always use your most recent med list.               Medications           Morning Afternoon Evening Bedtime As Needed    aspirin 81 MG tablet   Take 81 mg by mouth daily                                hydrOXYzine 25 MG capsule   Commonly known as:  VISTARIL   Take 2 capsules (50 mg) by mouth 3 times daily as needed for itching                                loratadine 10 MG tablet   Commonly known as:  CLARITIN   Take 1 tablet (10 mg) by mouth daily                                macitentan 10 MG tablet   Commonly known as:  OPSUMIT   Take 1 tablet (10 mg) by mouth daily                                midodrine HCl 10 MG Tabs   Take 20 mg by mouth 3 times daily                                 omeprazole 40 MG capsule   Commonly known as:  priLOSEC   Take 1 capsule (40 mg) by mouth daily Take 30-60 minutes before a meal.                                paricalcitol 5 MCG/ML injection   Commonly known as:  ZEMPLAR   2 mcg 2 mcg by IV Push route dialysis.                                PHOSLO 667 MG Caps capsule   Take 2 capsules (1,334 mg) by mouth 3 times daily (with meals)   Generic drug:  calcium acetate                                RENAL 1 MG Caps   Take 1 mg by mouth Take 1 capsule (1 mg) by mouth daily in the afternoon.  Indications: Nutritional Support                                sildenafil 20 MG tablet   Commonly known as:  REVATIO   Take 2 tablets (40 mg) by mouth 3 times daily                                torsemide 20 MG tablet   Commonly known as:  DEMADEX   Take 3 tablets (60 mg) by mouth 2 times daily

## 2017-09-21 NOTE — PROGRESS NOTES
Paracentesis Nursing Note  Jayy Gill [facility].    During today's appointment orders from Rocky Rojo MD were completed.    Progress Note:  Patient identification verified by name and date of birth.  Assessment completed.  Vitals monitored throughout appointment and recorded in Doc Flowsheets.  See proceduralist note in ultrasound.    Date of consent or authorization: 8/24.  Invasive Procedure Safety Checklist was completed and sent for scanning.     Paracentesis performed by Alton Wick PA-C Radiology.    The following labs were communicated to provider performing paracentesis:  Lab Results   Component Value Date     08/24/2017     08/24/2017       Total amount of ascites fluid drained: 3.4 liters.  Color of ascites fluid: yellow and clear.  Total amount of albumin given: 37.5  grams.    Patient tolerated procedure well.      Post procedure,denies pain or discomfort post paracentesis.      Discharge Plan:  Discharge instructions were reviewed with patient.  Patient/Representative verbalized understanding and all questions were answered.   [facility] in stable condition.    Cassidy Gilmore RN      Administrations This Visit     albumin human 25 % injection 12.5 g     Admin Date Action Dose Route Administered By             09/21/2017 New Bag 37.5 g Intravenous Cassidy Gilmore RN                    lidocaine 1 % 20 mL     Admin Date Action Dose Route Administered By             09/21/2017 Given by Other Clinician 20 mL Injection Cassidy Gilmore RN                          Temp 100.7  F (38.2  C)  Wt 64.2 kg (141 lb 8 oz)  SpO2 (!) 86%  BMI 20.9 kg/m2

## 2017-09-21 NOTE — PATIENT INSTRUCTIONS
Discharge Instructions for Paracentesis  Paracentesis is a procedure to remove extra fluid from your belly (abdomen). This fluid buildup in the abdomen is called ascites. The procedure may have been done to take a sample of the fluid. Or, it may have been done to drain the extra fluid from your abdomen and help make you more comfortable.     Ascites is buildup of excess fluid in the abdomen.   Home care    If you have pain after the procedure, your healthcare provider can prescribe or recommend pain medicines. Take these exactly as directed. If you stopped taking other medicines before the procedure, ask your provider when you can start them again.    Take it easy for 24 hours after the procedure. Avoid physical activity until your provider says it s OK.    You will have a small bandage over the puncture site. Stitches (sutures), surgical staples, adhesive tapes, adhesive strips, or surgical glue may be used to close the incision. They also help stop bleeding and speed healing. You may take the bandage off in 24 hours.    Check the puncture site for the signs of infection listed below.  Follow-up care  Make a follow-up appointment with your healthcare provider as directed. During your follow-up visit, your provider will check your healing. Let your provider know how you are feeling. You can also discuss the cause of your ascites and if you need any further treatment.  When to seek medical advice  Call your healthcare provider if you have any of the following after the procedure:    A fever of 100.4 F (38.0 C) or higher    Trouble breathing    Pain that doesn't go away even after taking pain medicine    Belly pain not caused by having the skin punctured    Bleeding from the puncture site    More than a small amount of fluid leaking from the puncture site    Swollen belly    Signs of infection at the puncture site. These include increased pain, redness, or swelling, warmth, or bad-smelling drainage.    Blood in your  urine    Feeling dizzy or lightheaded, or fainting   Date Last Reviewed: 7/1/2016 2000-2017 [employer]. [address] [place] [address], [place] [address]. All rights reserved. This information is not intended as a substitute for professional medical care. Always follow your healthcare professional's instructions.

## 2017-09-22 NOTE — TELEPHONE ENCOUNTER
Received message that pt stopped by  at clinic stating he was having an issue with getting Opsumit filled through [facility]. Called [facility] and representative stated an updated prior authorization was needed. As the pt's coverage through [employer] has not changed, I verified with [employer] that the prior authorization was still valid. Then called [facility] again to confirm that no additional prior authorization is needed. A member of the benefit verification team stated that the claim was able to be processed and the refill is in the que to set up delivery. The patient has been updated and has been provided with the number for [facility] so he can call for a refill.

## 2017-11-02 NOTE — PROGRESS NOTES
Paracentesis Nursing Note  Jayy Gill [facility].    During today's appointment orders from Rocky Rojo MD were completed.    Progress Note:  Patient identification verified by name and date of birth.  Assessment completed.  Vitals monitored throughout appointment and recorded in Doc Flowsheets.    Patient arrives with SBP 90's. Patient states this is is baseline. Denies feeling symptomatic. Spo2% sensor does not read accurately for patient- he requires an ear Spo2% sensor due to circulation. Does not appear to be in respiratory distress. RR 18, denies SOB, lips/skin pink and warm.    See proceduralist note in ultrasound.    Date of consent or authorization: 8/24/17.  Invasive Procedure Safety Checklist was completed and sent for scanning.     Paracentesis performed by Jamey Pereira PA-C Radiology.    The following labs were communicated to provider performing paracentesis:  Lab Results   Component Value Date     11/02/2017       Total amount of ascites fluid drained: 3.0 liters.  Color of ascites fluid: dark yellow and cloudy.  Total amount of albumin given: 37.5  grams.    Patient tolerated procedure well.    Post procedure,denies pain or discomfort post paracentesis.      Discharge Plan:  Discharge instructions were reviewed with patient.  Patient/Representative verbalized understanding and all questions were answered.   Discharged [facility] in stable condition.    Ericka Severson, RN       Administrations This Visit     albumin human 25 % injection 12.5 g     Admin Date Action Dose Route Administered By             11/02/2017 New Bag 12.5 g Intravenous Severson, Ericka, RN              Admin Date Action Dose Route Administered By             11/02/2017 New Bag 12.5 g Intravenous Severson, Ericka, RN              Admin Date Action Dose Route Administered By             11/02/2017 New Bag  12.5 g Intravenous Severson, Ericka, RN                    lidocaine 1 % 20 mL     Admin Date Action Dose Route Administered By             11/02/2017 Given by Other Clinician 20 mL Injection Severson, Ericka, RN                            Temp 98.3  F (36.8  C) (Oral)  Wt 64.5 kg (142 lb 3.2 oz)  SpO2 (!) 84%  BMI 21 kg/m2

## 2017-11-02 NOTE — PATIENT INSTRUCTIONS
Dear Jayy Gill    Thank you for choosing [facility] ([facility]) for your infusion.  The following information is a summary of our appointment as well as important reminders.      We look forward in seeing you on your next appointment here at [facility].  Please don t hesitate to call us at [phone] to reschedule any of your appointments or to speak with one of the [facility] registered nurses.  It was a pleasure taking care of you today.    Sincerely,    [facility]  [facility]  [address] [place] [address]  [address]  Phone:  [phone]    DISCHARGE INSTRUCTIONS FOLLOWING ABDOMINAL PARACENTESIS    After you go home:    No strenuous activity for 24 hours    Resume your regular diet    Limit fluid intake for the first 48 hours to no more than 2 quarts per day.  There should be minimal drainage from the needle site.  If drainage does occur and soaks through the bandage, apply gentle pressure with your hand for 5 minutes.    Notify MD for the following:    Excessive drainage    Excessive swelling, redness or tenderness at the needle site    Fever greater than 101 degrees F    Dizziness or light-headedness when getting up or walking      IF THIS IS A MEDICAL EMERGENCY, CALL 911    If you have any questions or concerns    Contact the Hepatology Clinic:   [phone]    If you are a post-transplant patient, contact the Transplant Office:  [phone]    If this is after hours, contact the hospital :  [phone] and as to have the GI resident on call paged                   I have received and understand my discharge instructions and I have all of my personal belongings.          ------------------------------------------------------        ---------------------------------------------------    Patient / Significant Other's Signature     Relationship

## 2017-11-02 NOTE — MR AVS SNAPSHOT
After Visit Summary   11/2/2017    Jayy Gill    MRN: [id number]           Patient Information     Date Of Birth          1970        Visit Information        Provider Department      11/2/2017 9:30 AM Provider, [facility] Spec Inf Para;  39 ATC [facility]        Today's Diagnoses     Disorder of liver    -  1      Care Instructions    Dear Jayy Gill    Thank you for choosing [facility] ([facility]) for your infusion.  The following information is a summary of our appointment as well as important reminders.      We look forward in seeing you on your next appointment here at [facility].  Please don t hesitate to call us at [phone] to reschedule any of your appointments or to speak with one of the [facility] registered nurses.  It was a pleasure taking care of you today.    Sincerely,    [facility]  [facility]  [address] [place] [address]  [address]  Phone:  [phone]    DISCHARGE INSTRUCTIONS FOLLOWING ABDOMINAL PARACENTESIS    After you go home:    No strenuous activity for 24 hours    Resume your regular diet    Limit fluid intake for the first 48 hours to no more than 2 quarts per day.  There should be minimal drainage from the needle site.  If drainage does occur and soaks through the bandage, apply gentle pressure with your hand for 5 minutes.    Notify MD for the following:    Excessive drainage    Excessive swelling, redness or tenderness at the needle site    Fever greater than 101 degrees F    Dizziness or light-headedness when getting up or walking      IF THIS IS A MEDICAL EMERGENCY, CALL 911    If you have any questions or concerns    Contact the Hepatology Clinic:   [phone]    If you are a post-transplant patient, contact the Transplant Office:  [phone]    If this is after hours, contact the hospital  :  [phone] and as to have the GI resident on call paged                   I have received and understand my discharge instructions and I have all of my personal belongings.          ------------------------------------------------------        ---------------------------------------------------    Patient / Significant Other's Signature     Relationship              Follow-ups after your visit        Your next 10 appointments already scheduled     Nov 02, 2017 12:00 PM CDT   Pulmonary Treatment with  Pulmonary Rehab 2   [facility] ([facility])    [address], [address]  [facility] [address]   [phone]            Nov 30, 2017 12:00 PM CST   Paracentesis Visit with  Spec Inf Para Provider,  40 ATC   [facility] ([facility])    [address] [address] [phone]              Who to contact     If you have questions or need follow up information about today's clinic visit or your schedule please contact [facility] SPECIAL[facility] PROCED[facility] directly at [phone].  Normal or non-critical lab and imaging results will be communicated to you by Mayi Zhaopinhart, letter or phone within 4 business days after the clinic has received the results. If you do not hear from us within 7 days, please contact the clinic through [employer] or phone. If you have a critical or abnormal lab result, we will notify you by phone as soon as possible.  Submit refill requests through [employer] or call your pharmacy and they will forward the refill request to us. Please allow 3 business days for your refill to be completed.          Additional Information About Your Visit        Mayi Zhaopin[employer]Charity Engine Information     [employer] lets you send messages to your doctor, view your test results, renew your prescriptions, schedule appointments and more. To sign up, go to  [email or web address] . Click on \"Log in\" on the left side of the screen, which will take you to the Welcome page. Then click on \"Sign up Now\" on the right side of the page.     You will be asked to enter the access code listed below, as well as some personal information. Please follow the directions to create your username and password.     Your access code is: [email or web address]  Expires: 2018  6:30 AM     Your access code will  in 90 days. If you need help or a new code, please call your [place] clinic or [phone].        Care EveryWhere ID     This is your Care EveryWhere ID. This could be used by other organizations to access your [place] medical records  [id number]        Your Vitals Were     Pulse Temperature Pulse Oximetry BMI (Body Mass Index)          75 98.3  F (36.8  C) (Oral) 84% 20.05 kg/m2         Blood Pressure from Last 3 Encounters:   17 93/62   17 (!) 89/59   17 91/61    Weight from Last 3 Encounters:   17 61.6 kg (135 lb 12.8 oz)   17 61.2 kg (134 lb 14.4 oz)   17 66.5 kg (146 lb 11.2 oz)              We Performed the Following     Platelet count     US Paracentesis        Primary Care Provider Office Phone # Fax #    Milly Eve Healy [phone]       [address] [place] [address]        Equal Access to Services     [facility]: Hadii sarthak acevedoo Sorhonda, waaxda luqadaha, qaybta kaalmada christie, isa sanchez . So [facility] [phone].    ATENCIÓN: Si habla español, tiene a richardson disposición servicios gratuitos de asistencia lingüística. Llame al [phone].    We comply with applicable federal civil rights laws and [place] laws. We do not discriminate on the basis of race, color, national origin, age, disability, sex, sexual orientation, or gender identity.            Thank you!     Thank you for choosing [facility]  for " your care. Our goal is always to provide you with excellent care. Hearing back from our patients is one way we can continue to improve our services. Please take a few minutes to complete the written survey that you may receive in the mail after your visit with us. Thank you!             Your Updated Medication List - Protect others around you: Learn how to safely use, store and throw away your medicines at www.disposemymeds.org.          This list is accurate as of: 11/2/17 11:11 AM.  Always use your most recent med list.                   Brand Name Dispense Instructions for use Diagnosis    aspirin 81 MG tablet      Take 81 mg by mouth daily        hydrOXYzine 25 MG capsule    VISTARIL    120 capsule    Take 2 capsules (50 mg) by mouth 3 times daily as needed for itching        loratadine 10 MG tablet    CLARITIN    90 tablet    Take 1 tablet (10 mg) by mouth daily    Itching       macitentan 10 MG tablet    OPSUMIT    30 tablet    Take 1 tablet (10 mg) by mouth daily    Pulmonary hypertension       midodrine HCl 10 MG Tabs     180 tablet    Take 20 mg by mouth 3 times daily    Hemodialysis-associated hypotension       omeprazole 40 MG capsule    priLOSEC    90 capsule    Take 1 capsule (40 mg) by mouth daily Take 30-60 minutes before a meal.    Gastroesophageal reflux disease, esophagitis presence not specified       paricalcitol 5 MCG/ML injection    ZEMPLAR     2 mcg 2 mcg by IV Push route dialysis.    Esophageal reflux, Pulmonary hypertension, Renal failure       PHOSLO 667 MG Caps capsule   Generic drug:  calcium acetate     180 capsule    Take 2 capsules (1,334 mg) by mouth 3 times daily (with meals)    Other dyspnea and respiratory abnormality       RENAL 1 MG Caps      Take 1 mg by mouth Take 1 capsule (1 mg) by mouth daily in the afternoon.  Indications: Nutritional Support    Esophageal reflux, Pulmonary hypertension, Renal failure       sildenafil 20 MG tablet    REVATIO    180 tablet    Take 2 tablets  (40 mg) by mouth 3 times daily    Pulmonary hypertension       torsemide 20 MG tablet    DEMADEX    90 tablet    Take 3 tablets (60 mg) by mouth 2 times daily    PAH (pulmonary artery hypertension), Cardiomyopathy (H)

## 2017-11-30 NOTE — PATIENT INSTRUCTIONS
Dear Jayy Gill    Thank you for choosing [facility] ([facility]) for your procedure.  The following information is a summary of our appointment as well as important reminders.      Additional information: You had a paracentesis today with 3.1 liters fluid removed and received 37.5 grams Albumin.     We look forward in seeing you on your next appointment here at [facility].  Please don t hesitate to call us at [phone] to reschedule any of your appointments or to speak with one of the [facility] registered nurses.  It was a pleasure taking care of you today.    Sincerely,    [facility]  [facility]  [address] [place] [address]  [address]  Phone:  [phone]

## 2017-11-30 NOTE — PROGRESS NOTES
"Paracentesis Nursing Note  Jayy Gill [facility] for [facility].    During today's appointment orders from Rocky Rojo MD were completed.    Progress Note:  Patient identification verified by name and date of birth.  Assessment completed.  Vitals monitored throughout appointment and recorded in Doc Flowsheets.  See proceduralist note in ultrasound.    Date of consent or authorization: 11/30/2017  Invasive Procedure Safety Checklist was completed and sent for scanning.     Paracentesis performed by Alton Wick PA-C Radiology.    The following labs were communicated to provider performing paracentesis:  Lab Results   Component Value Date     11/02/2017       Total amount of ascites fluid drained: 3.1 liters.  Color of ascites fluid: dark yellow.  Total amount of albumin given: 37.5  grams.    Patient tolerated procedure fairly well. Patient started to \"not feel right\" and requested 02. Per PA 02 placed at 2l/m. With some improvement. States now he does use 02 at home at times at glo.4-5 l/m. But did not bring today. Sat's improved from mid-high 80's to mid to high 90's with mask.    Post procedure,denies pain or discomfort post paracentesis.    Discharge Plan:  Discharge instructions were reviewed with patient.  Patient/Representative verbalized understanding and all questions were answered.   Discharged [facility] in stable condition.    SALINAS DOLL RN        /59  Pulse 85  Temp 97.9  F (36.6  C)  Resp 18  Wt 64.8 kg (142 lb 12.8 oz)  SpO2 (!) 88%  BMI 21.09 kg/m2    "

## 2017-11-30 NOTE — MR AVS SNAPSHOT
After Visit Summary   11/30/2017    Jayy Gill    MRN: [id number]           Patient Information     Date Of Birth          1970        Visit Information        Provider Department      11/30/2017 12:00 PM Provider, [facility] Inf Para;  40 AT[facility]        Today's Diagnoses     Disorder of liver    -  1      Care Instructions    Dear Jayy Gill    Thank you for choosing [facility] ([facility]) for your procedure.  The following information is a summary of our appointment as well as important reminders.      Additional information: You had a paracentesis today with 3.1 liters fluid removed and received 37.5 grams Albumin.     We look forward in seeing you on your next appointment here at [facility].  Please don t hesitate to call us at [phone] to reschedule any of your appointments or to speak with one of the [facility] registered nurses.  It was a pleasure taking care of you today.    Sincerely,    [facility]  [address] [place] [address]  [id number]  Phone:  [phone]          Follow-ups after your visit        Who to contact     If you have questions or need follow up information about today's clinic visit or your schedule please contact [facility] PROCED[facility] directly at [phone].  Normal or non-critical lab and imaging results will be communicated to you by MyChart, letter or phone within 4 business days after the clinic has received the results. If you do not hear from us within 7 days, please contact the clinic through MyChart or phone. If you have a critical or abnormal lab result, we will notify you by phone as soon as possible.  Submit refill requests through "[employer] or call your pharmacy and they will forward the refill request to us. Please allow 3 business days for your  "refill to be completed.          Additional Information About Your Visit        [employer] Information     [employer] lets you send messages to your doctor, view your test results, renew your prescriptions, schedule appointments and more. To sign up, go to [email or web address] . Click on \"Log in\" on the left side of the screen, which will take you to the Welcome page. Then click on \"Sign up Now\" on the right side of the page.     You will be asked to enter the access code listed below, as well as some personal information. Please follow the directions to create your username and password.     Your access code is: [email or web address]  Expires: 2018  5:30 AM     Your access code will  in 90 days. If you need help or a new code, please call your [place] clinic or [phone].        Care EveryWhere ID     This is your Care EveryWhere ID. This could be used by other organizations to access your [place] medical records  [id number]        Your Vitals Were     Pulse Temperature Respirations Pulse Oximetry BMI (Body Mass Index)       83 97.9  F (36.6  C) 18 95% 20.07 kg/m2        Blood Pressure from Last 3 Encounters:   17 94/59   17 93/62   17 (!) 89/59    Weight from Last 3 Encounters:   17 61.6 kg (135 lb 14.4 oz)   17 61.6 kg (135 lb 12.8 oz)   17 61.2 kg (134 lb 14.4 oz)              We Performed the Following     US Paracentesis        Primary Care Provider Office Phone # Fax #    Milly Eve Healy [phone]       [address] [place] [address] [address] [place] [id number]        Equal Access to Services     COLLINS WYLIE : Mariana Gary, koki huggins, chantal soriano, isa driver. So [facility] [phone].    ATENCIÓN: Si habla español, tiene a richardson disposición servicios gratuitos de asistencia lingüística. Llame al [phone].    We comply with applicable federal civil rights laws and [place] laws. We do " not discriminate on the basis of race, color, national origin, age, disability, sex, sexual orientation, or gender identity.            Thank you!     Thank you for choosing [facility] SPECIALTY [facility] PROCEDURE  for your care. Our goal is always to provide you with excellent care. Hearing back from our patients is one way we can continue to improve our services. Please take a few minutes to complete the written survey that you may receive in the mail after your visit with us. Thank you!             Your Updated Medication List - Protect others around you: Learn how to safely use, store and throw away your medicines at www.disposemymeds.org.          This list is accurate as of: 11/30/17  1:47 PM.  Always use your most recent med list.                   Brand Name Dispense Instructions for use Diagnosis    aspirin 81 MG tablet      Take 81 mg by mouth daily        hydrOXYzine 25 MG capsule    VISTARIL    120 capsule    Take 2 capsules (50 mg) by mouth 3 times daily as needed for itching        loratadine 10 MG tablet    CLARITIN    90 tablet    Take 1 tablet (10 mg) by mouth daily    Itching       macitentan 10 MG tablet    OPSUMIT    30 tablet    Take 1 tablet (10 mg) by mouth daily    Pulmonary hypertension       midodrine HCl 10 MG Tabs     180 tablet    Take 20 mg by mouth 3 times daily    Hemodialysis-associated hypotension       omeprazole 40 MG capsule    priLOSEC    90 capsule    Take 1 capsule (40 mg) by mouth daily Take 30-60 minutes before a meal.    Gastroesophageal reflux disease, esophagitis presence not specified       paricalcitol 5 MCG/ML injection    ZEMPLAR     2 mcg 2 mcg by IV Push route dialysis.    Esophageal reflux, Pulmonary hypertension, Renal failure       PHOSLO 667 MG Caps capsule   Generic drug:  calcium acetate     180 capsule    Take 2 capsules (1,334 mg) by mouth 3 times daily (with meals)    Other dyspnea and respiratory abnormality       RENAL 1 MG Caps       Take 1 mg by mouth Take 1 capsule (1 mg) by mouth daily in the afternoon.  Indications: Nutritional Support    Esophageal reflux, Pulmonary hypertension, Renal failure       sildenafil 20 MG tablet    REVATIO    180 tablet    Take 2 tablets (40 mg) by mouth 3 times daily    Pulmonary hypertension       torsemide 20 MG tablet    DEMADEX    90 tablet    Take 3 tablets (60 mg) by mouth 2 times daily    PAH (pulmonary artery hypertension), Cardiomyopathy (H)

## 2018-01-01 ENCOUNTER — APPOINTMENT (OUTPATIENT)
Dept: GENERAL RADIOLOGY | Facility: CLINIC | Age: 48
DRG: 870 | End: 2018-01-01
Payer: COMMERCIAL

## 2018-01-01 ENCOUNTER — APPOINTMENT (OUTPATIENT)
Dept: ULTRASOUND IMAGING | Facility: CLINIC | Age: 48
DRG: 870 | End: 2018-01-01
Payer: COMMERCIAL

## 2018-01-01 ENCOUNTER — APPOINTMENT (OUTPATIENT)
Dept: GENERAL RADIOLOGY | Facility: CLINIC | Age: 48
DRG: 870 | End: 2018-01-01
Attending: EMERGENCY MEDICINE
Payer: COMMERCIAL

## 2018-01-01 ENCOUNTER — HOSPITAL ENCOUNTER (INPATIENT)
Facility: CLINIC | Age: 48
LOS: 7 days | DRG: 870 | End: 2018-01-09
Attending: EMERGENCY MEDICINE
Payer: COMMERCIAL

## 2018-01-01 ENCOUNTER — APPOINTMENT (OUTPATIENT)
Dept: GENERAL RADIOLOGY | Facility: CLINIC | Age: 48
DRG: 870 | End: 2018-01-01
Attending: STUDENT IN AN ORGANIZED HEALTH CARE EDUCATION/TRAINING PROGRAM
Payer: COMMERCIAL

## 2018-01-01 ENCOUNTER — APPOINTMENT (OUTPATIENT)
Dept: CT IMAGING | Facility: CLINIC | Age: 48
DRG: 870 | End: 2018-01-01
Payer: COMMERCIAL

## 2018-01-01 ENCOUNTER — ANESTHESIA EVENT (OUTPATIENT)
Dept: INTENSIVE CARE | Facility: CLINIC | Age: 48
DRG: 870 | End: 2018-01-01
Payer: COMMERCIAL

## 2018-01-01 ENCOUNTER — APPOINTMENT (OUTPATIENT)
Dept: GENERAL RADIOLOGY | Facility: CLINIC | Age: 48
DRG: 870 | End: 2018-01-01
Attending: SURGERY
Payer: COMMERCIAL

## 2018-01-01 ENCOUNTER — APPOINTMENT (OUTPATIENT)
Dept: CARDIOLOGY | Facility: CLINIC | Age: 48
DRG: 870 | End: 2018-01-01
Payer: COMMERCIAL

## 2018-01-01 ENCOUNTER — ANESTHESIA (OUTPATIENT)
Dept: INTENSIVE CARE | Facility: CLINIC | Age: 48
DRG: 870 | End: 2018-01-01
Payer: COMMERCIAL

## 2018-01-01 VITALS
BODY MASS INDEX: 22.11 KG/M2 | SYSTOLIC BLOOD PRESSURE: 86 MMHG | TEMPERATURE: 98 F | OXYGEN SATURATION: 93 % | DIASTOLIC BLOOD PRESSURE: 62 MMHG | WEIGHT: 149.69 LBS | HEART RATE: 97 BPM

## 2018-01-01 DIAGNOSIS — I42.9 CARDIOMYOPATHY, UNSPECIFIED TYPE (H): ICD-10-CM

## 2018-01-01 DIAGNOSIS — N18.6 ESRD (END STAGE RENAL DISEASE) ON DIALYSIS (H): ICD-10-CM

## 2018-01-01 DIAGNOSIS — I27.21 PULMONARY ARTERIAL HYPERTENSION (H): ICD-10-CM

## 2018-01-01 DIAGNOSIS — A41.9 SEPSIS, DUE TO UNSPECIFIED ORGANISM: ICD-10-CM

## 2018-01-01 DIAGNOSIS — R65.21 SEPTIC SHOCK (H): Primary | ICD-10-CM

## 2018-01-01 DIAGNOSIS — A40.9 STREPTOCOCCAL SEPSIS (H): ICD-10-CM

## 2018-01-01 DIAGNOSIS — Z99.2 ESRD (END STAGE RENAL DISEASE) ON DIALYSIS (H): ICD-10-CM

## 2018-01-01 DIAGNOSIS — A41.9 SEPTIC SHOCK (H): Primary | ICD-10-CM

## 2018-01-01 DIAGNOSIS — I27.20 PULMONARY HYPERTENSION (H): ICD-10-CM

## 2018-01-01 LAB
ALBUMIN FLD-MCNC: 1.6 G/DL
ALBUMIN SERPL-MCNC: 1.8 G/DL (ref 3.4–5)
ALBUMIN SERPL-MCNC: 1.9 G/DL (ref 3.4–5)
ALBUMIN SERPL-MCNC: 2 G/DL (ref 3.4–5)
ALBUMIN SERPL-MCNC: 2.1 G/DL (ref 3.4–5)
ALBUMIN SERPL-MCNC: 2.2 G/DL (ref 3.4–5)
ALBUMIN SERPL-MCNC: 2.3 G/DL (ref 3.4–5)
ALP SERPL-CCNC: 107 U/L (ref 40–150)
ALP SERPL-CCNC: 114 U/L (ref 40–150)
ALP SERPL-CCNC: 116 U/L (ref 40–150)
ALP SERPL-CCNC: 122 U/L (ref 40–150)
ALP SERPL-CCNC: 58 U/L (ref 40–150)
ALP SERPL-CCNC: 58 U/L (ref 40–150)
ALP SERPL-CCNC: 60 U/L (ref 40–150)
ALP SERPL-CCNC: 60 U/L (ref 40–150)
ALP SERPL-CCNC: 62 U/L (ref 40–150)
ALP SERPL-CCNC: 66 U/L (ref 40–150)
ALP SERPL-CCNC: 68 U/L (ref 40–150)
ALP SERPL-CCNC: 69 U/L (ref 40–150)
ALP SERPL-CCNC: 70 U/L (ref 40–150)
ALP SERPL-CCNC: 72 U/L (ref 40–150)
ALP SERPL-CCNC: 74 U/L (ref 40–150)
ALP SERPL-CCNC: 79 U/L (ref 40–150)
ALP SERPL-CCNC: 80 U/L (ref 40–150)
ALP SERPL-CCNC: 81 U/L (ref 40–150)
ALP SERPL-CCNC: 84 U/L (ref 40–150)
ALP SERPL-CCNC: 85 U/L (ref 40–150)
ALP SERPL-CCNC: 88 U/L (ref 40–150)
ALP SERPL-CCNC: 88 U/L (ref 40–150)
ALP SERPL-CCNC: 91 U/L (ref 40–150)
ALP SERPL-CCNC: 91 U/L (ref 40–150)
ALP SERPL-CCNC: 95 U/L (ref 40–150)
ALP SERPL-CCNC: 99 U/L (ref 40–150)
ALT SERPL W P-5'-P-CCNC: 34 U/L (ref 0–70)
ALT SERPL W P-5'-P-CCNC: 34 U/L (ref 0–70)
ALT SERPL W P-5'-P-CCNC: 35 U/L (ref 0–70)
ALT SERPL W P-5'-P-CCNC: 36 U/L (ref 0–70)
ALT SERPL W P-5'-P-CCNC: 37 U/L (ref 0–70)
ALT SERPL W P-5'-P-CCNC: 38 U/L (ref 0–70)
ALT SERPL W P-5'-P-CCNC: 40 U/L (ref 0–70)
ALT SERPL W P-5'-P-CCNC: 41 U/L (ref 0–70)
ALT SERPL W P-5'-P-CCNC: 43 U/L (ref 0–70)
ALT SERPL W P-5'-P-CCNC: 44 U/L (ref 0–70)
ALT SERPL W P-5'-P-CCNC: 46 U/L (ref 0–70)
ALT SERPL W P-5'-P-CCNC: 47 U/L (ref 0–70)
ALT SERPL W P-5'-P-CCNC: 49 U/L (ref 0–70)
ALT SERPL W P-5'-P-CCNC: 54 U/L (ref 0–70)
ALT SERPL W P-5'-P-CCNC: 60 U/L (ref 0–70)
ALT SERPL W P-5'-P-CCNC: 61 U/L (ref 0–70)
ALT SERPL W P-5'-P-CCNC: 62 U/L (ref 0–70)
AMMONIA PLAS-SCNC: 46 UMOL/L (ref 10–50)
ANION GAP SERPL CALCULATED.3IONS-SCNC: 10 MMOL/L (ref 3–14)
ANION GAP SERPL CALCULATED.3IONS-SCNC: 10 MMOL/L (ref 3–14)
ANION GAP SERPL CALCULATED.3IONS-SCNC: 11 MMOL/L (ref 3–14)
ANION GAP SERPL CALCULATED.3IONS-SCNC: 12 MMOL/L (ref 3–14)
ANION GAP SERPL CALCULATED.3IONS-SCNC: 13 MMOL/L (ref 3–14)
ANION GAP SERPL CALCULATED.3IONS-SCNC: 14 MMOL/L (ref 3–14)
ANION GAP SERPL CALCULATED.3IONS-SCNC: 15 MMOL/L (ref 3–14)
ANION GAP SERPL CALCULATED.3IONS-SCNC: 15 MMOL/L (ref 3–14)
ANION GAP SERPL CALCULATED.3IONS-SCNC: 16 MMOL/L (ref 3–14)
ANION GAP SERPL CALCULATED.3IONS-SCNC: 17 MMOL/L (ref 3–14)
ANISOCYTOSIS BLD QL SMEAR: ABNORMAL
ANISOCYTOSIS BLD QL SMEAR: SLIGHT
APPEARANCE FLD: NORMAL
AST SERPL W P-5'-P-CCNC: 107 U/L (ref 0–45)
AST SERPL W P-5'-P-CCNC: 113 U/L (ref 0–45)
AST SERPL W P-5'-P-CCNC: 27 U/L (ref 0–45)
AST SERPL W P-5'-P-CCNC: 30 U/L (ref 0–45)
AST SERPL W P-5'-P-CCNC: 34 U/L (ref 0–45)
AST SERPL W P-5'-P-CCNC: 34 U/L (ref 0–45)
AST SERPL W P-5'-P-CCNC: 38 U/L (ref 0–45)
AST SERPL W P-5'-P-CCNC: 39 U/L (ref 0–45)
AST SERPL W P-5'-P-CCNC: 40 U/L (ref 0–45)
AST SERPL W P-5'-P-CCNC: 41 U/L (ref 0–45)
AST SERPL W P-5'-P-CCNC: 42 U/L (ref 0–45)
AST SERPL W P-5'-P-CCNC: 43 U/L (ref 0–45)
AST SERPL W P-5'-P-CCNC: 46 U/L (ref 0–45)
AST SERPL W P-5'-P-CCNC: 47 U/L (ref 0–45)
AST SERPL W P-5'-P-CCNC: 48 U/L (ref 0–45)
AST SERPL W P-5'-P-CCNC: 50 U/L (ref 0–45)
AST SERPL W P-5'-P-CCNC: 51 U/L (ref 0–45)
AST SERPL W P-5'-P-CCNC: 61 U/L (ref 0–45)
AST SERPL W P-5'-P-CCNC: 62 U/L (ref 0–45)
AST SERPL W P-5'-P-CCNC: 62 U/L (ref 0–45)
AST SERPL W P-5'-P-CCNC: ABNORMAL U/L (ref 0–45)
AST SERPL W P-5'-P-CCNC: ABNORMAL U/L (ref 0–45)
BACTERIA SPEC CULT: ABNORMAL
BACTERIA SPEC CULT: NO GROWTH
BASE DEFICIT BLDA-SCNC: 1.9 MMOL/L
BASE DEFICIT BLDA-SCNC: 11.4 MMOL/L
BASE DEFICIT BLDA-SCNC: 2.2 MMOL/L
BASE DEFICIT BLDA-SCNC: 2.9 MMOL/L
BASE DEFICIT BLDA-SCNC: 3.3 MMOL/L
BASE DEFICIT BLDA-SCNC: 3.5 MMOL/L
BASE DEFICIT BLDA-SCNC: 3.8 MMOL/L
BASE DEFICIT BLDA-SCNC: 3.9 MMOL/L
BASE DEFICIT BLDA-SCNC: 3.9 MMOL/L
BASE DEFICIT BLDA-SCNC: 4 MMOL/L
BASE DEFICIT BLDA-SCNC: 4.1 MMOL/L
BASE DEFICIT BLDA-SCNC: 4.6 MMOL/L
BASE DEFICIT BLDA-SCNC: 4.9 MMOL/L
BASE DEFICIT BLDA-SCNC: 5.3 MMOL/L
BASE DEFICIT BLDA-SCNC: 5.3 MMOL/L
BASE DEFICIT BLDA-SCNC: 5.4 MMOL/L
BASE DEFICIT BLDA-SCNC: 5.4 MMOL/L
BASE DEFICIT BLDA-SCNC: 5.8 MMOL/L
BASE DEFICIT BLDA-SCNC: 6.2 MMOL/L
BASE DEFICIT BLDA-SCNC: NORMAL MMOL/L
BASE DEFICIT BLDV-SCNC: 1.4 MMOL/L
BASE DEFICIT BLDV-SCNC: 1.6 MMOL/L
BASE DEFICIT BLDV-SCNC: 1.7 MMOL/L
BASE DEFICIT BLDV-SCNC: 1.7 MMOL/L
BASE DEFICIT BLDV-SCNC: 1.8 MMOL/L
BASE DEFICIT BLDV-SCNC: 2.7 MMOL/L
BASE DEFICIT BLDV-SCNC: 2.8 MMOL/L
BASE DEFICIT BLDV-SCNC: 2.9 MMOL/L
BASE DEFICIT BLDV-SCNC: 3.1 MMOL/L
BASE DEFICIT BLDV-SCNC: 3.2 MMOL/L
BASE DEFICIT BLDV-SCNC: 3.4 MMOL/L
BASE DEFICIT BLDV-SCNC: 3.7 MMOL/L
BASE DEFICIT BLDV-SCNC: 3.7 MMOL/L
BASE DEFICIT BLDV-SCNC: 3.8 MMOL/L
BASE DEFICIT BLDV-SCNC: 3.8 MMOL/L
BASE DEFICIT BLDV-SCNC: 4.3 MMOL/L
BASE DEFICIT BLDV-SCNC: 4.4 MMOL/L
BASE DEFICIT BLDV-SCNC: 4.4 MMOL/L
BASE DEFICIT BLDV-SCNC: 4.8 MMOL/L
BASE DEFICIT BLDV-SCNC: 5 MMOL/L
BASE DEFICIT BLDV-SCNC: 5.1 MMOL/L
BASE DEFICIT BLDV-SCNC: 5.2 MMOL/L
BASE DEFICIT BLDV-SCNC: 5.6 MMOL/L
BASE DEFICIT BLDV-SCNC: 5.8 MMOL/L
BASE DEFICIT BLDV-SCNC: 6.8 MMOL/L
BASE DEFICIT BLDV-SCNC: 7.1 MMOL/L
BASE DEFICIT BLDV-SCNC: NORMAL MMOL/L
BASE EXCESS BLDA CALC-SCNC: NORMAL MMOL/L
BASE EXCESS BLDV CALC-SCNC: 0.5 MMOL/L
BASE EXCESS BLDV CALC-SCNC: NORMAL MMOL/L
BASOPHILS # BLD AUTO: 0 10E9/L (ref 0–0.2)
BASOPHILS NFR BLD AUTO: 0 %
BASOPHILS NFR BLD AUTO: 0.1 %
BASOPHILS NFR BLD AUTO: 0.2 %
BASOPHILS NFR BLD AUTO: 0.3 %
BASOPHILS NFR FLD MANUAL: 1 %
BILIRUB SERPL-MCNC: 3.6 MG/DL (ref 0.2–1.3)
BILIRUB SERPL-MCNC: 3.7 MG/DL (ref 0.2–1.3)
BILIRUB SERPL-MCNC: 3.8 MG/DL (ref 0.2–1.3)
BILIRUB SERPL-MCNC: 3.9 MG/DL (ref 0.2–1.3)
BILIRUB SERPL-MCNC: 3.9 MG/DL (ref 0.2–1.3)
BILIRUB SERPL-MCNC: 4 MG/DL (ref 0.2–1.3)
BILIRUB SERPL-MCNC: 4.1 MG/DL (ref 0.2–1.3)
BILIRUB SERPL-MCNC: 4.2 MG/DL (ref 0.2–1.3)
BILIRUB SERPL-MCNC: 4.2 MG/DL (ref 0.2–1.3)
BILIRUB SERPL-MCNC: 4.4 MG/DL (ref 0.2–1.3)
BILIRUB SERPL-MCNC: 4.5 MG/DL (ref 0.2–1.3)
BILIRUB SERPL-MCNC: 4.6 MG/DL (ref 0.2–1.3)
BILIRUB SERPL-MCNC: 4.6 MG/DL (ref 0.2–1.3)
BILIRUB SERPL-MCNC: 5.2 MG/DL (ref 0.2–1.3)
BUN SERPL-MCNC: 21 MG/DL (ref 7–30)
BUN SERPL-MCNC: 21 MG/DL (ref 7–30)
BUN SERPL-MCNC: 22 MG/DL (ref 7–30)
BUN SERPL-MCNC: 23 MG/DL (ref 7–30)
BUN SERPL-MCNC: 24 MG/DL (ref 7–30)
BUN SERPL-MCNC: 25 MG/DL (ref 7–30)
BUN SERPL-MCNC: 26 MG/DL (ref 7–30)
BUN SERPL-MCNC: 26 MG/DL (ref 7–30)
BUN SERPL-MCNC: 29 MG/DL (ref 7–30)
BUN SERPL-MCNC: 29 MG/DL (ref 7–30)
BUN SERPL-MCNC: 30 MG/DL (ref 7–30)
BUN SERPL-MCNC: 31 MG/DL (ref 7–30)
BUN SERPL-MCNC: 34 MG/DL (ref 7–30)
BUN SERPL-MCNC: 39 MG/DL (ref 7–30)
BUN SERPL-MCNC: 39 MG/DL (ref 7–30)
BUN SERPL-MCNC: 44 MG/DL (ref 7–30)
BUN SERPL-MCNC: 47 MG/DL (ref 7–30)
BUN SERPL-MCNC: 50 MG/DL (ref 7–30)
BUN SERPL-MCNC: 52 MG/DL (ref 7–30)
BURR CELLS BLD QL SMEAR: ABNORMAL
BURR CELLS BLD QL SMEAR: SLIGHT
C DIFF TOX B STL QL: NEGATIVE
CA-I BLD-MCNC: 4.2 MG/DL (ref 4.4–5.2)
CA-I BLD-MCNC: 4.2 MG/DL (ref 4.4–5.2)
CA-I BLD-MCNC: 4.5 MG/DL (ref 4.4–5.2)
CA-I BLD-MCNC: 4.6 MG/DL (ref 4.4–5.2)
CA-I BLD-MCNC: 4.6 MG/DL (ref 4.4–5.2)
CA-I BLD-MCNC: 4.7 MG/DL (ref 4.4–5.2)
CA-I BLD-MCNC: 4.7 MG/DL (ref 4.4–5.2)
CA-I BLD-MCNC: 5 MG/DL (ref 4.4–5.2)
CA-I BLD-MCNC: 5.1 MG/DL (ref 4.4–5.2)
CA-I BLD-MCNC: 5.2 MG/DL (ref 4.4–5.2)
CA-I BLD-MCNC: NORMAL MG/DL (ref 4.4–5.2)
CA-I BLD-SCNC: 4 MG/DL (ref 4.4–5.2)
CA-I SERPL ISE-MCNC: 3.8 MG/DL (ref 4.4–5.2)
CA-I SERPL ISE-MCNC: 3.9 MG/DL (ref 4.4–5.2)
CA-I SERPL ISE-MCNC: 4 MG/DL (ref 4.4–5.2)
CA-I SERPL ISE-MCNC: 4.1 MG/DL (ref 4.4–5.2)
CA-I SERPL ISE-MCNC: 4.4 MG/DL (ref 4.4–5.2)
CA-I SERPL ISE-MCNC: 4.4 MG/DL (ref 4.4–5.2)
CA-I SERPL ISE-MCNC: 4.5 MG/DL (ref 4.4–5.2)
CA-I SERPL ISE-MCNC: 4.7 MG/DL (ref 4.4–5.2)
CA-I SERPL ISE-MCNC: 4.8 MG/DL (ref 4.4–5.2)
CA-I SERPL ISE-MCNC: 4.9 MG/DL (ref 4.4–5.2)
CA-I SERPL ISE-MCNC: 5.3 MG/DL (ref 4.4–5.2)
CALCIUM SERPL-MCNC: 7.7 MG/DL (ref 8.5–10.1)
CALCIUM SERPL-MCNC: 7.8 MG/DL (ref 8.5–10.1)
CALCIUM SERPL-MCNC: 8.1 MG/DL (ref 8.5–10.1)
CALCIUM SERPL-MCNC: 8.2 MG/DL (ref 8.5–10.1)
CALCIUM SERPL-MCNC: 8.2 MG/DL (ref 8.5–10.1)
CALCIUM SERPL-MCNC: 8.3 MG/DL (ref 8.5–10.1)
CALCIUM SERPL-MCNC: 8.4 MG/DL (ref 8.5–10.1)
CALCIUM SERPL-MCNC: 8.5 MG/DL (ref 8.5–10.1)
CALCIUM SERPL-MCNC: 8.5 MG/DL (ref 8.5–10.1)
CALCIUM SERPL-MCNC: 8.6 MG/DL (ref 8.5–10.1)
CALCIUM SERPL-MCNC: 8.7 MG/DL (ref 8.5–10.1)
CALCIUM SERPL-MCNC: 8.8 MG/DL (ref 8.5–10.1)
CALCIUM SERPL-MCNC: 8.9 MG/DL (ref 8.5–10.1)
CHLORIDE SERPL-SCNC: 100 MMOL/L (ref 94–109)
CHLORIDE SERPL-SCNC: 101 MMOL/L (ref 94–109)
CHLORIDE SERPL-SCNC: 101 MMOL/L (ref 94–109)
CHLORIDE SERPL-SCNC: 102 MMOL/L (ref 94–109)
CHLORIDE SERPL-SCNC: 102 MMOL/L (ref 94–109)
CHLORIDE SERPL-SCNC: 103 MMOL/L (ref 94–109)
CHLORIDE SERPL-SCNC: 104 MMOL/L (ref 94–109)
CHLORIDE SERPL-SCNC: 105 MMOL/L (ref 94–109)
CHLORIDE SERPL-SCNC: 106 MMOL/L (ref 94–109)
CHLORIDE SERPL-SCNC: 107 MMOL/L (ref 94–109)
CHLORIDE SERPL-SCNC: 108 MMOL/L (ref 94–109)
CHLORIDE SERPL-SCNC: 93 MMOL/L (ref 94–109)
CHLORIDE SERPL-SCNC: 96 MMOL/L (ref 94–109)
CHLORIDE SERPL-SCNC: 97 MMOL/L (ref 94–109)
CHLORIDE SERPL-SCNC: 98 MMOL/L (ref 94–109)
CHLORIDE SERPL-SCNC: 98 MMOL/L (ref 94–109)
CHLORIDE SERPL-SCNC: 99 MMOL/L (ref 94–109)
CK SERPL-CCNC: 100 U/L (ref 30–300)
CO2 BLDCOV-SCNC: 23 MMOL/L (ref 21–28)
CO2 BLDCOV-SCNC: 24 MMOL/L (ref 21–28)
CO2 SERPL-SCNC: 17 MMOL/L (ref 20–32)
CO2 SERPL-SCNC: 19 MMOL/L (ref 20–32)
CO2 SERPL-SCNC: 20 MMOL/L (ref 20–32)
CO2 SERPL-SCNC: 21 MMOL/L (ref 20–32)
CO2 SERPL-SCNC: 22 MMOL/L (ref 20–32)
CO2 SERPL-SCNC: 23 MMOL/L (ref 20–32)
CO2 SERPL-SCNC: 23 MMOL/L (ref 20–32)
CO2 SERPL-SCNC: 24 MMOL/L (ref 20–32)
CO2 SERPL-SCNC: 24 MMOL/L (ref 20–32)
COLOR FLD: YELLOW
CREAT SERPL-MCNC: 1.33 MG/DL (ref 0.66–1.25)
CREAT SERPL-MCNC: 1.38 MG/DL (ref 0.66–1.25)
CREAT SERPL-MCNC: 1.43 MG/DL (ref 0.66–1.25)
CREAT SERPL-MCNC: 1.43 MG/DL (ref 0.66–1.25)
CREAT SERPL-MCNC: 1.46 MG/DL (ref 0.66–1.25)
CREAT SERPL-MCNC: 1.47 MG/DL (ref 0.66–1.25)
CREAT SERPL-MCNC: 1.49 MG/DL (ref 0.66–1.25)
CREAT SERPL-MCNC: 1.5 MG/DL (ref 0.66–1.25)
CREAT SERPL-MCNC: 1.51 MG/DL (ref 0.66–1.25)
CREAT SERPL-MCNC: 1.53 MG/DL (ref 0.66–1.25)
CREAT SERPL-MCNC: 1.75 MG/DL (ref 0.66–1.25)
CREAT SERPL-MCNC: 1.75 MG/DL (ref 0.66–1.25)
CREAT SERPL-MCNC: 1.83 MG/DL (ref 0.66–1.25)
CREAT SERPL-MCNC: 1.83 MG/DL (ref 0.66–1.25)
CREAT SERPL-MCNC: 1.89 MG/DL (ref 0.66–1.25)
CREAT SERPL-MCNC: 2.05 MG/DL (ref 0.66–1.25)
CREAT SERPL-MCNC: 2.18 MG/DL (ref 0.66–1.25)
CREAT SERPL-MCNC: 2.37 MG/DL (ref 0.66–1.25)
CREAT SERPL-MCNC: 2.43 MG/DL (ref 0.66–1.25)
CREAT SERPL-MCNC: 2.66 MG/DL (ref 0.66–1.25)
CREAT SERPL-MCNC: 3.17 MG/DL (ref 0.66–1.25)
CREAT SERPL-MCNC: 3.49 MG/DL (ref 0.66–1.25)
CREAT SERPL-MCNC: 3.79 MG/DL (ref 0.66–1.25)
CREAT SERPL-MCNC: 4.09 MG/DL (ref 0.66–1.25)
CREAT SERPL-MCNC: 4.54 MG/DL (ref 0.66–1.25)
CREAT SERPL-MCNC: 5.86 MG/DL (ref 0.66–1.25)
CREAT SERPL-MCNC: 6.04 MG/DL (ref 0.66–1.25)
CREAT SERPL-MCNC: 6.39 MG/DL (ref 0.66–1.25)
DIFFERENTIAL METHOD BLD: ABNORMAL
EOSINOPHIL # BLD AUTO: 0 10E9/L (ref 0–0.7)
EOSINOPHIL NFR BLD AUTO: 0 %
ERYTHROCYTE [DISTWIDTH] IN BLOOD BY AUTOMATED COUNT: 16 % (ref 10–15)
ERYTHROCYTE [DISTWIDTH] IN BLOOD BY AUTOMATED COUNT: 16.1 % (ref 10–15)
ERYTHROCYTE [DISTWIDTH] IN BLOOD BY AUTOMATED COUNT: 16.1 % (ref 10–15)
ERYTHROCYTE [DISTWIDTH] IN BLOOD BY AUTOMATED COUNT: 16.2 % (ref 10–15)
ERYTHROCYTE [DISTWIDTH] IN BLOOD BY AUTOMATED COUNT: 16.2 % (ref 10–15)
ERYTHROCYTE [DISTWIDTH] IN BLOOD BY AUTOMATED COUNT: 16.3 % (ref 10–15)
ERYTHROCYTE [DISTWIDTH] IN BLOOD BY AUTOMATED COUNT: 16.4 % (ref 10–15)
ERYTHROCYTE [DISTWIDTH] IN BLOOD BY AUTOMATED COUNT: 16.5 % (ref 10–15)
ERYTHROCYTE [DISTWIDTH] IN BLOOD BY AUTOMATED COUNT: 16.9 % (ref 10–15)
ERYTHROCYTE [DISTWIDTH] IN BLOOD BY AUTOMATED COUNT: 17 % (ref 10–15)
ERYTHROCYTE [DISTWIDTH] IN BLOOD BY AUTOMATED COUNT: 17.1 % (ref 10–15)
ERYTHROCYTE [DISTWIDTH] IN BLOOD BY AUTOMATED COUNT: 17.1 % (ref 10–15)
ERYTHROCYTE [DISTWIDTH] IN BLOOD BY AUTOMATED COUNT: 17.4 % (ref 10–15)
ERYTHROCYTE [DISTWIDTH] IN BLOOD BY AUTOMATED COUNT: 17.4 % (ref 10–15)
ERYTHROCYTE [DISTWIDTH] IN BLOOD BY AUTOMATED COUNT: 17.6 % (ref 10–15)
ERYTHROCYTE [DISTWIDTH] IN BLOOD BY AUTOMATED COUNT: 17.9 % (ref 10–15)
ERYTHROCYTE [DISTWIDTH] IN BLOOD BY AUTOMATED COUNT: 17.9 % (ref 10–15)
ERYTHROCYTE [DISTWIDTH] IN BLOOD BY AUTOMATED COUNT: 18 % (ref 10–15)
ERYTHROCYTE [DISTWIDTH] IN BLOOD BY AUTOMATED COUNT: 18.1 % (ref 10–15)
ERYTHROCYTE [DISTWIDTH] IN BLOOD BY AUTOMATED COUNT: 18.5 % (ref 10–15)
ERYTHROCYTE [DISTWIDTH] IN BLOOD BY AUTOMATED COUNT: 18.6 % (ref 10–15)
GFR SERPL CREATININE-BSD FRML MDRD: 10 ML/MIN/1.7M2
GFR SERPL CREATININE-BSD FRML MDRD: 10 ML/MIN/1.7M2
GFR SERPL CREATININE-BSD FRML MDRD: 14 ML/MIN/1.7M2
GFR SERPL CREATININE-BSD FRML MDRD: 16 ML/MIN/1.7M2
GFR SERPL CREATININE-BSD FRML MDRD: 17 ML/MIN/1.7M2
GFR SERPL CREATININE-BSD FRML MDRD: 19 ML/MIN/1.7M2
GFR SERPL CREATININE-BSD FRML MDRD: 21 ML/MIN/1.7M2
GFR SERPL CREATININE-BSD FRML MDRD: 26 ML/MIN/1.7M2
GFR SERPL CREATININE-BSD FRML MDRD: 29 ML/MIN/1.7M2
GFR SERPL CREATININE-BSD FRML MDRD: 30 ML/MIN/1.7M2
GFR SERPL CREATININE-BSD FRML MDRD: 33 ML/MIN/1.7M2
GFR SERPL CREATININE-BSD FRML MDRD: 35 ML/MIN/1.7M2
GFR SERPL CREATININE-BSD FRML MDRD: 39 ML/MIN/1.7M2
GFR SERPL CREATININE-BSD FRML MDRD: 40 ML/MIN/1.7M2
GFR SERPL CREATININE-BSD FRML MDRD: 40 ML/MIN/1.7M2
GFR SERPL CREATININE-BSD FRML MDRD: 42 ML/MIN/1.7M2
GFR SERPL CREATININE-BSD FRML MDRD: 42 ML/MIN/1.7M2
GFR SERPL CREATININE-BSD FRML MDRD: 49 ML/MIN/1.7M2
GFR SERPL CREATININE-BSD FRML MDRD: 50 ML/MIN/1.7M2
GFR SERPL CREATININE-BSD FRML MDRD: 50 ML/MIN/1.7M2
GFR SERPL CREATININE-BSD FRML MDRD: 51 ML/MIN/1.7M2
GFR SERPL CREATININE-BSD FRML MDRD: 51 ML/MIN/1.7M2
GFR SERPL CREATININE-BSD FRML MDRD: 52 ML/MIN/1.7M2
GFR SERPL CREATININE-BSD FRML MDRD: 53 ML/MIN/1.7M2
GFR SERPL CREATININE-BSD FRML MDRD: 53 ML/MIN/1.7M2
GFR SERPL CREATININE-BSD FRML MDRD: 55 ML/MIN/1.7M2
GFR SERPL CREATININE-BSD FRML MDRD: 58 ML/MIN/1.7M2
GFR SERPL CREATININE-BSD FRML MDRD: 9 ML/MIN/1.7M2
GLUCOSE BLD-MCNC: >700 MG/DL (ref 70–99)
GLUCOSE BLDC GLUCOMTR-MCNC: 104 MG/DL (ref 70–99)
GLUCOSE BLDC GLUCOMTR-MCNC: 194 MG/DL (ref 70–99)
GLUCOSE BLDC GLUCOMTR-MCNC: 25 MG/DL (ref 70–99)
GLUCOSE BLDC GLUCOMTR-MCNC: 298 MG/DL (ref 70–99)
GLUCOSE BLDC GLUCOMTR-MCNC: 51 MG/DL (ref 70–99)
GLUCOSE BLDC GLUCOMTR-MCNC: 79 MG/DL (ref 70–99)
GLUCOSE BLDC GLUCOMTR-MCNC: 82 MG/DL (ref 70–99)
GLUCOSE BLDC GLUCOMTR-MCNC: 84 MG/DL (ref 70–99)
GLUCOSE BLDC GLUCOMTR-MCNC: 96 MG/DL (ref 70–99)
GLUCOSE SERPL-MCNC: 104 MG/DL (ref 70–99)
GLUCOSE SERPL-MCNC: 105 MG/DL (ref 70–99)
GLUCOSE SERPL-MCNC: 106 MG/DL (ref 70–99)
GLUCOSE SERPL-MCNC: 113 MG/DL (ref 70–99)
GLUCOSE SERPL-MCNC: 115 MG/DL (ref 70–99)
GLUCOSE SERPL-MCNC: 118 MG/DL (ref 70–99)
GLUCOSE SERPL-MCNC: 123 MG/DL (ref 70–99)
GLUCOSE SERPL-MCNC: 126 MG/DL (ref 70–99)
GLUCOSE SERPL-MCNC: 137 MG/DL (ref 70–99)
GLUCOSE SERPL-MCNC: 142 MG/DL (ref 70–99)
GLUCOSE SERPL-MCNC: 142 MG/DL (ref 70–99)
GLUCOSE SERPL-MCNC: 147 MG/DL (ref 70–99)
GLUCOSE SERPL-MCNC: 148 MG/DL (ref 70–99)
GLUCOSE SERPL-MCNC: 150 MG/DL (ref 70–99)
GLUCOSE SERPL-MCNC: 151 MG/DL (ref 70–99)
GLUCOSE SERPL-MCNC: 156 MG/DL (ref 70–99)
GLUCOSE SERPL-MCNC: 160 MG/DL (ref 70–99)
GLUCOSE SERPL-MCNC: 166 MG/DL (ref 70–99)
GLUCOSE SERPL-MCNC: 167 MG/DL (ref 70–99)
GLUCOSE SERPL-MCNC: 178 MG/DL (ref 70–99)
GLUCOSE SERPL-MCNC: 179 MG/DL (ref 70–99)
GLUCOSE SERPL-MCNC: 184 MG/DL (ref 70–99)
GLUCOSE SERPL-MCNC: 89 MG/DL (ref 70–99)
GLUCOSE SERPL-MCNC: 90 MG/DL (ref 70–99)
GLUCOSE SERPL-MCNC: 90 MG/DL (ref 70–99)
GLUCOSE SERPL-MCNC: 93 MG/DL (ref 70–99)
GLUCOSE SERPL-MCNC: 94 MG/DL (ref 70–99)
GLUCOSE SERPL-MCNC: ABNORMAL MG/DL (ref 70–99)
GRAM STN SPEC: ABNORMAL
GRAM STN SPEC: NORMAL
HBA1C MFR BLD: 5.6 % (ref 4.3–6)
HCO3 BLD-SCNC: 17 MMOL/L (ref 21–28)
HCO3 BLD-SCNC: 19 MMOL/L (ref 21–28)
HCO3 BLD-SCNC: 20 MMOL/L (ref 21–28)
HCO3 BLD-SCNC: 21 MMOL/L (ref 21–28)
HCO3 BLD-SCNC: 22 MMOL/L (ref 21–28)
HCO3 BLD-SCNC: 23 MMOL/L (ref 21–28)
HCO3 BLD-SCNC: 24 MMOL/L (ref 21–28)
HCO3 BLD-SCNC: NORMAL MMOL/L (ref 21–28)
HCO3 BLDV-SCNC: 20 MMOL/L (ref 21–28)
HCO3 BLDV-SCNC: 21 MMOL/L (ref 21–28)
HCO3 BLDV-SCNC: 22 MMOL/L (ref 21–28)
HCO3 BLDV-SCNC: 23 MMOL/L (ref 21–28)
HCO3 BLDV-SCNC: 24 MMOL/L (ref 21–28)
HCO3 BLDV-SCNC: 25 MMOL/L (ref 21–28)
HCO3 BLDV-SCNC: 25 MMOL/L (ref 21–28)
HCO3 BLDV-SCNC: 26 MMOL/L (ref 21–28)
HCO3 BLDV-SCNC: NORMAL MMOL/L (ref 21–28)
HCT VFR BLD AUTO: 32.1 % (ref 40–53)
HCT VFR BLD AUTO: 32.4 % (ref 40–53)
HCT VFR BLD AUTO: 32.8 % (ref 40–53)
HCT VFR BLD AUTO: 33.1 % (ref 40–53)
HCT VFR BLD AUTO: 33.3 % (ref 40–53)
HCT VFR BLD AUTO: 33.3 % (ref 40–53)
HCT VFR BLD AUTO: 33.8 % (ref 40–53)
HCT VFR BLD AUTO: 35 % (ref 40–53)
HCT VFR BLD AUTO: 35.3 % (ref 40–53)
HCT VFR BLD AUTO: 35.4 % (ref 40–53)
HCT VFR BLD AUTO: 35.4 % (ref 40–53)
HCT VFR BLD AUTO: 35.6 % (ref 40–53)
HCT VFR BLD AUTO: 35.9 % (ref 40–53)
HCT VFR BLD AUTO: 36.6 % (ref 40–53)
HCT VFR BLD AUTO: 36.7 % (ref 40–53)
HCT VFR BLD AUTO: 36.7 % (ref 40–53)
HCT VFR BLD AUTO: 36.8 % (ref 40–53)
HCT VFR BLD AUTO: 37.3 % (ref 40–53)
HCT VFR BLD AUTO: 37.4 % (ref 40–53)
HCT VFR BLD AUTO: 37.6 % (ref 40–53)
HCT VFR BLD AUTO: 37.7 % (ref 40–53)
HCT VFR BLD AUTO: 37.8 % (ref 40–53)
HCT VFR BLD AUTO: 38.1 % (ref 40–53)
HCT VFR BLD AUTO: 38.4 % (ref 40–53)
HCT VFR BLD AUTO: 38.8 % (ref 40–53)
HCT VFR BLD AUTO: 39 % (ref 40–53)
HCT VFR BLD AUTO: 39 % (ref 40–53)
HCT VFR BLD AUTO: 39.3 % (ref 40–53)
HCT VFR BLD CALC: 41 %PCV (ref 40–53)
HGB BLD CALC-MCNC: 13.9 G/DL (ref 13.3–17.7)
HGB BLD-MCNC: 10.2 G/DL (ref 13.3–17.7)
HGB BLD-MCNC: 10.3 G/DL (ref 13.3–17.7)
HGB BLD-MCNC: 10.3 G/DL (ref 13.3–17.7)
HGB BLD-MCNC: 10.4 G/DL (ref 13.3–17.7)
HGB BLD-MCNC: 10.5 G/DL (ref 13.3–17.7)
HGB BLD-MCNC: 10.6 G/DL (ref 13.3–17.7)
HGB BLD-MCNC: 10.7 G/DL (ref 13.3–17.7)
HGB BLD-MCNC: 10.8 G/DL (ref 13.3–17.7)
HGB BLD-MCNC: 10.9 G/DL (ref 13.3–17.7)
HGB BLD-MCNC: 11 G/DL (ref 13.3–17.7)
HGB BLD-MCNC: 11.2 G/DL (ref 13.3–17.7)
HGB BLD-MCNC: 11.4 G/DL (ref 13.3–17.7)
HGB BLD-MCNC: 11.5 G/DL (ref 13.3–17.7)
HGB BLD-MCNC: 11.6 G/DL (ref 13.3–17.7)
HGB BLD-MCNC: 11.7 G/DL (ref 13.3–17.7)
HGB BLD-MCNC: 11.8 G/DL (ref 13.3–17.7)
HGB BLD-MCNC: 12.1 G/DL (ref 13.3–17.7)
HGB BLD-MCNC: 12.2 G/DL (ref 13.3–17.7)
HGB BLD-MCNC: 12.2 G/DL (ref 13.3–17.7)
HGB BLD-MCNC: 12.3 G/DL (ref 13.3–17.7)
HGB BLD-MCNC: 12.3 G/DL (ref 13.3–17.7)
HGB BLD-MCNC: 12.4 G/DL (ref 13.3–17.7)
HGB BLD-MCNC: 13.1 G/DL (ref 13.3–17.7)
HOWELL-JOLLY BOD BLD QL SMEAR: PRESENT
IMM GRANULOCYTES # BLD: 0 10E9/L (ref 0–0.4)
IMM GRANULOCYTES # BLD: 0.1 10E9/L (ref 0–0.4)
IMM GRANULOCYTES # BLD: 0.2 10E9/L (ref 0–0.4)
IMM GRANULOCYTES NFR BLD: 0.3 %
IMM GRANULOCYTES NFR BLD: 0.3 %
IMM GRANULOCYTES NFR BLD: 0.4 %
IMM GRANULOCYTES NFR BLD: 0.5 %
IMM GRANULOCYTES NFR BLD: 0.6 %
IMM GRANULOCYTES NFR BLD: 0.6 %
IMM GRANULOCYTES NFR BLD: 0.7 %
IMM GRANULOCYTES NFR BLD: 0.8 %
INR PPP: 1.55 (ref 0.86–1.14)
INR PPP: 1.61 (ref 0.86–1.14)
INR PPP: 2.06 (ref 0.86–1.14)
INTERPRETATION ECG - MUSE: NORMAL
LACTATE BLD-SCNC: 2.1 MMOL/L (ref 0.7–2)
LACTATE BLD-SCNC: 2.7 MMOL/L (ref 0.7–2)
LACTATE BLD-SCNC: 5.6 MMOL/L (ref 0.7–2.1)
LACTATE BLD-SCNC: 5.8 MMOL/L (ref 0.7–2)
LYMPHOCYTES # BLD AUTO: 0 10E9/L (ref 0.8–5.3)
LYMPHOCYTES # BLD AUTO: 0.1 10E9/L (ref 0.8–5.3)
LYMPHOCYTES # BLD AUTO: 0.2 10E9/L (ref 0.8–5.3)
LYMPHOCYTES # BLD AUTO: 0.3 10E9/L (ref 0.8–5.3)
LYMPHOCYTES # BLD AUTO: 0.4 10E9/L (ref 0.8–5.3)
LYMPHOCYTES # BLD AUTO: 0.5 10E9/L (ref 0.8–5.3)
LYMPHOCYTES # BLD AUTO: 0.5 10E9/L (ref 0.8–5.3)
LYMPHOCYTES # BLD AUTO: 0.8 10E9/L (ref 0.8–5.3)
LYMPHOCYTES NFR BLD AUTO: 0 %
LYMPHOCYTES NFR BLD AUTO: 0.4 %
LYMPHOCYTES NFR BLD AUTO: 0.6 %
LYMPHOCYTES NFR BLD AUTO: 0.6 %
LYMPHOCYTES NFR BLD AUTO: 0.7 %
LYMPHOCYTES NFR BLD AUTO: 0.9 %
LYMPHOCYTES NFR BLD AUTO: 0.9 %
LYMPHOCYTES NFR BLD AUTO: 1.3 %
LYMPHOCYTES NFR BLD AUTO: 1.7 %
LYMPHOCYTES NFR BLD AUTO: 1.7 %
LYMPHOCYTES NFR BLD AUTO: 2 %
LYMPHOCYTES NFR BLD AUTO: 2.1 %
LYMPHOCYTES NFR BLD AUTO: 2.4 %
LYMPHOCYTES NFR BLD AUTO: 2.6 %
LYMPHOCYTES NFR BLD AUTO: 2.8 %
LYMPHOCYTES NFR BLD AUTO: 3.2 %
LYMPHOCYTES NFR BLD AUTO: 3.5 %
LYMPHOCYTES NFR BLD AUTO: 3.5 %
LYMPHOCYTES NFR BLD AUTO: 4.5 %
LYMPHOCYTES NFR BLD AUTO: 5.2 %
LYMPHOCYTES NFR BLD AUTO: 8.8 %
LYMPHOCYTES NFR FLD MANUAL: 2 %
Lab: ABNORMAL
Lab: NORMAL
MACROCYTES BLD QL SMEAR: PRESENT
MAGNESIUM SERPL-MCNC: 2 MG/DL (ref 1.6–2.3)
MAGNESIUM SERPL-MCNC: 2.1 MG/DL (ref 1.6–2.3)
MAGNESIUM SERPL-MCNC: 2.2 MG/DL (ref 1.6–2.3)
MAGNESIUM SERPL-MCNC: 2.3 MG/DL (ref 1.6–2.3)
MCH RBC QN AUTO: 30.1 PG (ref 26.5–33)
MCH RBC QN AUTO: 30.2 PG (ref 26.5–33)
MCH RBC QN AUTO: 30.3 PG (ref 26.5–33)
MCH RBC QN AUTO: 30.4 PG (ref 26.5–33)
MCH RBC QN AUTO: 30.5 PG (ref 26.5–33)
MCH RBC QN AUTO: 30.6 PG (ref 26.5–33)
MCH RBC QN AUTO: 30.7 PG (ref 26.5–33)
MCH RBC QN AUTO: 30.7 PG (ref 26.5–33)
MCH RBC QN AUTO: 30.9 PG (ref 26.5–33)
MCH RBC QN AUTO: 31.5 PG (ref 26.5–33)
MCHC RBC AUTO-ENTMCNC: 30 G/DL (ref 31.5–36.5)
MCHC RBC AUTO-ENTMCNC: 30.6 G/DL (ref 31.5–36.5)
MCHC RBC AUTO-ENTMCNC: 30.8 G/DL (ref 31.5–36.5)
MCHC RBC AUTO-ENTMCNC: 30.8 G/DL (ref 31.5–36.5)
MCHC RBC AUTO-ENTMCNC: 30.9 G/DL (ref 31.5–36.5)
MCHC RBC AUTO-ENTMCNC: 31.1 G/DL (ref 31.5–36.5)
MCHC RBC AUTO-ENTMCNC: 31.2 G/DL (ref 31.5–36.5)
MCHC RBC AUTO-ENTMCNC: 31.3 G/DL (ref 31.5–36.5)
MCHC RBC AUTO-ENTMCNC: 31.4 G/DL (ref 31.5–36.5)
MCHC RBC AUTO-ENTMCNC: 31.4 G/DL (ref 31.5–36.5)
MCHC RBC AUTO-ENTMCNC: 31.5 G/DL (ref 31.5–36.5)
MCHC RBC AUTO-ENTMCNC: 31.6 G/DL (ref 31.5–36.5)
MCHC RBC AUTO-ENTMCNC: 31.7 G/DL (ref 31.5–36.5)
MCHC RBC AUTO-ENTMCNC: 31.8 G/DL (ref 31.5–36.5)
MCHC RBC AUTO-ENTMCNC: 32 G/DL (ref 31.5–36.5)
MCHC RBC AUTO-ENTMCNC: 32 G/DL (ref 31.5–36.5)
MCHC RBC AUTO-ENTMCNC: 32.1 G/DL (ref 31.5–36.5)
MCHC RBC AUTO-ENTMCNC: 32.4 G/DL (ref 31.5–36.5)
MCHC RBC AUTO-ENTMCNC: 32.5 G/DL (ref 31.5–36.5)
MCHC RBC AUTO-ENTMCNC: 33.3 G/DL (ref 31.5–36.5)
MCV RBC AUTO: 102 FL (ref 78–100)
MCV RBC AUTO: 94 FL (ref 78–100)
MCV RBC AUTO: 95 FL (ref 78–100)
MCV RBC AUTO: 96 FL (ref 78–100)
MCV RBC AUTO: 97 FL (ref 78–100)
MCV RBC AUTO: 98 FL (ref 78–100)
MCV RBC AUTO: 99 FL (ref 78–100)
MCV RBC AUTO: 99 FL (ref 78–100)
MONOCYTES # BLD AUTO: 0.1 10E9/L (ref 0–1.3)
MONOCYTES # BLD AUTO: 0.2 10E9/L (ref 0–1.3)
MONOCYTES # BLD AUTO: 0.4 10E9/L (ref 0–1.3)
MONOCYTES # BLD AUTO: 0.5 10E9/L (ref 0–1.3)
MONOCYTES # BLD AUTO: 0.6 10E9/L (ref 0–1.3)
MONOCYTES # BLD AUTO: 0.6 10E9/L (ref 0–1.3)
MONOCYTES # BLD AUTO: 0.7 10E9/L (ref 0–1.3)
MONOCYTES # BLD AUTO: 0.8 10E9/L (ref 0–1.3)
MONOCYTES # BLD AUTO: 1 10E9/L (ref 0–1.3)
MONOCYTES # BLD AUTO: 1.1 10E9/L (ref 0–1.3)
MONOCYTES # BLD AUTO: 1.1 10E9/L (ref 0–1.3)
MONOCYTES # BLD AUTO: 1.2 10E9/L (ref 0–1.3)
MONOCYTES # BLD AUTO: 1.2 10E9/L (ref 0–1.3)
MONOCYTES NFR BLD AUTO: 0.9 %
MONOCYTES NFR BLD AUTO: 1.8 %
MONOCYTES NFR BLD AUTO: 11.7 %
MONOCYTES NFR BLD AUTO: 2.6 %
MONOCYTES NFR BLD AUTO: 2.6 %
MONOCYTES NFR BLD AUTO: 3.4 %
MONOCYTES NFR BLD AUTO: 3.5 %
MONOCYTES NFR BLD AUTO: 4.3 %
MONOCYTES NFR BLD AUTO: 4.3 %
MONOCYTES NFR BLD AUTO: 4.4 %
MONOCYTES NFR BLD AUTO: 4.7 %
MONOCYTES NFR BLD AUTO: 4.8 %
MONOCYTES NFR BLD AUTO: 5.2 %
MONOCYTES NFR BLD AUTO: 6 %
MONOCYTES NFR BLD AUTO: 6.1 %
MONOCYTES NFR BLD AUTO: 6.2 %
MONOCYTES NFR BLD AUTO: 6.4 %
MONOCYTES NFR BLD AUTO: 6.7 %
MONOCYTES NFR BLD AUTO: 6.9 %
MONOCYTES NFR BLD AUTO: 7 %
MONOCYTES NFR BLD AUTO: 7.1 %
MONOCYTES NFR BLD AUTO: 7.2 %
MONOCYTES NFR BLD AUTO: 8.1 %
MONOCYTES NFR BLD AUTO: 8.3 %
MONOCYTES NFR BLD AUTO: 9.6 %
MONOS+MACROS NFR FLD MANUAL: 20 %
MRSA DNA SPEC QL NAA+PROBE: POSITIVE
MYELOCYTES # BLD: 0.1 10E9/L
MYELOCYTES NFR BLD MANUAL: 0.9 %
NEUTROPHILS # BLD AUTO: 10 10E9/L (ref 1.6–8.3)
NEUTROPHILS # BLD AUTO: 10.4 10E9/L (ref 1.6–8.3)
NEUTROPHILS # BLD AUTO: 10.5 10E9/L (ref 1.6–8.3)
NEUTROPHILS # BLD AUTO: 12.2 10E9/L (ref 1.6–8.3)
NEUTROPHILS # BLD AUTO: 12.2 10E9/L (ref 1.6–8.3)
NEUTROPHILS # BLD AUTO: 12.6 10E9/L (ref 1.6–8.3)
NEUTROPHILS # BLD AUTO: 12.6 10E9/L (ref 1.6–8.3)
NEUTROPHILS # BLD AUTO: 12.7 10E9/L (ref 1.6–8.3)
NEUTROPHILS # BLD AUTO: 12.8 10E9/L (ref 1.6–8.3)
NEUTROPHILS # BLD AUTO: 13.1 10E9/L (ref 1.6–8.3)
NEUTROPHILS # BLD AUTO: 13.4 10E9/L (ref 1.6–8.3)
NEUTROPHILS # BLD AUTO: 14 10E9/L (ref 1.6–8.3)
NEUTROPHILS # BLD AUTO: 14.1 10E9/L (ref 1.6–8.3)
NEUTROPHILS # BLD AUTO: 14.1 10E9/L (ref 1.6–8.3)
NEUTROPHILS # BLD AUTO: 14.2 10E9/L (ref 1.6–8.3)
NEUTROPHILS # BLD AUTO: 14.6 10E9/L (ref 1.6–8.3)
NEUTROPHILS # BLD AUTO: 14.7 10E9/L (ref 1.6–8.3)
NEUTROPHILS # BLD AUTO: 15.3 10E9/L (ref 1.6–8.3)
NEUTROPHILS # BLD AUTO: 16.6 10E9/L (ref 1.6–8.3)
NEUTROPHILS # BLD AUTO: 20.7 10E9/L (ref 1.6–8.3)
NEUTROPHILS # BLD AUTO: 22 10E9/L (ref 1.6–8.3)
NEUTROPHILS # BLD AUTO: 7.5 10E9/L (ref 1.6–8.3)
NEUTROPHILS # BLD AUTO: 7.8 10E9/L (ref 1.6–8.3)
NEUTROPHILS # BLD AUTO: 8.5 10E9/L (ref 1.6–8.3)
NEUTROPHILS # BLD AUTO: 8.6 10E9/L (ref 1.6–8.3)
NEUTROPHILS # BLD AUTO: 8.9 10E9/L (ref 1.6–8.3)
NEUTROPHILS # BLD AUTO: 8.9 10E9/L (ref 1.6–8.3)
NEUTROPHILS NFR BLD AUTO: 85 %
NEUTROPHILS NFR BLD AUTO: 87 %
NEUTROPHILS NFR BLD AUTO: 88.7 %
NEUTROPHILS NFR BLD AUTO: 88.8 %
NEUTROPHILS NFR BLD AUTO: 89.4 %
NEUTROPHILS NFR BLD AUTO: 89.5 %
NEUTROPHILS NFR BLD AUTO: 89.9 %
NEUTROPHILS NFR BLD AUTO: 90.3 %
NEUTROPHILS NFR BLD AUTO: 90.5 %
NEUTROPHILS NFR BLD AUTO: 90.6 %
NEUTROPHILS NFR BLD AUTO: 90.6 %
NEUTROPHILS NFR BLD AUTO: 91.3 %
NEUTROPHILS NFR BLD AUTO: 91.5 %
NEUTROPHILS NFR BLD AUTO: 92.2 %
NEUTROPHILS NFR BLD AUTO: 92.2 %
NEUTROPHILS NFR BLD AUTO: 92.3 %
NEUTROPHILS NFR BLD AUTO: 93 %
NEUTROPHILS NFR BLD AUTO: 93.1 %
NEUTROPHILS NFR BLD AUTO: 93.1 %
NEUTROPHILS NFR BLD AUTO: 93.7 %
NEUTROPHILS NFR BLD AUTO: 93.9 %
NEUTROPHILS NFR BLD AUTO: 94.7 %
NEUTROPHILS NFR BLD AUTO: 94.8 %
NEUTROPHILS NFR BLD AUTO: 95.4 %
NEUTROPHILS NFR BLD AUTO: 95.6 %
NEUTROPHILS NFR BLD AUTO: 96.5 %
NEUTROPHILS NFR BLD AUTO: 97.4 %
NEUTS BAND NFR FLD MANUAL: 77 %
NRBC # BLD AUTO: 0.3 10*3/UL
NRBC # BLD AUTO: 0.3 10*3/UL
NRBC # BLD AUTO: 0.4 10*3/UL
NRBC # BLD AUTO: 0.5 10*3/UL
NRBC # BLD AUTO: 0.6 10*3/UL
NRBC # BLD AUTO: 0.7 10*3/UL
NRBC # BLD AUTO: 0.8 10*3/UL
NRBC # BLD AUTO: 0.9 10*3/UL
NRBC # BLD AUTO: 0.9 10*3/UL
NRBC # BLD AUTO: 1.3 10*3/UL
NRBC # BLD AUTO: 1.8 10*3/UL
NRBC # BLD AUTO: 2 10*3/UL
NRBC # BLD AUTO: 2.1 10*3/UL
NRBC # BLD AUTO: 2.7 10*3/UL
NRBC BLD AUTO-RTO: 11 /100
NRBC BLD AUTO-RTO: 15 /100
NRBC BLD AUTO-RTO: 16 /100
NRBC BLD AUTO-RTO: 19 /100
NRBC BLD AUTO-RTO: 2 /100
NRBC BLD AUTO-RTO: 2 /100
NRBC BLD AUTO-RTO: 3 /100
NRBC BLD AUTO-RTO: 4 /100
NRBC BLD AUTO-RTO: 5 /100
NRBC BLD AUTO-RTO: 9 /100
NT-PROBNP SERPL-MCNC: ABNORMAL PG/ML (ref 0–450)
O2/TOTAL GAS SETTING VFR VENT: 100 %
O2/TOTAL GAS SETTING VFR VENT: 40 %
O2/TOTAL GAS SETTING VFR VENT: 50 %
O2/TOTAL GAS SETTING VFR VENT: 60 %
O2/TOTAL GAS SETTING VFR VENT: 70 %
O2/TOTAL GAS SETTING VFR VENT: 70 %
O2/TOTAL GAS SETTING VFR VENT: 75 %
O2/TOTAL GAS SETTING VFR VENT: 80 %
O2/TOTAL GAS SETTING VFR VENT: 90 %
O2/TOTAL GAS SETTING VFR VENT: 95 %
O2/TOTAL GAS SETTING VFR VENT: 95 %
O2/TOTAL GAS SETTING VFR VENT: ABNORMAL %
O2/TOTAL GAS SETTING VFR VENT: NORMAL %
O2/TOTAL GAS SETTING VFR VENT: NORMAL %
OVALOCYTES BLD QL SMEAR: SLIGHT
OXYHGB MFR BLD: 91 % (ref 92–100)
OXYHGB MFR BLD: 92 % (ref 92–100)
OXYHGB MFR BLDV: 44 %
OXYHGB MFR BLDV: 44 %
OXYHGB MFR BLDV: 46 %
OXYHGB MFR BLDV: 47 %
OXYHGB MFR BLDV: 49 %
OXYHGB MFR BLDV: 52 %
OXYHGB MFR BLDV: 53 %
OXYHGB MFR BLDV: 53 %
OXYHGB MFR BLDV: 54 %
OXYHGB MFR BLDV: 54 %
OXYHGB MFR BLDV: 55 %
OXYHGB MFR BLDV: 56 %
OXYHGB MFR BLDV: 59 %
OXYHGB MFR BLDV: 59 %
OXYHGB MFR BLDV: 62 %
OXYHGB MFR BLDV: 65 %
OXYHGB MFR BLDV: 65 %
OXYHGB MFR BLDV: 66 %
OXYHGB MFR BLDV: 69 %
OXYHGB MFR BLDV: 73 %
OXYHGB MFR BLDV: 79 %
OXYHGB MFR BLDV: 84 %
OXYHGB MFR BLDV: 87 %
OXYHGB MFR BLDV: NORMAL %
PCO2 BLD: 34 MM HG (ref 35–45)
PCO2 BLD: 38 MM HG (ref 35–45)
PCO2 BLD: 39 MM HG (ref 35–45)
PCO2 BLD: 40 MM HG (ref 35–45)
PCO2 BLD: 41 MM HG (ref 35–45)
PCO2 BLD: 42 MM HG (ref 35–45)
PCO2 BLD: 42 MM HG (ref 35–45)
PCO2 BLD: 43 MM HG (ref 35–45)
PCO2 BLD: 44 MM HG (ref 35–45)
PCO2 BLD: 44 MM HG (ref 35–45)
PCO2 BLD: 45 MM HG (ref 35–45)
PCO2 BLD: 46 MM HG (ref 35–45)
PCO2 BLD: NORMAL MM HG (ref 35–45)
PCO2 BLDV: 38 MM HG (ref 40–50)
PCO2 BLDV: 41 MM HG (ref 40–50)
PCO2 BLDV: 41 MM HG (ref 40–50)
PCO2 BLDV: 42 MM HG (ref 40–50)
PCO2 BLDV: 42 MM HG (ref 40–50)
PCO2 BLDV: 43 MM HG (ref 40–50)
PCO2 BLDV: 44 MM HG (ref 40–50)
PCO2 BLDV: 45 MM HG (ref 40–50)
PCO2 BLDV: 46 MM HG (ref 40–50)
PCO2 BLDV: 47 MM HG (ref 40–50)
PCO2 BLDV: 47 MM HG (ref 40–50)
PCO2 BLDV: 49 MM HG (ref 40–50)
PCO2 BLDV: 50 MM HG (ref 40–50)
PCO2 BLDV: 50 MM HG (ref 40–50)
PCO2 BLDV: 52 MM HG (ref 40–50)
PCO2 BLDV: 54 MM HG (ref 40–50)
PCO2 BLDV: NORMAL MM HG (ref 40–50)
PH BLD: 7.17 PH (ref 7.35–7.45)
PH BLD: 7.28 PH (ref 7.35–7.45)
PH BLD: 7.29 PH (ref 7.35–7.45)
PH BLD: 7.3 PH (ref 7.35–7.45)
PH BLD: 7.32 PH (ref 7.35–7.45)
PH BLD: 7.33 PH (ref 7.35–7.45)
PH BLD: 7.34 PH (ref 7.35–7.45)
PH BLD: 7.35 PH (ref 7.35–7.45)
PH BLD: 7.36 PH (ref 7.35–7.45)
PH BLD: 7.37 PH (ref 7.35–7.45)
PH BLD: 7.38 PH (ref 7.35–7.45)
PH BLD: NORMAL PH (ref 7.35–7.45)
PH BLDV: 7.2 PH (ref 7.32–7.43)
PH BLDV: 7.23 PH (ref 7.32–7.43)
PH BLDV: 7.24 PH (ref 7.32–7.43)
PH BLDV: 7.25 PH (ref 7.32–7.43)
PH BLDV: 7.26 PH (ref 7.32–7.43)
PH BLDV: 7.27 PH (ref 7.32–7.43)
PH BLDV: 7.27 PH (ref 7.32–7.43)
PH BLDV: 7.29 PH (ref 7.32–7.43)
PH BLDV: 7.3 PH (ref 7.32–7.43)
PH BLDV: 7.31 PH (ref 7.32–7.43)
PH BLDV: 7.31 PH (ref 7.32–7.43)
PH BLDV: 7.33 PH (ref 7.32–7.43)
PH BLDV: 7.34 PH (ref 7.32–7.43)
PH BLDV: 7.35 PH (ref 7.32–7.43)
PH BLDV: 7.36 PH (ref 7.32–7.43)
PH BLDV: 7.37 PH (ref 7.32–7.43)
PH BLDV: 7.38 PH (ref 7.32–7.43)
PH BLDV: NORMAL PH (ref 7.32–7.43)
PHOSPHATE SERPL-MCNC: 2.4 MG/DL (ref 2.5–4.5)
PHOSPHATE SERPL-MCNC: 2.5 MG/DL (ref 2.5–4.5)
PHOSPHATE SERPL-MCNC: 2.9 MG/DL (ref 2.5–4.5)
PHOSPHATE SERPL-MCNC: 2.9 MG/DL (ref 2.5–4.5)
PHOSPHATE SERPL-MCNC: 3 MG/DL (ref 2.5–4.5)
PHOSPHATE SERPL-MCNC: 3.1 MG/DL (ref 2.5–4.5)
PHOSPHATE SERPL-MCNC: 3.2 MG/DL (ref 2.5–4.5)
PHOSPHATE SERPL-MCNC: 3.2 MG/DL (ref 2.5–4.5)
PHOSPHATE SERPL-MCNC: 3.3 MG/DL (ref 2.5–4.5)
PHOSPHATE SERPL-MCNC: 3.4 MG/DL (ref 2.5–4.5)
PHOSPHATE SERPL-MCNC: 3.5 MG/DL (ref 2.5–4.5)
PHOSPHATE SERPL-MCNC: 3.8 MG/DL (ref 2.5–4.5)
PHOSPHATE SERPL-MCNC: 4.1 MG/DL (ref 2.5–4.5)
PHOSPHATE SERPL-MCNC: 4.1 MG/DL (ref 2.5–4.5)
PHOSPHATE SERPL-MCNC: 4.3 MG/DL (ref 2.5–4.5)
PHOSPHATE SERPL-MCNC: 5 MG/DL (ref 2.5–4.5)
PHOSPHATE SERPL-MCNC: 5 MG/DL (ref 2.5–4.5)
PHOSPHATE SERPL-MCNC: 5.2 MG/DL (ref 2.5–4.5)
PHOSPHATE SERPL-MCNC: 5.6 MG/DL (ref 2.5–4.5)
PHOSPHATE SERPL-MCNC: 5.9 MG/DL (ref 2.5–4.5)
PHOSPHATE SERPL-MCNC: 6.2 MG/DL (ref 2.5–4.5)
PHOSPHATE SERPL-MCNC: 6.5 MG/DL (ref 2.5–4.5)
PHOSPHATE SERPL-MCNC: 6.5 MG/DL (ref 2.5–4.5)
PHOSPHATE SERPL-MCNC: 7.1 MG/DL (ref 2.5–4.5)
PHOSPHATE SERPL-MCNC: 7.8 MG/DL (ref 2.5–4.5)
PLATELET # BLD AUTO: 100 10E9/L (ref 150–450)
PLATELET # BLD AUTO: 101 10E9/L (ref 150–450)
PLATELET # BLD AUTO: 103 10E9/L (ref 150–450)
PLATELET # BLD AUTO: 106 10E9/L (ref 150–450)
PLATELET # BLD AUTO: 107 10E9/L (ref 150–450)
PLATELET # BLD AUTO: 108 10E9/L (ref 150–450)
PLATELET # BLD AUTO: 110 10E9/L (ref 150–450)
PLATELET # BLD AUTO: 111 10E9/L (ref 150–450)
PLATELET # BLD AUTO: 113 10E9/L (ref 150–450)
PLATELET # BLD AUTO: 66 10E9/L (ref 150–450)
PLATELET # BLD AUTO: 70 10E9/L (ref 150–450)
PLATELET # BLD AUTO: 71 10E9/L (ref 150–450)
PLATELET # BLD AUTO: 73 10E9/L (ref 150–450)
PLATELET # BLD AUTO: 76 10E9/L (ref 150–450)
PLATELET # BLD AUTO: 76 10E9/L (ref 150–450)
PLATELET # BLD AUTO: 79 10E9/L (ref 150–450)
PLATELET # BLD AUTO: 79 10E9/L (ref 150–450)
PLATELET # BLD AUTO: 86 10E9/L (ref 150–450)
PLATELET # BLD AUTO: 89 10E9/L (ref 150–450)
PLATELET # BLD AUTO: 93 10E9/L (ref 150–450)
PLATELET # BLD AUTO: 93 10E9/L (ref 150–450)
PLATELET # BLD AUTO: 97 10E9/L (ref 150–450)
PLATELET # BLD AUTO: 98 10E9/L (ref 150–450)
PLATELET # BLD AUTO: 99 10E9/L (ref 150–450)
PLATELET # BLD EST: ABNORMAL 10*3/UL
PO2 BLD: 115 MM HG (ref 80–105)
PO2 BLD: 61 MM HG (ref 80–105)
PO2 BLD: 64 MM HG (ref 80–105)
PO2 BLD: 65 MM HG (ref 80–105)
PO2 BLD: 67 MM HG (ref 80–105)
PO2 BLD: 67 MM HG (ref 80–105)
PO2 BLD: 69 MM HG (ref 80–105)
PO2 BLD: 72 MM HG (ref 80–105)
PO2 BLD: 73 MM HG (ref 80–105)
PO2 BLD: 77 MM HG (ref 80–105)
PO2 BLD: 78 MM HG (ref 80–105)
PO2 BLD: 79 MM HG (ref 80–105)
PO2 BLD: 85 MM HG (ref 80–105)
PO2 BLD: 85 MM HG (ref 80–105)
PO2 BLD: 89 MM HG (ref 80–105)
PO2 BLD: 92 MM HG (ref 80–105)
PO2 BLD: 94 MM HG (ref 80–105)
PO2 BLD: 96 MM HG (ref 80–105)
PO2 BLD: 96 MM HG (ref 80–105)
PO2 BLD: NORMAL MM HG (ref 80–105)
PO2 BLDV: 28 MM HG (ref 25–47)
PO2 BLDV: 30 MM HG (ref 25–47)
PO2 BLDV: 31 MM HG (ref 25–47)
PO2 BLDV: 32 MM HG (ref 25–47)
PO2 BLDV: 32 MM HG (ref 25–47)
PO2 BLDV: 34 MM HG (ref 25–47)
PO2 BLDV: 35 MM HG (ref 25–47)
PO2 BLDV: 36 MM HG (ref 25–47)
PO2 BLDV: 37 MM HG (ref 25–47)
PO2 BLDV: 38 MM HG (ref 25–47)
PO2 BLDV: 38 MM HG (ref 25–47)
PO2 BLDV: 39 MM HG (ref 25–47)
PO2 BLDV: 39 MM HG (ref 25–47)
PO2 BLDV: 40 MM HG (ref 25–47)
PO2 BLDV: 41 MM HG (ref 25–47)
PO2 BLDV: 42 MM HG (ref 25–47)
PO2 BLDV: 44 MM HG (ref 25–47)
PO2 BLDV: 44 MM HG (ref 25–47)
PO2 BLDV: 48 MM HG (ref 25–47)
PO2 BLDV: 55 MM HG (ref 25–47)
PO2 BLDV: 59 MM HG (ref 25–47)
PO2 BLDV: 72 MM HG (ref 25–47)
PO2 BLDV: NORMAL MM HG (ref 25–47)
POIKILOCYTOSIS BLD QL SMEAR: ABNORMAL
POIKILOCYTOSIS BLD QL SMEAR: SLIGHT
POLYCHROMASIA BLD QL SMEAR: SLIGHT
POLYCHROMASIA BLD QL SMEAR: SLIGHT
POTASSIUM BLD-SCNC: 4.2 MMOL/L (ref 3.4–5.3)
POTASSIUM BLD-SCNC: 4.6 MMOL/L (ref 3.4–5.3)
POTASSIUM SERPL-SCNC: 3 MMOL/L (ref 3.4–5.3)
POTASSIUM SERPL-SCNC: 3.2 MMOL/L (ref 3.4–5.3)
POTASSIUM SERPL-SCNC: 3.3 MMOL/L (ref 3.4–5.3)
POTASSIUM SERPL-SCNC: 3.4 MMOL/L (ref 3.4–5.3)
POTASSIUM SERPL-SCNC: 3.6 MMOL/L (ref 3.4–5.3)
POTASSIUM SERPL-SCNC: 3.7 MMOL/L (ref 3.4–5.3)
POTASSIUM SERPL-SCNC: 3.7 MMOL/L (ref 3.4–5.3)
POTASSIUM SERPL-SCNC: 3.8 MMOL/L (ref 3.4–5.3)
POTASSIUM SERPL-SCNC: 4 MMOL/L (ref 3.4–5.3)
POTASSIUM SERPL-SCNC: 4 MMOL/L (ref 3.4–5.3)
POTASSIUM SERPL-SCNC: 4.2 MMOL/L (ref 3.4–5.3)
POTASSIUM SERPL-SCNC: 4.2 MMOL/L (ref 3.4–5.3)
POTASSIUM SERPL-SCNC: 4.3 MMOL/L (ref 3.4–5.3)
POTASSIUM SERPL-SCNC: 4.5 MMOL/L (ref 3.4–5.3)
POTASSIUM SERPL-SCNC: 4.5 MMOL/L (ref 3.4–5.3)
POTASSIUM SERPL-SCNC: 4.6 MMOL/L (ref 3.4–5.3)
POTASSIUM SERPL-SCNC: 4.6 MMOL/L (ref 3.4–5.3)
POTASSIUM SERPL-SCNC: 4.8 MMOL/L (ref 3.4–5.3)
POTASSIUM SERPL-SCNC: 5.7 MMOL/L (ref 3.4–5.3)
POTASSIUM SERPL-SCNC: 6.1 MMOL/L (ref 3.4–5.3)
PROCALCITONIN SERPL-MCNC: 18.88 NG/ML
PROCALCITONIN SERPL-MCNC: 92.36 NG/ML
PROT FLD-MCNC: 3.6 G/DL
PROT SERPL-MCNC: 6.2 G/DL (ref 6.8–8.8)
PROT SERPL-MCNC: 6.4 G/DL (ref 6.8–8.8)
PROT SERPL-MCNC: 6.4 G/DL (ref 6.8–8.8)
PROT SERPL-MCNC: 6.5 G/DL (ref 6.8–8.8)
PROT SERPL-MCNC: 6.6 G/DL (ref 6.8–8.8)
PROT SERPL-MCNC: 6.7 G/DL (ref 6.8–8.8)
PROT SERPL-MCNC: 6.8 G/DL (ref 6.8–8.8)
PROT SERPL-MCNC: 6.9 G/DL (ref 6.8–8.8)
PROT SERPL-MCNC: 7 G/DL (ref 6.8–8.8)
PROT SERPL-MCNC: 7.1 G/DL (ref 6.8–8.8)
RBC # BLD AUTO: 3.37 10E12/L (ref 4.4–5.9)
RBC # BLD AUTO: 3.4 10E12/L (ref 4.4–5.9)
RBC # BLD AUTO: 3.42 10E12/L (ref 4.4–5.9)
RBC # BLD AUTO: 3.46 10E12/L (ref 4.4–5.9)
RBC # BLD AUTO: 3.48 10E12/L (ref 4.4–5.9)
RBC # BLD AUTO: 3.51 10E12/L (ref 4.4–5.9)
RBC # BLD AUTO: 3.51 10E12/L (ref 4.4–5.9)
RBC # BLD AUTO: 3.55 10E12/L (ref 4.4–5.9)
RBC # BLD AUTO: 3.59 10E12/L (ref 4.4–5.9)
RBC # BLD AUTO: 3.6 10E12/L (ref 4.4–5.9)
RBC # BLD AUTO: 3.61 10E12/L (ref 4.4–5.9)
RBC # BLD AUTO: 3.65 10E12/L (ref 4.4–5.9)
RBC # BLD AUTO: 3.7 10E12/L (ref 4.4–5.9)
RBC # BLD AUTO: 3.76 10E12/L (ref 4.4–5.9)
RBC # BLD AUTO: 3.8 10E12/L (ref 4.4–5.9)
RBC # BLD AUTO: 3.81 10E12/L (ref 4.4–5.9)
RBC # BLD AUTO: 3.81 10E12/L (ref 4.4–5.9)
RBC # BLD AUTO: 3.84 10E12/L (ref 4.4–5.9)
RBC # BLD AUTO: 3.92 10E12/L (ref 4.4–5.9)
RBC # BLD AUTO: 3.95 10E12/L (ref 4.4–5.9)
RBC # BLD AUTO: 3.95 10E12/L (ref 4.4–5.9)
RBC # BLD AUTO: 3.96 10E12/L (ref 4.4–5.9)
RBC # BLD AUTO: 3.98 10E12/L (ref 4.4–5.9)
RBC # BLD AUTO: 3.98 10E12/L (ref 4.4–5.9)
RBC # BLD AUTO: 4.02 10E12/L (ref 4.4–5.9)
RBC # BLD AUTO: 4.05 10E12/L (ref 4.4–5.9)
RBC # BLD AUTO: 4.05 10E12/L (ref 4.4–5.9)
RBC # BLD AUTO: 4.16 10E12/L (ref 4.4–5.9)
RBC # FLD: NORMAL /UL
SAO2 % BLDV FROM PO2: 51 %
SAO2 % BLDV FROM PO2: 54 %
SODIUM BLD-SCNC: 133 MMOL/L (ref 133–144)
SODIUM SERPL-SCNC: 131 MMOL/L (ref 133–144)
SODIUM SERPL-SCNC: 134 MMOL/L (ref 133–144)
SODIUM SERPL-SCNC: 134 MMOL/L (ref 133–144)
SODIUM SERPL-SCNC: 135 MMOL/L (ref 133–144)
SODIUM SERPL-SCNC: 136 MMOL/L (ref 133–144)
SODIUM SERPL-SCNC: 137 MMOL/L (ref 133–144)
SODIUM SERPL-SCNC: 138 MMOL/L (ref 133–144)
SODIUM SERPL-SCNC: 139 MMOL/L (ref 133–144)
SODIUM SERPL-SCNC: 140 MMOL/L (ref 133–144)
SPECIMEN SOURCE FLD: NORMAL
SPECIMEN SOURCE: ABNORMAL
SPECIMEN SOURCE: NORMAL
T4 FREE SERPL-MCNC: 0.78 NG/DL (ref 0.76–1.46)
TARGETS BLD QL SMEAR: ABNORMAL
TARGETS BLD QL SMEAR: ABNORMAL
TARGETS BLD QL SMEAR: SLIGHT
TARGETS BLD QL SMEAR: SLIGHT
TROPONIN I SERPL-MCNC: 3.12 UG/L (ref 0–0.04)
TROPONIN I SERPL-MCNC: 3.24 UG/L (ref 0–0.04)
TSH SERPL DL<=0.005 MIU/L-ACNC: 4.24 MU/L (ref 0.4–4)
VANCOMYCIN SERPL-MCNC: 22.4 MG/L
WBC # BLD AUTO: 10 10E9/L (ref 4–11)
WBC # BLD AUTO: 10.1 10E9/L (ref 4–11)
WBC # BLD AUTO: 10.3 10E9/L (ref 4–11)
WBC # BLD AUTO: 10.8 10E9/L (ref 4–11)
WBC # BLD AUTO: 11.6 10E9/L (ref 4–11)
WBC # BLD AUTO: 11.7 10E9/L (ref 4–11)
WBC # BLD AUTO: 13.4 10E9/L (ref 4–11)
WBC # BLD AUTO: 13.4 10E9/L (ref 4–11)
WBC # BLD AUTO: 13.6 10E9/L (ref 4–11)
WBC # BLD AUTO: 13.6 10E9/L (ref 4–11)
WBC # BLD AUTO: 13.7 10E9/L (ref 4–11)
WBC # BLD AUTO: 14.1 10E9/L (ref 4–11)
WBC # BLD AUTO: 14.2 10E9/L (ref 4–11)
WBC # BLD AUTO: 14.7 10E9/L (ref 4–11)
WBC # BLD AUTO: 14.8 10E9/L (ref 4–11)
WBC # BLD AUTO: 14.8 10E9/L (ref 4–11)
WBC # BLD AUTO: 14.9 10E9/L (ref 4–11)
WBC # BLD AUTO: 15 10E9/L (ref 4–11)
WBC # BLD AUTO: 15.1 10E9/L (ref 4–11)
WBC # BLD AUTO: 15.8 10E9/L (ref 4–11)
WBC # BLD AUTO: 15.9 10E9/L (ref 4–11)
WBC # BLD AUTO: 16.3 10E9/L (ref 4–11)
WBC # BLD AUTO: 17 10E9/L (ref 4–11)
WBC # BLD AUTO: 21.7 10E9/L (ref 4–11)
WBC # BLD AUTO: 23.5 10E9/L (ref 4–11)
WBC # BLD AUTO: 8.5 10E9/L (ref 4–11)
WBC # BLD AUTO: 8.6 10E9/L (ref 4–11)
WBC # BLD AUTO: 9.5 10E9/L (ref 4–11)
WBC # FLD AUTO: 792 /UL

## 2018-01-01 PROCEDURE — 94640 AIRWAY INHALATION TREATMENT: CPT

## 2018-01-01 PROCEDURE — 40000502 ZZHCL STATISTIC GLUCOSE ED POCT

## 2018-01-01 PROCEDURE — 40000014 ZZH STATISTIC ARTERIAL MONITORING DAILY

## 2018-01-01 PROCEDURE — 20000004 ZZH R&B ICU UMMC

## 2018-01-01 PROCEDURE — 40000275 ZZH STATISTIC RCP TIME EA 10 MIN

## 2018-01-01 PROCEDURE — 83605 ASSAY OF LACTIC ACID: CPT | Performed by: STUDENT IN AN ORGANIZED HEALTH CARE EDUCATION/TRAINING PROGRAM

## 2018-01-01 PROCEDURE — 82330 ASSAY OF CALCIUM: CPT | Performed by: SURGERY

## 2018-01-01 PROCEDURE — 83735 ASSAY OF MAGNESIUM: CPT | Performed by: INTERNAL MEDICINE

## 2018-01-01 PROCEDURE — 80053 COMPREHEN METABOLIC PANEL: CPT | Performed by: INTERNAL MEDICINE

## 2018-01-01 PROCEDURE — 25000128 H RX IP 250 OP 636: Performed by: STUDENT IN AN ORGANIZED HEALTH CARE EDUCATION/TRAINING PROGRAM

## 2018-01-01 PROCEDURE — 84075 ASSAY ALKALINE PHOSPHATASE: CPT

## 2018-01-01 PROCEDURE — 25000125 ZZHC RX 250: Performed by: INTERNAL MEDICINE

## 2018-01-01 PROCEDURE — 82374 ASSAY BLOOD CARBON DIOXIDE: CPT

## 2018-01-01 PROCEDURE — 83605 ASSAY OF LACTIC ACID: CPT | Performed by: SURGERY

## 2018-01-01 PROCEDURE — 82330 ASSAY OF CALCIUM: CPT | Performed by: STUDENT IN AN ORGANIZED HEALTH CARE EDUCATION/TRAINING PROGRAM

## 2018-01-01 PROCEDURE — 94640 AIRWAY INHALATION TREATMENT: CPT | Mod: 76

## 2018-01-01 PROCEDURE — 40000501 ZZHCL STATISTIC HEMATOCRIT ED POCT

## 2018-01-01 PROCEDURE — 85025 COMPLETE CBC W/AUTO DIFF WBC: CPT | Performed by: INTERNAL MEDICINE

## 2018-01-01 PROCEDURE — 82330 ASSAY OF CALCIUM: CPT | Performed by: INTERNAL MEDICINE

## 2018-01-01 PROCEDURE — 25000132 ZZH RX MED GY IP 250 OP 250 PS 637: Performed by: STUDENT IN AN ORGANIZED HEALTH CARE EDUCATION/TRAINING PROGRAM

## 2018-01-01 PROCEDURE — 82330 ASSAY OF CALCIUM: CPT

## 2018-01-01 PROCEDURE — 87205 SMEAR GRAM STAIN: CPT | Performed by: STUDENT IN AN ORGANIZED HEALTH CARE EDUCATION/TRAINING PROGRAM

## 2018-01-01 PROCEDURE — 25000128 H RX IP 250 OP 636

## 2018-01-01 PROCEDURE — 25000128 H RX IP 250 OP 636: Performed by: INTERNAL MEDICINE

## 2018-01-01 PROCEDURE — 85610 PROTHROMBIN TIME: CPT | Performed by: INTERNAL MEDICINE

## 2018-01-01 PROCEDURE — 40000048 ZZH STATISTIC DAILY SWAN MONITORING

## 2018-01-01 PROCEDURE — 83605 ASSAY OF LACTIC ACID: CPT

## 2018-01-01 PROCEDURE — 99285 EMERGENCY DEPT VISIT HI MDM: CPT | Mod: 25 | Performed by: EMERGENCY MEDICINE

## 2018-01-01 PROCEDURE — 80202 ASSAY OF VANCOMYCIN: CPT

## 2018-01-01 PROCEDURE — 94003 VENT MGMT INPAT SUBQ DAY: CPT

## 2018-01-01 PROCEDURE — G0463 HOSPITAL OUTPT CLINIC VISIT: HCPCS

## 2018-01-01 PROCEDURE — 25000132 ZZH RX MED GY IP 250 OP 250 PS 637

## 2018-01-01 PROCEDURE — 82805 BLOOD GASES W/O2 SATURATION: CPT | Performed by: INTERNAL MEDICINE

## 2018-01-01 PROCEDURE — 40000986 XR ABDOMEN PORT F1 VW

## 2018-01-01 PROCEDURE — 40000281 ZZH STATISTIC TRANSPORT TIME EA 15 MIN

## 2018-01-01 PROCEDURE — 84443 ASSAY THYROID STIM HORMONE: CPT | Performed by: EMERGENCY MEDICINE

## 2018-01-01 PROCEDURE — 27210437 ZZH NUTRITION PRODUCT SEMIELEM INTERMED LITER

## 2018-01-01 PROCEDURE — 40000986 XR CHEST PORT 1 VW

## 2018-01-01 PROCEDURE — 76000 FLUOROSCOPY <1 HR PHYS/QHP: CPT | Mod: TC

## 2018-01-01 PROCEDURE — 82140 ASSAY OF AMMONIA: CPT | Performed by: STUDENT IN AN ORGANIZED HEALTH CARE EDUCATION/TRAINING PROGRAM

## 2018-01-01 PROCEDURE — 82805 BLOOD GASES W/O2 SATURATION: CPT | Performed by: STUDENT IN AN ORGANIZED HEALTH CARE EDUCATION/TRAINING PROGRAM

## 2018-01-01 PROCEDURE — 71045 X-RAY EXAM CHEST 1 VIEW: CPT

## 2018-01-01 PROCEDURE — 90947 DIALYSIS REPEATED EVAL: CPT

## 2018-01-01 PROCEDURE — 87070 CULTURE OTHR SPECIMN AEROBIC: CPT | Performed by: STUDENT IN AN ORGANIZED HEALTH CARE EDUCATION/TRAINING PROGRAM

## 2018-01-01 PROCEDURE — 74018 RADEX ABDOMEN 1 VIEW: CPT

## 2018-01-01 PROCEDURE — 84439 ASSAY OF FREE THYROXINE: CPT | Performed by: EMERGENCY MEDICINE

## 2018-01-01 PROCEDURE — 36556 INSERT NON-TUNNEL CV CATH: CPT

## 2018-01-01 PROCEDURE — 87040 BLOOD CULTURE FOR BACTERIA: CPT | Performed by: INTERNAL MEDICINE

## 2018-01-01 PROCEDURE — 25000128 H RX IP 250 OP 636: Performed by: NURSE ANESTHETIST, CERTIFIED REGISTERED

## 2018-01-01 PROCEDURE — 87186 SC STD MICRODIL/AGAR DIL: CPT | Performed by: STUDENT IN AN ORGANIZED HEALTH CARE EDUCATION/TRAINING PROGRAM

## 2018-01-01 PROCEDURE — 84295 ASSAY OF SERUM SODIUM: CPT

## 2018-01-01 PROCEDURE — 99291 CRITICAL CARE FIRST HOUR: CPT | Mod: GC | Performed by: INTERNAL MEDICINE

## 2018-01-01 PROCEDURE — 40000497 ZZHCL STATISTIC SODIUM ED POCT

## 2018-01-01 PROCEDURE — 82247 BILIRUBIN TOTAL: CPT

## 2018-01-01 PROCEDURE — 99233 SBSQ HOSP IP/OBS HIGH 50: CPT | Mod: 25 | Performed by: INTERNAL MEDICINE

## 2018-01-01 PROCEDURE — 84132 ASSAY OF SERUM POTASSIUM: CPT

## 2018-01-01 PROCEDURE — 40000196 ZZH STATISTIC RAPCV CVP MONITORING

## 2018-01-01 PROCEDURE — 83735 ASSAY OF MAGNESIUM: CPT

## 2018-01-01 PROCEDURE — 84145 PROCALCITONIN (PCT): CPT

## 2018-01-01 PROCEDURE — 87077 CULTURE AEROBIC IDENTIFY: CPT

## 2018-01-01 PROCEDURE — 36620 INSERTION CATHETER ARTERY: CPT

## 2018-01-01 PROCEDURE — 70450 CT HEAD/BRAIN W/O DYE: CPT

## 2018-01-01 PROCEDURE — 25000125 ZZHC RX 250: Performed by: STUDENT IN AN ORGANIZED HEALTH CARE EDUCATION/TRAINING PROGRAM

## 2018-01-01 PROCEDURE — 25000128 H RX IP 250 OP 636: Performed by: EMERGENCY MEDICINE

## 2018-01-01 PROCEDURE — 84157 ASSAY OF PROTEIN OTHER: CPT | Performed by: STUDENT IN AN ORGANIZED HEALTH CARE EDUCATION/TRAINING PROGRAM

## 2018-01-01 PROCEDURE — 84100 ASSAY OF PHOSPHORUS: CPT | Performed by: INTERNAL MEDICINE

## 2018-01-01 PROCEDURE — 94002 VENT MGMT INPAT INIT DAY: CPT

## 2018-01-01 PROCEDURE — 36415 COLL VENOUS BLD VENIPUNCTURE: CPT | Performed by: STUDENT IN AN ORGANIZED HEALTH CARE EDUCATION/TRAINING PROGRAM

## 2018-01-01 PROCEDURE — 93503 INSERT/PLACE HEART CATHETER: CPT

## 2018-01-01 PROCEDURE — 25800025 ZZH RX 258

## 2018-01-01 PROCEDURE — 25000132 ZZH RX MED GY IP 250 OP 250 PS 637: Performed by: INTERNAL MEDICINE

## 2018-01-01 PROCEDURE — 82947 ASSAY GLUCOSE BLOOD QUANT: CPT

## 2018-01-01 PROCEDURE — 5A1955Z RESPIRATORY VENTILATION, GREATER THAN 96 CONSECUTIVE HOURS: ICD-10-PCS

## 2018-01-01 PROCEDURE — 82550 ASSAY OF CK (CPK): CPT

## 2018-01-01 PROCEDURE — 85027 COMPLETE CBC AUTOMATED: CPT

## 2018-01-01 PROCEDURE — 93306 TTE W/DOPPLER COMPLETE: CPT | Mod: 26 | Performed by: INTERNAL MEDICINE

## 2018-01-01 PROCEDURE — 93005 ELECTROCARDIOGRAM TRACING: CPT

## 2018-01-01 PROCEDURE — 27210136 ZZH KIT CATH ARTERIAL EXT SUPPLY

## 2018-01-01 PROCEDURE — 87641 MR-STAPH DNA AMP PROBE: CPT

## 2018-01-01 PROCEDURE — 99231 SBSQ HOSP IP/OBS SF/LOW 25: CPT | Performed by: CLINICAL NURSE SPECIALIST

## 2018-01-01 PROCEDURE — 93306 TTE W/DOPPLER COMPLETE: CPT

## 2018-01-01 PROCEDURE — 82803 BLOOD GASES ANY COMBINATION: CPT | Performed by: STUDENT IN AN ORGANIZED HEALTH CARE EDUCATION/TRAINING PROGRAM

## 2018-01-01 PROCEDURE — 83605 ASSAY OF LACTIC ACID: CPT | Performed by: INTERNAL MEDICINE

## 2018-01-01 PROCEDURE — 87077 CULTURE AEROBIC IDENTIFY: CPT | Performed by: STUDENT IN AN ORGANIZED HEALTH CARE EDUCATION/TRAINING PROGRAM

## 2018-01-01 PROCEDURE — 82310 ASSAY OF CALCIUM: CPT

## 2018-01-01 PROCEDURE — 85610 PROTHROMBIN TIME: CPT | Performed by: EMERGENCY MEDICINE

## 2018-01-01 PROCEDURE — 99238 HOSP IP/OBS DSCHRG MGMT 30/<: CPT | Mod: GC | Performed by: INTERNAL MEDICINE

## 2018-01-01 PROCEDURE — 82565 ASSAY OF CREATININE: CPT

## 2018-01-01 PROCEDURE — 84460 ALANINE AMINO (ALT) (SGPT): CPT

## 2018-01-01 PROCEDURE — 25000125 ZZHC RX 250: Performed by: PLASTIC SURGERY

## 2018-01-01 PROCEDURE — 73206 CT ANGIO UPR EXTRM W/O&W/DYE: CPT | Mod: LT

## 2018-01-01 PROCEDURE — 49082 ABD PARACENTESIS: CPT | Mod: GC

## 2018-01-01 PROCEDURE — 27210995 ZZH RX 272: Performed by: STUDENT IN AN ORGANIZED HEALTH CARE EDUCATION/TRAINING PROGRAM

## 2018-01-01 PROCEDURE — 99291 CRITICAL CARE FIRST HOUR: CPT | Mod: 25 | Performed by: INTERNAL MEDICINE

## 2018-01-01 PROCEDURE — 84100 ASSAY OF PHOSPHORUS: CPT

## 2018-01-01 PROCEDURE — 40000498 ZZHCL STATISTIC POTASSIUM ED POCT

## 2018-01-01 PROCEDURE — 00000146 ZZHCL STATISTIC GLUCOSE BY METER IP

## 2018-01-01 PROCEDURE — 87040 BLOOD CULTURE FOR BACTERIA: CPT | Performed by: STUDENT IN AN ORGANIZED HEALTH CARE EDUCATION/TRAINING PROGRAM

## 2018-01-01 PROCEDURE — 99223 1ST HOSP IP/OBS HIGH 75: CPT | Performed by: CLINICAL NURSE SPECIALIST

## 2018-01-01 PROCEDURE — 83880 ASSAY OF NATRIURETIC PEPTIDE: CPT | Performed by: EMERGENCY MEDICINE

## 2018-01-01 PROCEDURE — 27210436 ZZH NUTRITION PRODUCT SEMIELEM INTERMED CAN

## 2018-01-01 PROCEDURE — 44500 INTRO GASTROINTESTINAL TUBE: CPT | Performed by: DIETITIAN, REGISTERED

## 2018-01-01 PROCEDURE — 82803 BLOOD GASES ANY COMBINATION: CPT

## 2018-01-01 PROCEDURE — 40000901 ZZH STATISTIC WOC PT EDUCATION, 0-15 MIN

## 2018-01-01 PROCEDURE — A7035 POS AIRWAY PRESS HEADGEAR: HCPCS

## 2018-01-01 PROCEDURE — 27211314

## 2018-01-01 PROCEDURE — 25000125 ZZHC RX 250: Performed by: NURSE ANESTHETIST, CERTIFIED REGISTERED

## 2018-01-01 PROCEDURE — 80053 COMPREHEN METABOLIC PANEL: CPT

## 2018-01-01 PROCEDURE — 40000671 ZZH STATISTIC ANESTHESIA CASE

## 2018-01-01 PROCEDURE — 36620 INSERTION CATHETER ARTERY: CPT | Mod: GC | Performed by: INTERNAL MEDICINE

## 2018-01-01 PROCEDURE — 93010 ELECTROCARDIOGRAM REPORT: CPT | Performed by: INTERNAL MEDICINE

## 2018-01-01 PROCEDURE — 93931 UPPER EXTREMITY STUDY: CPT | Mod: LT

## 2018-01-01 PROCEDURE — 84520 ASSAY OF UREA NITROGEN: CPT

## 2018-01-01 PROCEDURE — 87640 STAPH A DNA AMP PROBE: CPT

## 2018-01-01 PROCEDURE — 99292 CRITICAL CARE ADDL 30 MIN: CPT | Mod: 25 | Performed by: INTERNAL MEDICINE

## 2018-01-01 PROCEDURE — 84484 ASSAY OF TROPONIN QUANT: CPT

## 2018-01-01 PROCEDURE — 96375 TX/PRO/DX INJ NEW DRUG ADDON: CPT | Performed by: EMERGENCY MEDICINE

## 2018-01-01 PROCEDURE — 5A1D70Z PERFORMANCE OF URINARY FILTRATION, INTERMITTENT, LESS THAN 6 HOURS PER DAY: ICD-10-PCS | Performed by: INTERNAL MEDICINE

## 2018-01-01 PROCEDURE — 99291 CRITICAL CARE FIRST HOUR: CPT | Mod: Z6 | Performed by: EMERGENCY MEDICINE

## 2018-01-01 PROCEDURE — 93970 EXTREMITY STUDY: CPT

## 2018-01-01 PROCEDURE — 84145 PROCALCITONIN (PCT): CPT | Performed by: INTERNAL MEDICINE

## 2018-01-01 PROCEDURE — 82805 BLOOD GASES W/O2 SATURATION: CPT | Performed by: SURGERY

## 2018-01-01 PROCEDURE — 82042 OTHER SOURCE ALBUMIN QUAN EA: CPT | Performed by: STUDENT IN AN ORGANIZED HEALTH CARE EDUCATION/TRAINING PROGRAM

## 2018-01-01 PROCEDURE — 82435 ASSAY OF BLOOD CHLORIDE: CPT

## 2018-01-01 PROCEDURE — 85025 COMPLETE CBC W/AUTO DIFF WBC: CPT | Performed by: EMERGENCY MEDICINE

## 2018-01-01 PROCEDURE — 27210140 ZZH KIT CATH SWAN VIP SUPPLY

## 2018-01-01 PROCEDURE — 96365 THER/PROPH/DIAG IV INF INIT: CPT | Performed by: EMERGENCY MEDICINE

## 2018-01-01 PROCEDURE — 99291 CRITICAL CARE FIRST HOUR: CPT | Mod: 25

## 2018-01-01 PROCEDURE — 87040 BLOOD CULTURE FOR BACTERIA: CPT | Performed by: EMERGENCY MEDICINE

## 2018-01-01 PROCEDURE — 83036 HEMOGLOBIN GLYCOSYLATED A1C: CPT

## 2018-01-01 PROCEDURE — 89051 BODY FLUID CELL COUNT: CPT | Performed by: STUDENT IN AN ORGANIZED HEALTH CARE EDUCATION/TRAINING PROGRAM

## 2018-01-01 PROCEDURE — 84484 ASSAY OF TROPONIN QUANT: CPT | Performed by: EMERGENCY MEDICINE

## 2018-01-01 PROCEDURE — 84155 ASSAY OF PROTEIN SERUM: CPT

## 2018-01-01 PROCEDURE — 3E043XZ INTRODUCTION OF VASOPRESSOR INTO CENTRAL VEIN, PERCUTANEOUS APPROACH: ICD-10-PCS

## 2018-01-01 PROCEDURE — 82040 ASSAY OF SERUM ALBUMIN: CPT

## 2018-01-01 PROCEDURE — 82803 BLOOD GASES ANY COMBINATION: CPT | Performed by: INTERNAL MEDICINE

## 2018-01-01 PROCEDURE — 87493 C DIFF AMPLIFIED PROBE: CPT | Performed by: STUDENT IN AN ORGANIZED HEALTH CARE EDUCATION/TRAINING PROGRAM

## 2018-01-01 PROCEDURE — 87186 SC STD MICRODIL/AGAR DIL: CPT

## 2018-01-01 PROCEDURE — 99292 CRITICAL CARE ADDL 30 MIN: CPT | Mod: GC | Performed by: INTERNAL MEDICINE

## 2018-01-01 PROCEDURE — 85049 AUTOMATED PLATELET COUNT: CPT | Performed by: STUDENT IN AN ORGANIZED HEALTH CARE EDUCATION/TRAINING PROGRAM

## 2018-01-01 PROCEDURE — 84132 ASSAY OF SERUM POTASSIUM: CPT | Performed by: STUDENT IN AN ORGANIZED HEALTH CARE EDUCATION/TRAINING PROGRAM

## 2018-01-01 PROCEDURE — 0W9G3ZZ DRAINAGE OF PERITONEAL CAVITY, PERCUTANEOUS APPROACH: ICD-10-PCS

## 2018-01-01 RX ORDER — DOPAMINE HYDROCHLORIDE 160 MG/100ML
2-20 INJECTION, SOLUTION INTRAVENOUS CONTINUOUS
Status: DISCONTINUED | OUTPATIENT
Start: 2018-01-01 | End: 2018-01-01

## 2018-01-01 RX ORDER — SODIUM CHLORIDE 9 MG/ML
INJECTION, SOLUTION INTRAVENOUS
Status: COMPLETED
Start: 2018-01-01 | End: 2018-01-01

## 2018-01-01 RX ORDER — TORSEMIDE 20 MG/1
60 TABLET ORAL 2 TIMES DAILY
Status: DISCONTINUED | OUTPATIENT
Start: 2018-01-01 | End: 2018-01-01

## 2018-01-01 RX ORDER — POTASSIUM CHLORIDE 29.8 MG/ML
20 INJECTION INTRAVENOUS EVERY 6 HOURS PRN
Status: DISCONTINUED | OUTPATIENT
Start: 2018-01-01 | End: 2018-01-01

## 2018-01-01 RX ORDER — FUROSEMIDE 10 MG/ML
40 INJECTION INTRAMUSCULAR; INTRAVENOUS
Status: DISCONTINUED | OUTPATIENT
Start: 2018-01-01 | End: 2018-01-01 | Stop reason: CLARIF

## 2018-01-01 RX ORDER — SODIUM CHLORIDE 9 MG/ML
INJECTION, SOLUTION INTRAVENOUS
Status: DISPENSED
Start: 2018-01-01 | End: 2018-01-01

## 2018-01-01 RX ORDER — HEPARIN SODIUM 5000 [USP'U]/.5ML
5000 INJECTION, SOLUTION INTRAVENOUS; SUBCUTANEOUS EVERY 12 HOURS
Status: DISCONTINUED | OUTPATIENT
Start: 2018-01-01 | End: 2018-01-01 | Stop reason: HOSPADM

## 2018-01-01 RX ORDER — DEXTROSE MONOHYDRATE 25 G/50ML
25-50 INJECTION, SOLUTION INTRAVENOUS
Status: DISCONTINUED | OUTPATIENT
Start: 2018-01-01 | End: 2018-01-01 | Stop reason: HOSPADM

## 2018-01-01 RX ORDER — SILDENAFIL CITRATE 20 MG/1
40 TABLET ORAL 3 TIMES DAILY
Status: DISCONTINUED | OUTPATIENT
Start: 2018-01-01 | End: 2018-01-01

## 2018-01-01 RX ORDER — ONDANSETRON 4 MG/1
4 TABLET, ORALLY DISINTEGRATING ORAL EVERY 6 HOURS PRN
Status: DISCONTINUED | OUTPATIENT
Start: 2018-01-01 | End: 2018-01-01 | Stop reason: HOSPADM

## 2018-01-01 RX ORDER — HYDROXYZINE HYDROCHLORIDE 50 MG/1
50 TABLET, FILM COATED ORAL 3 TIMES DAILY PRN
Status: DISCONTINUED | OUTPATIENT
Start: 2018-01-01 | End: 2018-01-01 | Stop reason: HOSPADM

## 2018-01-01 RX ORDER — LIDOCAINE HYDROCHLORIDE 10 MG/ML
INJECTION, SOLUTION EPIDURAL; INFILTRATION; INTRACAUDAL; PERINEURAL
Status: DISCONTINUED
Start: 2018-01-01 | End: 2018-01-01 | Stop reason: HOSPADM

## 2018-01-01 RX ORDER — ALBUTEROL SULFATE 90 UG/1
6 AEROSOL, METERED RESPIRATORY (INHALATION) EVERY 4 HOURS PRN
Status: DISCONTINUED | OUTPATIENT
Start: 2018-01-01 | End: 2018-01-01 | Stop reason: HOSPADM

## 2018-01-01 RX ORDER — ONDANSETRON 2 MG/ML
4 INJECTION INTRAMUSCULAR; INTRAVENOUS EVERY 6 HOURS PRN
Status: DISCONTINUED | OUTPATIENT
Start: 2018-01-01 | End: 2018-01-01 | Stop reason: HOSPADM

## 2018-01-01 RX ORDER — MIDODRINE HYDROCHLORIDE 5 MG/1
20 TABLET ORAL 3 TIMES DAILY
Status: DISCONTINUED | OUTPATIENT
Start: 2018-01-01 | End: 2018-01-01 | Stop reason: HOSPADM

## 2018-01-01 RX ORDER — SILDENAFIL 10 MG/ML
20 POWDER, FOR SUSPENSION ORAL 3 TIMES DAILY
Status: DISCONTINUED | OUTPATIENT
Start: 2018-01-01 | End: 2018-01-01 | Stop reason: HOSPADM

## 2018-01-01 RX ORDER — MEROPENEM 1 G/1
1 INJECTION, POWDER, FOR SOLUTION INTRAVENOUS EVERY 6 HOURS
Status: DISCONTINUED | OUTPATIENT
Start: 2018-01-01 | End: 2018-01-01

## 2018-01-01 RX ORDER — NALOXONE HYDROCHLORIDE 0.4 MG/ML
.1-.4 INJECTION, SOLUTION INTRAMUSCULAR; INTRAVENOUS; SUBCUTANEOUS
Status: DISCONTINUED | OUTPATIENT
Start: 2018-01-01 | End: 2018-01-01

## 2018-01-01 RX ORDER — LIDOCAINE HYDROCHLORIDE 10 MG/ML
10 INJECTION, SOLUTION EPIDURAL; INFILTRATION; INTRACAUDAL; PERINEURAL ONCE
Status: DISCONTINUED | OUTPATIENT
Start: 2018-01-01 | End: 2018-01-01 | Stop reason: HOSPADM

## 2018-01-01 RX ORDER — DOPAMINE HYDROCHLORIDE 160 MG/100ML
5 INJECTION, SOLUTION INTRAVENOUS CONTINUOUS
Status: DISCONTINUED | OUTPATIENT
Start: 2018-01-01 | End: 2018-01-01 | Stop reason: HOSPADM

## 2018-01-01 RX ORDER — SODIUM CHLORIDE 9 MG/ML
INJECTION, SOLUTION INTRAVENOUS
Status: DISCONTINUED
Start: 2018-01-01 | End: 2018-01-01 | Stop reason: HOSPADM

## 2018-01-01 RX ORDER — ASPIRIN 81 MG/1
81 TABLET, CHEWABLE ORAL DAILY
Status: DISCONTINUED | OUTPATIENT
Start: 2018-01-01 | End: 2018-01-01 | Stop reason: HOSPADM

## 2018-01-01 RX ORDER — DOPAMINE HYDROCHLORIDE 160 MG/100ML
INJECTION, SOLUTION INTRAVENOUS
Status: DISCONTINUED
Start: 2018-01-01 | End: 2018-01-01 | Stop reason: HOSPADM

## 2018-01-01 RX ORDER — DOPAMINE HYDROCHLORIDE 160 MG/100ML
INJECTION, SOLUTION INTRAVENOUS
Status: DISPENSED
Start: 2018-01-01 | End: 2018-01-01

## 2018-01-01 RX ORDER — VIT B COMP NO.3/FOLIC/C/BIOTIN 1 MG-60 MG
1 TABLET ORAL DAILY
Status: DISCONTINUED | OUTPATIENT
Start: 2018-01-01 | End: 2018-01-01

## 2018-01-01 RX ORDER — NICOTINE POLACRILEX 4 MG
15-30 LOZENGE BUCCAL
Status: DISCONTINUED | OUTPATIENT
Start: 2018-01-01 | End: 2018-01-01 | Stop reason: HOSPADM

## 2018-01-01 RX ORDER — VANCOMYCIN HYDROCHLORIDE 1 G/200ML
1000 INJECTION, SOLUTION INTRAVENOUS EVERY 24 HOURS
Status: DISCONTINUED | OUTPATIENT
Start: 2018-01-01 | End: 2018-01-01 | Stop reason: HOSPADM

## 2018-01-01 RX ORDER — DEXTROSE MONOHYDRATE 25 G/50ML
INJECTION, SOLUTION INTRAVENOUS
Status: COMPLETED
Start: 2018-01-01 | End: 2018-01-01

## 2018-01-01 RX ORDER — MEROPENEM 1 G/1
1 INJECTION, POWDER, FOR SOLUTION INTRAVENOUS EVERY 8 HOURS
Status: DISCONTINUED | OUTPATIENT
Start: 2018-01-01 | End: 2018-01-01 | Stop reason: HOSPADM

## 2018-01-01 RX ORDER — VANCOMYCIN HYDROCHLORIDE 1 G/200ML
15 INJECTION, SOLUTION INTRAVENOUS EVERY 24 HOURS
Status: DISCONTINUED | OUTPATIENT
Start: 2018-01-01 | End: 2018-01-01

## 2018-01-01 RX ORDER — MIDAZOLAM (PF) 1 MG/ML IN 0.9 % SODIUM CHLORIDE INTRAVENOUS SOLUTION
1-8 CONTINUOUS
Status: DISCONTINUED | OUTPATIENT
Start: 2018-01-01 | End: 2018-01-01

## 2018-01-01 RX ORDER — AMINO ACIDS/PROTEIN HYDROLYS 11G-40/45
1 LIQUID IN PACKET (ML) ORAL 2 TIMES DAILY
Status: DISCONTINUED | OUTPATIENT
Start: 2018-01-01 | End: 2018-01-01 | Stop reason: HOSPADM

## 2018-01-01 RX ORDER — IOPAMIDOL 755 MG/ML
100 INJECTION, SOLUTION INTRAVASCULAR ONCE
Status: COMPLETED | OUTPATIENT
Start: 2018-01-01 | End: 2018-01-01

## 2018-01-01 RX ORDER — LORATADINE 10 MG/1
10 TABLET ORAL DAILY
Status: DISCONTINUED | OUTPATIENT
Start: 2018-01-01 | End: 2018-01-01 | Stop reason: HOSPADM

## 2018-01-01 RX ORDER — FENTANYL CITRATE 50 UG/ML
25 INJECTION, SOLUTION INTRAMUSCULAR; INTRAVENOUS ONCE
Status: COMPLETED | OUTPATIENT
Start: 2018-01-01 | End: 2018-01-01

## 2018-01-01 RX ORDER — NALOXONE HYDROCHLORIDE 0.4 MG/ML
.1-.4 INJECTION, SOLUTION INTRAMUSCULAR; INTRAVENOUS; SUBCUTANEOUS
Status: DISCONTINUED | OUTPATIENT
Start: 2018-01-01 | End: 2018-01-01 | Stop reason: HOSPADM

## 2018-01-01 RX ORDER — ALBUTEROL SULFATE 90 UG/1
6 AEROSOL, METERED RESPIRATORY (INHALATION) EVERY 4 HOURS
Status: DISCONTINUED | OUTPATIENT
Start: 2018-01-01 | End: 2018-01-01

## 2018-01-01 RX ORDER — ETOMIDATE 2 MG/ML
INJECTION INTRAVENOUS PRN
Status: DISCONTINUED | OUTPATIENT
Start: 2018-01-01 | End: 2018-01-01

## 2018-01-01 RX ORDER — LIDOCAINE HYDROCHLORIDE 20 MG/ML
10 INJECTION, SOLUTION EPIDURAL; INFILTRATION; INTRACAUDAL; PERINEURAL ONCE
Status: COMPLETED | OUTPATIENT
Start: 2018-01-01 | End: 2018-01-01

## 2018-01-01 RX ORDER — MIDAZOLAM (PF) 1 MG/ML IN 0.9 % SODIUM CHLORIDE INTRAVENOUS SOLUTION
1-8 CONTINUOUS
Status: DISCONTINUED | OUTPATIENT
Start: 2018-01-01 | End: 2018-01-01 | Stop reason: HOSPADM

## 2018-01-01 RX ORDER — FENTANYL CITRATE 50 UG/ML
50 INJECTION, SOLUTION INTRAMUSCULAR; INTRAVENOUS ONCE
Status: COMPLETED | OUTPATIENT
Start: 2018-01-01 | End: 2018-01-01

## 2018-01-01 RX ORDER — HEPARIN SODIUM 10000 [USP'U]/100ML
0-3500 INJECTION, SOLUTION INTRAVENOUS CONTINUOUS
Status: DISCONTINUED | OUTPATIENT
Start: 2018-01-01 | End: 2018-01-01

## 2018-01-01 RX ORDER — VIT B COMP NO.3/FOLIC/C/BIOTIN 1 MG-60 MG
1 TABLET ORAL DAILY
Status: DISCONTINUED | OUTPATIENT
Start: 2018-01-01 | End: 2018-01-01 | Stop reason: HOSPADM

## 2018-01-01 RX ADMIN — MIDODRINE HYDROCHLORIDE 20 MG: 5 TABLET ORAL at 20:34

## 2018-01-01 RX ADMIN — SILDENAFIL CITRATE 20 MG: 10 POWDER, FOR SUSPENSION ORAL at 21:46

## 2018-01-01 RX ADMIN — PIPERACILLIN SODIUM AND TAZOBACTAM SODIUM 4.5 G: 36; 4.5 INJECTION, POWDER, FOR SOLUTION INTRAVENOUS at 12:03

## 2018-01-01 RX ADMIN — MIDODRINE HYDROCHLORIDE 20 MG: 5 TABLET ORAL at 08:13

## 2018-01-01 RX ADMIN — Medication 12.5 ML/KG/HR: at 15:16

## 2018-01-01 RX ADMIN — LORATADINE 10 MG: 10 TABLET ORAL at 07:42

## 2018-01-01 RX ADMIN — Medication 12.5 ML/KG/HR: at 05:06

## 2018-01-01 RX ADMIN — Medication 12.5 ML/KG/HR: at 22:31

## 2018-01-01 RX ADMIN — HYDROCORTISONE SODIUM SUCCINATE 50 MG: 100 INJECTION, POWDER, FOR SOLUTION INTRAMUSCULAR; INTRAVENOUS at 00:03

## 2018-01-01 RX ADMIN — Medication 40 MG: at 07:59

## 2018-01-01 RX ADMIN — Medication 12.5 ML/KG/HR: at 14:32

## 2018-01-01 RX ADMIN — PIPERACILLIN SODIUM AND TAZOBACTAM SODIUM 4.5 G: 36; 4.5 INJECTION, POWDER, FOR SOLUTION INTRAVENOUS at 11:19

## 2018-01-01 RX ADMIN — VANCOMYCIN HYDROCHLORIDE 1000 MG: 1 INJECTION, SOLUTION INTRAVENOUS at 22:04

## 2018-01-01 RX ADMIN — VASOPRESSIN 2 UNITS/HR: 20 INJECTION, SOLUTION INTRAMUSCULAR; SUBCUTANEOUS at 09:07

## 2018-01-01 RX ADMIN — FENTANYL CITRATE 50 MCG: 50 INJECTION, SOLUTION INTRAMUSCULAR; INTRAVENOUS at 18:06

## 2018-01-01 RX ADMIN — MIDODRINE HYDROCHLORIDE 20 MG: 5 TABLET ORAL at 07:45

## 2018-01-01 RX ADMIN — PHENYLEPHRINE HYDROCHLORIDE 200 MCG: 10 INJECTION, SOLUTION INTRAMUSCULAR; INTRAVENOUS; SUBCUTANEOUS at 11:34

## 2018-01-01 RX ADMIN — PIPERACILLIN SODIUM AND TAZOBACTAM SODIUM 4.5 G: 36; 4.5 INJECTION, POWDER, FOR SOLUTION INTRAVENOUS at 06:06

## 2018-01-01 RX ADMIN — FENTANYL CITRATE 25 MCG/HR: 50 INJECTION, SOLUTION INTRAMUSCULAR; INTRAVENOUS at 12:20

## 2018-01-01 RX ADMIN — VASOPRESSIN 2.4 UNITS/HR: 20 INJECTION, SOLUTION INTRAMUSCULAR; SUBCUTANEOUS at 18:48

## 2018-01-01 RX ADMIN — OMEPRAZOLE 40 MG: 20 CAPSULE, DELAYED RELEASE ORAL at 07:41

## 2018-01-01 RX ADMIN — Medication 40 MG: at 07:35

## 2018-01-01 RX ADMIN — VASOPRESSIN 3 UNITS/HR: 20 INJECTION, SOLUTION INTRAMUSCULAR; SUBCUTANEOUS at 08:06

## 2018-01-01 RX ADMIN — VASOPRESSIN 2.4 UNITS/HR: 20 INJECTION, SOLUTION INTRAMUSCULAR; SUBCUTANEOUS at 12:19

## 2018-01-01 RX ADMIN — SILDENAFIL CITRATE 20 MG: 10 POWDER, FOR SUSPENSION ORAL at 19:37

## 2018-01-01 RX ADMIN — B-COMPLEX W/ C & FOLIC ACID TAB 1 MG 1 TABLET: 1 TAB at 17:01

## 2018-01-01 RX ADMIN — AZITHROMYCIN MONOHYDRATE 500 MG: 500 INJECTION, POWDER, LYOPHILIZED, FOR SOLUTION INTRAVENOUS at 06:04

## 2018-01-01 RX ADMIN — B-COMPLEX W/ C & FOLIC ACID TAB 1 MG 1 TABLET: 1 TAB at 07:44

## 2018-01-01 RX ADMIN — PIPERACILLIN SODIUM AND TAZOBACTAM SODIUM 4.5 G: 36; 4.5 INJECTION, POWDER, FOR SOLUTION INTRAVENOUS at 18:06

## 2018-01-01 RX ADMIN — NOREPINEPHRINE BITARTRATE 0.03 MCG/KG/MIN: 1 INJECTION INTRAVENOUS at 05:59

## 2018-01-01 RX ADMIN — HYDROCORTISONE SODIUM SUCCINATE 50 MG: 100 INJECTION, POWDER, FOR SOLUTION INTRAMUSCULAR; INTRAVENOUS at 18:05

## 2018-01-01 RX ADMIN — SODIUM CHLORIDE 2000 ML: 9 INJECTION, SOLUTION INTRAVENOUS at 04:21

## 2018-01-01 RX ADMIN — Medication 12.5 ML/KG/HR: at 08:16

## 2018-01-01 RX ADMIN — ROCURONIUM BROMIDE 100 MG: 10 INJECTION INTRAVENOUS at 11:36

## 2018-01-01 RX ADMIN — MIDODRINE HYDROCHLORIDE 20 MG: 5 TABLET ORAL at 14:30

## 2018-01-01 RX ADMIN — PIPERACILLIN SODIUM AND TAZOBACTAM SODIUM 4.5 G: 36; 4.5 INJECTION, POWDER, FOR SOLUTION INTRAVENOUS at 14:20

## 2018-01-01 RX ADMIN — HEPARIN SODIUM 5000 UNITS: 5000 INJECTION, SOLUTION INTRAVENOUS; SUBCUTANEOUS at 18:19

## 2018-01-01 RX ADMIN — MIDAZOLAM 1 MG: 1 INJECTION INTRAMUSCULAR; INTRAVENOUS at 06:00

## 2018-01-01 RX ADMIN — MIDODRINE HYDROCHLORIDE 20 MG: 5 TABLET ORAL at 07:53

## 2018-01-01 RX ADMIN — SODIUM CHLORIDE 2000 ML: 9 INJECTION, SOLUTION INTRAVENOUS at 06:14

## 2018-01-01 RX ADMIN — MINERAL OIL AND WHITE PETROLATUM: 150; 830 OINTMENT OPHTHALMIC at 07:53

## 2018-01-01 RX ADMIN — PIPERACILLIN SODIUM AND TAZOBACTAM SODIUM 4.5 G: 36; 4.5 INJECTION, POWDER, FOR SOLUTION INTRAVENOUS at 02:01

## 2018-01-01 RX ADMIN — HYDROCORTISONE SODIUM SUCCINATE 50 MG: 100 INJECTION, POWDER, FOR SOLUTION INTRAMUSCULAR; INTRAVENOUS at 12:12

## 2018-01-01 RX ADMIN — FENTANYL CITRATE 25 MCG: 50 INJECTION, SOLUTION INTRAMUSCULAR; INTRAVENOUS at 14:06

## 2018-01-01 RX ADMIN — DOPAMINE HYDROCHLORIDE 2.5 MCG/KG/MIN: 160 INJECTION, SOLUTION INTRAVENOUS at 16:35

## 2018-01-01 RX ADMIN — Medication 1 PACKET: at 07:54

## 2018-01-01 RX ADMIN — VASOPRESSIN 2.4 UNITS/HR: 20 INJECTION, SOLUTION INTRAMUSCULAR; SUBCUTANEOUS at 11:04

## 2018-01-01 RX ADMIN — Medication 12.5 ML/KG/HR: at 19:56

## 2018-01-01 RX ADMIN — Medication 12.5 ML/KG/HR: at 08:32

## 2018-01-01 RX ADMIN — PIPERACILLIN SODIUM AND TAZOBACTAM SODIUM 4.5 G: 36; 4.5 INJECTION, POWDER, FOR SOLUTION INTRAVENOUS at 08:13

## 2018-01-01 RX ADMIN — Medication 12.5 ML/KG/HR: at 18:45

## 2018-01-01 RX ADMIN — PIPERACILLIN SODIUM AND TAZOBACTAM SODIUM 4.5 G: 36; 4.5 INJECTION, POWDER, FOR SOLUTION INTRAVENOUS at 06:07

## 2018-01-01 RX ADMIN — VANCOMYCIN HYDROCHLORIDE 1500 MG: 10 INJECTION, POWDER, LYOPHILIZED, FOR SOLUTION INTRAVENOUS at 17:24

## 2018-01-01 RX ADMIN — CALCIUM GLUCONATE 2 G: 94 INJECTION, SOLUTION INTRAVENOUS at 06:12

## 2018-01-01 RX ADMIN — SILDENAFIL CITRATE 20 MG: 10 POWDER, FOR SUSPENSION ORAL at 07:35

## 2018-01-01 RX ADMIN — MIDODRINE HYDROCHLORIDE 20 MG: 5 TABLET ORAL at 10:09

## 2018-01-01 RX ADMIN — HYDROCORTISONE SODIUM SUCCINATE 50 MG: 100 INJECTION, POWDER, FOR SOLUTION INTRAMUSCULAR; INTRAVENOUS at 05:37

## 2018-01-01 RX ADMIN — POTASSIUM CHLORIDE 20 MEQ: 400 INJECTION, SOLUTION INTRAVENOUS at 11:01

## 2018-01-01 RX ADMIN — Medication 12.5 ML/KG/HR: at 09:33

## 2018-01-01 RX ADMIN — Medication 12.5 ML/KG/HR: at 17:27

## 2018-01-01 RX ADMIN — MIDODRINE HYDROCHLORIDE 20 MG: 5 TABLET ORAL at 20:37

## 2018-01-01 RX ADMIN — Medication 40 MG: at 08:12

## 2018-01-01 RX ADMIN — SILDENAFIL CITRATE 20 MG: 10 POWDER, FOR SUSPENSION ORAL at 20:34

## 2018-01-01 RX ADMIN — SILDENAFIL CITRATE 20 MG: 10 POWDER, FOR SUSPENSION ORAL at 07:54

## 2018-01-01 RX ADMIN — MIDODRINE HYDROCHLORIDE 20 MG: 5 TABLET ORAL at 08:37

## 2018-01-01 RX ADMIN — MIDODRINE HYDROCHLORIDE 20 MG: 5 TABLET ORAL at 15:47

## 2018-01-01 RX ADMIN — ALBUTEROL SULFATE 6 PUFF: 90 AEROSOL, METERED RESPIRATORY (INHALATION) at 12:16

## 2018-01-01 RX ADMIN — MIDODRINE HYDROCHLORIDE 20 MG: 5 TABLET ORAL at 19:37

## 2018-01-01 RX ADMIN — HYDROCORTISONE SODIUM SUCCINATE 50 MG: 100 INJECTION, POWDER, FOR SOLUTION INTRAMUSCULAR; INTRAVENOUS at 12:20

## 2018-01-01 RX ADMIN — VASOPRESSIN 4 UNITS/HR: 20 INJECTION, SOLUTION INTRAMUSCULAR; SUBCUTANEOUS at 10:34

## 2018-01-01 RX ADMIN — Medication 12.5 ML/KG/HR: at 23:47

## 2018-01-01 RX ADMIN — Medication 12.5 ML/KG/HR: at 17:38

## 2018-01-01 RX ADMIN — Medication: at 21:51

## 2018-01-01 RX ADMIN — Medication 1 PACKET: at 08:12

## 2018-01-01 RX ADMIN — Medication 1 PACKET: at 19:47

## 2018-01-01 RX ADMIN — HYDROCORTISONE SODIUM SUCCINATE 50 MG: 100 INJECTION, POWDER, FOR SOLUTION INTRAMUSCULAR; INTRAVENOUS at 12:05

## 2018-01-01 RX ADMIN — ONDANSETRON 4 MG: 2 INJECTION INTRAMUSCULAR; INTRAVENOUS at 07:14

## 2018-01-01 RX ADMIN — SODIUM CHLORIDE, PRESERVATIVE FREE 90 ML: 5 INJECTION INTRAVENOUS at 15:41

## 2018-01-01 RX ADMIN — MIDAZOLAM 0.5 MG: 1 INJECTION INTRAMUSCULAR; INTRAVENOUS at 23:15

## 2018-01-01 RX ADMIN — MICAFUNGIN SODIUM 100 MG: 10 INJECTION, POWDER, LYOPHILIZED, FOR SOLUTION INTRAVENOUS at 15:57

## 2018-01-01 RX ADMIN — Medication: at 16:17

## 2018-01-01 RX ADMIN — LORATADINE 10 MG: 10 TABLET ORAL at 07:45

## 2018-01-01 RX ADMIN — NOREPINEPHRINE BITARTRATE 0.03 MCG/KG/MIN: 1 INJECTION INTRAVENOUS at 22:35

## 2018-01-01 RX ADMIN — MICAFUNGIN SODIUM 100 MG: 10 INJECTION, POWDER, LYOPHILIZED, FOR SOLUTION INTRAVENOUS at 13:55

## 2018-01-01 RX ADMIN — ROCURONIUM BROMIDE 50 MG: 10 INJECTION INTRAVENOUS at 16:00

## 2018-01-01 RX ADMIN — LORATADINE 10 MG: 10 TABLET ORAL at 08:13

## 2018-01-01 RX ADMIN — POTASSIUM CHLORIDE 20 MEQ: 400 INJECTION, SOLUTION INTRAVENOUS at 06:04

## 2018-01-01 RX ADMIN — MINERAL OIL AND WHITE PETROLATUM: 150; 830 OINTMENT OPHTHALMIC at 08:38

## 2018-01-01 RX ADMIN — MINERAL OIL AND WHITE PETROLATUM: 150; 830 OINTMENT OPHTHALMIC at 14:49

## 2018-01-01 RX ADMIN — ASPIRIN 81 MG CHEWABLE TABLET 81 MG: 81 TABLET CHEWABLE at 07:53

## 2018-01-01 RX ADMIN — HYDROCORTISONE SODIUM SUCCINATE 50 MG: 100 INJECTION, POWDER, FOR SOLUTION INTRAMUSCULAR; INTRAVENOUS at 00:37

## 2018-01-01 RX ADMIN — Medication 12.5 ML/KG/HR: at 08:37

## 2018-01-01 RX ADMIN — SODIUM CHLORIDE 500 ML: 9 INJECTION, SOLUTION INTRAVENOUS at 01:16

## 2018-01-01 RX ADMIN — MINERAL OIL AND WHITE PETROLATUM: 150; 830 OINTMENT OPHTHALMIC at 23:41

## 2018-01-01 RX ADMIN — Medication: at 22:30

## 2018-01-01 RX ADMIN — Medication 2 MG/HR: at 20:29

## 2018-01-01 RX ADMIN — DEXTROSE MONOHYDRATE 100 ML: 25 INJECTION, SOLUTION INTRAVENOUS at 00:32

## 2018-01-01 RX ADMIN — IOPAMIDOL 100 ML: 755 INJECTION, SOLUTION INTRAVENOUS at 15:40

## 2018-01-01 RX ADMIN — B-COMPLEX W/ C & FOLIC ACID TAB 1 MG 1 TABLET: 1 TAB at 07:53

## 2018-01-01 RX ADMIN — VASOPRESSIN 3.5 UNITS/HR: 20 INJECTION, SOLUTION INTRAMUSCULAR; SUBCUTANEOUS at 19:36

## 2018-01-01 RX ADMIN — Medication 12.5 ML/KG/HR: at 10:59

## 2018-01-01 RX ADMIN — Medication: at 05:41

## 2018-01-01 RX ADMIN — MEROPENEM 1 G: 1 INJECTION, POWDER, FOR SOLUTION INTRAVENOUS at 19:38

## 2018-01-01 RX ADMIN — FENTANYL CITRATE 50 MCG/HR: 50 INJECTION, SOLUTION INTRAMUSCULAR; INTRAVENOUS at 18:09

## 2018-01-01 RX ADMIN — NOREPINEPHRINE BITARTRATE 0.03 MCG/KG/MIN: 1 INJECTION INTRAVENOUS at 08:05

## 2018-01-01 RX ADMIN — Medication 12.5 ML/KG/HR: at 11:37

## 2018-01-01 RX ADMIN — MIDODRINE HYDROCHLORIDE 20 MG: 5 TABLET ORAL at 07:42

## 2018-01-01 RX ADMIN — VANCOMYCIN HYDROCHLORIDE 1000 MG: 1 INJECTION, SOLUTION INTRAVENOUS at 02:01

## 2018-01-01 RX ADMIN — HYDROCORTISONE SODIUM SUCCINATE 50 MG: 100 INJECTION, POWDER, FOR SOLUTION INTRAMUSCULAR; INTRAVENOUS at 12:21

## 2018-01-01 RX ADMIN — HEPARIN SODIUM 5000 UNITS: 5000 INJECTION, SOLUTION INTRAVENOUS; SUBCUTANEOUS at 18:39

## 2018-01-01 RX ADMIN — DEXTRAN 70, AND HYPROMELLOSE 2910 1 DROP: 1; 3 SOLUTION/ DROPS OPHTHALMIC at 10:34

## 2018-01-01 RX ADMIN — MIDODRINE HYDROCHLORIDE 20 MG: 5 TABLET ORAL at 07:52

## 2018-01-01 RX ADMIN — VASOPRESSIN 3 UNITS/HR: 20 INJECTION, SOLUTION INTRAMUSCULAR; SUBCUTANEOUS at 02:02

## 2018-01-01 RX ADMIN — Medication 1 PACKET: at 07:36

## 2018-01-01 RX ADMIN — MIDODRINE HYDROCHLORIDE 20 MG: 5 TABLET ORAL at 19:46

## 2018-01-01 RX ADMIN — HYDROCORTISONE SODIUM SUCCINATE 50 MG: 100 INJECTION, POWDER, FOR SOLUTION INTRAMUSCULAR; INTRAVENOUS at 23:47

## 2018-01-01 RX ADMIN — Medication 12.5 ML/KG/HR: at 11:16

## 2018-01-01 RX ADMIN — FENTANYL CITRATE 50 MCG/HR: 50 INJECTION, SOLUTION INTRAMUSCULAR; INTRAVENOUS at 02:52

## 2018-01-01 RX ADMIN — ALBUTEROL SULFATE 6 PUFF: 90 AEROSOL, METERED RESPIRATORY (INHALATION) at 04:15

## 2018-01-01 RX ADMIN — ALBUTEROL SULFATE 6 PUFF: 90 AEROSOL, METERED RESPIRATORY (INHALATION) at 15:48

## 2018-01-01 RX ADMIN — HYDROCORTISONE SODIUM SUCCINATE 50 MG: 100 INJECTION, POWDER, FOR SOLUTION INTRAMUSCULAR; INTRAVENOUS at 12:03

## 2018-01-01 RX ADMIN — Medication 12.5 ML/KG/HR: at 16:17

## 2018-01-01 RX ADMIN — ASPIRIN 81 MG CHEWABLE TABLET 81 MG: 81 TABLET CHEWABLE at 07:45

## 2018-01-01 RX ADMIN — Medication 12.5 ML/KG/HR: at 05:41

## 2018-01-01 RX ADMIN — NOREPINEPHRINE BITARTRATE 0.35 MCG/KG/MIN: 1 INJECTION INTRAVENOUS at 16:06

## 2018-01-01 RX ADMIN — MIDODRINE HYDROCHLORIDE 20 MG: 5 TABLET ORAL at 19:54

## 2018-01-01 RX ADMIN — Medication: at 17:41

## 2018-01-01 RX ADMIN — ALBUTEROL SULFATE 6 PUFF: 90 AEROSOL, METERED RESPIRATORY (INHALATION) at 13:21

## 2018-01-01 RX ADMIN — PIPERACILLIN SODIUM AND TAZOBACTAM SODIUM 4.5 G: 36; 4.5 INJECTION, POWDER, FOR SOLUTION INTRAVENOUS at 20:37

## 2018-01-01 RX ADMIN — HYDROCORTISONE SODIUM SUCCINATE 50 MG: 100 INJECTION, POWDER, FOR SOLUTION INTRAMUSCULAR; INTRAVENOUS at 06:03

## 2018-01-01 RX ADMIN — MINERAL OIL AND WHITE PETROLATUM: 150; 830 OINTMENT OPHTHALMIC at 18:34

## 2018-01-01 RX ADMIN — VASOPRESSIN 2.4 UNITS/HR: 20 INJECTION, SOLUTION INTRAMUSCULAR; SUBCUTANEOUS at 04:36

## 2018-01-01 RX ADMIN — PIPERACILLIN SODIUM AND TAZOBACTAM SODIUM 4.5 G: 36; 4.5 INJECTION, POWDER, FOR SOLUTION INTRAVENOUS at 23:31

## 2018-01-01 RX ADMIN — VASOPRESSIN 2.4 UNITS/HR: 20 INJECTION, SOLUTION INTRAMUSCULAR; SUBCUTANEOUS at 01:59

## 2018-01-01 RX ADMIN — NOREPINEPHRINE BITARTRATE 0.2 MCG/KG/MIN: 1 INJECTION INTRAVENOUS at 11:29

## 2018-01-01 RX ADMIN — Medication 12.5 ML/KG/HR: at 02:22

## 2018-01-01 RX ADMIN — MIDODRINE HYDROCHLORIDE 20 MG: 5 TABLET ORAL at 13:55

## 2018-01-01 RX ADMIN — VANCOMYCIN HYDROCHLORIDE 1250 MG: 10 INJECTION, POWDER, LYOPHILIZED, FOR SOLUTION INTRAVENOUS at 16:53

## 2018-01-01 RX ADMIN — MEROPENEM 1 G: 1 INJECTION, POWDER, FOR SOLUTION INTRAVENOUS at 08:16

## 2018-01-01 RX ADMIN — Medication: at 19:56

## 2018-01-01 RX ADMIN — HYDROCORTISONE SODIUM SUCCINATE 50 MG: 100 INJECTION, POWDER, FOR SOLUTION INTRAMUSCULAR; INTRAVENOUS at 06:06

## 2018-01-01 RX ADMIN — LIDOCAINE HYDROCHLORIDE 5 ML: 20 SOLUTION ORAL; TOPICAL at 14:43

## 2018-01-01 RX ADMIN — SILDENAFIL CITRATE 20 MG: 10 POWDER, FOR SUSPENSION ORAL at 07:55

## 2018-01-01 RX ADMIN — Medication: at 04:21

## 2018-01-01 RX ADMIN — HEPARIN SODIUM 5000 UNITS: 5000 INJECTION, SOLUTION INTRAVENOUS; SUBCUTANEOUS at 05:37

## 2018-01-01 RX ADMIN — DEXTRAN 70, AND HYPROMELLOSE 2910 1 DROP: 1; 3 SOLUTION/ DROPS OPHTHALMIC at 02:39

## 2018-01-01 RX ADMIN — HEPARIN SODIUM 5000 UNITS: 5000 INJECTION, SOLUTION INTRAVENOUS; SUBCUTANEOUS at 05:47

## 2018-01-01 RX ADMIN — VASOPRESSIN 2.4 UNITS/HR: 20 INJECTION, SOLUTION INTRAMUSCULAR; SUBCUTANEOUS at 19:54

## 2018-01-01 RX ADMIN — FENTANYL CITRATE 25 MCG/HR: 50 INJECTION, SOLUTION INTRAMUSCULAR; INTRAVENOUS at 06:28

## 2018-01-01 RX ADMIN — HEPARIN SODIUM 5000 UNITS: 5000 INJECTION, SOLUTION INTRAVENOUS; SUBCUTANEOUS at 17:24

## 2018-01-01 RX ADMIN — Medication 12.5 ML/KG/HR: at 04:21

## 2018-01-01 RX ADMIN — HYDROCORTISONE SODIUM SUCCINATE 50 MG: 100 INJECTION, POWDER, FOR SOLUTION INTRAMUSCULAR; INTRAVENOUS at 18:06

## 2018-01-01 RX ADMIN — SODIUM CHLORIDE 500 ML: 0.9 INJECTION, SOLUTION INTRAVENOUS at 06:14

## 2018-01-01 RX ADMIN — ASPIRIN 81 MG CHEWABLE TABLET 81 MG: 81 TABLET CHEWABLE at 08:37

## 2018-01-01 RX ADMIN — Medication 12.5 ML/KG/HR: at 21:51

## 2018-01-01 RX ADMIN — VANCOMYCIN HYDROCHLORIDE 1000 MG: 1 INJECTION, SOLUTION INTRAVENOUS at 21:59

## 2018-01-01 RX ADMIN — MEROPENEM 1 G: 1 INJECTION, POWDER, FOR SOLUTION INTRAVENOUS at 02:00

## 2018-01-01 RX ADMIN — ALBUTEROL SULFATE 6 PUFF: 90 AEROSOL, METERED RESPIRATORY (INHALATION) at 00:11

## 2018-01-01 RX ADMIN — LORATADINE 10 MG: 10 TABLET ORAL at 07:52

## 2018-01-01 RX ADMIN — POTASSIUM CHLORIDE 20 MEQ: 400 INJECTION, SOLUTION INTRAVENOUS at 23:47

## 2018-01-01 RX ADMIN — VASOPRESSIN 3.5 UNITS/HR: 20 INJECTION, SOLUTION INTRAMUSCULAR; SUBCUTANEOUS at 07:49

## 2018-01-01 RX ADMIN — MEROPENEM 1 G: 1 INJECTION, POWDER, FOR SOLUTION INTRAVENOUS at 07:55

## 2018-01-01 RX ADMIN — SODIUM PHOSPHATE, MONOBASIC, MONOHYDRATE AND SODIUM PHOSPHATE, DIBASIC, ANHYDROUS 20 MMOL: 276; 142 INJECTION, SOLUTION INTRAVENOUS at 12:59

## 2018-01-01 RX ADMIN — Medication 1 MG/HR: at 21:45

## 2018-01-01 RX ADMIN — DOPAMINE HYDROCHLORIDE 5 MCG/KG/MIN: 160 INJECTION, SOLUTION INTRAVENOUS at 03:35

## 2018-01-01 RX ADMIN — Medication 12.5 ML/KG/HR: at 11:08

## 2018-01-01 RX ADMIN — NOREPINEPHRINE BITARTRATE 0.2 MCG/KG/MIN: 1 INJECTION INTRAVENOUS at 02:03

## 2018-01-01 RX ADMIN — PIPERACILLIN SODIUM AND TAZOBACTAM SODIUM 4.5 G: 36; 4.5 INJECTION, POWDER, FOR SOLUTION INTRAVENOUS at 07:58

## 2018-01-01 RX ADMIN — OMEPRAZOLE 40 MG: 20 CAPSULE, DELAYED RELEASE ORAL at 10:18

## 2018-01-01 RX ADMIN — HYDROCORTISONE SODIUM SUCCINATE 50 MG: 100 INJECTION, POWDER, FOR SOLUTION INTRAMUSCULAR; INTRAVENOUS at 23:32

## 2018-01-01 RX ADMIN — Medication 40 MG: at 07:55

## 2018-01-01 RX ADMIN — HYDROCORTISONE SODIUM SUCCINATE 50 MG: 100 INJECTION, POWDER, FOR SOLUTION INTRAMUSCULAR; INTRAVENOUS at 23:17

## 2018-01-01 RX ADMIN — HYDROCORTISONE SODIUM SUCCINATE 50 MG: 100 INJECTION, POWDER, FOR SOLUTION INTRAMUSCULAR; INTRAVENOUS at 06:09

## 2018-01-01 RX ADMIN — Medication: at 05:06

## 2018-01-01 RX ADMIN — SODIUM CHLORIDE 1000 ML: 9 INJECTION, SOLUTION INTRAVENOUS at 19:57

## 2018-01-01 RX ADMIN — ALBUTEROL SULFATE 6 PUFF: 90 AEROSOL, METERED RESPIRATORY (INHALATION) at 00:07

## 2018-01-01 RX ADMIN — POTASSIUM CHLORIDE 20 MEQ: 400 INJECTION, SOLUTION INTRAVENOUS at 17:24

## 2018-01-01 RX ADMIN — MEROPENEM 1 G: 1 INJECTION, POWDER, FOR SOLUTION INTRAVENOUS at 00:35

## 2018-01-01 RX ADMIN — HYDROCORTISONE SODIUM SUCCINATE 50 MG: 100 INJECTION, POWDER, FOR SOLUTION INTRAMUSCULAR; INTRAVENOUS at 05:47

## 2018-01-01 RX ADMIN — SILDENAFIL 40 MG: 20 TABLET ORAL at 10:20

## 2018-01-01 RX ADMIN — MIDODRINE HYDROCHLORIDE 20 MG: 5 TABLET ORAL at 14:25

## 2018-01-01 RX ADMIN — ALBUTEROL SULFATE 6 PUFF: 90 AEROSOL, METERED RESPIRATORY (INHALATION) at 08:57

## 2018-01-01 RX ADMIN — SILDENAFIL CITRATE 20 MG: 10 POWDER, FOR SUSPENSION ORAL at 14:30

## 2018-01-01 RX ADMIN — PIPERACILLIN SODIUM AND TAZOBACTAM SODIUM 4.5 G: 36; 4.5 INJECTION, POWDER, FOR SOLUTION INTRAVENOUS at 01:13

## 2018-01-01 RX ADMIN — Medication 12.5 ML/KG/HR: at 14:34

## 2018-01-01 RX ADMIN — HYDROCORTISONE SODIUM SUCCINATE 50 MG: 100 INJECTION, POWDER, FOR SOLUTION INTRAMUSCULAR; INTRAVENOUS at 23:41

## 2018-01-01 RX ADMIN — Medication 12.5 ML/KG/HR: at 06:02

## 2018-01-01 RX ADMIN — Medication 12.5 ML/KG/HR: at 17:25

## 2018-01-01 RX ADMIN — LIDOCAINE HYDROCHLORIDE 5 ML: 20 INJECTION, SOLUTION EPIDURAL; INFILTRATION; INTRACAUDAL; PERINEURAL at 16:51

## 2018-01-01 RX ADMIN — MINERAL OIL AND WHITE PETROLATUM: 150; 830 OINTMENT OPHTHALMIC at 23:51

## 2018-01-01 RX ADMIN — DOPAMINE HYDROCHLORIDE 5 MCG/KG/MIN: 160 INJECTION, SOLUTION INTRAVENOUS at 20:53

## 2018-01-01 RX ADMIN — HYDROCORTISONE SODIUM SUCCINATE 50 MG: 100 INJECTION, POWDER, FOR SOLUTION INTRAMUSCULAR; INTRAVENOUS at 11:50

## 2018-01-01 RX ADMIN — PIPERACILLIN SODIUM AND TAZOBACTAM SODIUM 2.25 G: 36; 4.5 INJECTION, POWDER, FOR SOLUTION INTRAVENOUS at 19:54

## 2018-01-01 RX ADMIN — PIPERACILLIN SODIUM AND TAZOBACTAM SODIUM 2.25 G: 36; 4.5 INJECTION, POWDER, FOR SOLUTION INTRAVENOUS at 09:43

## 2018-01-01 RX ADMIN — HEPARIN SODIUM 5000 UNITS: 5000 INJECTION, SOLUTION INTRAVENOUS; SUBCUTANEOUS at 06:31

## 2018-01-01 RX ADMIN — MIDODRINE HYDROCHLORIDE 20 MG: 5 TABLET ORAL at 19:42

## 2018-01-01 RX ADMIN — HYDROCORTISONE SODIUM SUCCINATE 50 MG: 100 INJECTION, POWDER, FOR SOLUTION INTRAMUSCULAR; INTRAVENOUS at 17:24

## 2018-01-01 RX ADMIN — AZITHROMYCIN MONOHYDRATE 500 MG: 500 INJECTION, POWDER, LYOPHILIZED, FOR SOLUTION INTRAVENOUS at 06:07

## 2018-01-01 RX ADMIN — B-COMPLEX W/ C & FOLIC ACID TAB 1 MG 1 TABLET: 1 TAB at 08:13

## 2018-01-01 RX ADMIN — MACITENTAN 10 MG: 10 TABLET, FILM COATED ORAL at 10:19

## 2018-01-01 RX ADMIN — Medication 12.5 ML/KG/HR: at 23:49

## 2018-01-01 RX ADMIN — MIDAZOLAM 1 MG: 1 INJECTION INTRAMUSCULAR; INTRAVENOUS at 02:00

## 2018-01-01 RX ADMIN — ALBUTEROL SULFATE 6 PUFF: 90 AEROSOL, METERED RESPIRATORY (INHALATION) at 04:03

## 2018-01-01 RX ADMIN — DEXTRAN 70, AND HYPROMELLOSE 2910 1 DROP: 1; 3 SOLUTION/ DROPS OPHTHALMIC at 00:15

## 2018-01-01 RX ADMIN — SODIUM CHLORIDE 2000 ML: 9 INJECTION, SOLUTION INTRAVENOUS at 21:51

## 2018-01-01 RX ADMIN — MINERAL OIL AND WHITE PETROLATUM: 150; 830 OINTMENT OPHTHALMIC at 22:39

## 2018-01-01 RX ADMIN — VASOPRESSIN 4 UNITS/HR: 20 INJECTION, SOLUTION INTRAMUSCULAR; SUBCUTANEOUS at 02:24

## 2018-01-01 RX ADMIN — MIDAZOLAM 1 MG: 1 INJECTION INTRAMUSCULAR; INTRAVENOUS at 03:17

## 2018-01-01 RX ADMIN — HYDROCORTISONE SODIUM SUCCINATE 50 MG: 100 INJECTION, POWDER, FOR SOLUTION INTRAMUSCULAR; INTRAVENOUS at 06:07

## 2018-01-01 RX ADMIN — MIDODRINE HYDROCHLORIDE 20 MG: 5 TABLET ORAL at 14:14

## 2018-01-01 RX ADMIN — HYDROCORTISONE SODIUM SUCCINATE 50 MG: 100 INJECTION, POWDER, FOR SOLUTION INTRAMUSCULAR; INTRAVENOUS at 06:20

## 2018-01-01 RX ADMIN — HYDROCORTISONE SODIUM SUCCINATE 50 MG: 100 INJECTION, POWDER, FOR SOLUTION INTRAMUSCULAR; INTRAVENOUS at 18:19

## 2018-01-01 RX ADMIN — VASOPRESSIN 3 UNITS/HR: 20 INJECTION, SOLUTION INTRAMUSCULAR; SUBCUTANEOUS at 22:05

## 2018-01-01 RX ADMIN — Medication 1 PACKET: at 19:37

## 2018-01-01 RX ADMIN — Medication: at 17:38

## 2018-01-01 RX ADMIN — VANCOMYCIN HYDROCHLORIDE 1500 MG: 10 INJECTION, POWDER, LYOPHILIZED, FOR SOLUTION INTRAVENOUS at 01:37

## 2018-01-01 RX ADMIN — Medication 40 MG: at 08:38

## 2018-01-01 RX ADMIN — PIPERACILLIN SODIUM AND TAZOBACTAM SODIUM 4.5 G: 36; 4.5 INJECTION, POWDER, FOR SOLUTION INTRAVENOUS at 23:46

## 2018-01-01 RX ADMIN — PIPERACILLIN SODIUM AND TAZOBACTAM SODIUM 2.25 G: 36; 4.5 INJECTION, POWDER, FOR SOLUTION INTRAVENOUS at 15:47

## 2018-01-01 RX ADMIN — Medication 1 PACKET: at 07:55

## 2018-01-01 RX ADMIN — LORATADINE 10 MG: 10 TABLET ORAL at 07:53

## 2018-01-01 RX ADMIN — MEROPENEM 1 G: 1 INJECTION, POWDER, FOR SOLUTION INTRAVENOUS at 12:59

## 2018-01-01 RX ADMIN — PIPERACILLIN SODIUM AND TAZOBACTAM SODIUM 4.5 G: 36; 4.5 INJECTION, POWDER, FOR SOLUTION INTRAVENOUS at 18:39

## 2018-01-01 RX ADMIN — Medication 12.5 ML/KG/HR: at 11:38

## 2018-01-01 RX ADMIN — MIDAZOLAM 1 MG: 1 INJECTION INTRAMUSCULAR; INTRAVENOUS at 05:00

## 2018-01-01 RX ADMIN — ASPIRIN 81 MG CHEWABLE TABLET 81 MG: 81 TABLET CHEWABLE at 08:13

## 2018-01-01 RX ADMIN — LORATADINE 10 MG: 10 TABLET ORAL at 08:38

## 2018-01-01 RX ADMIN — Medication 12.5 ML/KG/HR: at 17:24

## 2018-01-01 RX ADMIN — HYDROCORTISONE SODIUM SUCCINATE 50 MG: 100 INJECTION, POWDER, FOR SOLUTION INTRAMUSCULAR; INTRAVENOUS at 00:15

## 2018-01-01 RX ADMIN — Medication 40 MG: at 07:54

## 2018-01-01 RX ADMIN — ALBUTEROL SULFATE 6 PUFF: 90 AEROSOL, METERED RESPIRATORY (INHALATION) at 20:33

## 2018-01-01 RX ADMIN — MIDODRINE HYDROCHLORIDE 20 MG: 5 TABLET ORAL at 14:33

## 2018-01-01 RX ADMIN — B-COMPLEX W/ C & FOLIC ACID TAB 1 MG 1 TABLET: 1 TAB at 07:55

## 2018-01-01 RX ADMIN — CEFTRIAXONE 2 G: 10 INJECTION, POWDER, FOR SOLUTION INTRAVENOUS at 18:05

## 2018-01-01 RX ADMIN — DEXTRAN 70, AND HYPROMELLOSE 2910 1 DROP: 1; 3 SOLUTION/ DROPS OPHTHALMIC at 18:18

## 2018-01-01 RX ADMIN — ALBUTEROL SULFATE 6 PUFF: 90 AEROSOL, METERED RESPIRATORY (INHALATION) at 08:08

## 2018-01-01 RX ADMIN — POTASSIUM CHLORIDE 20 MEQ: 400 INJECTION, SOLUTION INTRAVENOUS at 00:13

## 2018-01-01 RX ADMIN — Medication 12.5 ML/KG/HR: at 16:18

## 2018-01-01 RX ADMIN — MIDODRINE HYDROCHLORIDE 20 MG: 5 TABLET ORAL at 19:47

## 2018-01-01 RX ADMIN — MEROPENEM 1 G: 1 INJECTION, POWDER, FOR SOLUTION INTRAVENOUS at 16:29

## 2018-01-01 RX ADMIN — MINERAL OIL AND WHITE PETROLATUM: 150; 830 OINTMENT OPHTHALMIC at 00:20

## 2018-01-01 RX ADMIN — Medication: at 09:31

## 2018-01-01 RX ADMIN — MIDODRINE HYDROCHLORIDE 20 MG: 5 TABLET ORAL at 14:01

## 2018-01-01 RX ADMIN — FENTANYL CITRATE 50 MCG/HR: 50 INJECTION, SOLUTION INTRAMUSCULAR; INTRAVENOUS at 22:59

## 2018-01-01 RX ADMIN — LORATADINE 10 MG: 10 TABLET ORAL at 07:55

## 2018-01-01 RX ADMIN — MIDAZOLAM 0.5 MG: 1 INJECTION INTRAMUSCULAR; INTRAVENOUS at 21:59

## 2018-01-01 RX ADMIN — MIDODRINE HYDROCHLORIDE 20 MG: 5 TABLET ORAL at 07:55

## 2018-01-01 RX ADMIN — ALBUTEROL SULFATE 6 PUFF: 90 AEROSOL, METERED RESPIRATORY (INHALATION) at 15:46

## 2018-01-01 RX ADMIN — HYDROCORTISONE SODIUM SUCCINATE 50 MG: 100 INJECTION, POWDER, FOR SOLUTION INTRAMUSCULAR; INTRAVENOUS at 18:10

## 2018-01-01 RX ADMIN — DEXTRAN 70, AND HYPROMELLOSE 2910 1 DROP: 1; 3 SOLUTION/ DROPS OPHTHALMIC at 12:21

## 2018-01-01 RX ADMIN — DOPAMINE HYDROCHLORIDE 5 MCG/KG/MIN: 160 INJECTION, SOLUTION INTRAVENOUS at 17:30

## 2018-01-01 RX ADMIN — FENTANYL CITRATE 25 MCG/HR: 50 INJECTION, SOLUTION INTRAMUSCULAR; INTRAVENOUS at 16:06

## 2018-01-01 RX ADMIN — ASPIRIN 81 MG CHEWABLE TABLET 81 MG: 81 TABLET CHEWABLE at 07:52

## 2018-01-01 RX ADMIN — NOREPINEPHRINE BITARTRATE 0.15 MCG/KG/MIN: 1 INJECTION INTRAVENOUS at 02:53

## 2018-01-01 RX ADMIN — Medication 1 PACKET: at 19:46

## 2018-01-01 RX ADMIN — HYDROCORTISONE SODIUM SUCCINATE 50 MG: 100 INJECTION, POWDER, FOR SOLUTION INTRAMUSCULAR; INTRAVENOUS at 11:26

## 2018-01-01 RX ADMIN — MINERAL OIL AND WHITE PETROLATUM: 150; 830 OINTMENT OPHTHALMIC at 04:31

## 2018-01-01 RX ADMIN — B-COMPLEX W/ C & FOLIC ACID TAB 1 MG 1 TABLET: 1 TAB at 08:37

## 2018-01-01 RX ADMIN — DOPAMINE HYDROCHLORIDE 7.5 MCG/KG/MIN: 160 INJECTION, SOLUTION INTRAVENOUS at 09:44

## 2018-01-01 RX ADMIN — SILDENAFIL CITRATE 20 MG: 10 POWDER, FOR SUSPENSION ORAL at 17:44

## 2018-01-01 RX ADMIN — VANCOMYCIN HYDROCHLORIDE 1000 MG: 1 INJECTION, SOLUTION INTRAVENOUS at 01:59

## 2018-01-01 RX ADMIN — AZITHROMYCIN MONOHYDRATE 500 MG: 500 INJECTION, POWDER, LYOPHILIZED, FOR SOLUTION INTRAVENOUS at 06:08

## 2018-01-01 RX ADMIN — SODIUM CHLORIDE 2000 ML: 9 INJECTION, SOLUTION INTRAVENOUS at 05:07

## 2018-01-01 RX ADMIN — PIPERACILLIN SODIUM AND TAZOBACTAM SODIUM 4.5 G: 36; 4.5 INJECTION, POWDER, FOR SOLUTION INTRAVENOUS at 14:18

## 2018-01-01 RX ADMIN — DEXTROSE MONOHYDRATE 100 ML: 25 INJECTION, SOLUTION INTRAVENOUS at 00:04

## 2018-01-01 RX ADMIN — Medication 12.5 ML/KG/HR: at 11:00

## 2018-01-01 RX ADMIN — ASPIRIN 81 MG CHEWABLE TABLET 81 MG: 81 TABLET CHEWABLE at 07:55

## 2018-01-01 RX ADMIN — ASPIRIN 81 MG CHEWABLE TABLET 81 MG: 81 TABLET CHEWABLE at 07:41

## 2018-01-01 RX ADMIN — HYDROCORTISONE SODIUM SUCCINATE 50 MG: 100 INJECTION, POWDER, FOR SOLUTION INTRAMUSCULAR; INTRAVENOUS at 18:03

## 2018-01-01 RX ADMIN — ETOMIDATE 10 MG: 2 INJECTION INTRAVENOUS at 11:35

## 2018-01-01 RX ADMIN — SILDENAFIL CITRATE 20 MG: 10 POWDER, FOR SUSPENSION ORAL at 13:56

## 2018-01-01 RX ADMIN — PIPERACILLIN SODIUM AND TAZOBACTAM SODIUM 4.5 G: 36; 4.5 INJECTION, POWDER, FOR SOLUTION INTRAVENOUS at 01:58

## 2018-01-01 RX ADMIN — MINERAL OIL AND WHITE PETROLATUM: 150; 830 OINTMENT OPHTHALMIC at 14:29

## 2018-01-01 RX ADMIN — Medication 1 PACKET: at 20:34

## 2018-01-01 RX ADMIN — HYDROCORTISONE SODIUM SUCCINATE 50 MG: 100 INJECTION, POWDER, FOR SOLUTION INTRAMUSCULAR; INTRAVENOUS at 18:39

## 2018-01-01 ASSESSMENT — ENCOUNTER SYMPTOMS
ABDOMINAL PAIN: 1
VOMITING: 0
APPETITE CHANGE: 1
CONSTIPATION: 0
MUSCULOSKELETAL NEGATIVE: 1
FEVER: 0
NECK PAIN: 0
COUGH: 0
NAUSEA: 1
BLOOD IN STOOL: 0
NECK STIFFNESS: 0
FATIGUE: 1
SHORTNESS OF BREATH: 1
TROUBLE SWALLOWING: 0
PALPITATIONS: 0
ABDOMINAL DISTENTION: 1
SORE THROAT: 0

## 2018-01-01 ASSESSMENT — ACTIVITIES OF DAILY LIVING (ADL)
DRESS: 0-->INDEPENDENT
TOILETING: 0-->INDEPENDENT
BATHING: 0-->INDEPENDENT
RETIRED_EATING: 0-->INDEPENDENT
SWALLOWING: 0-->SWALLOWS FOODS/LIQUIDS WITHOUT DIFFICULTY
AMBULATION: 0-->INDEPENDENT
RETIRED_COMMUNICATION: 0-->UNDERSTANDS/COMMUNICATES WITHOUT DIFFICULTY
FALL_HISTORY_WITHIN_LAST_SIX_MONTHS: NO
TRANSFERRING: 0-->INDEPENDENT
COGNITION: 0 - NO COGNITION ISSUES REPORTED

## 2018-01-02 PROBLEM — R65.21 SEPTIC SHOCK (H): Status: ACTIVE | Noted: 2018-01-01

## 2018-01-02 PROBLEM — A41.9 SEPTIC SHOCK (H): Status: ACTIVE | Noted: 2018-01-01

## 2018-01-02 NOTE — PROGRESS NOTES
Critical Care Procedure Note    Bedside Procedure    Right radial arterial blood pressure monitor.    Patient had no pulse on right radial artery.  Ultrasound used and small radial artery noted right next to vein.  Attempt to cannulate resulted in venous cannulation.  2 more attempts were unsuccessful, presumably due to vasospasm and small hematoma. Pressure applied and hemostasis achieved.  Will try again in a couple hours if femoral arterial line is not placed.

## 2018-01-02 NOTE — PLAN OF CARE
Problem: Patient Care Overview  Goal: Plan of Care/Patient Progress Review  PT 4A: Cancel- PT orders received. Patient currently on 3 pressors and not medically appropriate for therapy. Will reschedule PT evaluation.

## 2018-01-02 NOTE — CONSULTS
Nephrology Initial Consult  January 2, 2018      Jayy Gill MRN:[id number] YOB: 1970  Date of Admission:1/1/2018  Primary care provider: Milly Healy  Requesting physician: Shoaib Prdao MD    ASSESSMENT AND RECOMMENDATIONS:   Jayy Gill is a 47 year old male with a Hx of ESRD on MWF HD sec to chronic hypertensive heart disease , pulm HTN (considered primary Vs sec to restrictive lung disease) admitted with hypotension and AMS.  Nephrology consulted for ESRD and shock state    ESRD on HD MWF  EDW 63.5 (61.5 on admission)  He has been hypotensive and unable to tolerate UF in HD  Poor PO intake and was feeling unwell  Access LUE AVF with cephalic vein stenosis and ulceration with ? Gram -ve infection hx , he has been referred for vascular surgery but still has to see them, no active ulcer seen but aneurysms and think skin over the fistula    -- he is currently on pressers with significant hypotension , will likely need CRRT for the volume optimization   -- with the high vent needs and persistent hypoxemia will start on CRRT with I=O for now and then increase UF as tolerated    Hypervolemia with shock state ( septic/cardiogenic)  CRRT for volume control  Will UF when tolerated  Hypervolemia with IVC 3.2 on ECHO and no resp variability   Cards following for shock state  Currently on dopamine , vaso and levophed  Chronic hypotension on midodrine TID 10mg  ECHO with moderate pericardial effusion , in the setting of hypervolemia , hypotension and paradoxical motion of the heart on ECHO may need to be addressed with pericardiocentesis --> cards to follow    Electrolytes  Stable so far    AGMA with lactic acidosis  Ph >7.3  AG 16   CRRT prescription to address  With bicarb at goal this is likely sec to the hypoperfusion and will resolve   Will avoid any UF with the shock     Anemia  On Micera 40  Held PTA  Hb is at goal 12.3  Will follow for EPO needs    MBD  Phos binders were on hold  per the last nephrology notes due to low phos and he has on Tums   Will follow on levels , phos is 5.9 with calcium 8.2 and albumin 2.3  Last  (12/19)    Acute hypoxic resp failure with hypervolemia  Viral panel pending  Intubated now with hypoxemia  CRRT for volume  Presser support per cards    Pulm htn and right heart failure hx  On sildenafil PTA  ECHO with Ef 60-65% and rt heart increased pressure and hypervolemia  PAP 71 + TR RAP 15    Recommendations were communicated to primary team     Seen and discussed with Dr. Giulia Franklin MD   [phone]      REASON FOR CONSULT: ESRD management with shock state    HISTORY OF PRESENT ILLNESS:  Jayy Gill is a 47 year old male with a Hx of ESRD on MWF HD sec to chronic hypertensive heart disease , pulm HTN (considered primary Vs sec to restrictive lung disease) admitted with hypotension and AMS. He has had a difficulties ith UF in the last 2 HD runs and he did not take his midodrine prior to the runs , he has hypotension 90s/60s prior to runs and post run is lower to 80s/50s as well. On 12/31 he had difficulty with his HD run due to hypotension needing IVF given to him ~500 during HD and still no recovery that lead to hospital admission  He has been in MICU for worsening hypoxemia on NC O2 /HFNC and plans to be intubated with worsening resp status. He is on diuretics and per records still makes some urine with lasix  He has been following Dr Motta for his ESRD and dialyzes at [facility].   No history obtained from patient sec to AMS and history per chart review and discussion with teams       PAST MEDICAL HISTORY:  Reviewed  01/02/2018     Past Medical History:   Diagnosis Date     Cardiomyopathy (H)      Dialysis patient (H)     M-W-F     Diarrhea     C diff     ESRD (end stage renal disease) (H)      HLD (hyperlipidemia)      Hypertension      Pulmonary hypertension 11/21/2013       Past Surgical History:   Procedure Laterality Date     AV  FISTULA OR GRAFT ARTERIAL      left upper arm     COLONOSCOPY  7/3015     COLONOSCOPY WITH CO2 INSUFFLATION N/A 11/10/2015    Procedure: COLONOSCOPY WITH CO2 INSUFFLATION;  Surgeon: Samuel Fritz MD;  Location: [facility] OR     ENDOBRONCHIAL ULTRASOUND FLEXIBLE N/A 9/18/2014    Procedure: ENDOBRONCHIAL ULTRASOUND FLEXIBLE;  Surgeon: Jg Morrison MD;  Location: [facility] GI     ENDOBRONCHIAL ULTRASOUND FLEXIBLE N/A 12/18/2014    Procedure: ENDOBRONCHIAL ULTRASOUND FLEXIBLE;  Surgeon: Jg Morrison MD;  Location: [facility] OR        MEDICATIONS:  PTA Meds  Prior to Admission medications    Medication Sig Last Dose Taking? Auth Provider   midodrine HCl 10 MG TABS Take 20 mg by mouth 3 times daily   Tyrone Blank MD   macitentan (OPSUMIT) 10 MG tablet Take 1 tablet (10 mg) by mouth daily   Pedro Sullivan MD   sildenafil (REVATIO/VIAGRA) 20 MG tablet Take 2 tablets (40 mg) by mouth 3 times daily   Tyrone Blank MD   loratadine (CLARITIN) 10 MG tablet Take 1 tablet (10 mg) by mouth daily   Milly Helay MD   hydrOXYzine (VISTARIL) 25 MG capsule Take 2 capsules (50 mg) by mouth 3 times daily as needed for itching   Citlaly Arias MD   torsemide (DEMADEX) 20 MG tablet Take 3 tablets (60 mg) by mouth 2 times daily   Kalia Peterson MD   omeprazole (PRILOSEC) 40 MG capsule Take 1 capsule (40 mg) by mouth daily Take 30-60 minutes before a meal.   Milly Healy MD   aspirin 81 MG tablet Take 81 mg by mouth daily   Unknown, Entered By History   B Complex-C-Folic Acid (RENAL) 1 MG CAPS Take 1 mg by mouth Take 1 capsule (1 mg) by mouth daily in the afternoon.  Indications: Nutritional Support   Reported, Patient   paricalcitol (ZEMPLAR) 5 MCG/ML injection 2 mcg 2 mcg by IV Push route dialysis.   Reported, Patient   calcium acetate (PHOSLO) 667 MG CAPS Take 2 capsules (1,334 mg) by mouth 3 times daily (with meals)   Tyrone Blank MD      Current  Meds    vancomycin place rowan - receiving intermittent dosing  1 each Does not apply See Admin Instructions     aspirin  81 mg Oral Daily     loratadine  10 mg Oral Daily     midodrine  20 mg Oral TID     omeprazole  40 mg Oral Daily     piperacillin-tazobactam  2.25 g Intravenous Q6H     hydrocortisone sodium succinate PF  50 mg Intravenous Q6H     furosemide  40 mg Intravenous BID     azithromycin  500 mg Intravenous Q24H     NaCl         Infusion Meds    vasopressin (PITRESSIN) infusion ADULT (40 mL) 2.4 Units/hr (18 1300)     norepinephrine 0.09 mcg/kg/min (18 1504)     DOPamine 6.5 mcg/kg/min (18 1425)     midazolam Stopped (18 1339)     fentaNYL 25 mcg/hr (18 1220)     replacement solution for CVVHD & CVVHDF (Phoxillum BK4/2.5)       replacement solution for CVVHD & CVVHDF (Phoxillum BK4/2.5)       dialysate for CVVHD & CVVHDF (Phoxillum BK4/2.5)         ALLERGIES:    No Known Allergies    REVIEW OF SYSTEMS:  A comprehensive of systems was negative except as noted above.    SOCIAL HISTORY:   Social History     Social History     Marital status: Single     Spouse name: N/A     Number of children: 2     Years of education: N/A     Occupational History     Not on file.     Social History Main Topics     Smoking status: Never Smoker     Smokeless tobacco: Never Used     Alcohol use No     Drug use: No     Sexual activity: Yes     Partners: Female     Other Topics Concern     Not on file     Social History Narrative     Reviewed       FAMILY MEDICAL HISTORY:   Family History   Problem Relation Age of Onset     Hypertension Mother      DIABETES Father      Reviewed     PHYSICAL EXAM:   Temp  Av  F (36.7  C)  Min: 97.2  F (36.2  C)  Max: 99.2  F (37.3  C)  Arterial Line MAP (mmHg)  Av.1 mmHg  Min: 12 mmHg  Max: 84 mmHg  Arterial Line BP  Min: 81/57  Max: 109/36      Pulse  Av  Min: 97  Max: 97 Resp  Av.2  Min: 11  Max: 45  SpO2  Av.1 %  Min: 71 %  Max: 100  %  FiO2 (%)  Av.3 %  Min: 80 %  Max: 100 %    CVP (mmHg): 28 mmHg  BP 98/72  Pulse 97  Temp 97.3  F (36.3  C) (Axillary)  Resp 13  SpO2 94%   Date 18 0700 - 18 0659   Shift 8526-8096 5676-1050 3880-6431 24 Hour Total   I  N  T  A  K  E   I.V. 423.69 139.89  563.58    NG/GT  60  60    Shift Total 423.69 199.89  623.58   O  U  T  P  U  T   Shift Total       Weight (kg)            Admit       GENERAL APPEARANCE: resp distress, drowsy and AMS   EYES: - scleral icterus, pupils equal  Endo: - goiter, - moon facies  Lymphatics: no cervical or supraclavicular LAD  Pulmonary: lungs decreased airflow to auscultation with equal breath sounds bilaterally, - clubbing  CV: regular rhythm, normal rate, no rub   - JVD +   - Edema -  GI: soft, nontender, normal bowel sounds  MS: no evidence of inflammation in joints, no muscle tenderness  : - tuttle  SKIN: no rash, warm, dry, no cyanosis  NEURO: face symmetric, - asterixis     LABS:   CMP  Recent Labs  Lab 18  0514 18  0011 18  0003    133 131*   POTASSIUM 4.2 4.2 4.3   CHLORIDE 96  --  93*   CO2 24  --  21   ANIONGAP 16*  --  17*   GLC 94 >700* Canceled, Test credited   BUN 50*  --  47*   CR 6.39*  --  6.04*   GFRESTIMATED 9*  --  10*   GFRESTBLACK 11*  --  12*   JAZMINE 8.2*  --  7.7*   MAG 2.0  --   --    PHOS 5.9*  --   --    PROTTOTAL 6.8  --  6.2*   ALBUMIN 2.3*  --  2.1*   BILITOTAL 4.2*  --  3.8*   ALKPHOS 122  --  114   *  --  107*   ALT 62  --  54     CBC  Recent Labs  Lab 18  0514 18  0011 18  0003   HGB 12.3* 13.9 11.7*   WBC 14.9*  --  15.0*   RBC 4.02*  --  3.81*   HCT 38.4*  --  39.0*   MCV 96  --  102*   MCH 30.6  --  30.7   MCHC 32.0  --  30.0*   RDW 16.0*  --  17.9*   *  --  107*     INR  Recent Labs  Lab 18  0003   INR 2.06*     ABG  Recent Labs  Lab 18  1342 18  1040 18  0840 18  0514   PH 7.32* 7.36 7.37  --    PCO2 43 42 34*  --    PO2 78* 61* 69*  --    HCO3  22 24 19*  --    O2PER 90 90  90 100 100.0      URINE STUDIES  Recent Labs   Lab Test  06/09/16   1349  04/24/15   1353   COLOR  [place]ma  Yellow   APPEARANCE   --   Clear   URINEGLC  Negative  Negative   URINEBILI   --   Negative   URINEKETONE  1+  Negative   SG  1.015  1.010   UBLD   --   Negative   URINEPH  6.0  7.0   PROTEIN   --   100*   NITRITE  Negative  Negative   LEUKEST   --   Negative   RBCU   --   <1   WBCU   --   <1     No lab results found.  PTH  No lab results found.  IRON STUDIES  Recent Labs   Lab Test  06/04/17   0427  02/19/16   2310  09/12/13   1455   IRON  71  56  57   FEB  235*  306  279   IRONSAT  30  18  20   MARANDA   --   369   --        IMAGING:  All imaging studies reviewed by me.     Stephanie Franklin MD    I was present with the fellow during the history and exam.  I discussed the case with the fellow and agree with the findings as documented in the assessment and plan.  Mily Platt

## 2018-01-02 NOTE — PROCEDURES
Procedure/Surgery Information   [facility], [place]    Bedside Procedure Note  Date of Service (when I performed the procedure): 01/02/2018    Jayy Gill is a 47 year old male patient.  1. Septic shock (H)    2. Sepsis, due to unspecified organism (H)      Past Medical History:   Diagnosis Date     Cardiomyopathy (H)      Dialysis patient (H)     M-W-F     Diarrhea     C diff     ESRD (end stage renal disease) (H)      HLD (hyperlipidemia)      Hypertension      Pulmonary hypertension 11/21/2013     Temp: 99.2  F (37.3  C) Temp src: Axillary BP: (!) 69/50 Pulse: 97 Heart Rate: 81 Resp: (!) 43 SpO2: 92 % O2 Device: High Flow Nasal Cannula (HFNC) Oxygen Delivery: 15 LPM    Central line  Date/Time: 1/2/2018 6:45 AM  Performed by: ZIGGY SINGH  Authorized by: ZIGGY SINGH   Consent: The procedure was performed in an emergent situation. Verbal consent obtained. Written consent obtained.  Consent given by: power of   Test results: test results available and properly labeled  Site marked: the operative site was marked  Imaging studies: imaging studies available  Patient identity confirmed: verbally with patient  Indications: vascular access  Anesthesia: local infiltration    Anesthesia:  Local Anesthetic: lidocaine 1% without epinephrine    Sedation:  Patient sedated: no  Preparation: skin prepped with chlorhexidine and sterile field established  Location details: left internal jugular  Patient position: flat  Ultrasound guidance: yes  Number of attempts: 2  Successful placement: no  Complications: other  Comments: Patient may have a L neck hematoma.            Ziggy Singh

## 2018-01-02 NOTE — PLAN OF CARE
Problem: Patient Care Overview  Goal: Plan of Care/Patient Progress Review  OT4A: CX: Pt on 3 pressors this AM, will check back tomorrow as able.

## 2018-01-02 NOTE — ED NOTES
Patient here for increased weakness today as noted by his s/o. She also notes he has had increasing cough and lung congestion. He is usually on o2 at home if unwell, pt comes in on 13L NRB. Denies fevers. Has increased abdominal swelling and pain, last paracentesis over a month ago per s/o.

## 2018-01-02 NOTE — PROCEDURES
There was a previous attempt at cannulation of the left IJ, which was unsuccessful, and a small hematoma formed.      I placed a right IJ CVC with ultrasound guidance.    Consent obtained.  Skin prepped with chlorhexadine  Patient was draped and in supine position  Right IJ viewed by ultrasound  Introducer needle placed with good blood return  Wire placed and position in right IJ confirmed with ultrasound  Skin nicked and dilated  Triple lumen catheter placed with good blood return in all three ports  Sutured in placed and bandage applied.    CXR is pending.

## 2018-01-02 NOTE — PHARMACY-VANCOMYCIN DOSING SERVICE
Pharmacy Vancomycin Initial Note  Date of Service 2018  Patient's  1970  47 year old, male    Indication: Sepsis    Current estimated CrCl = Estimated Creatinine Clearance: 13.3 mL/min (based on Cr of 6.04).    Creatinine for last 3 days  2018: 12:03 AM Creatinine 6.04 mg/dL    Recent Vancomycin Level(s) for last 3 days  No results found for requested labs within last 72 hours.      Vancomycin IV Administrations (past 72 hours)      No vancomycin orders with administrations in past 72 hours.                Nephrotoxins and other renal medications (Future)    Start     Dose/Rate Route Frequency Ordered Stop    18 0100  piperacillin-tazobactam (ZOSYN) 4.5 g in 20 mL NS Premix Syringe      4.5 g  over 3 Minutes Intravenous ONCE 18 0045            Contrast Orders - past 72 hours     None                Plan:  1.  Start vancomycin  1500 mg IV once (25 mg/kg). Later we will dose based on levels.   2.  Goal Trough Level: 15-20 mg/L   3.  Pharmacy will check trough levels as appropriate in 1-3 Days.    4. Serum creatinine levels will be ordered daily for the first week of therapy and at least twice weekly for subsequent weeks.    5. [facility] method utilized to dose vancomycin therapy: Method 2    Nathan Good

## 2018-01-02 NOTE — PLAN OF CARE
Problem: Patient Care Overview  Goal: Plan of Care/Patient Progress Review  Admitted to 4A on MICU service from ED overnight with sepsis-- peritonitis,  pneumonia.  Drowsy otherwise neuro intact, denies pain. Will continue Q2 hour neuros as ordered.  MAP goal >65, currently on vasopressin, norepi, and dopamine infusions- Many adjustments made to vasoactive drips, please see flowsheets for detailed information.  Remains on high flow nasal cannula, O2 sat goal >90%, will trend VBGs and mixed venous levels to assess oxygenation.  Cultures from peritoneal fluid pending. Vanc, zosyn and steroids per sepsis protocol.  NPO except ice and sips with PO meds. Zofran given x1 this morning for c/o nausea, no vomiting. No BM.  Possible HD today, possible paracentesis, possible arterial line access.  Patient and significant other updated throughout shift on all cares and plans.  Please see flowsheets,  charting and MAR for detailed information.

## 2018-01-02 NOTE — PROGRESS NOTES
Assisted with endotracheal intubation for respiratory failure/airway protection. An 8.0 ETT was placed and secured at 24 cm @ lip. Positive color change noted with CO2 detector, breath sounds were clear BUL's, crackles and diminished in RML and BLL's. ETT sxn'd for a scant amt of thin blood tinged secretions. Patient placed on full vent support, labs and CXR pending.    Kael Hammer, RT  1/2/2018 12:39 PM

## 2018-01-02 NOTE — PROCEDURES
Procedure/Surgery Information   [facility], [place]    Bedside Procedure Note  Date of Service (when I performed the procedure): 01/02/2018    Jayy Gill is a 47 year old male patient.  1. Sepsis, due to unspecified organism (H)      Past Medical History:   Diagnosis Date     Cardiomyopathy (H)      Dialysis patient (H)     M-W-F     Diarrhea     C diff     ESRD (end stage renal disease) (H)      HLD (hyperlipidemia)      Hypertension      Pulmonary hypertension 11/21/2013     Temp: 99.2  F (37.3  C) Temp src: Axillary BP: (!) 69/50 Pulse: 97 Heart Rate: 81 Resp: (!) 43 SpO2: 92 % O2 Device: High Flow Nasal Cannula (HFNC) Oxygen Delivery: 15 LPM    Abdominal paracentesis-subsequent  Date/Time: 1/2/2018 6:43 AM  Performed by: ZIGGY SINGH  Authorized by: ZIGGY SINGH   Consent: Verbal consent obtained. Written consent obtained.  Consent given by: power of  and patient  Patient identity confirmed: verbally with patient  Preparation: Patient was prepped and draped in the usual sterile fashion.  Local anesthesia used: yes    Anesthesia:  Local anesthesia used: yes  Local Anesthetic: lidocaine 1% without epinephrine    Sedation:  Patient sedated: no  Patient tolerance: Patient tolerated the procedure well with no immediate complications  Comments: Diagnostic paracentesis on the RLQ.            Ziggy Singh

## 2018-01-02 NOTE — ANESTHESIA PROCEDURE NOTES
ANESTHESIOLOGY RESIDENT/CRNA INTUBATION NOTE  Indication for intubation: respiratory insufficiency.  Provider Ordering Intubation: JATIN BRENNAN  History regarding the most recent potassium obtained: Yes  History regarding renal failure obtained: Yes  History of presence or absence of CVA/stroke was obtained: Yes  History of presence or absence of NM disorder obtained: Yes  Post Intubation: ETT secured, Vent settings by primary/ICU team, Primary/ICU team to review CXR, Sedation to be ordered by primary/ICU team, No apparent complications and Report given to primary nurse and/or team

## 2018-01-02 NOTE — ED PROVIDER NOTES
History     Chief Complaint   Patient presents with     Shortness of Breath     The history is provided by the patient, a significant other and the EMS personnel. The history is limited by the condition of the patient.     Jayy Gill is a 47 year old male for pulmonary hypertension, congestive heart failure, cardiomyopathy, end-stage renal disease on HD MWF, liver disease (paracentesis ~once a month), hypertension, hyperlipidemia, liver disease/cirrhosis with need for recurrent paracenteses, who presents via ambulance with shortness of breath.      Per EMS and GF, the patient has had progressively worsening shortness of breath over the last several days and worsened tonight to the point where the patient was unable to walk he was so weak and short of breath.  Patient's loved one did call 911 and upon EMS arrival the patient was complaining of shortness of breath and generalized weakness despite being on his home O2 at 4 L.  Patient's blood pressures were found to be in the 80s systolic.  Patient's family member reports that he has had decreased appetite and has not been eating very much for the past week since he has been feeling progressively worse.  Per EMS, blood glucose was found to be 50 in route and even after being given oral glucose, repeat check was at 48.  Patient does feel that his abdomen has become more distended and believes he is due to have another paracentesis, which he has to get regularly. No pain in the abdomen at rest,though says he is more uncomfortable lying flat (abdominal sx's and breathing worse being flat).  Per the patient's loved one patient did have a dialysis run yesterday, but did not complete a full run as his blood pressures were low.  No traumas or falls. No other new symptoms or complaints at this time, but history/ROS limited by acuity of illness.     Past Medical History:   Diagnosis Date     Cardiomyopathy (H)      Dialysis patient (H)     M-W-F     Diarrhea     C diff      ESRD (end stage renal disease) (H)      HLD (hyperlipidemia)      Hypertension      Pulmonary hypertension 11/21/2013       Past Surgical History:   Procedure Laterality Date     AV FISTULA OR GRAFT ARTERIAL      left upper arm     COLONOSCOPY  7/3015     COLONOSCOPY WITH CO2 INSUFFLATION N/A 11/10/2015    Procedure: COLONOSCOPY WITH CO2 INSUFFLATION;  Surgeon: Samuel Fritz MD;  Location: [facility] OR     ENDOBRONCHIAL ULTRASOUND FLEXIBLE N/A 9/18/2014    Procedure: ENDOBRONCHIAL ULTRASOUND FLEXIBLE;  Surgeon: Jg Morrison MD;  Location: [facility] GI     ENDOBRONCHIAL ULTRASOUND FLEXIBLE N/A 12/18/2014    Procedure: ENDOBRONCHIAL ULTRASOUND FLEXIBLE;  Surgeon: Jg Morrison MD;  Location: [facility] OR       Family History   Problem Relation Age of Onset     Hypertension Mother      DIABETES Father        Social History   Substance Use Topics     Smoking status: Never Smoker     Smokeless tobacco: Never Used     Alcohol use No     No current facility-administered medications for this encounter.      Current Outpatient Prescriptions   Medication     midodrine HCl 10 MG TABS     macitentan (OPSUMIT) 10 MG tablet     sildenafil (REVATIO/VIAGRA) 20 MG tablet     loratadine (CLARITIN) 10 MG tablet     hydrOXYzine (VISTARIL) 25 MG capsule     torsemide (DEMADEX) 20 MG tablet     omeprazole (PRILOSEC) 40 MG capsule     aspirin 81 MG tablet     B Complex-C-Folic Acid (RENAL) 1 MG CAPS     paricalcitol (ZEMPLAR) 5 MCG/ML injection     [DISCONTINUED] SILDENAFIL CITRATE PO     calcium acetate (PHOSLO) 667 MG CAPS      No Known Allergies     I have reviewed the Medications, Allergies, Past Medical and Surgical History, and Social History in the Epic system.    Review of Systems   Unable to perform ROS: Acuity of condition   Constitutional: Positive for appetite change (decreased) and fatigue. Negative for fever.   HENT: Negative for sore throat and trouble swallowing.    Respiratory: Positive for shortness of breath.  Negative for cough.    Cardiovascular: Negative for chest pain and palpitations.   Gastrointestinal: Positive for abdominal distention, abdominal pain (with lying flat) and nausea. Negative for blood in stool, constipation and vomiting.   Musculoskeletal: Negative.  Negative for neck pain and neck stiffness.   Skin: Negative.        Physical Exam   BP: (!) 64/44  Pulse: 97  Heart Rate: 151  Temp: 98.8  F (37.1  C)  Resp: 14  SpO2: 98 %      Physical Exam  CONSTITUTIONAL: Well-developed and well-nourished. Awake and alert. Non-toxic appearance. No acute distress.   HENT:   - Head: Normocephalic and atraumatic.   - Ears: Hearing and external ear grossly normal.   - Nose: Nose normal. No rhinorrhea. No epistaxis.   - Mouth/Throat: Oropharynx is clear and MMM  EYES: Conjunctivae and lids are normal. No scleral icterus.   NECK: Normal range of motion and phonation normal. Neck supple.  No tracheal deviation, no stridor. No edema or erythema noted.  CARDIOVASCULAR: Normal rate, regular rhythm and no appreciable abnormal heart sounds.  PULMONARY/CHEST: Effort normal. No accessory muscle usage or stridor. No respiratory distress.  No appreciable abnormal breath sounds.  ABDOMEN: Soft, non-distended. No tenderness. No rigidity, rebound or guarding.   MUSCULOSKELETAL: Extremities warm and seemingly well perfused. No edema or calf tenderness.  NEUROLOGIC: Awake, alert. Not disoriented. Normal tone. No seizure activity. Coordination normal. GCS 15  SKIN: Skin is warm and dry. No rash noted. No diaphoresis. No pallor.   PSYCHIATRIC: Normal mood and affect. Speech and behavior normal. Thought processes linear. Cognition and memory are normal.     ED Course     ED Course     Procedures     11:54 AM  The patient was seen and examined by Dr. Dior in Room 3.           Critical Care time:  was 60 minutes for this patient excluding procedures.  ____________________________________________________________________________  The patient  has signs of Septic Shock as evidenced by:  1. Presence of Sepsis, AND  2. Lactic Acid level >4  Time sepsis diagnosis confirmed = 0022 as this was the time whenLactate was resulted and the level was >4    3 Hour Septic Shock Bundle Completion:  1. Initial Lactic Acid Result:   Recent Labs   Lab Test  01/02/18   0514  01/02/18   0011  06/08/17   1803   LACT  2.7*  5.6*  2.2*   2. Blood Cultures before Antibiotics: Yes  3. Broad Spectrum Antibiotics Administered: Yes     Anti-infectives (Future)    Start     Dose/Rate Route Frequency Ordered Stop    01/03/18 0200  piperacillin-tazobactam (ZOSYN) 4.5 g in 20 mL NS Premix Syringe      4.5 g  over 3 Minutes Intravenous EVERY 6 HOURS 01/02/18 2125 01/03/18 0200  vancomycin (VANCOCIN) 1000 mg in dextrose 5% 200 mL PREMIX      15 mg/kg × 61.6 kg  200 mL/hr over 1 Hours Intravenous EVERY 24 HOURS 01/02/18 2128 01/02/18 0545  azithromycin (ZITHROMAX) 500 mg in NaCl 0.9 % 250 mL intermittent infusion      500 mg  over 1 Hours Intravenous EVERY 24 HOURS 01/02/18 0531          4. Full 30mL/kg bolus not administered due to CHF and acute Pulmonary Edema, pulmonary hypertension, incomplete dialysis run yesterday, full IVC (got 500cc from EMS)         Labs Ordered and Resulted from Time of ED Arrival Up to the Time of Departure from the ED   CBC WITH PLATELETS DIFFERENTIAL - Abnormal; Notable for the following:        Result Value    WBC 15.0 (*)     RBC Count 3.81 (*)     Hemoglobin 11.7 (*)     Hematocrit 39.0 (*)      (*)     MCHC 30.0 (*)     RDW 17.9 (*)     Platelet Count 107 (*)     All other components within normal limits   INR   COMPREHENSIVE METABOLIC PANEL   TSH WITH FREE T4 REFLEX   TROPONIN I   NT PROBNP INPATIENT   ISTAT CG4 GASES LACTATE QI NURSING POCT   ISTAT CG8 GAS ELEC ICA GLUC QI NURSE POCT            Assessments & Plan (with Medical Decision Making)   IMPRESSION: 47-year-old medically complex male with PMH notable for severe pulmonary  hypertension, cardiomyopathy, Hx HTN, Hx of hypotension needing midodrine, HLP, ESRD on dialysis (not get full run yesterday during dialysis), cardiomyopathy, liver disease for which he needs occasional paracentesis, et al., brought in by EMS as Level Red patient with progressive shortness of breath and hypotension as described further above in the HPI/ROS.      Clinically, he does not appear significantly dyspneic but is hypoxic beyond his normal baseline oxygen requirement [place] is normal 4LNC at home), and is hypotensive down to the high-60s systolic which is below is baseline though it's not that much higher is normally in the 80s to 90s here recently.  Blood sugars down trended per EMS even after oral supplementation, repeat here is now in the 20's, which could explain their report of worsening mental status/fatigue en route. Patient given 2-amps D50 and continuing to monitor sugars closely.     With regards to the BP and breathing symptoms, Could be to related heart failure/worsening pulmonary HTN, infection, fluid overload, anemia, et al.     PLAN: ECG, Labs, CXR, Symptom management, monitor sugars/hemodynamics closely     RESULTS:  See ED Course section above for particular pertinent findings and comments  - Labs: Initial glucose 25, Elevated troponin 3.243, Cr 6, WBC 15, Hgb 11.7, lactate 5.6  - Imaging: Images and written preliminary reports reviewed by myself and revealed:  --- CXR: Prelim has enlargement of cardiac silhoette and pulmonary vascular congestion with retrocardiac/perihilar findings for edema or infection.     INTERVENTIONS:   - IVF (~ 500 cc starting from EMS)  - Respiratory support: 11-15L face mask (from baseline 4LNC)    RE-EVALUATION:  See ED Course section above for particular pertinent findings and comments  - The patient's symptoms were somewhat improved with the facemask oxygen  - BPs improved from the low 60's up to the high 70's (pt's baseline recently SBP  80-90's)    DISCUSSIONS:  - w/ Cardiology: Given patient's hx pulmonary HTN. They recommend ICU admit and can consult  - w/ ICU Attending: They will admit for further evaluation/management. No additional requests for while in ED.  --- Code status discussed w/ patient by ICU team while still in ED. Sounds as though he will be DNR, but OK w/ trial of intubation. See their documentation for full details.    - w/ Patient: I have reviewed the available findings, plan with the patient and his loved one/SO.    DISPOSITION/PLANNING:  - IMPRESSION: Multiple abnormalities, multifactorial shock (septic, cardiogenic)  --- PNA, Acute hypoxic respiratory failure, shortness of breath  --- Elevated troponin and BNP  --- Severe pulmonary hypertension  --- Worsening hypotension  --- Hypoglycemia, improved  - DISPOSITION: Admit to MICU for further evaluation/management   --- Pending: Cultures  - OTHER RECOMMENDATIONS:   --- Cardiology consult  --- Formal Echo  --- Monitor sugars, respiratory status, BP very closely as all quite tenuous in the ED.   --- Paracentesis      ______________________________________________________________________________    - I have reviewed the available nursing notes.      New Prescriptions    No medications on file       Final diagnoses:   None     I, Jenny Dumont, am serving as a trained medical scribe to document services personally performed by Kierra Dior MD, based on the provider's statements to me.   IKierra MD, was physically present and have reviewed and verified the accuracy of this note documented by Jenny Dumont.     1/1/2018   [facility], [place], EMERGENCY DEPARTMENT     Kierra Dior MD  01/03/18 2762

## 2018-01-02 NOTE — PROGRESS NOTES
Cards 2 History and Physical      Patient Name: Jayy Gill MRN# [id number]   Age: 47 year old YOB: 1970     Date of Admission:1/1/2018  Primary care provider: Milly Healy  Date of Service: 1/2/2018         Assessment and Plan:   Jayy Gill is a 47 year old male with history of pulmonary HTN, cirrhosis, ESRD on MWF HD     Summary: 46 y/o M with PMH of ESRD on MWF HD, cirrhosis, pulmonary HTN who presented to the ED with respiratory failure and hypotension.  Admitted to MICU and was intubated, found to have ascites consistent with SBP and transferred to the Cards 2 service given severe PH and familiarity with the service.      ===NEURO===  # Sedation  - Continue fentanyl      ===CARDIOVASCULAR===  # Sepsis, suspected secondary to SBP  Based on repeat oxyhemoglobins suspect severe hypotension is secondary to sepsis rather than cardiogenic.  On dopamine, vasopressin and norepi.  - Continue dopamine, vasopressin, norepinephrine.  Weaning as able, plan to wean dopamine first  - Continue vancomycin/zosyn      # Demand ischemia  Had elevated troponin on admission, down-trending on repeat check.         ===PULMONARY===  # Acute hypoxemic resp failure  Intubated 1/2/18 after not tolerating 100% high flow.  Holding pulmonary hypertension meds.  - Hold macitentan, sildenafil  - F/u sputum cx  - Continue vent, wean FiO2 as able        ===GASTROINTESTINAL===  # Cirrhosis, MELD-Na 33  # Spontaneous bacterial peritonitis  Ascites with 792 WBC and 77% PMN.  On vancomycin/zosyn.    MELD-Na score: 33 at 1/2/2018  5:14 AM  MELD score: 33 at 1/2/2018  5:14 AM  Calculated from:  Serum Creatinine: 6.39 mg/dL (Rounded to 4) at 1/2/2018  5:14 AM  Serum Sodium: 135 mmol/L at 1/2/2018  5:14 AM  Total Bilirubin: 4.2 mg/dL at 1/2/2018  5:14 AM  INR(ratio): 2.06 at 1/2/2018 12:03 AM  Age: 47 years        # Nutrition:   - NPO for now      ===RENAL===  # ESRD on MWF HD  - Nephrology consult for HD or CRRT.  -  Reportedly dry weight is 64 kg    - Javed for strict I&Os.       ===HEME/ONC===  # Anemia  - Stable      ===ENDOCRINE===  # Hypoglycemia   - Unclear etiology  - BG checks      ===INFECTIOUS DISEASE===  # Shock, see cardiovascular.       # Antimicrobials:  - vanc 1/2/18  - zosyn 1/2/18  - azithro 1/2/18      ===SKIN/MSK===  # No issue      Lines: RIJ central line, R femoral central line, R arterial line  Prophylaxis:  DVT: Mechancial   GI: PPI  Family: GF updated at bedside  Disposition: Cardiac ICU  Code Status: DNR, ok with intubation for short term.  GF, who is POA, will make decision about comfort focus if it looks like he is giong to have prolonged intubation without much chance of meaningful recovery.       Patient was seen and discussed with attending physician Dr. Blank, who agrees with above assessment and plan.    Ganga Petty MD  PGY-2 Internal Medicine  Pager: [phone]             Past Medical History:     Past Medical History:   Diagnosis Date     Cardiomyopathy (H)      Dialysis patient (H)     M-W-F     Diarrhea     C diff     ESRD (end stage renal disease) (H)      HLD (hyperlipidemia)      Hypertension      Pulmonary hypertension 11/21/2013              Past Surgical History:     Past Surgical History:   Procedure Laterality Date     AV FISTULA OR GRAFT ARTERIAL      left upper arm     COLONOSCOPY  7/3015     COLONOSCOPY WITH CO2 INSUFFLATION N/A 11/10/2015    Procedure: COLONOSCOPY WITH CO2 INSUFFLATION;  Surgeon: Samuel Fritz MD;  Location: [facility] OR     ENDOBRONCHIAL ULTRASOUND FLEXIBLE N/A 9/18/2014    Procedure: ENDOBRONCHIAL ULTRASOUND FLEXIBLE;  Surgeon: Jg Morrison MD;  Location: [facility] GI     ENDOBRONCHIAL ULTRASOUND FLEXIBLE N/A 12/18/2014    Procedure: ENDOBRONCHIAL ULTRASOUND FLEXIBLE;  Surgeon: Jg Morrison MD;  Location: [facility] OR              Social History:     Social History     Social History     Marital status: Single     Spouse name: N/A     Number of  children: 2     Years of education: N/A     Occupational History     Not on file.     Social History Main Topics     Smoking status: Never Smoker     Smokeless tobacco: Never Used     Alcohol use No     Drug use: No     Sexual activity: Yes     Partners: Female     Other Topics Concern     Not on file     Social History Narrative            Family History:     Family History   Problem Relation Age of Onset     Hypertension Mother      DIABETES Father               Immunizations:     Immunization History   Administered Date(s) Administered     HepB 2013, 2013, 2013     Influenza (IIV3) PF 10/01/2014, 10/01/2015     Pneumococcal 23 valent 2013, 2013              Allergies:    No Known Allergies         Medications:     Prior to Admission Medications   Prescriptions Last Dose Informant Patient Reported? Taking?   B Complex-C-Folic Acid (RENAL) 1 MG CAPS  Self Yes No   Sig: Take 1 mg by mouth Take 1 capsule (1 mg) by mouth daily in the afternoon.  Indications: Nutritional Support   aspirin 81 MG tablet  Self Yes No   Sig: Take 81 mg by mouth daily   calcium acetate (PHOSLO) 667 MG CAPS  Self Yes No   Sig: Take 2 capsules (1,334 mg) by mouth 3 times daily (with meals)   hydrOXYzine (VISTARIL) 25 MG capsule  Self No No   Sig: Take 2 capsules (50 mg) by mouth 3 times daily as needed for itching   loratadine (CLARITIN) 10 MG tablet  Self No No   Sig: Take 1 tablet (10 mg) by mouth daily   macitentan (OPSUMIT) 10 MG tablet   No No   Sig: Take 1 tablet (10 mg) by mouth daily   midodrine HCl 10 MG TABS   No No   Sig: Take 20 mg by mouth 3 times daily   omeprazole (PRILOSEC) 40 MG capsule  Self No No   Sig: Take 1 capsule (40 mg) by mouth daily Take 30-60 minutes before a meal.   paricalcitol (ZEMPLAR) 5 MCG/ML injection  Self Yes No   Si mcg 2 mcg by IV Push route dialysis.   sildenafil (REVATIO/VIAGRA) 20 MG tablet  Self No No   Sig: Take 2 tablets (40 mg) by mouth 3 times daily   torsemide  (DEMADEX) 20 MG tablet  Self No No   Sig: Take 3 tablets (60 mg) by mouth 2 times daily      Facility-Administered Medications: None             Review of Systems:   A complete ROS was performed and is negative other than what is stated in the HPI.         Physical Exam:   Blood pressure (!) 111/93, pulse 97, temperature 97.7  F (36.5  C), temperature source Axillary, resp. rate 14, SpO2 96 %.  General: Lying in bed, intubated/sedated  Chest/Resp: On ventilator, faint crackles appreciated  Heart/CV: Regular rate and rhythm, tachycardic,  Abdomen/GI: Distended, normoactive bowel sounds  Extremities/MSK: 1+ pitting edema in bilateral lower extremities  Skin: No rash, no jaundice    Peripheral IV 06/04/17 Right Lower forearm (Active)   Number of days:212       Peripheral IV 01/01/18 Right (Active)   Site Assessment [facility] 1/2/2018  3:00 AM   Line Status Saline locked 1/2/2018  3:00 AM   Phlebitis Scale 0-->no symptoms 1/2/2018  3:00 AM   Infiltration Scale 0 1/2/2018  3:00 AM   Extravasation? No 1/2/2018  3:00 AM   Number of days:1       Peripheral IV 01/02/18 Right Lower forearm (Active)   Site Assessment WDL 1/2/2018  3:00 AM   Line Status Infusing 1/2/2018  3:00 AM   Phlebitis Scale 0-->no symptoms 1/2/2018  3:00 AM   Infiltration Scale 0 1/2/2018  3:00 AM   Extravasation? No 1/2/2018  3:00 AM   Number of days:0       CVC Triple Lumen 01/02/18 Right Internal jugular (Active)   Site Assessment WDL 1/2/2018  4:00 AM   Extravasation? No 1/2/2018  4:00 AM   Dressing Intervention Chlorhexidine sponge;Transparent;New dressing 1/2/2018  4:00 AM   Dressing Change Due 01/07/18 1/2/2018  4:00 AM   CVC Comment CDI 1/2/2018  4:00 AM   CVC Lumen Assessment Brown;White;Blue 1/2/2018  4:00 AM   Number of days:0       Right Groin Interventional Procedure Access (Active)   Number of days:210                Data:   ROUTINE ICU LABS (Last four results)  CMP  Recent Labs  Lab 01/02/18  0514 01/02/18  0011 01/02/18  0003    133 131*    POTASSIUM 4.2 4.2 4.3   CHLORIDE 96  --  93*   CO2 24  --  21   ANIONGAP 16*  --  17*   GLC 94 >700* Canceled, Test credited   BUN 50*  --  47*   CR 6.39*  --  6.04*   GFRESTIMATED 9*  --  10*   GFRESTBLACK 11*  --  12*   JAZMINE 8.2*  --  7.7*   MAG 2.0  --   --    PHOS 5.9*  --   --    PROTTOTAL 6.8  --  6.2*   ALBUMIN 2.3*  --  2.1*   BILITOTAL 4.2*  --  3.8*   ALKPHOS 122  --  114   *  --  107*   ALT 62  --  54     CBC  Recent Labs  Lab 01/02/18  0514 01/02/18  0011 01/02/18  0003   WBC 14.9*  --  15.0*   RBC 4.02*  --  3.81*   HGB 12.3* 13.9 11.7*   HCT 38.4*  --  39.0*   MCV 96  --  102*   MCH 30.6  --  30.7   MCHC 32.0  --  30.0*   RDW 16.0*  --  17.9*   *  --  107*     INR  Recent Labs  Lab 01/02/18  0003   INR 2.06*     Arterial Blood Gas  Recent Labs  Lab 01/02/18  1040 01/02/18  0840 01/02/18  0514   PH 7.36 7.37  --    PCO2 42 34*  --    PO2 61* 69*  --    HCO3 24 19*  --    O2PER 90  90 100 100.0     Venous Blood Gas   Recent Labs  Lab 01/02/18  1040 01/02/18  0840 01/02/18  0514 01/02/18  0011   PHV 7.34  --  7.38 7.37  7.35   PCO2V 46  --  44 41  42   PO2V 48*  --  44 28  30   HCO3V 25  --  26 24  23   HARRIETT  --   --  0.5  --    O2PER 90  90 100 100.0  --          Results for orders placed or performed during the hospital encounter of 01/01/18   XR Chest Port 1 View    Narrative    Exam: Chest radiograph 1 view, 1/2/2018 12:26 AM    Indication: Shortness of breath    Comparison: 6/3/2017, 3/13/2016, 4/24/2015    Findings:   Single AP view of the chest was obtained. The cardiac silhouette  remains enlarged. Pulmonary vascular congestion. No pneumothorax. Left  perihilar, retrocardiac, and right midlung opacities. Small left  pleural effusion.      Impression    Impression:   1. Accounting for technical differences, stable enlargement of the  cardiac silhouette and pulmonary vascular congestion.  2. Interval mild increase in the perihilar and retrocardiac opacities  which may represent  pulmonary edema, atelectasis, or  aspiration/infection.    I have personally reviewed the examination and initial interpretation  and I agree with the findings.    JONAS GALVIN MD   XR Chest Port 1 View    Narrative    Exam: Chest radiograph 1 view, 1/2/2018 4:30 AM    Indication: Central line placement    Comparison: 1/2/2018, 6/3/2017, 3/13/2016    Findings:   A single AP view of the chest was obtained. The right IJ catheter tip  projects over the superior cavoatrial junction. The cardiac silhouette  is enlarged. No pneumothorax. Small left pleural effusion. Stable  pulmonary vascular congestion. Stable perihilar and increased  retrocardiac opacities.      Impression    Impression:   1. The right IJ catheter tip projects over the superior cavoatrial  junction.  2. Stable enlargement of cardiac silhouette and pulmonary edema.  2. Increased left basilar atelectasis/consolidation, and stable small  left pleural effusion.    I have personally reviewed the examination and initial interpretation  and I agree with the findings.    JONAS GALVIN MD   XR Abdomen Port 1 View    Narrative    EXAM: XR ABDOMEN PORT F1 VW  1/2/2018 10:06 AM      HISTORY: r/o toxic megacolon;     COMPARISON: CT abdomen 3/14/2016, 4/24/2015    FINDINGS: Unchanged metallic body projecting over the right lower  quadrant. Nonobstructive bowel gas pattern.    No pneumatosis/portal venous gas. Diffuse abdominal haziness with poor  definition of the liver and splenic margins, consistent with ascites.      Impression    IMPRESSION: No evidence of dilated colon. Nonobstructive bowel gas  pattern.    I have personally reviewed the examination and initial interpretation  and I agree with the findings.    MIGUEL MCFARLAND MD     I personally provided care for this patient, reviewed chart, discussed course with patient, housestaff and consulting physicians.  I answered all questions.    Tyrone Blank M.D.  Division of Cardiology  Department of  Medicine

## 2018-01-02 NOTE — H&P
[facility]      MICU History and Physicial  Jayy Gill MRN: [id number]  1970  Date of Admission:1/1/2018  Primary care provider: Milly Healy  Patient seen at 2am      Assessment and Plan:     Summary: 46 y/o M with PMH of ESRD on MWF HD, cirrhosis, pulmonary HTN who presented to the ED with respiratory failure and hypotension.  Admitted to MICU for further management    ===NEURO===  # Somonolence  - LIkely due to poor perfusion from hypotension as well as from resp failure  - f/u ammonia  - No indication for head CT now    ===CARDIOVASCULAR===  # Hypotension  - Septic and/or cardiogenic with pulm HTN.    - SBP vs. PNA.   - vanc, zosyn, azithro  - vaso and norepi -- goal is to keep HR < 100 because his LV is rate dependent.  His RV is pretty dilated and would need to be careful about taking off fluids to not cause tachycardia and arrhythmia.   - Stress dose steroids  - f/u oxyhmeoglobin -- consider dobutamine or low dose epi  - Trend lactic acid    # Demand ischemia  - Elevated tropoin, no EKG change  - Trend trop  - Holding off on heparin for now given recent angiogram  - Cards consult        ===PULMONARY===  # Acute hypoxemic resp failure  - Likely 2/2 fluid overload, possible PNA, with pulm HTN contributing  - lasix 40 BID.  Hold home torsemide  - Continue pulm HTN meds  - Sputum cx sent.  On Abx for possible PNA.   - Continue high flow nasal canula.  Wean oxygen as able.  May need to be intubated, not a good candidate for bipap given mental status.   - Holding off intubation at this point to not cause hypotension with induction.  Would have to be very careful with intubation induction agent to not drop BP as that can cause him to go into arrhythmia given pulm HTN.     ===GASTROINTESTINAL===  # Cirrhosis  - Supportive care  - Already on abx for possible SBP  - No albumin given fluid overload now.  - f/u ammonia  MELD-Na score: 34 at 1/2/2018 12:11 AM  MELD score: 33 at 1/2/2018 12:03  AM  Calculated from:  Serum Creatinine: 6.04 mg/dL (Rounded to 4) at 1/2/2018 12:03 AM  Serum Sodium: 133 mmol/L at 1/2/2018 12:11 AM  Total Bilirubin: 3.8 mg/dL at 1/2/2018 12:03 AM  INR(ratio): 2.06 at 1/2/2018 12:03 AM  Age: 47 years    # Nutrition:   - NPO for now    ===RENAL===  # ESRD on MWF HD  - Nephrology consult for HD or CRRT.  - Patient patient, dry weight is 64 kg.   - Javed for strict I&Os.     ===HEME/ONC===  # Anemia  - Stable    ===ENDOCRINE===  # Hypoglycemia   - Unclear etiology  - BG checks    ===INFECTIOUS DISEASE===  # Shock, see cardiovascular.     # Antimicrobials:  - vanc 1/2/18  - zosyn 1/2/18  - azithro 1/2/18    ===SKIN/MSK===  # No issue    Lines: RIJ central line  Prophylaxis:  DVT: Mechancial   GI: PPI  Family: GF updated at bedside  Disposition: MICU  Code Status: DNR, ok with intubation for short term.  GF, who is POA, will make decision about comfort focus if it looks like he is giong to have prolonged intubation without much chance of meaningful recovery.      Patient was seen and plan was discussed with attending physician Dr. Prado.    Radha Singh DO, MS  PGY-2, Internal Medicine  Pager: [phone]         Chief Complaint:   Fatigue         History of Present Illness:   46 y/o M with PMH of ESRD on MWF HD, cirrhosis, pulmonary HTN who presented to the ED with worsening fatigue.  He has been feeling more fatigued and has had a cough since 12/24/17.  He wasn't able to have any fluid off at his last HD session on Sunday due to BP issues.  He hasn't had any fever/chills, chest pain.  Some dry cough.  His abdomen is also more distended -- he usually gets paracentesis every few weeks.  In the ED he was found to be requiring 10L of oxygen and his BP was in 70s/50s.   He was admitted to MICU for further management.     Per girl-friend, who is POA, he has been taking his medications.             Review of Systems:    Complete ROS not obtained because patient is tachypnic and can't  participate in long conversations.          Past Medical History:   Medical History reviewed.            Past Surgical History:   Surgical History reviewed.           Social History:   Social History reviewed.           Family History:   Family History reviewed.            Allergies:   No Known Allergies          Medications:   Medications Reviewed.        Physical Exam:   Vital signs:  Temp:  [98.8  F (37.1  C)-99.2  F (37.3  C)] 99.2  F (37.3  C)  Pulse:  [97] 97  Heart Rate:  [] 97  Resp:  [14-45] 43  BP: (47-98)/(20-71) 98/71  MAP:  [12 mmHg] 12 mmHg  FiO2 (%):  [100 %] 100 %  SpO2:  [71 %-100 %] 99 %    FiO2 (%): 100 %  Resp: 43       Intake/Output Summary (Last 24 hours) at 01/02/18 0513  Last data filed at 01/02/18 0500   Gross per 24 hour   Intake              250 ml   Output                0 ml   Net              250 ml       General: Sleepy but arousable and following commands  Skin: Not jaundiced, no rash  HEENT: PERRL  CV: tachycardic  Resp: crackles bilaterally on bases, on 10L facemask and then switched to 100% on high flow  Abd: Tight, distended, and tender  Extremities: Tight non-pitting edema on both legs  Neuro: No lateralizing symptoms or focal neurologic deficits  Admission weight:    Current weight:            Data Reviewed in EMR

## 2018-01-03 NOTE — PROCEDURES
"Procedure/Surgery Information   [facility] [place]    Bedside Procedure Note  Date of Service (when I performed the procedure): 01/02/2018    Jayy Gill is a 47 year old male patient.  1. Septic shock (H)    2. Sepsis, due to unspecified organism (H)      Past Medical History:   Diagnosis Date     Cardiomyopathy (H)      Dialysis patient (H)     M-W-F     Diarrhea     C diff     ESRD (end stage renal disease) (H)      HLD (hyperlipidemia)      Hypertension      Pulmonary hypertension 11/21/2013     Temp: 97.3  F (36.3  C) Temp src: Axillary BP: 97/72 Pulse: 97 Heart Rate: 76 Resp: 17 SpO2: 97 % O2 Device: Mechanical Ventilator Oxygen Delivery: 15 LPM    Central line  Date/Time: 1/2/2018 8:47 PM  Performed by: CYNDY MURRY  Authorized by: HAIDER WORRELL   Consent: Verbal consent obtained. Written consent obtained.  Risks and benefits: risks, benefits and alternatives were discussed  Consent given by: spouse  Patient understanding: patient states understanding of the procedure being performed  Patient consent: the patient's understanding of the procedure matches consent given  Procedure consent: procedure consent matches procedure scheduled  Relevant documents: relevant documents present and verified  Test results: test results available and properly labeled  Site marked: the operative site was marked  Imaging studies: imaging studies available  Required items: required blood products, implants, devices, and special equipment available  Patient identity confirmed: arm band  Time out: Immediately prior to procedure a \"time out\" was called to verify the correct patient, procedure, equipment, support staff and site/side marked as required.  Indications: vascular access  Anesthesia: see MAR for details    Anesthesia:  Local Anesthetic: lidocaine 1% without epinephrine    Sedation:  Patient sedated: yes  Sedation type: moderate (conscious) sedation  Sedatives: fentanyl  Analgesia: " fentanyl  Vitals: Vital signs were monitored during sedation.  Preparation: skin prepped with chlorhexidine and sterile field established  Location details: right femoral  Patient position: flat  Catheter type: triple lumen and double lumen  Pre-procedure: landmarks identified  Ultrasound guidance: yes  Number of attempts: 1  Successful placement: yes  Post-procedure: line sutured and dressing applied  Assessment: blood return through all parts and free fluid flow  Patient tolerance: Patient tolerated the procedure well with no immediate complications           Ganga Petty I was personally present for this procedure

## 2018-01-03 NOTE — PLAN OF CARE
Problem: Patient Care Overview  Goal: Plan of Care/Patient Progress Review  Outcome: Declining  D/I:?Patient on unit 4A Surgical/Neuro ICU for sepsis  Neuro- Follows commands, PERRLA, answers yes or no questions  CV- BPs 80s/ high 50s, HR in low 80s. Currently on .09 of Levo, 6.5 of Dopamine, and 2.4 of vaso. MAP> 65 with MAP in high 60s/ low 70s throughout day. Increased levo, decreased vaso and dopamine. Heart sounds have a gallop.  Pulm- Intubated shortly after 1200 due to low PaO2. Since intubation sats in mid 90s with increasing PaO2. CMV, FIO2 of 90%, tv 370, rate 14, PEEP 10. Lung sounds clear.  GI- Distended abdomen. Patient has monthly paracentesis and has not had one in past month according to patient creating the distension. NG placed and hooked up to NDIAYE  - Anuric  Gtts- Fent-25, Levo-.09, Dop-6.5, Vaso- 2.4, TKO- 10  Skin- Lips cracked and mild bleeding from intubation. Skin otherwise dry, afebrile.   IV's/Drains- Triple lumen left IJ, right arterial wrist, PIV in left arm, fistula in left arm, NG in left nare.   Pain- Denies  See flow sheets for further interventions and assessments.   A: Stable, will eventually be put on CRRT after catheter placement.   P:?Continue to monitor pt closely. Notify MD of significant changes.

## 2018-01-03 NOTE — PLAN OF CARE
Problem: Patient Care Overview  Goal: Plan of Care/Patient Progress Review  PT 4A: Cancel, pt continues to be medically inappropriate for therapies. Will reschedule.

## 2018-01-03 NOTE — PLAN OF CARE
Problem: Patient Care Overview  Goal: Plan of Care/Patient Progress Review    4A: Cxl - pt remains on 3 pressors and not medically appropriate for therapies.

## 2018-01-03 NOTE — PROGRESS NOTES
The patient was seen and examined the with the resident physician Dr. Gurmeet Bolivar .  A separate note will be placed into the chart by this resident physician.  We have discussed the patient in detail agree with the findings, assessment, and plan as documented when this note is cosigned.   The plan was formulated in conjunction with the house staff.  I personally examined and evaluated the patient today.  I have evaluated all laboratory values and imaging studies for the past 24 hours.  A brief overview, key findings and key decisions made today include the following:   This is a 47-year-old male with cirrhosis,  Or coronary hypertension, and end stage kidney disease.  He has a history of ascites and has required multiple recurrent paracenteses who presents with worsening hypoxemia and hypotension with concern for both a combination of cardiogenic and septic shock.  Broad-spectrum antimicrobials started including vancomycin, Zosyn, azithromycin.  He had a diagnostic paracentesis performed upon arrival which was cloudy  And white blood cell count of 800 with a differential pending.  His chest x-ray demonstrated loss of the left hemidiaphragm with associated opacities consistent with pulmonary edema.  His troponin was elevated with no acute changes on EKG.  Patient is well known to the cardiology service who has evaluated the patient and given his hx agree tx to Cards II is appropriate.  Is profoundly hypoxemic requiring significant supplemental oxygen and given his underlying comorbidities on recommend intubation.    He has had multiple prolonged hospitalizations in the past and his CODE STATUS remains DO NOT RESUSCITATE due except limited intubation.    Echocardiogram from today demonstrates ejection fraction of 60-65% with moderate left ventricular hypertrophy.  There was paradoxical septal motion which is consistent with right ventricular pressure and volume overload.    Pulmonary embolism is possible although the  patient's INR on admission was 2.06 making this less likely.    Recommend continued vasopressor support, broad-spectrum antimicrobials, we will removal as able but will most likely require CRRT. I have reviewed all the consults that have been ordered and are active for this patient.  Planned procedures today include intubation, tx to cards 2. .      Critical Care Time: 35 min.  I spent this time (excluding procedures) personally providing and directing critical care services at the bedside and on the critical care unit.      Jg Morrison MD  Pulmonary and Critical Care  [facility]  Pager:  [phone]

## 2018-01-03 NOTE — PROGRESS NOTES
Cards 2 History and Physical      Patient Name: Jayy Gill MRN# [id number]   Age: 47 year old YOB: 1970     Date of Admission:1/1/2018  Primary care provider: Milly Healy  Date of Service: 1/3/2018         Assessment and Plan:   Jayy Gill is a 47 year old male with history of pulmonary HTN, cirrhosis, ESRD on MWF HD.    Summary: 46 y/o M with PMH of ESRD on MWF HD, cirrhosis, pulmonary HTN who presented to the ED with respiratory failure and hypotension.  Found to have septic shock likely secondary to SBP, requiring intubation and multiple pressors.      ===NEURO===  # Sedation  RASS -2 to -3.  - Continue fentanyl, midazolam      ===CARDIOVASCULAR===  # Sepsis, suspected secondary to SBP  Based on repeat oxyhemoglobins suspect severe hypotension is secondary to sepsis rather than cardiogenic.  On dopamine, vasopressin and norepi.  CI 4.47, CO 7.73,  this AM  - Continue dopamine, vasopressin, norepinephrine.  Weaning as able, plan to wean dopamine first  - Continue vancomycin/zosyn      # Demand ischemia  Had elevated troponin on admission, down-trending on repeat check.    # Moderate pericardial effusion  Present on prior echos but has increased from small to moderate.  Given severe PH and elevated RV pressures patient is unlikely to develop tamponade physiology, no interventions at this time.       ===PULMONARY===  # Acute hypoxemic resp failure  Intubated 1/2/18 after not tolerating 100% high flow.  Holding pulmonary hypertension meds.  - Hold macitentan, sildenafil  - F/u sputum cx  - Continue vent, wean FiO2 as able        ===GASTROINTESTINAL===  # Cirrhosis, MELD-Na 33  # Spontaneous bacterial peritonitis  Ascites with 792 WBC and 77% PMN.  On vancomycin/zosyn.  Strongly suspect SBP is primary source of sepsis but blood cx NGTD, will continue broad-spectrum abx for now given patient is critically ill.    MELD-Na score: 34 at 1/3/2018  9:59 AM  MELD score: 34 at 1/3/2018   9:59 AM  Calculated from:  Serum Creatinine: 4.09 mg/dL (Rounded to 4) at 1/3/2018  9:59 AM  Serum Sodium: 135 mmol/L at 1/3/2018  9:59 AM  Total Bilirubin: 4.5 mg/dL at 1/3/2018  9:59 AM  INR(ratio): 2.06 at 1/2/2018 12:03 AM  Age: 47 years    # Nutrition:   - NPO for now      ===RENAL===  # ESRD on MWF HD  - Nephrology consult for HD or CRRT.  - Reportedly dry weight is 64 kg    - Javed for strict I&Os.      ===HEME/ONC===  # Anemia  - Stable      ===ENDOCRINE===  # NTD      ===INFECTIOUS DISEASE===  # Shock, see cardiovascular.       # Antimicrobials:  Suspect 2/2 SBP but continuing broad-spectrum abx while bcx resulting and patient critically ill.  - vanc 1/2/18  - zosyn 1/2/18  - azithro 1/2/18      ===SKIN/MSK===  # No issue      Lines: RIJ central line, R femoral central line, L femoral arterial line (unable to obtain R radial despite multiple attempts)  Prophylaxis:  DVT: Mechancial   GI: PPI  Family: GF updated at bedside  Disposition: Cardiac ICU  Code Status: DNR, ok with intubation for short term.  GF, who is POA, will make decision about comfort focus if it looks like he is giong to have prolonged intubation without much chance of meaningful recovery.       Patient was seen and discussed with attending physician Dr. Blank, who agrees with above assessment and plan.    Ganga Petty MD  PGY-2 Internal Medicine  Pager: [phone]             Past Medical History:     Past Medical History:   Diagnosis Date     Cardiomyopathy (H)      Dialysis patient (H)     M-W-F     Diarrhea     C diff     ESRD (end stage renal disease) (H)      HLD (hyperlipidemia)      Hypertension      Pulmonary hypertension 11/21/2013              Past Surgical History:     Past Surgical History:   Procedure Laterality Date     AV FISTULA OR GRAFT ARTERIAL      left upper arm     COLONOSCOPY  7/3015     COLONOSCOPY WITH CO2 INSUFFLATION N/A 11/10/2015    Procedure: COLONOSCOPY WITH CO2 INSUFFLATION;  Surgeon: Samuel Fritz  MD Cristi;  Location: [facility]     ENDOBRONCHIAL ULTRASOUND FLEXIBLE N/A 9/18/2014    Procedure: ENDOBRONCHIAL ULTRASOUND FLEXIBLE;  Surgeon: Jg Morrison MD;  Location:  GI     ENDOBRONCHIAL ULTRASOUND FLEXIBLE N/A 12/18/2014    Procedure: ENDOBRONCHIAL ULTRASOUND FLEXIBLE;  Surgeon: Jg Morrison MD;  Location:  OR              Social History:     Social History     Social History     Marital status: Single     Spouse name: N/A     Number of children: 2     Years of education: N/A     Occupational History     Not on file.     Social History Main Topics     Smoking status: Never Smoker     Smokeless tobacco: Never Used     Alcohol use No     Drug use: No     Sexual activity: Yes     Partners: Female     Other Topics Concern     Not on file     Social History Narrative            Family History:     Family History   Problem Relation Age of Onset     Hypertension Mother      DIABETES Father               Immunizations:     Immunization History   Administered Date(s) Administered     HepB 07/05/2013, 08/05/2013, 09/06/2013     Influenza (IIV3) PF 10/01/2014, 10/01/2015     Pneumococcal 23 valent 06/28/2013, 07/30/2013              Allergies:    No Known Allergies         Medications:     Prior to Admission Medications   Prescriptions Last Dose Informant Patient Reported? Taking?   B Complex-C-Folic Acid (RENAL) 1 MG CAPS  Self Yes No   Sig: Take 1 mg by mouth Take 1 capsule (1 mg) by mouth daily in the afternoon.  Indications: Nutritional Support   aspirin 81 MG tablet  Self Yes No   Sig: Take 81 mg by mouth daily   calcium acetate (PHOSLO) 667 MG CAPS  Self Yes No   Sig: Take 2 capsules (1,334 mg) by mouth 3 times daily (with meals)   hydrOXYzine (VISTARIL) 25 MG capsule  Self No No   Sig: Take 2 capsules (50 mg) by mouth 3 times daily as needed for itching   loratadine (CLARITIN) 10 MG tablet  Self No No   Sig: Take 1 tablet (10 mg) by mouth daily   macitentan (OPSUMIT) 10 MG tablet   No No   Sig:  Take 1 tablet (10 mg) by mouth daily   midodrine HCl 10 MG TABS   No No   Sig: Take 20 mg by mouth 3 times daily   omeprazole (PRILOSEC) 40 MG capsule  Self No No   Sig: Take 1 capsule (40 mg) by mouth daily Take 30-60 minutes before a meal.   paricalcitol (ZEMPLAR) 5 MCG/ML injection  Self Yes No   Si mcg 2 mcg by IV Push route dialysis.   sildenafil (REVATIO/VIAGRA) 20 MG tablet  Self No No   Sig: Take 2 tablets (40 mg) by mouth 3 times daily   torsemide (DEMADEX) 20 MG tablet  Self No No   Sig: Take 3 tablets (60 mg) by mouth 2 times daily      Facility-Administered Medications: None             Review of Systems:   Unable to complete due to patient intubated/sedated.         Physical Exam:   Blood pressure 98/50, pulse 97, temperature 97.4  F (36.3  C), temperature source Axillary, resp. rate 17, SpO2 99 %.  General: Lying in bed, intubated/sedated  Chest/Resp: On ventilator, faint crackles appreciated  Heart/CV: Regular rate and rhythm  Abdomen/GI: Distended, normoactive bowel sounds  Extremities/MSK: 1+ pitting edema in bilateral lower extremities  Skin: No rash, no jaundice    Peripheral IV 17 Right Lower forearm (Active)   Number of days:212       Peripheral IV 18 Right (Active)   Site Assessment [facility] 2018  3:00 AM   Line Status Saline locked 2018  3:00 AM   Phlebitis Scale 0-->no symptoms 2018  3:00 AM   Infiltration Scale 0 2018  3:00 AM   Extravasation? No 2018  3:00 AM   Number of days:1       Peripheral IV 18 Right Lower forearm (Active)   Site Assessment [facility] 2018  3:00 AM   Line Status Infusing 2018  3:00 AM   Phlebitis Scale 0-->no symptoms 2018  3:00 AM   Infiltration Scale 0 2018  3:00 AM   Extravasation? No 2018  3:00 AM   Number of days:0       CVC Triple Lumen 18 Right Internal jugular (Active)   Site Assessment [facility] 2018  4:00 AM   Extravasation? No 2018  4:00 AM   Dressing Intervention Chlorhexidine sponge;Transparent;New  dressing 1/2/2018  4:00 AM   Dressing Change Due 01/07/18 1/2/2018  4:00 AM   CVC Comment CDI 1/2/2018  4:00 AM   CVC Lumen Assessment Brown;White;Blue 1/2/2018  4:00 AM   Number of days:0       Right Groin Interventional Procedure Access (Active)   Number of days:210                Data:   ROUTINE ICU LABS (Last four results)  CMP    Recent Labs  Lab 01/03/18  0700 01/03/18 0510 01/02/18 2127 01/02/18 0514 01/02/18 0011 01/02/18  0003   NA  --  134 134 135 133 131*   POTASSIUM 4.8  4.6 6.1* 5.7* 4.2 4.2 4.3   CHLORIDE  --  97 98 96  --  93*   CO2  --  20 21 24  --  21   ANIONGAP  --  16* 15* 16*  --  17*   GLC  --  90 104* 94 >700* Canceled, Test credited   BUN  --  44* 52* 50*  --  47*   CR  --  4.54* 5.86* 6.39*  --  6.04*   GFRESTIMATED  --  14* 10* 9*  --  10*   GFRESTBLACK  --  17* 13* 11*  --  12*   JAZMINE  --  8.2* 8.3* 8.2*  --  7.7*   MAG  --  2.3 2.2 2.0  --   --    PHOS  --  7.1* 7.8* 5.9*  --   --    PROTTOTAL  --  6.8 7.0 6.8  --  6.2*   ALBUMIN  --  2.0* 2.2* 2.3*  --  2.1*   BILITOTAL  --  4.6* 4.6* 4.2*  --  3.8*   ALKPHOS  --  107 116 122  --  114   AST  --  Canceled, Test credited Unsatisfactory specimen - hemolyzed 113*  --  107*   ALT  --  60 61 62  --  54     CBC    Recent Labs  Lab 01/03/18 0510 01/02/18 2127 01/02/18 0514 01/02/18 0011 01/02/18  0003   WBC 11.7* 14.7* 14.9*  --  15.0*   RBC 3.96* 4.16* 4.02*  --  3.81*   HGB 12.1* 13.1* 12.3* 13.9 11.7*   HCT 37.3* 39.3* 38.4*  --  39.0*   MCV 94 95 96  --  102*   MCH 30.6 31.5 30.6  --  30.7   MCHC 32.4 33.3 32.0  --  30.0*   RDW 16.2* 16.1* 16.0*  --  17.9*   * 110* 113*  --  107*     INR    Recent Labs  Lab 01/02/18  0003   INR 2.06*     Arterial Blood Gas    Recent Labs  Lab 01/03/18  0700 01/03/18  0457 01/03/18  0200 01/02/18  2127 01/02/18  1342   PH  --  Canceled, Test credited 7.36 7.32* 7.32*   PCO2  --  Canceled, Test credited 39 44 43   PO2  --  Canceled, Test credited 85 67* 78*   HCO3  --  Canceled, Test credited 22  22 22   O2PER 90.0 Canceled, Test credited 90 90.0 90     Venous Blood Gas   Recent Labs  Lab 01/03/18  0700 01/03/18  0457 01/03/18  0200 01/02/18  2127  01/02/18  1040  01/02/18  0514 01/02/18  0011   PHV 7.35  --   --   --   --  7.34  --  7.38 7.37  7.35   PCO2V 41  --   --   --   --  46  --  44 41  42   PO2V 55*  --   --   --   --  48*  --  44 28  30   HCO3V 22  --   --   --   --  25  --  26 24  23   HARRIETT  --   --   --   --   --   --   --  0.5  --    O2PER 90.0 Canceled, Test credited 90 90.0  < > 90  90  < > 100.0  --    < > = values in this interval not displayed.      Results for orders placed or performed during the hospital encounter of 01/01/18   XR Chest Port 1 View    Narrative    Exam: Chest radiograph 1 view, 1/2/2018 12:26 AM    Indication: Shortness of breath    Comparison: 6/3/2017, 3/13/2016, 4/24/2015    Findings:   Single AP view of the chest was obtained. The cardiac silhouette  remains enlarged. Pulmonary vascular congestion. No pneumothorax. Left  perihilar, retrocardiac, and right midlung opacities. Small left  pleural effusion.      Impression    Impression:   1. Accounting for technical differences, stable enlargement of the  cardiac silhouette and pulmonary vascular congestion.  2. Interval mild increase in the perihilar and retrocardiac opacities  which may represent pulmonary edema, atelectasis, or  aspiration/infection.    I have personally reviewed the examination and initial interpretation  and I agree with the findings.    JONAS GALVIN MD   XR Chest Port 1 View    Narrative    Exam: Chest radiograph 1 view, 1/2/2018 4:30 AM    Indication: Central line placement    Comparison: 1/2/2018, 6/3/2017, 3/13/2016    Findings:   A single AP view of the chest was obtained. The right IJ catheter tip  projects over the superior cavoatrial junction. The cardiac silhouette  is enlarged. No pneumothorax. Small left pleural effusion. Stable  pulmonary vascular congestion. Stable perihilar and  increased  retrocardiac opacities.      Impression    Impression:   1. The right IJ catheter tip projects over the superior cavoatrial  junction.  2. Stable enlargement of cardiac silhouette and pulmonary edema.  2. Increased left basilar atelectasis/consolidation, and stable small  left pleural effusion.    I have personally reviewed the examination and initial interpretation  and I agree with the findings.    JONAS GALVIN MD   XR Abdomen Port 1 View    Narrative    EXAM: XR ABDOMEN PORT F1 VW  1/2/2018 10:06 AM      HISTORY: r/o toxic megacolon;     COMPARISON: CT abdomen 3/14/2016, 4/24/2015    FINDINGS: Unchanged metallic body projecting over the right lower  quadrant. Nonobstructive bowel gas pattern.    No pneumatosis/portal venous gas. Diffuse abdominal haziness with poor  definition of the liver and splenic margins, consistent with ascites.      Impression    IMPRESSION: No evidence of dilated colon. Nonobstructive bowel gas  pattern.    I have personally reviewed the examination and initial interpretation  and I agree with the findings.    MIGUEL MCFARLAND MD     Cath: 6/22/17    Critical Care ICU Note - Cardiology  Tyrone Blank M.D.    Impression:  Pulmonary hypertension  Septic shock  Acute and chronic congestive heart failure  Acute respiratory failure  Chronic renal failure    The patient remains unstable in the ICU with on-going need for ventilator support, parenteral medications for the adjustment of blood pressure and cardiac output and maintenance of renal function.      The patient is seen for prolonged ventilator management requiring oxygen and/or pressure modulation; shock requiring vasopressor and or inotropic agents; low cardiac output necessitating  inotropic agents, vasopressors and afterload reducing agents; chronic renal failure requiring fluid and diuretic managemen and dialysist; sepsis requiring antibiotic selection and monitoring, vasopressors, fluid management to maintain  blood pressure and secondary organ function    I personally reviewed:    Arterial and venous blood gases to assess acid base balance, oxygenation, and ventilator settings.      Hemodynamic parameters obtained by central hemodynamic monitoring including RAP, estimated LVEDP, pulmonary artery pressure, cardiac output and vascular resistances in order to adjust fluids and infused medications for blood pressure and cardiac output maintenance.    Ventilatory settings and/or supplement oxygen needs in order to obtain optimal oxygenation and electrolyte balance at low possible pressure support and inspired oxygen tension    Volume status, renal function and nutritional support as judged by intake, output, daily weight and appropriate laboratories.    45 minutes critical care    I reviewed individual and serial imaging studies including echocardiogram, chest x-ray, CT scan    I personally supervised the prescription of parenteral fluids, inotropes, vasodilators , and vasopressors in order to correct cardiac output, maintain urine output and renal function.    I personally met with patient or family to discuss current and future diagnostic and therapeutic strategies

## 2018-01-03 NOTE — PLAN OF CARE
Problem: Patient Care Overview  Goal: Plan of Care/Patient Progress Review  Outcome: No Change  D/I: Patient admitted to unit 4A Surgical/Neuro ICU for septic shock  /66  Pulse 97  Temp 97.4  F (36.3  C) (Axillary)  Resp 17  SpO2 100%  Neuro- sedated on Versed gtt. MENDIOLA and withdraws to pain.   CV- HR 60-80, Sinus.  MAP goal >65.  Dopamine at 1-2, Levophed at 0.1, Vasopressin 2.4  Respiratory- Pt remains on Vent.  FiO2 90%. Peep 10 See ABG results.  Minimal secretions  GI- NPO.  Small BM x1  - No UOP.  CRRT   Pain- Fentanyl at 50 mcg/hr  ID- Zosyn, Vanco, Zithromax  CRRT- Fluid removal 50 ml/hr per Cards 2 MD.  See flowsheets for further interventions and assessments.   A: Stable  P: Continue to monitor pt closely. Notify MD of significant changes.

## 2018-01-03 NOTE — PLAN OF CARE
Problem: Sepsis/Septic Shock (Adult)  Goal: Signs and Symptoms of Listed Potential Problems Will be Absent, Minimized or Managed (Sepsis/Septic Shock)  Signs and symptoms of listed potential problems will be absent, minimized or managed by discharge/transition of care (reference Sepsis/Septic Shock (Adult) CPG).   Outcome: No Change  D/I: Blood pressure remains unstable. Using levophed, vasopressin and dopamine to keep MAP >60. Dopamine weaned off at one point but had to restart r/t increasing levophed dose almost to max of order. CRRT running but have been unable to remove any fluid r/t hypotension (see VS and drip flow sheets). Pt vented, FiO2 90%, sats 95%, no vent changes today. Fentanyl and versed for sedation.   A: Blood pressure remains very unstable. Unable to wean dopamine off all day. With dopamine MAP is improved. Will try to start removing fluid.   P: Continue with drips, wean as tolerated. Remove fluid with CRRT if able. Update MD with concerns.

## 2018-01-04 NOTE — PLAN OF CARE
Problem: Patient Care Overview  Goal: Plan of Care/Patient Progress Review  Outcome: No Change  Neuro: Remains sedated overnight on versed/fentanyl. Opens eyes to pain/MAEx4. PERRL.  CV: Afebrile. HR NSR, 70-80s, no ectopy noted. Lt femoral art line, positional at times. MAP goal >60, maintaining with Levo/Dopamine/Vaso drips. Able to wean Dopamine from 2 to 1 mcg/kg/min, and Levophed from 0.28 to 0.20 mcg/kg/min. Vaso remains unchanged. Continues on CRRT, attempting to pull ~50cc/hr, see flowsheets. Rt femoral HD access is very positional.   Pulm: Remains vented on CMV/18/370/PEEP 10/90% Fio2. LS coarse/diminished, suctioning scant pale, yellow sputum from ETT. Sputum sample sent.    GI: NPO with NG to LIWS. Minimal output. OK to clamp for meds. Smear of BM x1. Hypoactive BS noted. Need to address nutrition today.   : Anuric. Bladder scanned for 0cc.   Skin: New Lt middle finger ischemic/pressure wound found under old pulse oximeter site. Lt middle finger tip is pale with black spot noted. WOC consult placed.   Access: Rt IJ triple lumen, 18G PIV x2 to RUE, Lt femoral art line, and Rt femoral dialysis line.   Drips: Versed @ 2 mg/hr, Fentanyl @ 50 mcg/hr, Levophed @ 0.2 mcg/kg/min, Vaso @ 2.4 units/hr, Dopamine @ 1 mcg/kg/min, and TKO x1.     Pt's S.O. visited overnight and was updated on labs and current plan of care.     Plan: Wean pressures and pull fluid with CRRT when able. Should address nutrition needs today. Notify CARDS 2 team with any issues or concerns.

## 2018-01-04 NOTE — PROGRESS NOTES
Nephrology Progress Note  01/03/2018         Assessment & Recommendations:     Jayy Gill is a 47 year old male with a Hx of ESRD on MWF HD sec to chronic hypertensive heart disease , pulm HTN (considered primary Vs sec to restrictive lung disease) admitted with hypotension and AMS.  Nephrology consulted for ESRD and shock state     ESRD on HD MWF  EDW 63.5 (61.5 on admission)  He has been hypotensive and unable to tolerate UF in HD  Access LUE AVF with cephalic vein stenosis and ulceration with ? Gram -ve infection hx , he has been referred for vascular surgery but still has to see them, no active ulcer seen but aneurysms and think skin over the fistula     -- he is currently on pressers with significant hypotension will continue on CRRT  -- will increase goal UF with hypervolemia on the ECHO to 0-100/hr as tolerated with MAPs to >60 but with the hypotension and pericardial effusion we are concerned if the the volume removal is too aggressive he may have worsening of his hemodynamics , will continue with gentle goals for UF for now  -- cardiology to follow on the need for pericardiocentesis as needed  -- Renal panel and mag , labs per CRRT protocol  -- daily weights and IO monitoring     Hypervolemia with shock state ( septic/cardiogenic)  CRRT for volume control  Will UF when tolerated  Hypervolemia with IVC 3.2 on ECHO and no resp variability   Cards following for shock state  Currently on dopamine , vaso and levophed  Chronic hypotension on midodrine TID 10mg  ECHO with moderate pericardial effusion , in the setting of hypervolemia , hypotension and paradoxical motion of the heart on ECHO may need to be addressed with pericardiocentesis --> cards to follow     Electrolytes  Stable so far     AGMA with lactic acidosis improving  CRRT prescription to address  With bicarb at goal this is likely sec to the hypoperfusion and will resolve   Will avoid any UF with the shock      Anemia  On Micera 40  Held PTA  Hb is  at goal 12.3  Will follow for EPO needs     MBD  Phos binders were on hold per the last nephrology notes due to low phos and he has on Tums   Will follow on levels , phos is 5.9 with calcium 8.2 and albumin 2.3  Last  (12/19)     Acute hypoxic resp failure with hypervolemia  Viral panel pending  Intubated now with hypoxemia  CRRT for volume  Presser support per cards     Pulm htn and right heart failure hx  On sildenafil PTA  ECHO with Ef 60-65% and rt heart increased pressure and hypervolemia  PAP 71 + TR RAP 15     Recommendations were communicated to primary team     Seen and discussed with Dr. Giulia Franklin MD   [phone]    Interval History :   In the last 24 hours Jayy Gill has been on CRRT , continues to be hypotensive and on dopamine/norepinephrine and vasopressin    Review of Systems:   Cannot complete due to intubation    Physical Exam:   I/O last 3 completed shifts:  In: 1297.13 [I.V.:1112.13; Other:5; NG/GT:180]  Out: 769 [Other:769]   BP 92/64  Pulse 97  Temp 97.5  F (36.4  C) (Axillary)  Resp 18  SpO2 100%     GENERAL APPEARANCE: ill appearing  EYES:  - scleral icterus, pupils equal  HENT: mouth without ulcers or lesions  PULM: lungs crackles to auscultation,  bilaterally, equal air movement, no clubbing  CV: regular rhythm, normal rate, no rub, distant heart sounds     -JVD +     -edema trace   GI: soft, non tender, - distended  INTEGUMENT: no cyanosis, - rash  NEURO:  - asterixis , sedated and on vent  Access femoral CVC for CRRT and LUE AVF    Labs:   All labs reviewed by me  Electrolytes/Renal -   Recent Labs   Lab Test  01/03/18   1558  01/03/18   0959  01/03/18   0700  01/03/18   0510   NA  135  135   --   134   POTASSIUM  4.8  4.6  4.8  4.6  6.1*   CHLORIDE  98  100   --   97   CO2  20  20   --   20   BUN  39*  39*   --   44*   CR  3.79*  4.09*   --   4.54*   GLC  90  93   --   90   JAZMINE  8.5  8.1*   --   8.2*   MAG  2.1  2.1   --   2.3   PHOS  6.5*  6.5*   --   7.1*        CBC -   Recent Labs   Lab Test  01/03/18   1558  01/03/18   0959  01/03/18   0510   WBC  10.0  10.8  11.7*   HGB  12.3*  12.1*  12.1*   PLT  101*  97*  108*       LFTs -   Recent Labs   Lab Test  01/03/18   1558  01/03/18   0959  01/03/18   0510   ALKPHOS  99  95  107   BILITOTAL  5.2*  4.5*  4.6*   ALT  49  46  60   AST  62*  62*  Canceled, Test credited   PROTTOTAL  7.1  6.4*  6.8   ALBUMIN  2.1*  2.0*  2.0*       Iron Panel -   Recent Labs   Lab Test  06/04/17   0427  02/19/16   2310  09/12/13   1455   IRON  71  56  57   IRONSAT  30  18  20   MARANDA   --   369   --          Imaging:  All imaging studies reviewed by me.     Current Medications:    albuterol  6 puff Inhalation Q4H     NaCl         aspirin  81 mg Oral Daily     loratadine  10 mg Oral Daily     midodrine  20 mg Oral TID     omeprazole  40 mg Oral Daily     hydrocortisone sodium succinate PF  50 mg Intravenous Q6H     azithromycin  500 mg Intravenous Q24H     piperacillin-tazobactam  4.5 g Intravenous Q6H     vancomycin (VANCOCIN) IV  15 mg/kg Intravenous Q24H       replacement solution for CVVHD & CVVHDF (PrismaSol BGK 2/3.5) 12.5 mL/kg/hr (01/03/18 0933)     replacement solution for CVVHD & CVVHDF (PrismaSol BGK 2/3.5) 200 mL/hr at 01/03/18 0931     dialysate for CVVHD & CVVHDF (Phoxillum BK4/2.5) 12.5 mL/kg/hr (01/03/18 1434)     vasopressin (PITRESSIN) infusion ADULT (40 mL) 2.4 Units/hr (01/03/18 1900)     norepinephrine 0.28 mcg/kg/min (01/03/18 1900)     DOPamine 2 mcg/kg/min (01/03/18 1900)     midazolam 2 mg/hr (01/03/18 1900)     fentaNYL 50 mcg/hr (01/03/18 1900)     Stephanie Franklin MD     I was present with the fellow during the history and exam.  I discussed the case with the fellow and agree with the findings as documented in the assessment and plan.  Mily Platt

## 2018-01-04 NOTE — PROGRESS NOTES
WO Nurse Inpatient Adult WoundAssessment    Initial Assessment  Reason for consultation: Evaluate and treat Left hand 3rd finger.    Staff concerned possible pressure injury noted on left hand 3rd finger when oximeter probe removed. Noted what appears to be pressor related ischemic discoloration purple extending up to 6cm. No pressure related wounds noted on any of the fingers. WOC Nurse follow-up plan: signing off. Nursing to notify the Provider(s) and re-consult the WOC Nurse if wound(s) deteriorates or new skin concern.    Discussed plan of care with Patient as able and Nurse    Face to face time: 15 minutes

## 2018-01-04 NOTE — PLAN OF CARE
Problem: Sepsis/Septic Shock (Adult)  Goal: Signs and Symptoms of Listed Potential Problems Will be Absent, Minimized or Managed (Sepsis/Septic Shock)  Signs and symptoms of listed potential problems will be absent, minimized or managed by discharge/transition of care (reference Sepsis/Septic Shock (Adult) CPG).   Outcome: No Change  D/I: Pt remains on levophed, dopamine and vasopressin to keep MAP>60 (see flow sheets). [place] catheter place on left neck. Wedge was 25, CVP 18. NJ was placed per nutrition. Peep changed to 7.5 today. Sats 100% on 90% FiO2. Fentanyl 50mcg/hr and versed 2mg for sedation. CRRT running, removing 75cc/hr.   A: Pt remains critically ill. Needs CRRT and vasoactive drips.   P: Continue CRRT, IV antibiotics, and vasoactive drips. Update MD with concerns.

## 2018-01-04 NOTE — PROCEDURES
Bridle Placement:   Reason for bridle placement: securement of FT   Medicine delivered during procedure: lubricating jelly    Procedure: Successful   Location of top of clip on FT: @ 101 cm marker   Condition of nose/skin at time of bridle placement: Unremarkable   Face to Face time with patient: <5 minutes.    Della Salguero RD, LD, [facility]  CVICU Dietitian  Pager: [phone]

## 2018-01-04 NOTE — PROGRESS NOTES
CRRT RESTART NOTE    Reason for Restart:  Filter clotted  Error Code:  N/A    Patient s Vascular Access: Right Femoral Catheter                   Placement Confirmed: Yes    DATA:  Procedure:  CVVHDF  Start Time:  2015  Machine#:  6  Filter:  M150  Blood Flow:   180 mL/min  Pre-Replacement Solution:  PrismaSol BGK2/3.5  Post-Replacement Solution:  PrismaSol BGK2/3.5  Dialysate Solution:  Phoxillum BK4/2.5  Pre-Replacement Solution Rate:  800 mL/hr  Post-Replacement Solution Rate:  200 mL/hr  Dialysate Flow Rate:  800 mL/hr  Patient Removal Rate:  50 mL/hr  Anticoagulation Type and Rate:  0    ASSESSMENT:  How Patient Tolerated Restart:  Well  Vital Signs:  BP 92/64  Pulse 97  Temp 97.7  F (36.5  C) (Axillary)  Resp 17  SpO2 100%  Initial Pressures:  Access:  -54  Filter:  141  Return:  80  TMP:  55  Change in Filter Pressure:  34    INTERVENTIONS:  Continue POC.      PLAN:  Fluid Removal 0-50 ml as able.  Call CRRT nurse with questions [phone]

## 2018-01-04 NOTE — PROGRESS NOTES
CLINICAL NUTRITION SERVICES - ASSESSMENT NOTE     Nutrition Prescription    RECOMMENDATIONS FOR MDs/PROVIDERS TO ORDER:  Alert RD when would like to advance >10 mL/hr pending hemodynamic status    Malnutrition Status:    Severe malnutrition in the context of chronic illness    Recommendations already ordered by Registered Dietitian (RD):  1. Begin Peptamen 1.5 (fiber-free d/t pressor requirements) @ 10 mL/hr with no advancement at this time  2. Free water flushes 30 mL q4h for patency.  3. Nephro-haritha to ensure adequate micronutrients in case of slow TF adv, EN interruptions, hypermetabolic needs on dialysis.    Future/Additional Recommendations:  1. If/when wish to advance TF and would like to remain on fiber-free formula, recommend advance 10 mL q8h to goal Peptamen 1.5 @ goal 55 ml/hr (1320 ml/day) to provide 1980 kcals, 90 g PRO, 1016 ml free H2O, 74 g Fat (70% from MCTs), 248 g CHO and no Fiber daily.                 - 1 pkt Prosource BID (80 kcal, 22 g PRO) for total 2060 kcal (32 kcal/kg) and 112 g PRO (1.7 g/kg)    2. If would like to change to low K+/phos formula, recommend Nepro @ goal 45 ml/hr (1080 ml/day) to provide 1944 kcals, 87 g PRO, 788 ml free H2O, 174 g CHO and 14 g Fiber daily.               - 1 pkt Prosource BID (80 kcal, 22 g PRO) for total 2024 kcal (31 kcal/kg) and 109 g PRO (1.7 g/kg)    3. If would like to change to fluid-restricted standard formula, recommend TwoCal HN @ 40 mL/hr (960 mL/day) to provide 1920 kcals, 81 g PRO, 672 mL H2O, 210 g CHO and 5 g Fiber daily.                 - 1 pkt Prosource BID (80 kcal, 22 g PRO) for total 2000 kcal (31 kcal/kg) and 103 g PRO (1.6 g/kg)    4. If remains intubated and TF at goal, recommend obtain metabolic cart study to assess approp of energy provisions to avoid over/under feeding.     REASON FOR ASSESSMENT  Jayy Gill is a/an 47 year old male assessed by the dietitian for Admission Nutrition Risk Screen for unintentional loss of 10# or more  "in the past two months and reduced oral intake over the last month, Provider Order - 46 yo admitted w/sepsis and multi-organ failure, anticiapte prolonged intubation, ?NJ and Provider Order - Registered Dietitian to Assess and Order TF per Medical Nutrition Therapy Protocol    NUTRITION HISTORY  Pt intubated/sedated, unable to obtain nutrition hx.   Pt has been seen in the past by inpatient RDs for low sodium diet education.   Pt gets HD M/W/F.  Notable supplements/meds on oupatient Rx list: dialysis multivitamin, Phoslo    CURRENT NUTRITION ORDERS  Diet: NPO day 2    LABS  Labs reviewed  K+ 4.8  Phos 5.6  Vitamin D low @ 15 on 4/25/17    MEDICATIONS  Medications reviewed  Norepi, Vaso, dopamine gtts    ANTHROPOMETRICS  Height: 5' 9\"  Ht Readings from Last 2 Encounters:   07/10/17 1.753 m (5' 9\")   06/12/17 1.753 m (5' 9\")     Most Recent Weight: 64.8 kg (142 lb 13.7 oz)    IBW: 72.7 kg  BMI: Normal BMI  Weight History: Pt lost 19% body wt from May to November.   Wt Readings from Last 10 Encounters:   01/04/18 64.8 kg (142 lb 13.7 oz)   11/30/17 61.6 kg (135 lb 14.4 oz)   11/02/17 61.6 kg (135 lb 12.8 oz)   09/21/17 61.2 kg (134 lb 14.4 oz)   08/24/17 66.5 kg (146 lb 11.2 oz)   07/10/17 67.1 kg (148 lb)   06/12/17 67.5 kg (148 lb 12.8 oz)   06/05/17 74.3 kg (163 lb 14.4 oz)   05/31/17 71.3 kg (157 lb 3 oz)   05/30/17 75.1 kg (165 lb 8 oz)     Dosing Weight: 65 kg    ASSESSED NUTRITION NEEDS  Estimated Energy Needs: 1625 - 1950+ kcals/day (25 - 30+ kcals/kg)  Justification: Maintenance  Estimated Protein Needs: 95 - 130 grams protein/day (1.5 - 2 grams of pro/kg)  Justification: CRRT  Estimated Fluid Needs: 1 mL/kcal or per MD pending fluid status  Justification: Maintenance    PHYSICAL FINDINGS  See malnutrition section below.    MALNUTRITION  % Intake: Unable to assess  % Weight Loss: > 10% in 6 months (severe)  Subcutaneous Fat Loss: Facial region, Upper arm and Thoracic/intercostal: moderate  Muscle Loss: " Temporal, Facial & jaw region:, Thoracic region (clavicle, acromium bone, deltoid, trapezius, pectoral) and Upper arm (bicep, tricep): moderate  Fluid Accumulation/Edema: Does not meet criteria (trace)  Malnutrition Diagnosis: Severe malnutrition in the context of chronic illness    NUTRITION DIAGNOSIS  Inadequate protein-energy intake related to intubated w/ no nutrition support as evidenced by NPO x 2 days with no kcal/PRO intake from nutrition support yet, also 19% wt loss in 6 months PTA      INTERVENTIONS  Implementation  Nutrition Education: Not appropriate at this time due to patient condition   Collaboration with other providers - Discussed w/ MDs who requested to start with trophic TFs today d/t pressors  Enteral Nutrition - Initiate TF @ 10 mL/hr  Feeding tube flush - patency flushes  Multivitamin/mineral supplement therapy - nephro-haritha    Goals  Total avg nutritional intake to meet a minimum of 25 kcal/kg and 1.5 g PRO/kg daily (per dosing wt 65 kg).     Monitoring/Evaluation  Progress toward goals will be monitored and evaluated per protocol.    Della Salguero RD, LD, [facility]  CVICU Dietitian  Pager: [phone]

## 2018-01-04 NOTE — PROGRESS NOTES
Cards 2 History and Physical      Patient Name: Jayy Gill MRN# [id number]   Age: 47 year old YOB: 1970     Date of Admission:1/1/2018  Primary care provider: Milly Healy  Date of Service: 1/3/2018         Assessment and Plan:   Jayy Gill is a 48 y/o M with PMH of ESRD on MWF HD, cirrhosis, pulmonary HTN who presented to the ED with respiratory failure and hypotension.  Found to have septic shock likely secondary to SBP, requiring intubation and multiple pressors.    Overnight: No acute events.  Continues to require 3 pressors with difficulty weaning off vent.    Plan for today:  - Weaning pressors/vent as able  - Nutrition consult  - ID consult  - [place] placement      ===NEURO===  # Sedation  RASS -2 to -3.  - Continue fentanyl, midazolam      ===CARDIOVASCULAR===  # Septic shock, suspected secondary to SBP  On dopamine, vasopressin and norepi.  Ascites with >700 WBC and predominantly neutrophils.  Bcx and ascitic fluid NGTD.  Nasal swab positive for MRSA.  Continuing ontinuing broad-spectrum antibiotics given severe sepsis and negative cultures, however most likely secondary to SBP.  Will consult ID prior to narrowing abx.  - Continue dopamine, vasopressin, norepinephrine; wean as tolerated  - Continue vancomycin/zosyn/azithromycin for now  - ID consulted, appreciate recs      # Demand ischemia  Had elevated troponin on admission, down-trending on repeat check.    # Moderate pericardial effusion  Present on prior echos but has increased from small to moderate.  Given severe PH and elevated RV pressures patient is unlikely to develop tamponade physiology, no interventions at this time.       ===PULMONARY===  # Acute hypoxemic resp failure  Intubated 1/2/18 after not tolerating 100% high flow.  Holding pulmonary hypertension meds.  - Hold macitentan, sildenafil  - F/u sputum cx  - Continue vent, wean FiO2 as able        ===GASTROINTESTINAL===  # Cirrhosis, MELD-Na 32  # Spontaneous  bacterial peritonitis  Ascites with 792 WBC and 77% PMN.  On vancomycin/zosyn.  Strongly suspect SBP is primary source of sepsis but blood cx NGTD, will continue broad-spectrum abx for now given patient is critically ill.    MELD-Na score: 32 at 1/3/2018  9:48 PM  MELD score: 32 at 1/3/2018  9:48 PM  Calculated from:  Serum Creatinine: 3.49 mg/dL at 1/3/2018  9:48 PM  Serum Sodium: 138 mmol/L (Rounded to 137) at 1/3/2018  9:48 PM  Total Bilirubin: 4.1 mg/dL at 1/3/2018  9:48 PM  INR(ratio): 2.06 at 1/2/2018 12:03 AM  Age: 47 years    # Nutrition:   - NPO for now      ===RENAL===  # ESRD on MWF HD  - Nephrology consult for HD or CRRT.  - Reportedly dry weight is 64 kg    - Javed for strict I&Os.      ===HEME/ONC===  # Anemia  - Stable      ===ENDOCRINE===  # NTD      ===INFECTIOUS DISEASE===  # Shock, see cardiovascular.   Nasal swab positive for MRSA.  Bcx, sputum cx, ascites fluid culture, sputum cx NGTD.  Ascites fluid consistent with SBP.      # Antimicrobials:  Suspect 2/2 SBP but continuing broad-spectrum abx while bcx resulting and patient critically ill.  Consulting ID, will narrow pending their recs.  - vanc 1/2/18  - zosyn 1/2/18  - azithro 1/2/18      ===SKIN/MSK===  # No issue      Lines: RIJ central line, R femoral central line, L femoral arterial line (unable to obtain R radial despite multiple attempts)  Prophylaxis:  DVT: Mechancial   GI: PPI  Family: GF updated at bedside  Disposition: Cardiac ICU  Code Status: DNR, ok with intubation for short term.  GF, who is POA, will make decision about comfort focus if it looks like he is giong to have prolonged intubation without much chance of meaningful recovery.       Patient was seen and discussed with attending physician Dr. Blank, who agrees with above assessment and plan.    Ganga Petty MD  PGY-2 Internal Medicine  Pager: [phone]             Past Medical History:     Past Medical History:   Diagnosis Date     Cardiomyopathy (H)      Dialysis  patient (H)     M-W-F     Diarrhea     C diff     ESRD (end stage renal disease) (H)      HLD (hyperlipidemia)      Hypertension      Pulmonary hypertension 11/21/2013              Past Surgical History:     Past Surgical History:   Procedure Laterality Date     AV FISTULA OR GRAFT ARTERIAL      left upper arm     COLONOSCOPY  7/3015     COLONOSCOPY WITH CO2 INSUFFLATION N/A 11/10/2015    Procedure: COLONOSCOPY WITH CO2 INSUFFLATION;  Surgeon: Samuel Fritz MD;  Location: [facility] OR     ENDOBRONCHIAL ULTRASOUND FLEXIBLE N/A 9/18/2014    Procedure: ENDOBRONCHIAL ULTRASOUND FLEXIBLE;  Surgeon: Jg Morrison MD;  Location: [facility] GI     ENDOBRONCHIAL ULTRASOUND FLEXIBLE N/A 12/18/2014    Procedure: ENDOBRONCHIAL ULTRASOUND FLEXIBLE;  Surgeon: Jg Morrison MD;  Location: [facility] OR              Social History:     Social History     Social History     Marital status: Single     Spouse name: N/A     Number of children: 2     Years of education: N/A     Occupational History     Not on file.     Social History Main Topics     Smoking status: Never Smoker     Smokeless tobacco: Never Used     Alcohol use No     Drug use: No     Sexual activity: Yes     Partners: Female     Other Topics Concern     Not on file     Social History Narrative            Family History:     Family History   Problem Relation Age of Onset     Hypertension Mother      DIABETES Father               Immunizations:     Immunization History   Administered Date(s) Administered     HepB 07/05/2013, 08/05/2013, 09/06/2013     Influenza (IIV3) PF 10/01/2014, 10/01/2015     Pneumococcal 23 valent 06/28/2013, 07/30/2013              Allergies:    No Known Allergies         Medications:     Prior to Admission Medications   Prescriptions Last Dose Informant Patient Reported? Taking?   B Complex-C-Folic Acid (RENAL) 1 MG CAPS  Self Yes No   Sig: Take 1 mg by mouth Take 1 capsule (1 mg) by mouth daily in the afternoon.  Indications: Nutritional  Support   aspirin 81 MG tablet  Self Yes No   Sig: Take 81 mg by mouth daily   calcium acetate (PHOSLO) 667 MG CAPS  Self Yes No   Sig: Take 2 capsules (1,334 mg) by mouth 3 times daily (with meals)   hydrOXYzine (VISTARIL) 25 MG capsule  Self No No   Sig: Take 2 capsules (50 mg) by mouth 3 times daily as needed for itching   loratadine (CLARITIN) 10 MG tablet  Self No No   Sig: Take 1 tablet (10 mg) by mouth daily   macitentan (OPSUMIT) 10 MG tablet   No No   Sig: Take 1 tablet (10 mg) by mouth daily   midodrine HCl 10 MG TABS   No No   Sig: Take 20 mg by mouth 3 times daily   omeprazole (PRILOSEC) 40 MG capsule  Self No No   Sig: Take 1 capsule (40 mg) by mouth daily Take 30-60 minutes before a meal.   paricalcitol (ZEMPLAR) 5 MCG/ML injection  Self Yes No   Si mcg 2 mcg by IV Push route dialysis.   sildenafil (REVATIO/VIAGRA) 20 MG tablet  Self No No   Sig: Take 2 tablets (40 mg) by mouth 3 times daily   torsemide (DEMADEX) 20 MG tablet  Self No No   Sig: Take 3 tablets (60 mg) by mouth 2 times daily      Facility-Administered Medications: None             Review of Systems:   Unable to complete due to patient intubated/sedated.         Physical Exam:   Blood pressure 92/64, pulse 97, temperature 97.1  F (36.2  C), temperature source Axillary, resp. rate 17, weight 64.8 kg (142 lb 13.7 oz), SpO2 100 %.  General: Lying in bed, intubated/sedated  Chest/Resp: On ventilator, faint crackles appreciated  Heart/CV: Regular rate and rhythm  Abdomen/GI: Distended, normoactive bowel sounds  Extremities/MSK: 1+ pitting edema in bilateral lower extremities  Skin: No rash, no jaundice    I/O:      Peripheral IV 17 Right Lower forearm (Active)   Number of days:212       Peripheral IV 18 Right (Active)   Site Assessment WDL 2018  3:00 AM   Line Status Saline locked 2018  3:00 AM   Phlebitis Scale 0-->no symptoms 2018  3:00 AM   Infiltration Scale 0 2018  3:00 AM   Extravasation? No 2018  3:00  AM   Number of days:1       Peripheral IV 01/02/18 Right Lower forearm (Active)   Site Assessment WDL 1/2/2018  3:00 AM   Line Status Infusing 1/2/2018  3:00 AM   Phlebitis Scale 0-->no symptoms 1/2/2018  3:00 AM   Infiltration Scale 0 1/2/2018  3:00 AM   Extravasation? No 1/2/2018  3:00 AM   Number of days:0       CVC Triple Lumen 01/02/18 Right Internal jugular (Active)   Site Assessment WDL 1/2/2018  4:00 AM   Extravasation? No 1/2/2018  4:00 AM   Dressing Intervention Chlorhexidine sponge;Transparent;New dressing 1/2/2018  4:00 AM   Dressing Change Due 01/07/18 1/2/2018  4:00 AM   CVC Comment CDI 1/2/2018  4:00 AM   CVC Lumen Assessment Brown;White;Blue 1/2/2018  4:00 AM   Number of days:0       Right Groin Interventional Procedure Access (Active)   Number of days:210                Data:   ROUTINE ICU LABS (Last four results)  CMP    Recent Labs  Lab 01/04/18 0350 01/03/18 2148 01/03/18  1558 01/03/18  0959    138 135 135   POTASSIUM 4.8 4.8 4.8 4.6   CHLORIDE 102 104 98 100   CO2 21 19* 20 20   ANIONGAP 14 15* 16* 14   * 89 90 93   BUN 31* 34* 39* 39*   CR 3.17* 3.49* 3.79* 4.09*   GFRESTIMATED 21* 19* 17* 16*   GFRESTBLACK 26* 23* 21* 19*   JAZMINE 8.4* 7.8* 8.5 8.1*   MAG 2.2 2.1 2.1 2.1   PHOS 5.6* 6.2* 6.5* 6.5*   PROTTOTAL 6.7* 6.7* 7.1 6.4*   ALBUMIN 2.0* 2.0* 2.1* 2.0*   BILITOTAL 4.2* 4.1* 5.2* 4.5*   ALKPHOS 91 91 99 95   AST 51* 61* 62* 62*   ALT 43 47 49 46     CBC    Recent Labs  Lab 01/04/18  0350 01/03/18 2148 01/03/18  1558 01/03/18  0959   WBC 9.5 10.3 10.0 10.8   RBC 3.98* 4.05* 3.98* 3.95*   HGB 12.1* 12.4* 12.3* 12.1*   HCT 38.1* 38.8* 37.8* 37.4*   MCV 96 96 95 95   MCH 30.4 30.6 30.9 30.6   MCHC 31.8 32.0 32.5 32.4   RDW 16.4* 16.3* 16.2* 16.1*   * 100* 101* 97*     INR    Recent Labs  Lab 01/02/18  0003   INR 2.06*     Arterial Blood Gas    Recent Labs  Lab 01/04/18  0333 01/03/18  2148 01/03/18  0700 01/03/18  0457 01/03/18  0200   PH 7.34* 7.32*  --  Canceled, Test  credited 7.36   PCO2 40 42  --  Canceled, Test credited 39   PO2 96 85  --  Canceled, Test credited 85   HCO3 22 21  --  Canceled, Test credited 22   O2PER 95.0 95 90.0 Canceled, Test credited 90     Venous Blood Gas   Recent Labs  Lab 01/04/18  0333 01/03/18  2148 01/03/18  0700 01/03/18  0457  01/02/18  1040  01/02/18  0514 01/02/18  0011   PHV  --   --  7.35  --   --  7.34  --  7.38 7.37  7.35   PCO2V  --   --  41  --   --  46  --  44 41  42   PO2V  --   --  55*  --   --  48*  --  44 28  30   HCO3V  --   --  22  --   --  25  --  26 24  23   HARRIETT  --   --   --   --   --   --   --  0.5  --    O2PER 95.0 95 90.0 Canceled, Test credited  < > 90  90  < > 100.0  --    < > = values in this interval not displayed.      Results for orders placed or performed during the hospital encounter of 01/01/18   XR Chest Port 1 View    Narrative    Exam: Chest radiograph 1 view, 1/2/2018 12:26 AM    Indication: Shortness of breath    Comparison: 6/3/2017, 3/13/2016, 4/24/2015    Findings:   Single AP view of the chest was obtained. The cardiac silhouette  remains enlarged. Pulmonary vascular congestion. No pneumothorax. Left  perihilar, retrocardiac, and right midlung opacities. Small left  pleural effusion.      Impression    Impression:   1. Accounting for technical differences, stable enlargement of the  cardiac silhouette and pulmonary vascular congestion.  2. Interval mild increase in the perihilar and retrocardiac opacities  which may represent pulmonary edema, atelectasis, or  aspiration/infection.    I have personally reviewed the examination and initial interpretation  and I agree with the findings.    JONAS GALVIN MD   XR Chest Port 1 View    Narrative    Exam: Chest radiograph 1 view, 1/2/2018 4:30 AM    Indication: Central line placement    Comparison: 1/2/2018, 6/3/2017, 3/13/2016    Findings:   A single AP view of the chest was obtained. The right IJ catheter tip  projects over the superior cavoatrial junction.  The cardiac silhouette  is enlarged. No pneumothorax. Small left pleural effusion. Stable  pulmonary vascular congestion. Stable perihilar and increased  retrocardiac opacities.      Impression    Impression:   1. The right IJ catheter tip projects over the superior cavoatrial  junction.  2. Stable enlargement of cardiac silhouette and pulmonary edema.  2. Increased left basilar atelectasis/consolidation, and stable small  left pleural effusion.    I have personally reviewed the examination and initial interpretation  and I agree with the findings.    JONAS GALVIN MD   XR Abdomen Port 1 View    Narrative    EXAM: XR ABDOMEN PORT F1 VW  1/2/2018 10:06 AM      HISTORY: r/o toxic megacolon;     COMPARISON: CT abdomen 3/14/2016, 4/24/2015    FINDINGS: Unchanged metallic body projecting over the right lower  quadrant. Nonobstructive bowel gas pattern.    No pneumatosis/portal venous gas. Diffuse abdominal haziness with poor  definition of the liver and splenic margins, consistent with ascites.      Impression    IMPRESSION: No evidence of dilated colon. Nonobstructive bowel gas  pattern.    I have personally reviewed the examination and initial interpretation  and I agree with the findings.    MIGUEL MCFARLAND MD     Cath: 6/22/17    Critical Care ICU Note - Cardiology  Tyrone Blank M.D.        The patient remains unstable in the ICU with on-going need for ventilator support, parenteral medications for the adjustment of blood pressure and cardiac output and maintenance of renal function.      The patient is seen for prolonged ventilator management requiring oxygen and/or pressure modulation; shock requiring vasopressor and or inotropic agents; low cardiac output necessitating  inotropic agents, vasopressors and afterload reducing agents; limited mechanical support requiring IABP; acute renal failure requiring fluid and diuretic management; sepsis requiring antibiotic selection and monitoring, vasopressors,  fluid management to maintain blood pressure and secondary organ function    I personally reviewed:    Arterial and venous blood gases to assess acid base balance, oxygenation, and ventilator settings.      Hemodynamic parameters obtained by central hemodynamic monitoring including RAP, estimated LVEDP, pulmonary artery pressure, cardiac output and vascular resistances in order to adjust fluids and infused medications for blood pressure and cardiac output maintenance.    Ventilatory settings and/or supplement oxygen needs in order to obtain optimal oxygenation and electrolyte balance at low possible pressure support and inspired oxygen tension    Volume status, renal function and nutritional support as judged by intake, output, daily weight and appropriate laboratories.    I reviewed individual and serial imaging studies including echocardiogram, chest x-ray, CT scan    I personally supervised the prescription of parenteral fluids, inotropes, vasodilators , and vasopressors in order to correct cardiac output, maintain urine output and renal function.    I personally met with patient or family to discuss current and future diagnostic and therapeutic strategies    90 minutes critical care

## 2018-01-04 NOTE — CONSULTS
[facility] SERVICE CONSULTATION     Patient:  Jayy Gill   Date of birth 1970, Medical record number [id number]  Date of Visit:  01/04/2018  Date of Admission: 1/1/2018  Consult Requester:Shoaib Prado MD            Assessment and Recommendations:   ASSESSMENT:  #. Septic Shock 2/2 Spontaneous Bacterial Peritonitis - culture negative neutrocytic ascites  Other infectious work up has been negative with blood cultures negative on 1/2 and 1.4, Sputum negative, and ascites fluid gram stain and culture negative as well. MRSA nares are notably positive, which appears to be a new finding. CXR reviewed and does not appear to be a infectious process in the lungs, but rather changes consistent with degree of pulmonary HTN (enlarged vasculature). Review of GI notes and patient was not on prophylactic antibiotics since he did not meet guidelines for SBP ppx. Patient's SAAG was 0.7 (2.3-1.6) at admit which favors a non-portal HTN etiology (ie peritonitis). Would have favored AFB and fungal cultures of fluid, but given that the patient's WBC has improved on current antibiotics, which are not treating TB or fungus, unlikely that these are causing infection. Ok to narrow current antibiotics to regimen that will treat the most likely offenders (GNR and GPC). Will opt for Ceftriaxone as better gram positive coverage than Ceftazidime and question if patient has GPC from possible contamination during prior paracentesis from skin griselda.     RECOMMENDATION:  -- discontinue Vancomycin, Pip-Tazo, and Azithromycin (1/2 - 1/4)  -- start Ceftriaxone 2 g daily for total 7 days of treatment but will re-assess before stopping (1/4 - 1/8)  -- will need prophylaxis at discharge, likely Ciprofloxacin  -- if decompensates, would recommend repeat cultures with repeat diagnostic para with cell count, diff, gram stain, culture, AFB and fungal cultures, and cytology as well. Can consider bedside inoculation to increase sensitivity of  "culture positivity or discussion with micro lab for techniques to enhance culture yield.   -- continue to trend WBC  -- monitor for signs of C diff infection given patient's prior C diff and recent abx use.   -- continue MAP goal of 60 in hopes of titrating down pressor requirements  -- limit further lines to decrease risk of line associated infections      Alex Henry MD       Attestation:  This patient has been seen and evaluated by me.  I discussed this patient with the resident Dr Henry and agree with the findings and plan in this note. I also personally edited this note to reflect my findings. I have reviewed today's vital signs, medications, labs and imaging.    Micky Wilkes MD,M.Med.Sc.  Attending, Infectious Diseases  Pager: [phone]                ________________________________________________________________    Consult Question: Antibiotic treatment and duration of therapy for SBP  Admission Diagnosis: Sepsis, due to unspecified organism (H) [A41.9]         History of Present Illness:   Mr. Gill is a 47 year old man with PMHx of Pulmonary HTN/PVOD, Right Heart Failure, Cirrhosis, ESRD on MWF HD who presented to the ED with worsening fatigue, developed acute hypoxemic respiratory failure and was intubated. Later identified as being in septic shock with source likely from spontaneous bacterial peritonitis.     Patient is intubated and sedated without collateral source present. Remaining HPI is taken from admitting H&P. \"He has been feeling more fatigued and has had a cough since 12/24/17.  He wasn't able to have any fluid off at his last HD session on Sunday (12/31) due to BP issues.  He hasn't had any fever/chills, chest pain.  Some dry cough.  His abdomen is also more distended -- he usually gets paracentesis every few weeks.  In the ED he was found to be requiring 10L of oxygen and his BP was in 70s/50s.   He was admitted to MICU for further management.\" Patient was " subsequently intubated a few hours after admission. Patient was started on pressors for refractory shock as well. He was treated with broad spectrum antibiotics ( Zosyn, Vancomycin, Azithromycin)  for what was originally thought to be a pneumonia, and then deemed to be spontaneous bacterial peritonitis based on ascitic fluid analysis with  that 77% PMN.          Review of Systems:   Unable to assess due to mental status of intubated and sedated patient.          Past Medical History:     Past Medical History:   Diagnosis Date     Cardiomyopathy (H)      Dialysis patient (H)     M-W-F     Diarrhea     C diff     ESRD (end stage renal disease) (H)      HLD (hyperlipidemia)      Hypertension      Pulmonary hypertension 11/21/2013            Past Surgical History:     Past Surgical History:   Procedure Laterality Date     AV FISTULA OR GRAFT ARTERIAL      left upper arm     COLONOSCOPY  7/3015     COLONOSCOPY WITH CO2 INSUFFLATION N/A 11/10/2015    Procedure: COLONOSCOPY WITH CO2 INSUFFLATION;  Surgeon: Samuel Fritz MD;  Location: [facility] OR     ENDOBRONCHIAL ULTRASOUND FLEXIBLE N/A 9/18/2014    Procedure: ENDOBRONCHIAL ULTRASOUND FLEXIBLE;  Surgeon: Jg Morrison MD;  Location: [facility] GI     ENDOBRONCHIAL ULTRASOUND FLEXIBLE N/A 12/18/2014    Procedure: ENDOBRONCHIAL ULTRASOUND FLEXIBLE;  Surgeon: Jg Morrison MD;  Location: [facility] OR            Family History:     Family History   Problem Relation Age of Onset     Hypertension Mother      DIABETES Father             Social History:     Social History   Substance Use Topics     Smoking status: Never Smoker     Smokeless tobacco: Never Used     Alcohol use No     History   Sexual Activity     Sexual activity: Yes     Partners: Female            Current Medications (antimicrobials listed in bold):       omeprazole  40 mg Oral or Feeding Tube Daily     lidocaine (viscous)  5 mL Topical Once     fentaNYL  25 mcg Intravenous Once     albuterol  6 puff  Inhalation Q4H     [START ON 1/7/2018] influenza quadrivalent (PF) vacc age 3 yrs and older  0.5 mL Intramuscular Prior to discharge     aspirin  81 mg Oral Daily     loratadine  10 mg Oral Daily     midodrine  20 mg Oral TID     hydrocortisone sodium succinate PF  50 mg Intravenous Q6H     azithromycin  500 mg Intravenous Q24H     piperacillin-tazobactam  4.5 g Intravenous Q6H     vancomycin (VANCOCIN) IV  15 mg/kg Intravenous Q24H            Allergies:   No Known Allergies         Physical Exam:   Vitals were reviewed  Ranges for his vital signs:  Temp:  [96.9  F (36.1  C)-97.7  F (36.5  C)] 97.4  F (36.3  C)  Heart Rate:  [73-96] 79  Resp:  [17-19] 18  MAP:  [60 mmHg-93 mmHg] 74 mmHg  Arterial Line BP: ()/(47-75) 106/60  FiO2 (%):  [90 %-95 %] 90 %  SpO2:  [87 %-100 %] 100 %    Physical Examination:  GENERAL:  Patient laying in bed, intubated and sedated.    HEENT:  Head is normocephalic, atraumatic   EYES:  Eyes have icteric sclerae without conjunctival injection or stigmata of endocarditis.    ENT:  Intubated   NECK:  No  Cervical lymphadenopathy  LUNGS:  Mechanical breath sounds auscultated b/l, no crackles or wheezing.   CARDIOVASCULAR:  Regular rate and rhythm with no murmurs, gallops or rubs.  ABDOMEN:  Decreased bowel sounds, soft, nontender. No appreciable hepatosplenomegaly  SKIN:  No acute rashes.  Line(s) are in place without any surrounding erythema or exudate. No stigmata of endocarditis.  NEUROLOGIC:  upgoing Babinski, retracts to pain on left hand, none on right.          Laboratory Data:     Inflammatory Markers    Recent Labs   Lab Test  03/14/16   1217  08/18/15   1423  03/11/15   1727  02/20/15   1341  11/20/14   1140  09/18/14   0900  09/12/13   1455   SED   --    --   12   --    --    --    --    CRP  4.3  4.1  6.4  140.0*  10.8*  3.5  7.8       Hematology Studies  Recent Labs   Lab Test  01/04/18   1008  01/04/18   0350  01/03/18   2148  01/03/18   1558  01/03/18   0959  01/03/18    0510   WBC  8.5  9.5  10.3  10.0  10.8  11.7*   ANEU  7.5  8.5*  8.9*  8.9*  10.0*  10.4*   AEOS  0.0  0.0  0.0  0.0  0.0  0.0   HGB  12.2*  12.1*  12.4*  12.3*  12.1*  12.1*   MCV  95  96  96  95  95  94   PLT  99*  100*  100*  101*  97*  108*       Immune Globulin Studies  Recent Labs   Lab Test  03/11/15   1727  05/22/14   1215   IGG  1790*  1300   IGM   --   111   IGA   --   179       Metabolic Studies   Recent Labs   Lab Test  01/04/18   1008  01/04/18   0350  01/03/18   2148  01/03/18   1558  01/03/18   0959   NA  137  136  138  135  135   POTASSIUM  4.6  4.8  4.8  4.8  4.6   CHLORIDE  101  102  104  98  100   CO2  20  21  19*  20  20   BUN  30  31*  34*  39*  39*   CR  2.66*  3.17*  3.49*  3.79*  4.09*   GFRESTIMATED  26*  21*  19*  17*  16*       Hepatic Studies  Recent Labs   Lab Test  01/04/18   1008  01/04/18   0350  01/03/18   2148  01/03/18   1558  01/03/18   0959  01/03/18   0510   BILITOTAL  4.1*  4.2*  4.1*  5.2*  4.5*  4.6*   ALKPHOS  88  91  91  99  95  107   ALBUMIN  2.1*  2.0*  2.0*  2.1*  2.0*  2.0*   AST  48*  51*  61*  62*  62*  Canceled, Test credited   ALT  41  43  47  49  46  60       Thyroid Studies    Recent Labs   Lab Test  01/02/18   0003  05/04/17   0933   TSH  4.24*  8.20*   T4  0.78  1.08       Microbiology:  Culture Micro   Date Value Ref Range Status   01/04/2018 No growth after 2 hours  Preliminary   01/04/2018 No growth after 2 hours  Preliminary   01/04/2018 Culture negative monitoring continues  Preliminary   01/02/2018 (A)  Final    Heavy growth  Methicillin resistant Staphylococcus aureus (MRSA)     01/02/2018 Culture negative monitoring continues  Preliminary   01/02/2018 No growth after 2 days  Preliminary   01/02/2018 No growth after 2 days  Preliminary   06/03/2017 No growth  Final   06/03/2017 No growth  Final   06/09/2016 (A)  Final    10,000 to 50,000 colonies/mL Coagulase negative Staphylococcus   03/16/2016 No growth  Final   02/18/2016 No growth  Final   02/18/2016    Final    Culture negative for acid fast bacilli  Assayed at [facility] [address]     09/01/2015   Final    No Salmonella, Shigella, Campylobacter, E. coli O157, Aeromonas, or Plesiomonas   isolated.     04/25/2015 No growth  Final   04/25/2015 No growth  Final   04/20/2015   Final    Duplicate request Canceled, Test credited  INCLUDED IN ROUTINE STOOL CULTURE     04/20/2015   Final    No Salmonella, Shigella, Campylobacter, E. coli O157, Aeromonas, or Plesiomonas   isolated.     02/20/2015 No growth  Final   09/18/2014   Final    Culture negative for acid fast bacilli  Assayed at [facility] [address]     09/18/2014 Normal respiratory griselda  Final   09/18/2014 Culture negative after 4 weeks  Final   09/18/2014 No growth after 4 weeks  Final       Urine Studies    Recent Labs   Lab Test  04/24/15   1353   LEUKEST  Negative   WBCU  <1     Virology:  CMV viral loads    Recent Labs   Lab Test  09/18/14   1100   CSPEC  Bronchoalveolar Lavage   CMQNT  <100     CMV Quantitative   Date Value Ref Range Status   09/18/2014 <100 <100 Copies/mL Final     Hepatitis B Testing Recent Labs   Lab Test  06/05/17   0958  02/16/16   1150  09/12/13   1455   HBSAB   --    --   0.1   HBCAB   --    --   Negative   HEPBANG  Nonreactive  Nonreactive  Negative     Hepatitis C Testing   Hepatitis C Antibody   Date Value Ref Range Status   02/19/2016  NR Final    Nonreactive   Assay performance characteristics have not been established for newborns,   infants, and children     09/12/2013 Negative NEG Final

## 2018-01-04 NOTE — PROCEDURES
PRELIMINARY HEMODYNAMIC STUDY REPORT:    Attending Cardiology Staff: Dr Rosamaria MD  Cardiology Fellow: Pedro Blair MD  Resident: Ganga JOSEPH'Sarah     46 y/o M with PMH of ESRD on MWF HD, cirrhosis, pulmonary HTN who presented to the ED with respiratory failure and hypotension. Hemodynamics being obtained with swan to help extubation and help assess fluid status.     7F introducer and PA catheter employed via RFV.  PA Catheter arrested  in right PA at 65 cm to hub.    Impression:  1. Elevated left sided filling pressures  2. Severe pulmonary hypertension of combined etiology    Plan:  1. Continue hemodynamic monitoring. Please refer to progress note from today for treatment.     Pedro Blair MD  Cardiology Fellow    Dr Blank supervised the procedure.

## 2018-01-04 NOTE — PROCEDURES
Small Bowel Feeding Tube Placement Assessment  Reason for Feeding Tube Placement: request for post-pyloric FT by providers, has groin line and unable to elevate HOB  Cortrak Start Time: 2:27pm   Cortrak End Time: 3:02pm  Medicine Delivered During Procedure: lidocaine gel  Placement Successful:  Presume post-pyloric (pending AXR confirmation).    Procedure Complications: Strong interaction with large-bore NGT. RN approved RD to remove NGT, then FT was able to advance post-pyloric  Final Placement Rodger at exit of nare 100 cm  Face to Face time with patient: 45 mins    Della Salguero RD, LD, CNSC  CVICU Dietitian  Pager: [phone]

## 2018-01-05 NOTE — PROGRESS NOTES
CRRT STATUS NOTE    DATA:  Time:  5:54 AM  Pressures WNL:  YES  Filter Status:  WDL    Problems Reported/Alarms Noted:  None on this set.    Supplies Present:  YES    ASSESSMENT:  Patient Net Fluid Balance:  -63 ml @ 0600  Vital Signs:  HR 75, RR 17, BP 93/52, MAP 64  Labs:  K 4.2, Crt 2.18, Mg 2.3, Phos 4.3, iCa 4.6, Plt 93  Goals of Therapy:  Net 0-100  ml / hr as tolerated to keep MAP > 60    INTERVENTIONS:   Restarted x2 during the night. No settings were changed. Pulling fluid without difficulty.    PLAN:  Continue to monitor circuit daily and change set q72 hours and PRN for clogging/clotting. Please call CRRT RN with any questions/problems.

## 2018-01-05 NOTE — PHARMACY-CONSULT NOTE
Pharmacy Tube Feeding Consult    Medication reviewed for administration by feeding tube and for potential food/drug interactions.    Recommendation: No changes are needed at this time.     Pharmacy will continue to follow as new medications are ordered.    Delma Mcgregor, PharmD

## 2018-01-05 NOTE — PHARMACY-VANCOMYCIN DOSING SERVICE
Pharmacy Vancomycin Initial Note  Date of Service 2018  Patient's  1970  47 year old, male    Indication: Sepsis   1/ sputum cx with Staph aureus, sensitivities pending, nares MRSA positive    Renal:  CRRT    Recent Vancomycin Level(s) for last 3 days  No results found for requested labs within last 72 hours.      Vancomycin IV Administrations (past 72 hours)                   vancomycin (VANCOCIN) 1000 mg in dextrose 5% 200 mL PREMIX (mg) 1,000 mg New Bag 18 0201     1,000 mg New Bag 18 0159                Nephrotoxins and other renal medications (Future)    Start     Dose/Rate Route Frequency Ordered Stop    18 1715  vancomycin (VANCOCIN) 1,250 mg in NaCl 0.9 % 250 mL intermittent infusion      1,250 mg  over 90 Minutes Intravenous EVERY 24 HOURS 18 1705      18 1715  vancomycin (VANCOCIN) 1,500 mg in NaCl 0.9 % 250 mL intermittent infusion      1,500 mg  over 90 Minutes Intravenous ONCE 18 1705      18 1715  piperacillin-tazobactam (ZOSYN) 4.5 g in 20 mL NS Premix Syringe      4.5 g  over 3 Minutes Intravenous EVERY 6 HOURS 18 1709      18 0530  norepinephrine (LEVOPHED) 16 mg in D5W 250 mL infusion      0.03-0.4 mcg/kg/min × 61.6 kg (Dosing Weight)  1.7-23.1 mL/hr  Intravenous CONTINUOUS 18 0527      18 0330  vasopressin (PITRESSIN) 40 Units in D5W 40 mL infusion      0.5-4 Units/hr  0.5-4 mL/hr  Intravenous CONTINUOUS 18 0316            Contrast Orders - past 72 hours     None                Plan:  1.  Start vancomycin  1500 mg (23.4 mg/kg) followed by 1250 mg (19.6 mg/kg) IV q24h.   2.  Goal Trough Level: 15-20 mg/L   3.  Pharmacy will check trough levels as appropriate in 1-3 Days.    4. [place] method utilized to dose vancomycin therapy: Method 2      Caitlyn Shane, PharmD  pager [phone]

## 2018-01-05 NOTE — PROGRESS NOTES
[facility]: PROGRESS NOTE  Jayy Gill : 1970 Sex: male:   Medical record number [id number] Attending Physician: Shoaib Prado MD  Date of Service: 2018  PROBLEM LIST:   1. Septic Shock secondary to pneumonia and peritonitis (SBP)   - blood cx - 18 - negative x2 , 18 - negative so far   - ascites - no growth 18    - sputum cx 18 - mod Staph aureus   - nares - 18 - heavy MRSA   2. Pneumonia: Aspiration vs Ventilator Associated   - CXR 17 - interval increasing perihilar and bibasilar opacities ( compared to 18)  3. Decompensated Cirrhosis  - ascites - no growth 18    4. Pulmonary Artery Hypertension  5. Right Heart Failure  6. ESRD with HD MWF  7. Leukocytosis   8. Thrombocytopenia   9. History of C.difficile      RECOMMENDATIONS:   -- Discontinue Ceftriaxone  -- Restart Vancomycin and Pip-Tazo  -- if decompensates, would recommend repeat cultures with repeat diagnostic para with cell count, diff, gram stain, culture, AFB and fungal cultures, and cytology as well. Can consider bedside inoculation to increase sensitivity of culture positivity or discussion with micro lab for techniques to enhance culture yield.   -- continue to trend WBC   -- Will need prophylaxis at time of discharge, likely Ciprofloxacin   -- monitor for signs of C diff infection given patient's prior C diff and recent abx use.   -- continue MAP goal of 60 in hopes of titrating down pressor requirements  -- limit further lines to decrease risk of line associated infections    DISCUSSION:   Firm abdomen with decreased bowel sounds and concern for ischemic bowel or toxic megacolon based on history of C diff and current broad spectrum antibiotics. However, Abd Xray unremarkable and lactate 2.1 (unchanged). CXR reviewed today and notable for increased perihilar and bibasilar opacities. Sputum returned positive for Staph aureus  which in conjunction with MRSA nares  WBC increased  today, is concerning for possible aspiration pneumonia vs VAP. Patient remains on two pressors with soft MAP and still quite critically ill. Given these findings and SBP, will broaden abx back out to Vancomycin and Zosyn with duration to be decided based on clinical improvement.  ID will continue to follow. Patient seen and discussed with my attending, Dr. Chavis, who agrees with my assessment and plan.      Alex Henry MD  PGY-3 Internal Medicine/[facility]  P: [phone]    Attestation:  This patient has been seen and evaluated by me.  I discussed this patient with the resident Dr Henry and agree with the findings and plan in this note. I also personally edited this note to reflect my findings. I have reviewed today's vital signs, medications, labs and imaging.    Micky Wilkes MD,M.Med.Sc.  Attending, Infectious Diseases  Pager: [phone]            SUBJECTIVE: (Recommend ? 4 systems)   Nursing notes reviewed and significant for able to wean dopamine off overnight. Versed weaned down as well and patient notably opens eyes to pain. Suction scant amounts pale, yellow sputum that sent for sample and positive for moderate growth S. aureus.    ROS:  Unable to obtain due to patient intubated and sedated.   No Known Allergies  Current Outpatient Prescriptions   Medication Sig Dispense Refill     [DISCONTINUED] SILDENAFIL CITRATE PO Take 20 mg by mouth 3 times daily       CURRENT ANTI-INFECTIVES:   Ceftriaxone 2 g daily  EXAMINATION: (Recommend ? 5 systems)   Vital Signs: BP 97/69 (BP Location: Right arm)  Pulse 97  Temp 96.8  F (36  C) (Axillary)  Resp 18  Wt 63.9 kg (140 lb 14 oz)  SpO2 100%  BMI 20.8 kg/m2   GENERAL:  Patient laying in bed, intubated and sedated.    HEENT:  Head is normocephalic, atraumatic   EYES:  Eyes have icteric sclerae without conjunctival injection or stigmata of endocarditis.    ENT:  Intubated   NECK:  No  Cervical lymphadenopathy  LUNGS:  Mechanical breath  sounds auscultated b/l, no crackles or wheezing.   CARDIOVASCULAR:  Regular rate and rhythm with no murmurs, gallops or rubs.  ABDOMEN:  Decreased bowel sounds, firm abdomen, nontender. No appreciable hepatosplenomegaly  SKIN:  No acute rashes.  Line(s) are in place without any surrounding erythema or exudate. No stigmata of endocarditis.  NEUROLOGIC:  upgoing Babinski b/l, no retraction to pain for my exam  CURRENT LINES: left femoral arterial line, right dialysis catheter, R IJ, L [place].     NEW DATA/RESULTS:     Culture Micro   Date Value Ref Range Status   01/04/2018 No growth after 1 day  Preliminary   01/04/2018 No growth after 1 day  Preliminary   01/04/2018 Light growth  Normal griselda    Preliminary   01/04/2018 Moderate growth  Staphylococcus aureus   (A)  Preliminary   01/04/2018 Culture in progress  Preliminary       Recent Labs   Lab Test  03/14/16   1217  08/18/15   1423  03/11/15   1727  02/20/15   1341  11/20/14   1140  09/18/14   0900   CRP  4.3  4.1  6.4  140.0*  10.8*  3.5     Recent Labs   Lab Test  01/05/18   0327  01/04/18   2211  01/04/18   1619  01/04/18   1008  01/04/18   0350  01/03/18   2148   WBC  11.6*  10.1  8.6  8.5  9.5  10.3     Recent Labs   Lab Test  01/05/18   0327  01/04/18   2211  01/04/18   1619  01/04/18   1008   CR  2.18*  2.43*  2.37*  2.66*   GFRESTIMATED  33*  29*  30*  26*       Hematology Studies  Recent Labs   Lab Test  01/05/18   0327  01/04/18   2211  01/04/18   1619  01/04/18   1008  01/04/18   0350  01/03/18   2148   WBC  11.6*  10.1  8.6  8.5  9.5  10.3   ANEU  10.5*  8.6*  7.8  7.5  8.5*  8.9*   AEOS  0.0  0.0  0.0  0.0  0.0  0.0   HCT  36.8*  37.7*  39.0*  37.6*  38.1*  38.8*   PLT  93*  98*  100*  99*  100*  100*       Metabolic  Recent Labs   Lab Test  01/05/18   0327  01/04/18   2211  01/04/18   1619   NA  138  136  135   BUN  26  29  29   CO2  23  19*  22   CR  2.18*  2.43*  2.37*   GFRESTIMATED  33*  29*  30*       Hepatic Studies  Recent Labs   Lab Test   01/05/18   0327  01/04/18   2211  01/04/18   1619   BILITOTAL  3.6*  4.1*  4.1*   ALKPHOS  85  84  88   ALBUMIN  2.0*  2.0*  2.1*   AST  41  42  47*   ALT  37  40  44       Immunologlobulins  Recent Labs   Lab Test  03/11/15   1727  05/22/14   1215   IGG  1790*  1300   IGM   --   111   IGA   --   179   SED  12   --      Abd Xray 1/5/2018  Impression:  Nonobstructive bowel gas pattern. No convincing radiographic evidence  of small bowel obstruction or megacolon.

## 2018-01-05 NOTE — PLAN OF CARE
Problem: Patient Care Overview  Goal: Plan of Care/Patient Progress Review  Outcome: No Change    Neuro: Remains sedated overnight on versed/fentanyl. Versed weaned to 1 mg/hr overnight. Opens eyes to pain/MAEx4. PERRL. Not following commands. Consider weaning versed off.  CV: Afebrile. HR NSR, 70-80s, rare PAC noted. Lt femoral art line, positional at times. MAP goal >60. Able to wean Dopamine off overnight. Levophed and Vaso drips remains unchanged. Continues on CRRT, averaging net negative 60-80 cc/hr, see flowsheets. Rt femoral HD access is very positional. Restarted x2 overnight. Turning only to back or on to Rt side.  Pulm: Remains vented on CMV/18/370/PEEP 7.5, FiO2 weaned to 40% . LS coarse/diminished, suctioning scant amounts pale, yellow sputum from ETT. LS coarse/diminished in bases.  GI: Hypoactive BS. NJ in place with TF at 10 cc/hr, no orders to advance at this time.   : Anuric.   Skin: Prophylactic mepilex pads applied to neil prominences to prevent pressure ulcers. Worsening blackened area to tip of Lt middle finger, ischemic injury likely from vasopressor use/poor vasculature per WOC RN note. Other fingers remain intact, will monitor closely.   Access: Rt IJ triple lumen, 18G PIV x2 to RUE, Lt femoral art line, and Rt femoral dialysis line.   Drips: Versed @ 1 mg/hr, Fentanyl @ 50 mcg/hr, Levophed @ 0.2 mcg/kg/min, Vaso @ 2.4 units/hr, and TKO x1.     Pt's S.O. visited overnight and was updated on labs and current plan of care.      Plan: Wean pressors and pull fluid with CRRT when able. Notify CARDS 2 team with any issues or concerns.

## 2018-01-05 NOTE — PROGRESS NOTES
CRRT RESTART NOTE    Reason for Restart:  Fluid loss limit reached  Error Code:  Fluid loss limit reached    Patient s Vascular Access: Catheter                   Placement Confirmed:  YES  Manufacture:  Armasight  Model:  Zafar  Length/Italian Size:  20 cm / 12 Fr  Flush Volume:  A 1.4 ml / V 1.4 ml    DATA:  Procedure:  CVVHDF  Start Time:  0440  Machine#:  6  Filter:  M150  Blood Flow:   180 mL/min  Pre-Replacement Solution:  PrismaSol 2/3.5  Post-Replacement Solution:  PrismaSol 2/3.5  Dialysate Solution:  Phoxillum 4/2.5  Pre-Replacement Solution Rate:  800mL/hr  Post-Replacement Solution Rate:  200mL/hr  Dialysate Flow Rate:  800mL/hr  Patient Removal Rate:  90 mL/hr  Anticoagulation Type and Rate:  0    ASSESSMENT:  How Patient Tolerated Restart:  Well  Vital Signs:  HR 79, RR 18, BP 99/57, MAP 68  Initial Pressures:  Access:  -48  Filter:  137  Return:  75  TMP:  49  Change in Filter Pressure:  34    INTERVENTIONS:  Restarted per MD orders. Lines in normal position and blood warmer on.     PLAN:  Continue to monitor circuit daily and change set q72 hours or PRN for clogging/clotting. Please call CRRT RN with any questions/problems.

## 2018-01-05 NOTE — PROGRESS NOTES
CRRT RESTART NOTE    Reason for Restart:  Filter clotting  Error Code:  None    Patient s Vascular Access: Catheter                   Placement Confirmed:  YES  Manufacture:  SpinTheCam  Model:  Zafar  Length/Dominican Size:  20 cm / 12 Fr  Flush Volume:  A 1.4 ml / V 1.4 ml    DATA:  Procedure:  CVVHDF  Start Time:  2213  Machine#:  6  Filter:  M150  Blood Flow:   180 mL/min  Pre-Replacement Solution:  PrismaSol 2/3.5  Post-Replacement Solution:  PrismaSol 2/3.5  Dialysate Solution:  Phoxillum 4/2.5  Pre-Replacement Solution Rate:  800mL/hr  Post-Replacement Solution Rate:  200mL/hr  Dialysate Flow Rate:  800mL/hr  Patient Removal Rate:  100 mL/hr  Anticoagulation Type and Rate:  0    ASSESSMENT:  How Patient Tolerated Restart:  Well  Vital Signs:  HR 78, BP 94/51, MAP 61, RR 16  Initial Pressures:  Access:  -51  Filter:  125  Return:  75  TMP:  50  Change in Filter Pressure:  23    INTERVENTIONS:  Restarted per MD orders. Lines in normal position. Access cleaned. Blood warmer applied. Machine not initiated with a syringe, so unable to attach syringe on restart.    PLAN:  Continue to monitor circuit daily and change set q72 hours and PRN for clogging/clotting. Please call CRRT RN with any questions/problems.

## 2018-01-05 NOTE — PROGRESS NOTES
CLINICAL NUTRITION SERVICES - brief note. See 1/4 note for full assessment.    -FEN/GI: Peptamen 1.5 running @ 10 mL/hr via NDT. AXR shows non-obstructive bowel gas pattern. K+, Mg++ and phos WNL (trending down on CRRT)  -CV: Pressor requirements down from yesterday (off dopamine, remains on levo and vaso), MAPs >60    Interventions  Collaboration with other providers - Discussed if able to adv TF today w/ Cards 2 fellows - approved advancement if AXR today does not show ileus/obstruction.   Enteral Nutrition - Ordered to advance TF 10 mL q8h to goal 55 mL/hr + 1 pkt ProSource BID.      RD will continue to follow.    Della Salguero, RD, LD, [facility]  CVICU Dietitian  Pager: [phone]

## 2018-01-05 NOTE — PROGRESS NOTES
Cardiology progress note    Mr. Gill is a 48 yo male with group I pulmonary hypertension documented to be from pulmonary veno-occlusive disease but maintained on pulmonary vasodilators prior to admission who was admitted with predominantly septic shock attributed to spontaneous bacterial peritonitis.  With volume removal through CVVHD (has ESRD), empiric antimicrobial therapy, and cessation of his pulmonary vasodilators his pressor requirements and oxygen requirements are slowly improving.    Interval history:  Vasopressor requirements and oxygen needs continue to improve.  He is tolerating gentle volume removal with CRRT.    Hemodynamics:  CVP 15, PA 90/36, mVO2 66%, CI 2.3, SVR 1130, ? Wedge (coils in RV).    Examination:  BP 97/69 (BP Location: Right arm)  Pulse 97  Temp 97.3  F (36.3  C) (Axillary)  Resp 15  Wt 63.9 kg (140 lb 14 oz)  SpO2 100%  BMI 20.8 kg/m2  Neuro:  Deeply sedated.  Does not awaken to verbal or light tactile stimulation.  General:  Orally intubated.  Neck:  Right IJ CVC & left IJ PA catheter  CV:  RRR.  Warm extremities.  No peripheral edema.  Lungs:  Diffuse inspiratory crackles.        Labs, Imaging & Procedures were reviewed.  CMP  Recent Labs  Lab 01/05/18  1010 01/05/18  0327 01/04/18  2211 01/04/18  1619    138 136 135   POTASSIUM 4.0 4.2 4.5 4.5   CHLORIDE 104 103 101 99   CO2 21 23 19* 22   ANIONGAP 13 13 16* 14   * 115* 118* 105*   BUN 25 26 29 29   CR 2.05* 2.18* 2.43* 2.37*   GFRESTIMATED 35* 33* 29* 30*   GFRESTBLACK 42* 40* 35* 36*   JAZMINE 8.8 8.6 8.8 8.8   MAG 2.1 2.3 2.2 2.1   PHOS 4.1 4.3 5.0* 5.0*   PROTTOTAL 6.5* 6.8 6.8 7.0   ALBUMIN 2.0* 2.0* 2.0* 2.1*   BILITOTAL 3.6* 3.6* 4.1* 4.1*   ALKPHOS 81 85 84 88   AST 40 41 42 47*   ALT 38 37 40 44     CBC  Recent Labs  Lab 01/05/18  1010 01/05/18  0327 01/04/18  2211 01/04/18  1619   WBC 14.8* 11.6* 10.1 8.6   RBC 3.81* 3.84* 3.92* 4.05*   HGB 11.6* 11.8* 12.1* 12.2*   HCT 36.7* 36.8* 37.7* 39.0*   MCV 96 96  96 96   MCH 30.4 30.7 30.9 30.1   MCHC 31.6 32.1 32.1 31.3*   RDW 16.5* 16.3* 16.4* 16.4*   PLT 86* 93* 98* 100*     INR  Recent Labs  Lab 01/05/18  0327 01/02/18  0003   INR 1.55* 2.06*     Arterial Blood Gas  Recent Labs  Lab 01/05/18  1602 01/05/18  1601 01/05/18  1010 01/05/18  0328 01/05/18  0327 01/04/18  2211   PH 7.38  --  7.35  --  7.36 7.35   PCO2 38  --  40  --  40 39   PO2 65*  --  64*  --  89 94   HCO3 23  --  22  --  22 21   O2PER 40 40 40.0  40.0 60.0 60.0 80  80     Echocardiogram 1/2/2018:  Global and regional left ventricular function is normal with an EF of 60-65%.  Paradoxical septal motion consistent with right ventricular pressure and volume overload is present.  Severe right ventricular dilation is present. Global right ventricular function is severely reduced. Severe right atrial enlargement is present.Severe tricuspid insufficiency is present. Estimated pulmonary artery systolic pressure is 71 mmHg plus right atrial pressure.  The inferior vena cava is dilated at 3.2 cm without respiratory variability, consistent with increased right atrial pressure of 15 mmHg.  There is moderate circumferential pericardial effusion with a maximum diameter of 2.27 cm, with no signs of respiratory variation in right/left inflow Doppler. There is no RA or RV collapse during diastole, although this may be obscured by elevated right sided filling pressures. Would recommend clinical correlation to determine if patient has tamponade physiology.  This study was compared with the study from 2/1/16.  RV function has worsened. Moderate perical effusion now exists.        Impression & Plan:  Neurologic: on midazolam & fentanyl infusions for sedation and analgesia; titrating to RASS -1-2.  Cardiovascular    Mixed but predominantly septic shock- dopamine off, vasopressin reduced, norepi stable, still on stress-dose steroids.  Weaning pressors as able.  Resuming broad spec abx coverage.  On midodrine to support BP while  pulling volume with CRRT.    Group I pulmonary hypertension with RV failure- continue to hold macitentan and sildenafil for now.  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  He may have predominantly idiopathic PAH with a low burden of patchy pulmonary venous occlusion.  Pulmonary    Hypoxic respiratory failure of indeterminate etiology- ? PNA (growing S aureus on sputum culture but no radiographic infiltrates on chest xray).  Despite pre-existing pulmonary hypertension he was not on oxygen at home.  In any case, O2 requirements have improved with abx, volume removal, and withholding his pulmonary vasodilators.  Gastrointestinal    Cirrhosis    Spontaneous bacterial peritonitis with ascitic neutrocytosis but no organisms grown on collected cultures  Renal    ESRD on HD prior to admission, now on CVVHD over intermittent HD because of hypotension.  Heme: chronic, stable anemia  Infectious disease: resumed broad spectrum antibiotic coverage with vancomycin & piperacillin-tazobactam per ID recommendations, given the rising leukocytosis after narrowing to ceftriaxone as well as the sputum S aureus growth.    Nutrition:  Advancing tube feeds with guidance from Nutrition  GI prophylaxis:  Omeprazole  DVT/PE prophylaxis:  Mechanical prophylaxis  DNR    I discussed the patient with Dr. Milly Titus.    Angel Powers MD  Cardiovascular disease fellow

## 2018-01-05 NOTE — PROGRESS NOTES
Nephrology Progress Note  01/04/2018         Assessment & Recommendations:     Jayy Gill is a 47 year old male with a Hx of ESRD on MWF HD sec to chronic hypertensive heart disease , pulm HTN (considered primary Vs sec to restrictive lung disease) admitted with hypotension and AMS.  Nephrology consulted for ESRD and shock state. He has been on CRRT since 1/1/18 with femoral access placed 1/1/18     ESRD on HD MWF  On CRRT   EDW 63.5 (64.8kg now)  He has been hypotensive and unable to tolerate UF in HD PTA  Access LUE AVF with cephalic vein stenosis and ulceration with ? Gram -ve infection hx , he has been referred for vascular surgery but still has to see them, no active ulcer seen but aneurysms and think skin over the fistula     -- he is currently on pressers with significant hypotension will continue on CRRT  -- will increase goal UF with hypervolemia on the ECHO to 0-100/hr as tolerated with MAPs to >60 but with the hypotension and pericardial effusion we are concerned if the the volume removal is too aggressive he may have worsening of his hemodynamics , will continue with gentle goals for UF for now  -- cardiology to follow on the need for pericardiocentesis as needed  -- Renal panel and mag , labs per CRRT protocol  -- daily weights and IO monitoring     Hypervolemia with shock state ( septic/cardiogenic)  He has probable peritonitis likely SBP with hx of cirrhosis and >700 WBC and 77% PMN  On ABx per ID , plan to continue on ceftriaxone  CRRT for volume control  Will UF when tolerated  Hypervolemia with IVC 3.2 on ECHO and no resp variability   Cards following for shock state  Currently on dopamine , vaso and levophed  Chronic hypotension on midodrine TID 10mg  ECHO with moderate pericardial effusion , in the setting of hypervolemia , hypotension and paradoxical motion of the heart on ECHO may need to be addressed with pericardiocentesis --> cards to follow     Electrolytes  Stable so far     AGMA with  lactic acidosis improving  CRRT prescription to address  With bicarb at goal this is likely sec to the hypoperfusion and will resolve   Will avoid any UF with the shock      Anemia  On Micera 40  Held PTA  Hb is at goal 12.3  Will follow for EPO needs     MBD  Phos binders were on hold per the last nephrology notes due to low phos and he has on Tums   Will follow on levels , phos is 5.9 with calcium 8.2 and albumin 2.3  Last  (12/19)     Acute hypoxic resp failure with hypervolemia  Viral panel pending  Intubated now with hypoxemia  CRRT for volume  Presser support per cards     Pulm htn and right heart failure hx  On sildenafil PTA  ECHO with Ef 60-65% and rt heart increased pressure and hypervolemia  PAP 71 + TR RAP 15     Recommendations were communicated to primary team     Seen and discussed with Dr. Giulia Franklin MD   [phone]    Interval History :   In the last 24 hours Jayy Gill has been on CRRT , continues to be hypotensive and on dopamine/norepinephrine and vasopressin  He is also found to have peritonitis and is on ABx , no events otehrwise    Review of Systems:   Cannot complete due to intubation    Physical Exam:   I/O last 3 completed shifts:  In: 1551.86 [I.V.:1326.86; Other:15; NG/GT:210]  Out: 1860 [Emesis/NG output:500; Other:1360]   BP 92/64  Pulse 97  Temp 98  F (36.7  C) (Axillary)  Resp 17  Wt 64.8 kg (142 lb 13.7 oz)  SpO2 96%  BMI 21.1 kg/m2     GENERAL APPEARANCE: ill appearing  EYES:  - scleral icterus, pupils equal  HENT: mouth without ulcers or lesions  PULM: lungs crackles to auscultation,  bilaterally, equal air movement, no clubbing  CV: regular rhythm, normal rate, no rub, distant heart sounds     -JVD +     -edema trace   GI: soft, non tender, - distended  INTEGUMENT: no cyanosis, - rash  NEURO:  - asterixis , sedated and on vent  Access femoral CVC for CRRT and LUE AVF    Labs:   All labs reviewed by me  Electrolytes/Renal -   Recent Labs   Lab Test   01/04/18   1619  01/04/18   1008  01/04/18   0350   NA  135  137  136   POTASSIUM  4.5  4.6  4.8   CHLORIDE  99  101  102   CO2  22  20  21   BUN  29  30  31*   CR  2.37*  2.66*  3.17*   GLC  105*  106*  113*   JAZMINE  8.8  8.5  8.4*   MAG  2.1  2.2  2.2   PHOS  5.0*  5.2*  5.6*       CBC -   Recent Labs   Lab Test  01/04/18   1619  01/04/18   1008  01/04/18   0350   WBC  8.6  8.5  9.5   HGB  12.2*  12.2*  12.1*   PLT  100*  99*  100*       LFTs -   Recent Labs   Lab Test  01/04/18   1619  01/04/18   1008  01/04/18   0350   ALKPHOS  88  88  91   BILITOTAL  4.1*  4.1*  4.2*   ALT  44  41  43   AST  47*  48*  51*   PROTTOTAL  7.0  6.7*  6.7*   ALBUMIN  2.1*  2.1*  2.0*       Iron Panel -   Recent Labs   Lab Test  06/04/17   0427  02/19/16   2310  09/12/13   1455   IRON  71  56  57   IRONSAT  30  18  20   MARANDA   --   369   --          Imaging:  All imaging studies reviewed by me.     Current Medications:    omeprazole  40 mg Oral or Feeding Tube Daily     NEPHRO-VIKRAM RX  1 tablet Per Feeding Tube Daily     cefTRIAXone  2 g Intravenous Q24H     albuterol  6 puff Inhalation Q4H     [START ON 1/7/2018] influenza quadrivalent (PF) vacc age 3 yrs and older  0.5 mL Intramuscular Prior to discharge     aspirin  81 mg Oral Daily     loratadine  10 mg Oral Daily     midodrine  20 mg Oral TID     hydrocortisone sodium succinate PF  50 mg Intravenous Q6H       IV fluid REPLACEMENT ONLY       replacement solution for CVVHD & CVVHDF (PrismaSol BGK 2/3.5) 12.5 mL/kg/hr (01/04/18 1516)     replacement solution for CVVHD & CVVHDF (PrismaSol BGK 2/3.5) 200 mL/hr at 01/03/18 1956     dialysate for CVVHD & CVVHDF (Phoxillum BK4/2.5) 12.5 mL/kg/hr (01/04/18 1516)     vasopressin (PITRESSIN) infusion ADULT (40 mL) 2.4 Units/hr (01/04/18 1700)     norepinephrine 0.2 mcg/kg/min (01/04/18 1700)     DOPamine 1 mcg/kg/min (01/04/18 1700)     midazolam 2 mg/hr (01/04/18 1700)     fentaNYL 50 mcg/hr (01/04/18 1700)     MD SARBJIT Reilly was  present with the fellow during the history and exam.  I discussed the case with the fellow and agree with the findings as documented in the assessment and plan.  Mily Platt

## 2018-01-06 NOTE — PROGRESS NOTES
Cardiology Progress Note    HPI: Mr. Gill is a 48 yo male with group I pulmonary hypertension documented to be from pulmonary veno-occlusive disease but maintained on pulmonary vasodilators prior to admission who was admitted with predominantly septic shock attributed to spontaneous bacterial peritonitis.  With volume removal through CVVHD (has ESRD), empiric antimicrobial therapy, and cessation of his pulmonary vasodilators his pressor requirements and oxygen requirements are slowly improving.    Overnight/subj: Overnight was noted to have disconjugate up/out gaze; stat head CT was done and unremarkable.  This AM remains intubated/sedated on 2 pressors.    VS reviewed: Notable for temp: 97.8-98.1, HR 70s-80s, BP 97/54-94/54, MAP 66-74, oxygenating high 90s on the ventilator with AC, rate 18, , PEEP 7.5, FiO2 50%  I/O: 1790 in / 1872 out yday, net -81. Since  in / 243 out      Given SVo2 55, SAo2 98, CVP 22, MAP 66 the CO 3.49 (1.96), SVR 1008  Wt: on admit 64.8 kg (142 lb), yday 63.9 kg (140 lb), today 64.8 kg (142 lb)  Tele: Frequent PVCs yesterday evening, paced  Drips: fentanyl 50 mcg/hr, norepinephrine 0.17 mcg/kg/min IV, vasoporessin 2 units/hr IV  Scheduled Meds: ASA 81 mg, midodrine 20 mg TID  Abx: Vancomycin, zosyn  Relevant prn meds yday: N/A    Radiology Imaging  XR 1/6/18:  Impression:   1. Stable perihilar and bibasilar opacities, and increased small right  pleural effusion.  2. Stable support devices, including the endotracheal tube tip at the  level of the thoracic inlet.    CT head 1/5/18  Impression:  1. No acute intracranial pathology.  2. Mucosal thickening of the ethmoid air cells and sphenoid locules,  nonspecific, can be seen with sinusitis.  3. Bilateral mastoid effusion.    Cardiology Studies:  Echocardiogram 1/2/2018:  Global and regional left ventricular function is normal with an EF of 60-65%.  Paradoxical septal motion consistent with right ventricular pressure and volume  overload is present.  Severe right ventricular dilation is present. Global right ventricular function is severely reduced. Severe right atrial enlargement is present.Severe tricuspid insufficiency is present. Estimated pulmonary artery systolic pressure is 71 mmHg plus right atrial pressure.  The inferior vena cava is dilated at 3.2 cm without respiratory variability, consistent with increased right atrial pressure of 15 mmHg.  There is moderate circumferential pericardial effusion with a maximum diameter of 2.27 cm, with no signs of respiratory variation in right/left inflow Doppler. There is no RA or RV collapse during diastole, although this may be obscured by elevated right sided filling pressures. Would recommend clinical correlation to determine if patient has tamponade physiology.  This study was compared with the study from 2/1/16.  RV function has worsened. Moderate perical effusion now exists.        Labs:         Phys exam:  GEN: Lying in bed, intubated/sedated  Pulm: On ventilator, diffuse bilateral crackles appreciated  Cardiac: RRR  MSK: Black tissue noted on L middle finger extending to the base of the MTP.  Strong radial pulses bilaterally.  L hand otherwise warm without rash/significant discoloration.  GI: Distended, firm, no change from prior exam  Neuro: Sedated    ASSESSMENT/PLAN:  Neurologic: on midazolam & fentanyl infusions for sedation and analgesia; titrating to RASS -1-2.  Cardiovascular    Appears to be improving from a septic shock perspective, down to 2 pressors on broad-spectrum abx.  Cardiac index remains low at 1.9.  Will restart dopamine and wean norepi off today as able; once norepi off will attempt to wean vaso.  On midodrine to support BP while pulling volume with CRRT.    Group I pulmonary hypertension with RV failure- resuming sildenafil today, continue to hold macitentan for now.  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  He may have predominantly idiopathic PAH with a low burden of patchy pulmonary venous occlusion.    Per nephrology patient has history of digital ischemia on the L hand associated with his fistula.  Appears to have significantly worsened in the past 24 hours while on pressors/critically ill.  Given acute change consulted plastics/vascular and will obtain CTA of LUE.  Pulmonary    Hypoxic respiratory failure of indeterminate etiology- ? PNA (growing S aureus on sputum culture but no radiographic infiltrates on chest xray).  Despite pre-existing pulmonary hypertension he was not on oxygen at home.  In any case, O2 requirements continue to improve with abx and volume removal.  Gastrointestinal    Cirrhosis    Spontaneous bacterial peritonitis with ascitic neutrocytosis but no organisms grown on collected cultures  Renal    ESRD on HD prior to admission, now on CVVHD over intermittent HD because of hypotension.  Goals of care    Had long discussion with significant other at beside today.  She noted that patient had previously expressed desire to avoid prolonged intubation/interventions if prognosis was poor and that he had recently been considering hospice.  She would like to meet with palliative on Tuesday if no significant medical progress is made this weekend.  Will plan to consult palliative if patient does not appear to be recovering.    Heme: chronic, stable anemia  Infectious disease: continue broad spectrum antibiotic coverage with vancomycin & piperacillin-tazobactam per ID recommendations, given the rising leukocytosis after narrowing to ceftriaxone as well as the sputum S aureus growth.     Nutrition:  Advancing tube feeds with guidance from Nutrition  GI prophylaxis:  Omeprazole  DVT/PE prophylaxis:  Mechanical prophylaxis  Code: DNR     Patient discussed with attending Dr. Titus.    Ganga Petty MD  Internal Medicine PGY-2  [phone]

## 2018-01-06 NOTE — PROGRESS NOTES
Assisted with ET tube exchange with anesthesia without incident. The Cuff on previous ETT had a leak, bilateral BS heard and CXR to follow.

## 2018-01-06 NOTE — PROVIDER NOTIFICATION
Notified Cards 2 (Dr. Johnson) at 2045 in regards to pupils dysconjugate with upward and outward gaze, pulses not palpable, and increase in pressor requirements. MD came to bedside. Ordered STAT head CT and layla numbers to be done. Decreased fluid removal on CRRT to 0. Awaiting labs. Will continue to monitor and update team with concerns.     Dr. Johnson placing new a-line at this time.

## 2018-01-06 NOTE — PLAN OF CARE
Problem: Patient Care Overview  Goal: Plan of Care/Patient Progress Review  Outcome: No Change  D: 47 admitted for septic shock.     I/A:   Neuro: Pupil change noted at beginning of shift; dysconjugate with up and outward gaze. STAT head CT done. 4mm and sluggish; at times appearing fixed. Withdraws all extremities. Spontaneously twitched LUE and LLE. Not following commands. Sedated on 50 mcg of fentanyl. Afebrile.   CV: SR with rare PAC; HR 70-80s. MAP goal >60; labile BP with MAPs to low 50s. Titrating levo and vaso. Obtaining FICKs q6 hrs; CO: 3.3 and CI: 1.9. PA pressures 90-100s/30s. CVP 19-22. dopplerable pulses. Cool extremities.   Resp: Desats with turns. CMV settings; 50% FiO2. Tan thick secretions. Coarse/ diminished LS.   GI: TF via NJ; currently at 30ml/hr with goal rate of 55ml/hr. Hypoactive/faint BS.   : Anuric. Remains on CRRT; net neg goal 0-100. Unable to achieve goal most of night; I=Os currently.   Skin: L hand middle finger remains black. LUE fistula +thrill and bruit.     P: Wean pressors as able. Increase fluid removal without increasing pressor requirement. Continue with POC and update team with concerns.

## 2018-01-06 NOTE — PROGRESS NOTES
Nephrology Progress Note  01/05/2018         Assessment & Recommendations:     Jayy Gill is a 47 year old male with a Hx of ESRD on MWF HD sec to chronic hypertensive heart disease , pulm HTN (considered primary Vs sec to restrictive lung disease) admitted with hypotension and AMS.  Nephrology consulted for ESRD and shock state. He has been on CRRT since 1/1/18 with femoral access placed 1/1/18     ESRD on HD MWF  On CRRT   EDW 63.5 (64.8kg now)  He has been hypotensive and unable to tolerate UF in HD PTA  Access LUE AVF with cephalic vein stenosis and ulceration with ? Gram -ve infection hx , he has been referred for vascular surgery but still has to see them, no active ulcer seen but aneurysms and think skin over the fistula     -- he is currently on pressers with significant hypotension will continue on CRRT  -- will increase goal UF with hypervolemia on the ECHO to 0-150/hr as tolerated with MAPs to >60 but with the hypotension and pericardial effusion we are concerned if the the volume removal is too aggressive he may have worsening of his hemodynamics , will continue with gentle goals for UF for now  -- cardiology to follow on the need for pericardiocentesis as needed  -- Renal panel and mag , labs per CRRT protocol  -- daily weights and IO monitoring     Hypervolemia with shock state ( septic/cardiogenic)  He has probable peritonitis likely SBP with hx of cirrhosis and >700 WBC and 77% PMN  On ABx per ID , plan to continue on ceftriaxone  CRRT for volume control  Will UF when tolerated  Hypervolemia with IVC 3.2 on ECHO and no resp variability   Cards following for shock state  Currently on dopamine , vaso and levophed  Chronic hypotension on midodrine TID 10mg  ECHO with moderate pericardial effusion , in the setting of hypervolemia , hypotension and paradoxical motion of the heart on ECHO may need to be addressed with pericardiocentesis --> cards to follow     Electrolytes  Stable so far     AGMA with  lactic acidosis improving  SBP with >700WBC on vanco and zosyn  With bicarb at goal this is likely sec to the hypoperfusion and will resolve   Will avoid any UF with the shock      Anemia  On Micera 40  Held PTA  Hb is at goal ~12  Will follow for EPO needs     MBD  Phos binders were on hold per the last nephrology notes due to low phos and he has on Tums   Will follow on levels , phos is 5.9 with calcium 8.2 and albumin 2.3  Last  (12/19)     Acute hypoxic resp failure with hypervolemia  Viral panel pending  Intubated now with hypoxemia  CRRT for volume  Presser support per cards     Pulm htn and right heart failure hx  On sildenafil PTA  ECHO with Ef 60-65% and rt heart increased pressure and hypervolemia  PAP 71 + TR RAP 15     Recommendations were communicated to primary team     Seen and discussed with Dr. Giulia Franklin MD   [phone]    Interval History :   In the last 24 hours Jayy Glil has been on CRRT , continues to be hypotensive and on dopamine/norepinephrine and vasopressin  He is also found to have peritonitis and is on ABx , no events otehrwise    Review of Systems:   Cannot complete due to intubation    Physical Exam:   I/O last 3 completed shifts:  In: 1474.67 [I.V.:895.67; Other:74; NG/GT:305]  Out: 2165 [Emesis/NG output:200; Other:1965]   BP 97/69 (BP Location: Right arm)  Pulse 97  Temp 97.2  F (36.2  C) (Axillary)  Resp 18  Wt 63.9 kg (140 lb 14 oz)  SpO2 100%  BMI 20.8 kg/m2     GENERAL APPEARANCE: ill appearing  EYES:  - scleral icterus, pupils equal  HENT: mouth without ulcers or lesions  PULM: lungs crackles to auscultation,  bilaterally, equal air movement, no clubbing  CV: regular rhythm, normal rate, no rub, distant heart sounds     -JVD +     -edema trace   GI: soft, non tender, - distended  INTEGUMENT: no cyanosis, - rash  NEURO:  - asterixis , sedated and on vent  Access femoral CVC for CRRT and LUE AVF    Labs:   All labs reviewed by me  Electrolytes/Renal -    Recent Labs   Lab Test  01/05/18   1602  01/05/18   1010  01/05/18   0327   NA  136  138  138   POTASSIUM  3.8  4.0  4.2   CHLORIDE  104  104  103   CO2  22  21  23   BUN  24  25  26   CR  1.89*  2.05*  2.18*   GLC  123*  126*  115*   JAZMINE  8.7  8.8  8.6   MAG  2.2  2.1  2.3   PHOS  3.5  4.1  4.3       CBC -   Recent Labs   Lab Test  01/05/18   1602  01/05/18   1010  01/05/18   0327   WBC  15.1*  14.8*  11.6*   HGB  11.5*  11.6*  11.8*   PLT  79*  86*  93*       LFTs -   Recent Labs   Lab Test  01/05/18   1602  01/05/18   1010  01/05/18   0327   ALKPHOS  80  81  85   BILITOTAL  3.6*  3.6*  3.6*   ALT  36  38  37   AST  39  40  41   PROTTOTAL  6.7*  6.5*  6.8   ALBUMIN  2.0*  2.0*  2.0*       Iron Panel -   Recent Labs   Lab Test  06/04/17   0427  02/19/16   2310  09/12/13   1455   IRON  71  56  57   IRONSAT  30  18  20   MARANDA   --   369   --          Imaging:  All imaging studies reviewed by me.     Current Medications:    protein modular  1 packet Per Feeding Tube BID     [START ON 1/6/2018] vancomycin (VANCOCIN) IV  1,250 mg Intravenous Q24H     piperacillin-tazobactam  4.5 g Intravenous Q6H     omeprazole  40 mg Oral or Feeding Tube Daily     NEPHRO-VIKRAM RX  1 tablet Per Feeding Tube Daily     [START ON 1/7/2018] influenza quadrivalent (PF) vacc age 3 yrs and older  0.5 mL Intramuscular Prior to discharge     aspirin  81 mg Oral Daily     loratadine  10 mg Oral Daily     midodrine  20 mg Oral TID     hydrocortisone sodium succinate PF  50 mg Intravenous Q6H       IV fluid REPLACEMENT ONLY       replacement solution for CVVHD & CVVHDF (PrismaSol BGK 2/3.5) 12.5 mL/kg/hr (01/05/18 1845)     replacement solution for CVVHD & CVVHDF (PrismaSol BGK 2/3.5) 200 mL/hr at 01/05/18 0421     dialysate for CVVHD & CVVHDF (Phoxillum BK4/2.5) 12.5 mL/kg/hr (01/05/18 1845)     vasopressin (PITRESSIN) infusion ADULT (40 mL) 2.4 Units/hr (01/05/18 1600)     norepinephrine 0.18 mcg/kg/min (01/05/18 1600)     DOPamine Stopped  (01/05/18 0030)     midazolam Stopped (01/05/18 0900)     fentaNYL 50 mcg/hr (01/05/18 1600)     Stephanie Franklin MD     I was present with the fellow during the history and exam.  I discussed the case with the fellow and agree with the findings as documented in the assessment and plan.  Mily Platt

## 2018-01-06 NOTE — PROGRESS NOTES
CRRT RESTART NOTE    Reason for Restart:  CT scan  Error Code:     Patient s Vascular Access: Catheter  Right femoral groin                 Placement Confirmed:  YES  Manufacture: [employer]  Model: Zafar  Length/Turkmen Size:  20Fcm/12Fr  Flush Volume:  A 1.4cc/V 1.4cc    DATA:  Procedure:  CVVHDF  Start Time: 1615  Machine#:  [id number]  Filter:  M150  Blood Flow:   180 mL/min  Pre-Replacement Solution: Prismasol 2/3.5  Post-Replacement Solution:  Prismasol 2/3.5  Dialysate Solution: Phoxillum 4/2.5  Pre-Replacement Solution Rate:  800mL/hr  Post-Replacement Solution Rate:  200mL/hr  Dialysate Flow Rate:  800mL/hr  Patient Removal Rate:  0 mL/hr  Anticoagulation Type and Rate:  NA    ASSESSMENT:  How Patient Tolerated Restart: No change in VS with restart  Vital Signs: HR 84, /38, O2 sat97% on 60% FIO2  Initial Pressures:  Access: -51  Filter:  128  Return: 68  TMP:  57  Change in Filter Pressure:  38    INTERVENTIONS:  FIlter pressures increasing so did not re-circ and filter changed.  Machine did not provide option to add a syringe when setting up, so tubing clamped.     PLAN:  Continue with plan of care and notify CRRT with concerns. #[id number]

## 2018-01-06 NOTE — PROGRESS NOTES
Anesthesiology Airway Note:    15:36 paged to patient's room with concern that his endotracheal tube may have a cuff leak. Per respiratory therapist, they had noted a mild cuff leak which was treated with intermittent inflation. Airway pressures were reported as being normal but SpO2 was intermittently low (or difficult to read). On evaluation there was an audible leak with the ETT. Hemodynamics were steady with MAPs > 65 on A-line (but on norepi and vasopressin). PO2 on recent ABG was 92 and SpO2 was 93% on 100% FiO2 with mixed metabolic and respiratory acidosis.  CXR from 0335 today was fairly clear with some basilar effusion on the right side and indication that the ETT was in the trachea.     I placed an exchange catheter into the trachea, and under direct visualization with the videolaryngoscope was able to note a cuff herniation above the glottis in addition to copious gastric secretions. These secretions were suctioned out and a new 8.0 ETT was advanced over the exchange catheter to where the cuff was fully underneath the glottis (25cm at teeth). Bilateral breath sounds were noted and after about 8ml of air was advanced into the cuff, no leak was appreciated on ventilation.  Upon examining the old ETT, a notable puncture was seen on the cuff along the distal portion with clear failure to maintain inflation.    Kem Cary MD  [facility]  [phone]

## 2018-01-06 NOTE — CONSULTS
Vascular Surgery Consultation    Jayy Gill  MRN#: [id number]    Date of Admission:  1/1/2018    Date of Consult: 1/6/2018    Reason for consult: Ischemic digit       Requesting service: MICU       Requesting provider: Dr. Petty                   Assessment and Plan:   Assessment:   7 year old man with a h/o cirrhosis, ESRD on HD via LUE brachiocephalic fistula, and pulmonary hypertension on whom Vascular Surgery is consulted for an ischemic left third digit in the setting of a left AVF.  Suspects ischemia is multifactorial from pressor use as well as possible steal from the left AVF.        Plan:   -No indication for urgent surgical intervention.  -Appreciate Plastic Surgery recommendations regarding possible amputation; will continue to evaluate ischemia.  -Recommend ultrasound of radial and ulnar arteries with waveforms at the wrist with and without compression of the fistula (ordered) to assess level of steal from AVF.  -Recommend finger pressures be obtained (cannot be done until 1/8/2018 per Radiology).  -Vascular Surgery will continue to follow.      Seen and discussed with Dr. Rivera.  - - - - - - - - - - - - - - - - - -  Sabrina Roberto MD  General Surgery PGY-2            History of Present Illness:   Jayy Gill is a 47 year old man with a h/o cirrhosis, ESRD on HD via LUE brachiocephalic fistula, and pulmonary hypertension on whom Vascular Surgery is consulted for an ischemic left third digit. He initially presented to the ED on 1/1/2018 with hypotension and respiratory failure and was admitted to the MICU.  He was found to have septic shock likely from pneumonia and SBP and is being treated with broad-spectrum antibiotics he has required multiple vasopressors (dopa at 2.5, vaso at 3-4) and is intubated and sedated.  His liver is decompensating with a current MELD of 32.    Earlier this morning, the primary team noted ischemic changes in his left third distal finger.  No other ischemic digits were  noted.  A CTA was obtained showing a patent AV fistula on the left, and small ulnar and radial arteries on the left possibly due to the AV fistula, though imaging was less than ideal due to low contrast load. He has been critically ill but stable and with no other changes in his condition. He is on heparin TID for DVT ppx.         Past Medical History:     Past Medical History:   Diagnosis Date     Cardiomyopathy (H)      Dialysis patient (H)     M-W-F     Diarrhea     C diff     ESRD (end stage renal disease) (H)      HLD (hyperlipidemia)      Hypertension      Pulmonary hypertension 11/21/2013             Past Surgical History:     Past Surgical History:   Procedure Laterality Date     AV FISTULA OR GRAFT ARTERIAL      left upper arm     COLONOSCOPY  7/3015     COLONOSCOPY WITH CO2 INSUFFLATION N/A 11/10/2015    Procedure: COLONOSCOPY WITH CO2 INSUFFLATION;  Surgeon: Samuel Fritz MD;  Location: [facility] OR     ENDOBRONCHIAL ULTRASOUND FLEXIBLE N/A 9/18/2014    Procedure: ENDOBRONCHIAL ULTRASOUND FLEXIBLE;  Surgeon: Jg Morrison MD;  [facility]: [facility]     ENDOBRONCHIAL ULTRASOUND FLEXIBLE N/A 12/18/2014    Procedure: ENDOBRONCHIAL ULTRASOUND FLEXIBLE;  Surgeon: Jg Morrison MD;  Location: [facility] OR             Social History:   Girlfriend makes his medical decisions.   Reviewed. Unable to obtain full history due to patient status.  Social History   Substance Use Topics     Smoking status: Never Smoker     Smokeless tobacco: Never Used     Alcohol use No             Family History:   Reviewed. Unable to obtain full history due to patient status.  Family History   Problem Relation Age of Onset     Hypertension Mother      DIABETES Father              Allergies:   No Known Allergies          Medications:       No current facility-administered medications on file prior to encounter.   Current Outpatient Prescriptions on File Prior to Encounter:  midodrine HCl 10 MG TABS Take 20 mg by mouth 3 times daily    macitentan (OPSUMIT) 10 MG tablet Take 1 tablet (10 mg) by mouth daily   sildenafil (REVATIO/VIAGRA) 20 MG tablet Take 2 tablets (40 mg) by mouth 3 times daily   loratadine (CLARITIN) 10 MG tablet Take 1 tablet (10 mg) by mouth daily   hydrOXYzine (VISTARIL) 25 MG capsule Take 2 capsules (50 mg) by mouth 3 times daily as needed for itching   torsemide (DEMADEX) 20 MG tablet Take 3 tablets (60 mg) by mouth 2 times daily   omeprazole (PRILOSEC) 40 MG capsule Take 1 capsule (40 mg) by mouth daily Take 30-60 minutes before a meal.   aspirin 81 MG tablet Take 81 mg by mouth daily   B Complex-C-Folic Acid (RENAL) 1 MG CAPS Take 1 mg by mouth Take 1 capsule (1 mg) by mouth daily in the afternoon.  Indications: Nutritional Support   paricalcitol (ZEMPLAR) 5 MCG/ML injection 2 mcg 2 mcg by IV Push route dialysis.   [DISCONTINUED] SILDENAFIL CITRATE PO Take 20 mg by mouth 3 times daily   calcium acetate (PHOSLO) 667 MG CAPS Take 2 capsules (1,334 mg) by mouth 3 times daily (with meals)             Review of Systems:   10-point ROS otherwise negative except as noted above.          Physical Exam:     Temp:  [96.4  F (35.8  C)-98.1  F (36.7  C)] 97.4  F (36.3  C)  Heart Rate:  [72-90] 82  Resp:  [14-22] 18  BP: ()/(20-83) 94/27  MAP:  [33 mmHg-249 mmHg] 42 mmHg  Arterial Line BP: ()/() 57/39  FiO2 (%):  [40 %-50 %] 50 %  SpO2:  [91 %-100 %] 98 %     General: intubated, sedated, appears in NAD  CV: regular rate and rhythm  Pulm: on mechanical ventilation  Abd: soft, non-distended, non-tender  Extremities:       Left hand with third digit black and mummified from DIP distal. No other ischemic changes to hands or feet.      Left radial pulse 1+, but 2+ when AVF is occluded.    I/O last 3 completed shifts:  In: 1882.65 [I.V.:1031.65; Other:6; NG/GT:385]  Out: 1633 [Other:1633]          Data:   Labs:  Arterial Blood Gases     Recent Labs  Lab 01/05/18  1602 01/05/18  1010 01/05/18  0327 01/04/18  2211   PH 7.38  7.35 7.36 7.35   PCO2 38 40 40 39   PO2 65* 64* 89 94   HCO3 23 22 22 21        Complete Blood Count     Recent Labs  Lab 01/06/18  0954 01/06/18  0334 01/05/18 2052 01/05/18  1602   WBC 13.4* 14.2* 15.8* 15.1*   HGB 10.9* 10.9* 11.4* 11.5*   PLT 70* 73* 76* 79*       Basic Metabolic Panel    Recent Labs  Lab 01/06/18  0954 01/06/18  0334 01/05/18 2052 01/05/18  1602    139 138 136   POTASSIUM 3.7 3.6 4.0 3.8   CHLORIDE 106 106 105 104   CO2 24 23 22 22   BUN 22 21 22 24   CR 1.75* 1.75* 1.83* 1.89*   * 147* 137* 123*   JAZMINE 8.9 8.7 8.9 8.7   MAG 2.3 2.2 2.3 2.2   PHOS 3.2 3.3 3.4 3.5       Liver Function Tests    Recent Labs  Lab 01/06/18  0954 01/06/18  0334 01/05/18 2052 01/05/18  1602   AST 40 40 38 39   ALT 36 34 35 36   ALKPHOS 74 72 79 80   BILITOTAL 3.6* 3.7* 3.7* 3.6*   ALBUMIN 1.9* 1.9* 2.0* 2.0*       Pancreatic Enzymes  No lab results found in last 7 days.    Coagulation Profile    Recent Labs  Lab 01/06/18  0334 01/05/18  0327 01/02/18  0003   INR 1.61* 1.55* 2.06*       Lactate    Recent Labs  Lab 01/05/18  1204 01/04/18  0926 01/02/18  0514 01/02/18  0011   LACT 2.1* 2.1* 2.7* 5.6*       Imaging:   CTA ANGIOGRAM UPPER EXTREMITY LEFT 1/6/2018 3:41 PM     History: 46 yo admitted w/sepsis, found to have L index finger digital  ischemia. Vascular surgery assessment this patient, and there may be a  need to intervene, and vascular surgery desires to know the patency of  arterial inflow to the level of the brachial artery. CT angiogram was  requested by vascular surgery.      Technique: Angiogram of the left upper extremity with contrast. 100 mL  of Isovue were used.      Comparison: None.     Findings:      The aortic arch is nondilated. There is a common origin of the  brachiocephalic and left common carotid arteries. The left subclavian  or axillary arteries are widely patent. The brachial artery is also  patent, but with mild, approximately 30-40% luminal narrowing, best  seen on series  5, images [id number]. Brachial artery is patent to the  level of the fistula arteriovenous anastomosis. Distal to the fistula  AV anastomosis, the proximal radial and ulnar arteries are patent, but  diminutive in comparison to the brachial artery. The radial and ulnar  arteries are not well seen distal to the proximal radial and ulnar  diaphyses, more likely due to low contrast bolus, but reduced  opacification may also be secondary to brisk preferential flow into  the AV fistula.     There is a patent left brachial artery to cephalic vein AV fistula,  with widely patent arterial anastomosis, and patent venous outflow.  The cephalic, subclavian, innominate veins and SVC are widely patent.  Although there is a dilated appearance to the central cephalic vein  and a relatively compressed appearance of the subclavian vein as it  courses between the clavicle and first rib, this pronounced appearance  is felt to more likely due to extreme abduction of the patient's arm  that was required in order to include the arm in the field-of-view.     Endotracheal tube tip is in place with the tip positioned in the  trachea just below the thoracic inlet. Partial visualization of a  nasoenteric tube seen to the level of the mid thoracic esophagus.  Patchy groundglass opacities bilaterally in the upper lobes. Right  pleural effusion and focal atelectasis in the right upper lobe.      There is a nontunneled left internal jugular central venous catheter  and nontunneled right internal jugular venous catheter which are seen  to the level of the mid SVC, both tips not included in field-of-view.  Prominent venous collaterals in the along the right chest wall without  obvious associated subclavian vein narrowing      The head and neck were included in the field-of-view of this study.  The bilateral vertebral, common carotid, external carotid, and  internal carotid arteries are patent. Study is not tailored for  precise assessment of the  cerebral arterial anatomy. The basilar  artery and [place] of Hodgson are contrast opacified, but any degree  cannot be assessed on this study. There is circumferential mucosal  thickening thickening involving the ethmoid bilateral right greater  than left maxillary sinus, sphenoid locules. There is fluid in the  bilateral mastoid air cells.          Impression:   1. Patent left upper extremity arteriovenous fistula.   2. The left subclavian, axillary, brachial arteries are patent, with  mild luminal narrowing of the brachial artery as described. The  proximal to mid ulnar and radial arteries are patent, although  diminutive possibly due to the presence of a brachial to cephalic AV  fistula. Low contrast bolus from the mid arm to hand does not allow  for assessment of the radial and ulnar arteries at this level.  Assessment of the radial and ulnar arteries could be performed with  ultrasound if clinically indicated.  3. Bilateral mosaic attenuation in the visualized upper lungs  compatible with small airways disease. Right sided pleural effusion  and overlying atelectasis or focal pneumonia.  4. Paranasal sinus mucosal thickening as described above.

## 2018-01-06 NOTE — PROGRESS NOTES
[facility]: PROGRESS NOTE  Jayy Gill : 1970 Sex: male:   Medical record number [id number] Attending Physician: Shoaib Prado MD  Date of Service: 2018  PROBLEM LIST:   1. Septic Shock secondary to pneumonia and peritonitis (SBP)   - blood cx - 18 - negative x2 , 18 - negative so far   - ascites 18 - no growth    - sputum cx 18 - mod Staph aureus   - nares - 18 - heavy MRSA   2. Pneumonia: Aspiration vs Ventilator Associated   - CXR 17 - interval increasing perihilar and bibasilar opacities ( compared to 18)  3. Decompensated Cirrhosis  - ascites 18 - no growth  4. Pulmonary Artery Hypertension  5. Right Heart Failure  6. ESRD with HD MWF  7. Leukocytosis   8. Thrombocytopenia   9. History of C.difficile      RECOMMENDATIONS:   -- Restarted Vancomycin and Pip-Tazo yesterday 18 due to increasing WBC and increasing infiltrates on CXR, Staph aureus isolated from sputum with MRSA nares.  --> continue same antibiotics   -- continue to trend WBC   -- Will need prophylaxis at time of discharge, likely Ciprofloxacin   -- monitor for signs of C diff infection given patient's prior C diff and recent abx use.   -- continue MAP goal of 60 in hopes of titrating down pressor requirements. Left middle finger - dry black tip   -- limit further lines to decrease risk of line associated infections    ID will continue to follow. Plan discussed with significant other and RN      Micky Wilkes MD,M.Med.Sc.  Attending, Infectious Diseases  Pager: [phone]       SUBJECTIVE: (Recommend ? 4 systems)   Still on pressors. minimal trach secretions     ROS:  Unable to obtain due to patient intubated and sedated.   No Known Allergies  Current Outpatient Prescriptions   Medication Sig Dispense Refill     [DISCONTINUED] SILDENAFIL CITRATE PO Take 20 mg by mouth 3 times daily       CURRENT ANTI-INFECTIVES:   Zosyn and IV Vancomycin , hydrocort . Minimal  trach secretion/suction. Left middle finger black tip. Unchanged from previous days per significant other.     EXAMINATION: (Recommend ? 5 systems)   Vital Signs: /45  Pulse 97  Temp 97.2  F (36.2  C) (Axillary)  Resp 21  Wt 64.8 kg (142 lb 13.7 oz)  SpO2 98%  BMI 21.1 kg/m2   GENERAL:  Patient laying in bed, intubated and sedated.    HEENT:  Head is normocephalic, atraumatic   EYES:  Eyes have icteric sclerae without conjunctival injection or stigmata of endocarditis.    ENT:  Intubated   NECK:  No  Cervical lymphadenopathy  LUNGS:  coarse breath sounds auscultated b/l, no crackles or wheezing.   CARDIOVASCULAR:  Regular rate and rhythm with no murmurs, gallops or rubs.  ABDOMEN:  Decreased bowel sounds, firm abdomen, nontender. No appreciable hepatosplenomegaly  SKIN:  left middle finger - black tip/ dry   Line(s) are in place without any surrounding erythema or exudate. No stigmata of endocarditis.  NEUROLOGIC:  sedated   CURRENT LINES: left femoral arterial line, right dialysis catheter, R IJ, L [place].     NEW DATA/RESULTS:     Culture Micro   Date Value Ref Range Status   01/04/2018 No growth after 2 days  Preliminary   01/04/2018 No growth after 2 days  Preliminary   01/04/2018 Light growth  Normal griselda    Preliminary   01/04/2018 (A)  Preliminary    Moderate growth  Staphylococcus aureus  Susceptibility testing in progress     01/02/2018 (A)  Final    Heavy growth  Methicillin resistant Staphylococcus aureus (MRSA)         Recent Labs   Lab Test  03/14/16   1217  08/18/15   1423  03/11/15   1727  02/20/15   1341  11/20/14   1140  09/18/14   0900   CRP  4.3  4.1  6.4  140.0*  10.8*  3.5     Recent Labs   Lab Test  01/06/18   0954  01/06/18   0334  01/05/18   2052  01/05/18   1602  01/05/18   1010  01/05/18   0327   WBC  13.4*  14.2*  15.8*  15.1*  14.8*  11.6*     Recent Labs   Lab Test  01/06/18   0954  01/06/18   0334  01/05/18 2052 01/05/18   1602   CR  1.75*  1.75*  1.83*  1.89*    GFRESTIMATED  42*  42*  40*  39*       Hematology Studies  Recent Labs   Lab Test  01/06/18   0954  01/06/18   0334  01/05/18 2052 01/05/18   1602  01/05/18   1010  01/05/18 0327   WBC  13.4*  14.2*  15.8*  15.1*  14.8*  11.6*   ANEU  12.2*  12.7*  14.6*  14.1*  14.0*  10.5*   AEOS  0.0  0.0  0.0  0.0  0.0  0.0   HCT  35.4*  35.0*  36.7*  36.6*  36.7*  36.8*   PLT  70*  73*  76*  79*  86*  93*       Metabolic  Recent Labs   Lab Test  01/06/18   0954  01/06/18   0334  01/05/18 2052   NA  140  139  138   BUN  22  21  22   CO2  24  23  22   CR  1.75*  1.75*  1.83*   GFRESTIMATED  42*  42*  40*     Hepatic Studies  Recent Labs   Lab Test  01/06/18   0954  01/06/18 0334 01/05/18 2052   BILITOTAL  3.6*  3.7*  3.7*   ALKPHOS  74  72  79   ALBUMIN  1.9*  1.9*  2.0*   AST  40  40  38   ALT  36  34  35       Immunologlobulins  Recent Labs   Lab Test  03/11/15   1727  05/22/14   1215   IGG  1790*  1300   IGM   --   111   IGA   --   179   SED  12   --      Abd Xray 1/5/2018  Impression:  Nonobstructive bowel gas pattern. No convincing radiographic evidence  of small bowel obstruction or megacolon.

## 2018-01-06 NOTE — PROGRESS NOTES
Nephrology Progress Note  01/06/2018         Assessment & Recommendations:     Jayy Gill is a 47 year old male with a Hx of ESRD on MWF HD sec to chronic hypertensive heart disease , pulm HTN (considered primary Vs sec to restrictive lung disease) admitted with hypotension and AMS.  Nephrology consulted for ESRD and shock state. He has been on CRRT since 1/1/18 with femoral access placed 1/1/18     ESRD on HD MWF  On CRRT   EDW 63.5 (64.8kg now)  He has been hypotensive and unable to tolerate UF in HD PTA  Access LUE AVF with cephalic vein stenosis and ulceration with ? Gram -ve infection hx , he has been referred for vascular surgery but still has to see them, no active ulcer seen but aneurysms and think skin over the fistula, hx of high venous pressures as well     -- he is currently on pressers with significant hypotension will continue on CRRT  -- has not tolerated UF over night  -- will increase goal UF with hypervolemia on the ECHO to 0-150/hr as tolerated with MAPs to >60 but with the hypotension and pericardial effusion we are concerned if the the volume removal is too aggressive he may have worsening of his hemodynamics , will continue with gentle goals for UF for now   -- hypotension issues reviewed with cards and they will optimize with ionotropic support at this time and not that concerned with the pericardial effusion sec to the pulm HTN  -- Renal panel and mag , labs per CRRT protocol  -- daily weights and IO monitoring     Hypervolemia with shock state ( septic/cardiogenic)  He has probable peritonitis likely SBP with hx of cirrhosis and >700 WBC and 77% PMN  On ABx per ID , plan to continue on ceftriaxone  CRRT for volume control  Will UF as tolerated  Hypervolemia with IVC 3.2 on ECHO and no resp variability   Cards following for shock state  Currently on  vaso and levophed, off dopamine  Chronic hypotension on midodrine TID 10mg PTA  ECHO with moderate pericardial effusion cards  following  Hypotension reviewed with cards and they will optimize the presser support     Electrolytes  Stable so far     AGMA with lactic acidosis improving  SBP with >700WBC on vanco and zosyn  With bicarb at goal   Will avoid any UF with the shock state , UF goals based on MAP     Anemia  On Micera 40  Held PTA  Hb is at goal ~12  Will follow for EPO needs     MBD  Phos binders were on hold per the last nephrology notes due to low phos and he has on Tums   Will follow on levels , phos is 5.9 with calcium 8.2 and albumin 2.3  Last  (12/19)     Acute hypoxic resp failure with hypervolemia  Viral panel -ve  Intubated now with hypoxemia  CRRT for volume  Presser support per cards     Pulm htn and right heart failure hx  On sildenafil PTA  ECHO with Ef 60-65% and rt heart increased pressure and hypervolemia  PAP 71 + TR RAP 15     Recommendations were communicated to primary team     Seen and discussed with Dr. Tello Franklin MD   [phone]    Interval History :   In the last 24 hours Jayy Gill has been on CRRT , continues to be hypotensive and on levophed and vasopressin  He is also found to have peritonitis and is on ABx , no events otehrwise    Review of Systems:   Cannot complete due to intubation    Physical Exam:   I/O last 3 completed shifts:  In: 1882.65 [I.V.:1031.65; Other:6; NG/GT:385]  Out: 1633 [Other:1633]   /45  Pulse 97  Temp 97.2  F (36.2  C) (Axillary)  Resp 21  Wt 64.8 kg (142 lb 13.7 oz)  SpO2 98%  BMI 21.1 kg/m2     GENERAL APPEARANCE: ill appearing  EYES:  - scleral icterus, pupils equal  HENT: mouth without ulcers or lesions  PULM: lungs crackles to auscultation,  bilaterally, equal air movement, no clubbing  CV: regular rhythm, normal rate, no rub, distant heart sounds     -JVD +     -edema trace   GI: soft, non tender, - distended  INTEGUMENT: no cyanosis, - rash  NEURO:  - asterixis , sedated and on vent  Access femoral CVC for CRRT and LUE AVF    Labs:   All labs  reviewed by me  Electrolytes/Renal -   Recent Labs   Lab Test  01/06/18   0954  01/06/18   0334  01/05/18 2052   NA  140  139  138   POTASSIUM  3.7  3.6  4.0   CHLORIDE  106  106  105   CO2  24  23  22   BUN  22  21  22   CR  1.75*  1.75*  1.83*   GLC  166*  147*  137*   JAZMINE  8.9  8.7  8.9   MAG  2.3  2.2  2.3   PHOS  3.2  3.3  3.4       CBC -   Recent Labs   Lab Test  01/06/18   0954  01/06/18 0334 01/05/18 2052   WBC  13.4*  14.2*  15.8*   HGB  10.9*  10.9*  11.4*   PLT  70*  73*  76*       LFTs -   Recent Labs   Lab Test  01/06/18   0954  01/06/18 0334 01/05/18 2052   ALKPHOS  74  72  79   BILITOTAL  3.6*  3.7*  3.7*   ALT  36  34  35   AST  40  40  38   PROTTOTAL  6.7*  6.4*  6.7*   ALBUMIN  1.9*  1.9*  2.0*       Iron Panel -   Recent Labs   Lab Test  06/04/17   0427  02/19/16   2310  09/12/13   1455   IRON  71  56  57   IRONSAT  30  18  20   MARANDA   --   369   --          Imaging:  All imaging studies reviewed by me.     Current Medications:    protein modular  1 packet Per Feeding Tube BID     vancomycin (VANCOCIN) IV  1,250 mg Intravenous Q24H     piperacillin-tazobactam  4.5 g Intravenous Q6H     omeprazole  40 mg Oral or Feeding Tube Daily     NEPHRO-VIKRAM RX  1 tablet Per Feeding Tube Daily     [START ON 1/7/2018] influenza quadrivalent (PF) vacc age 3 yrs and older  0.5 mL Intramuscular Prior to discharge     aspirin  81 mg Oral Daily     loratadine  10 mg Oral Daily     midodrine  20 mg Oral TID     hydrocortisone sodium succinate PF  50 mg Intravenous Q6H       IV fluid REPLACEMENT ONLY       replacement solution for CVVHD & CVVHDF (PrismaSol BGK 2/3.5) 12.5 mL/kg/hr (01/06/18 1116)     replacement solution for CVVHD & CVVHDF (PrismaSol BGK 2/3.5) 200 mL/hr at 01/06/18 0752     dialysate for CVVHD & CVVHDF (Phoxillum BK4/2.5) 12.5 mL/kg/hr (01/06/18 1116)     vasopressin (PITRESSIN) infusion ADULT (40 mL) 2.2 Units/hr (01/06/18 1200)     norepinephrine 0.22 mcg/kg/min (01/06/18 1200)      DOPamine Stopped (01/05/18 0030)     midazolam Stopped (01/05/18 0900)     fentaNYL 50 mcg/hr (01/06/18 1200)     Stephanie Franklin MD     I, Deshawn Javed, saw this patient with Dr. Franklin and agree with the findings and plan of care as documented in the note.

## 2018-01-06 NOTE — PLAN OF CARE
Problem: Patient Care Overview  Goal: Plan of Care/Patient Progress Review  Outcome: No Change  D/I: Patient on unit 4A Surgical/Neuro ICU   Neuro- Pupils equal, reactive, sluggish. Withdraws to pain. Opens eyes to voice intermittently   CV- HR 70s-80s. MAP goal of 60 maintained with levo and vaso. Pt very sensitive to turns and MAP decreases by 5-10 points. PA pressures remain elevated; see flowsheets for BRENDA calculations  Pulm- LS coarse; diminished bases. Scant thick secretions with ET suctioning  GI- BS hypoactive, faint. No BM. TF increased from 10 to 20ml/hr at 1600 with goal rate of 55ml/hr  - Anuric. CRRT continues with no issues today. Goal net negative . Able to remove negative 40-80 ml/hr today.  Gtts- Dopamine stopped at 0900. Norepi titrated down to 0.18 mcg/kg/min. Vaso remains at 2.4 units/hr. Fentanyl infusing at 50 mcg/hr  Skin- Tip of L middle finger cyanotic, black, MD aware. Mepilex in place over bony prominences; intact. L lower tooth loose.  Pain- No s/s of pain observed  See flow sheets for further interventions and assessments.   A: Stable   P: Continue to monitor pt closely. Notify MD of significant changes

## 2018-01-06 NOTE — PROGRESS NOTES
CRRT RESTART NOTE    Reason for Restart: Filter Clotted  Error Code: NA    Patient s Vascular Access:  Rt groin Catheter                   Placement Confirmed: YES  Manufacture:  [employer]  Model:  Zafar  Length/Yoruba Size:  20 cm/ 12 Fr  Flush Volume:  A 1.4 ml/ V 1.4 ml    DATA:  Procedure:  CVVDDF  Start Time:  2257  Machine#:  6  Filter:  M150  Blood Flow:   180 mL/min  Pre-Replacement Solution: PrismaSol 2/3.5   Post-Replacement Solution:  PrismaSol 2/3.5  Dialysate Solution:  Phoxillum 4/2.5  Pre-Replacement Solution Rate:  800 mL/hr  Post-Replacement Solution Rate:  200 mL/hr  Dialysate Flow Rate:  800 mL/hr  Patient Removal Rate:  0 mL/hr  Anticoagulation Type and Rate:  0    ASSESSMENT:  How Patient Tolerated Restart:  Well  Vital Signs:  HR 84, BP 90/61 (71), SpO2 99%, RR 18  Initial Pressures:  Access:  -39  Filter:  109  Return:  66  TMP:  48  Change in Filter Pressure:  16    INTERVENTIONS:  Attempted recirculation when pt went to CT at 2110, but when pt returned and CRRT restarted at 2215, stated filter clotted. Line cleaned per protocol. Blood warmer applied.     PLAN:  Continue current plan of care.  Notify CRRT RN with issues and concerns  #[phone].

## 2018-01-07 NOTE — PROCEDURES
"Procedure/Surgery Information   [facility], [place]    Bedside Procedure Note  Date of Service (when I performed the procedure): 01/07/2018    Jayy Gill is a 47 year old male patient.  1. Septic shock (H)    2. Sepsis, due to unspecified organism (H)    3. Streptococcal sepsis (H)    4. ESRD (end stage renal disease) on dialysis    5. Cardiomyopathy, unspecified type (H)    6. Pulmonary hypertension    7. Pulmonary arterial hypertension      Past Medical History:   Diagnosis Date     Cardiomyopathy (H)      Dialysis patient (H)     M-W-F     Diarrhea     C diff     ESRD (end stage renal disease) (H)      HLD (hyperlipidemia)      Hypertension      Pulmonary hypertension 11/21/2013     Temp: 96.7  F (35.9  C) Temp src: Axillary BP: 92/56   Heart Rate: 101 Resp: 18 SpO2: 90 % O2 Device: Mechanical Ventilator      Rewire left femoral arterial line  Date/Time: 1/7/2018 12:01 AM  Performed by: GIUSEPPE DIAZ  Authorized by: GIUSEPPE DIAZ   Consent: The procedure was performed in an emergent situation. Verbal consent not obtained. Written consent not obtained.  Patient identity confirmed: verbally with patient, arm band and hospital-assigned identification number  Time out: Immediately prior to procedure a \"time out\" was called to verify the correct patient, procedure, equipment, support staff and site/side marked as required.  Preparation: Patient was prepped and draped in the usual sterile fashion.  Indications: multiple ABGs and hemodynamic monitoring  Location: left femoral  Anesthesia: see MAR for details    Sedation:  Patient sedated: no  Number of attempts: 1  Post-procedure: line sutured and dressing applied  Post-procedure CMS: normal  Patient tolerance: Patient tolerated the procedure well with no immediate complications           Giuseppe Diaz"
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
## 2018-01-07 NOTE — PROGRESS NOTES
Plastic Surgery Progress Note    Continues to be on multiple vasopressors. Patient is DNR.    Temp:  [96.1  F (35.6  C)-98  F (36.7  C)] 97.7  F (36.5  C)  Heart Rate:  [] 101  Resp:  [14-22] 20  BP: ()/(21-86) 65/22  MAP:  [42 mmHg-102 mmHg] 70 mmHg  Arterial Line BP: ()/(25-95) 107/58  FiO2 (%):  [60 %-100 %] 100 %  SpO2:  [69 %-100 %] 86 %  I/O last 3 completed shifts:  In: 2599.65 [I.V.:1128.65; NG/GT:375]  Out: 483 [Other:483]    Gen: Intubated and mechanically ventilated.  On exam of L hand, there is dry gangrene of LF from tip to PIP volarly and middle phalanx dorsally. Proximal to this, there is turgor in tissue, but the remaining hand is cold. There are 1 cm dusky lesions along the dorsum of the RF at the DIP level and IF at the MP level. There is loss of turgor in all these digits. No splinter hemorrhages in the nails. Pulse ox measurement at the finger tips are around 60%. There is a doppler signal of the radial artery at the wrist and at the anatomic snuff box, but not at the palmar arch. Ulnar artery could not be dopplered. There is no palpable pulse.    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    PLAN:   - No surgical intervention recommended from plastic surgery standpoint.  - There is slight improvement in doppler exam today when compared to yesterday's findings. Recommend continuing to warm LUE.  - Wean vasopressors as able.  - AV fistula management per vascular surgery.  - Patient is on vancomycin and zosyn per ICU.

## 2018-01-07 NOTE — PROGRESS NOTES
Patient intubated, on multiple pressors in ICU    B/P: 99/62, T: 97.6, P: 97, R: 18  Vented and sedated  Faint thrill in left AVF  Radial pulse with improvement in pulse with compression of fistula  Digit ischemia slightly improved form yesterday, involving distal aspect.    WBC   Date Value Ref Range Status   01/07/2018 23.5 (H) 4.0 - 11.0 10e9/L Final   ]  Hemoglobin   Date Value Ref Range Status   01/07/2018 11.0 (L) 13.3 - 17.7 g/dL Final   ]  INR/Prothrombin Time  Creatinine   Date Value Ref Range Status   01/07/2018 1.50 (H) 0.66 - 1.25 mg/dL Final   ]      Intake/Output Summary (Last 24 hours) at 01/07/18 0813  Last data filed at 01/07/18 0800   Gross per 24 hour   Intake          2431.47 ml   Output              332 ml   Net          2099.47 ml     CT upper extremity showing mild brachial luminal narrowing, difficult to assess vessels below the elbow due to fistula    Assessment/Plan:  48 yo male with Left brachiocephalic AVF, left third digit ischemia    Arterial US of left upper extremity with waveforms at wrist with and without compression of fistula, also obtain finger pressures to evaluate flow to remaining digits.  If significant decreased flow to hand may need to ligate fistula.  Appreciate plastics involvement  Continue ICU cares in a complex patient.  Wean pressors as able.  Antibiotics vanc/zosyn  Further recommendations to follow.    Sophia Boyer MD  Vascular Surgery Fellow  Pager [phone]

## 2018-01-07 NOTE — PLAN OF CARE
Problem: Patient Care Overview  Goal: Plan of Care/Patient Progress Review  Outcome: No Change  S:  Unable to pull fluid via CRRT all shift, increased norepi   B:  Pt hemodynamically unstable, did not tolerate turning on sides even for a brief small turn.  Had to titrate norepi up to keep MAP 60.  Late in shift dopamine started at straight rate of 2.5 mcg and able to wean norepi off.  CI initially 1.9 then 2.3 then 1.9.  PA pressures remain 80-90s over 25-35.   Art line positional at times so intermittently using cuff----cards 2 aware of art line.        Pt had frequent ETT cuff leak in am-----which RT assessed and added air to but this did not resolve the leak.  Pt ETT was switched out by anes (see note), pt tolerated well and f/u CXR done.  Peak airway pressures wnl.  Minimal ETT secretions.         Just prior to changing ETT ABG was sent and pH 7.17 w/ p02 92 on 100% and lactic was 5.8----MD notified.  Subsequent lactic was 2.1.       Pt was taken to CT for exam of blood flow in LUE.  Plastic MD in to inject lidocaine to decrease any vasospasm to dusky finger.       SO was in and updated by Cards 2 MDs.  Questions appropriate.  A;  As noted.  R:  Cont w/ care plan.

## 2018-01-07 NOTE — PLAN OF CARE
Problem: Patient Care Overview  Goal: Plan of Care/Patient Progress Review  Outcome: Declining  D: 47 admitted for septic shock.      I/A:   Neuro: Pupils remain dysconjugate with upward gaze; sluggish but reactive. Withdraws all extremities. Not following commands. More alert than last night and not syncing with vent well; started versed gtt at 1mg/hr. Fentanyl decreased to 25 mcg and appears comfortable. Normothermic with bear hugger.   CV: Labile BP; MAPs as low as 30s and frequently dropping to 40s without reasoning such as turns. Titrated vasopressin and levophed aggressively (see MAR); team aware. Dopamine remains straight rated. MAP goal >60. SR with rare PAC/PVC; HR 90-110s. EKG ordered for AM to assess new t-wave inversion. A-line rewired. Obtaining FICKs q6 hrs;CO- 3.4 and CI- 1.7. PA pressures 90-100s/30s. CVP 20-22. Weak pulses. Cool extremities.   Resp: CMV settings; % FiO2. See ABG and VBGs. Spontaneously desats to low 70s; Increased PEEP to 10 and TV to 420. Tan thick secretions. Coarse/ diminished LS.   GI: TF via NJ; 55ml/hr (goal rate). Hypoactive/faint BS. Neg for c-diff.   : Anuric. Remains on CRRT; net neg goal 0-100. Unable to achieve goal all night.   Skin: L hand middle finger remains black. LUE fistula +thrill and bruit. Unable to reposition as hemodynamically decompensates. Coccyx blanchable.      P: Wean pressors as able. attempt fluid removal without increasing pressor requirement. Continue with POC and update team with concerns.

## 2018-01-07 NOTE — PROGRESS NOTES
Nephrology Progress Note  01/07/2018         Assessment & Recommendations:     Jayy Gill is a 47 year old male with a Hx of ESRD on MWF HD sec to chronic hypertensive heart disease , pulm HTN (considered primary Vs sec to restrictive lung disease) admitted with hypotension and AMS.  Nephrology consulted for ESRD and shock state. He has been on CRRT since 1/1/18 with femoral access placed 1/1/18     ESRD on HD MWF  On CRRT   EDW 63.5 (66kg now)  He has been hypotensive and unable to tolerate UF in HD PTA  Access LUE AVF with cephalic vein stenosis and ulceration with ? Gram -ve infection hx , he has been referred for vascular surgery but still has to see them, no active ulcer seen but aneurysms and think skin over the fistula, hx of high venous pressures as well     -- he is currently on pressers with significant hypotension will continue on CRRT  -- has not tolerated UF over night  -- hypotension issues reviewed with cards and they will optimize with ionotropic support at this time and not that concerned with the pericardial effusion sec to the pulm HTN  -- Renal panel and mag , labs per CRRT protocol  -- daily weights and IO monitoring    Discussed plans of care with his girl friend , POA , and that his prognosis is poor with the renal failure and hypervolemia and the inability to tolerate any UF at this time. She was updated regarding his care so far and she reports that he did not want to suffer and had agreed to the intubation if this was temporary and she will like till Tuesday to make more decisions      Hypervolemia with shock state ( septic/cardiogenic)  He has probable peritonitis likely SBP with hx of cirrhosis and >700 WBC and 77% PMN  On ABx per ID  CRRT for volume control  Will UF as tolerated  Hypervolemia with IVC 3.2 on ECHO and no resp variability   Cards following for shock state  Currently on  vaso and levophed, restarted dopamine  Chronic hypotension on midodrine TID 10mg PTA  ECHO with  moderate pericardial effusion cards following  Hypotension reviewed with cards and they will optimize the presser support     Electrolytes  Stable so far on CRRT     AGMA with lactic acidosis improving  SBP with >700WBC on vanco and zosyn  With bicarb at goal   Will avoid any UF with the shock state , UF goals based on MAP     Anemia  On Micera 40  Held PTA  Hb is at goal ~12  Will follow for EPO needs     MBD  Phos binders were on hold per the last nephrology notes due to low phos and he has on Tums   Will follow on levels , phos is 5.9 with calcium 8.2 and albumin 2.3  Last  (12/19)     Acute hypoxic resp failure with hypervolemia  Viral panel -ve  Intubated now with hypoxemia  He has been not tolerating UF even with pressers and increased pulm infiltrates likely sec to hypervolemia  Presser support per cards     Pulm htn and right heart failure hx  On sildenafil PTA  ECHO with Ef 60-65% and rt heart increased pressure and hypervolemia  PAP 71 + TR RAP 15     Recommendations were communicated to primary team     Seen and discussed with Dr. Tello Franklin MD   [phone]    Interval History :   In the last 24 hours Jayy Gill has been on CRRT , continues to be hypotensive and on levophed and vasopressin    Review of Systems:   Cannot complete due to intubation    Physical Exam:   I/O last 3 completed shifts:  In: 3308.19 [I.V.:1735.19; Other:3; NG/GT:315]  Out: 318 [Other:318]   /70  Pulse 97  Temp 97.7  F (36.5  C) (Oral)  Resp 19  Wt 66.5 kg (146 lb 9.7 oz)  SpO2 94%  BMI 21.65 kg/m2     GENERAL APPEARANCE: ill appearing  EYES:  - scleral icterus, pupils equal  HENT: mouth without ulcers or lesions  PULM: lungs crackles to auscultation,  bilaterally, equal air movement, no clubbing  CV: regular rhythm, normal rate, no rub, distant heart sounds     -JVD +     -edema trace   GI: soft, non tender, - distended  INTEGUMENT: no cyanosis, - rash  NEURO:  - asterixis , sedated and on vent  Access  femoral CVC for CRRT and LUE AVF    Labs:   All labs reviewed by me  Electrolytes/Renal -   Recent Labs   Lab Test  01/07/18   0945  01/07/18   0335  01/06/18   2204   NA  140  139  137   POTASSIUM  3.2*  3.3*  3.0*   CHLORIDE  108  108  106   CO2  19*  21  19*   BUN  22  24  24   CR  1.49*  1.50*  1.53*   GLC  184*  178*  151*   JAZMINE  8.7  8.8  8.6   MAG  2.1  2.1  2.0   PHOS  2.4*  2.5  2.9       CBC -   Recent Labs   Lab Test  01/07/18   0945  01/07/18   0335  01/06/18   2204   WBC  21.7*  23.5*  16.3*   HGB  11.2*  11.0*  10.9*   PLT  70*  66*  71*       LFTs -   Recent Labs   Lab Test  01/07/18   0945  01/07/18   0335  01/06/18   2204   ALKPHOS  68  66  69   BILITOTAL  3.9*  4.1*  3.8*   ALT  37  36  36   AST  46*  43  50*   PROTTOTAL  7.1  6.6*  6.6*   ALBUMIN  2.0*  1.9*  1.9*       Iron Panel -   Recent Labs   Lab Test  06/04/17   0427  02/19/16   2310  09/12/13   1455   IRON  71  56  57   IRONSAT  30  18  20   MARANDA   --   369   --          Imaging:  All imaging studies reviewed by me.     Current Medications:    meropenem  1 g Intravenous Q6H     micafungin  100 mg Intravenous Q24H     sildenafil  20 mg Oral or Feeding Tube TID     lidocaine (PF)  10 mL Other Once     heparin  5,000 Units Subcutaneous Q12H     protein modular  1 packet Per Feeding Tube BID     omeprazole  40 mg Oral or Feeding Tube Daily     NEPHRO-VIKRAM RX  1 tablet Per Feeding Tube Daily     influenza quadrivalent (PF) vacc age 3 yrs and older  0.5 mL Intramuscular Prior to discharge     aspirin  81 mg Oral Daily     loratadine  10 mg Oral Daily     midodrine  20 mg Oral TID     hydrocortisone sodium succinate PF  50 mg Intravenous Q6H       DOPamine 5 mcg/kg/min (01/07/18 1500)     IV fluid REPLACEMENT ONLY       replacement solution for CVVHD & CVVHDF (PrismaSol BGK 2/3.5) 12.5 mL/kg/hr (01/07/18 1108)     replacement solution for CVVHD & CVVHDF (PrismaSol BGK 2/3.5) 200 mL/hr at 01/07/18 0751     dialysate for CVVHD & CVVHDF (Phoxillum  BK4/2.5) 12.5 mL/kg/hr (01/07/18 1108)     vasopressin (PITRESSIN) infusion ADULT (40 mL) 3.5 Units/hr (01/07/18 1400)     norepinephrine 0.02 mcg/kg/min (01/07/18 1500)     midazolam 1 mg/hr (01/07/18 1500)     fentaNYL 25 mcg/hr (01/07/18 1500)     Stephanie Franklin MD     I, Deshawn Javed, saw this patient with Dr. Franklin and agree with the findings and plan of care as documented in the note.

## 2018-01-07 NOTE — CONSULTS
Plastic Surgery Consult Note    CC: Ischemic changes of left long finger    HPI:   This is a 47 year old male with history of ESRD on HD (LUE brachiocephalic fistula), cirrhosis, pulmonary hypertension, who presented to the ER on 1/2/18 with shortness of breath and hypotension, and was subsequently admitted to the MICU for further work-up. The patient is critical, intubated and sedated and currently requiring dopamine, norepinephrine, and vasopressin for pressor support. He is on CRRT due to blood pressure lability and also is being given steroids. He is on broad spectrum antibiotics, vancomycin and zosyn, thought to be treating septic shock due to pneumonia and SBP. This morning, his team noted ischemic changes of his left long finger. He underwent a CTA which shows patent fistula proximally, it is difficult to see radial and ulnar artery distally. Likely due to current level of pressor support as well as steal syndrome.     PMHx:   ESRD, cirrhosis MELD 34, pulmonary hypertension    PSHx: Unable to obtain    Allergies: NKDA    Social Hx: Girlfriend is POA    Physical Exam:   /74  Pulse 97  Temp 97.5  F (36.4  C) (Axillary)  Resp 17  Wt 64.8 kg (142 lb 13.7 oz)  SpO2 97%  BMI 21.1 kg/m2  General: Intubated, sedated. Scleral icterus  Right upper extremity: Digits are cool with bluish hue, decreased turgor on finger tips. No signs of lesions on this hand  Left upper extremity: Palpable AVF fistula proximally, very difficult to palpate radial or ulnar artery at the wrist. Able to doppler biphasic pulses at wrist, but monophasic deep radial artery in snuff box. Unable to doppler arch. There are ecchymotic lesions on the dorsum of the index, long, ring and small fingers. Digits are cool and have decreased turgor. The long finger is mummified distal to PIP and clearly ischemic to PIP.     CTA: reviewed as above.     Labs:   Lactic acid 5.8  PH 7.17    Hgb: 10.8  Plt: 79  INR: 1.61    A/P:  47 year old male  with ischemic changes of left long finger that happened acutely today, probably a combination of embolization and steal syndrome secondary to left upper extremity fistula. Patient is also on aggressive pressor support, which is also decreasing blood flow to distal extremities. Long finger most obviously involved at this point, but likely other digits will become involved.    - Vascular surgery also involved, deciding if it would be beneficial to ligate fistula  - Provided chemo sympathectomy with digital block of left long finger using 2% lidocaine  - Keep hands warm with ariana hugger  - Watch for development of infection on left long finger with current dry gangrene  - Will eventually need plain x-ray of left hand    rGaciela English MD  PGY-5 Plastic Surgery  [phone]    Patient d/w Dr. Simpson.

## 2018-01-07 NOTE — PROGRESS NOTES
[facility]: PROGRESS NOTE  Jayy Gill : 1970 Sex: male:   Medical record number [id number] Attending Physician: Shoaib Prado MD  Date of Service: 2018    Mr. Gill is a 47 year old man with PMHx of Pulmonary HTN/PVOD, Right Heart Failure, Cirrhosis, ESRD on MWF HD who presented to the ED with worsening fatigue, developed acute hypoxemic respiratory failure and was intubated.    PROBLEM LIST:   1. Septic Shock secondary to pneumonia and peritonitis (SBP)   - blood cx - 18 - negative x2 , 18 - negative so far   - ascites 18 - no growth    - sputum cx 18 - mod MRSA  - nares - 18 - heavy MRSA   2. Pneumonia: Aspiration vs Ventilator Associated   - CXR 17 - interval increasing perihilar and bibasilar opacities ( compared to 18)  3. Decompensated Cirrhosis  - ascites 18 - no growth  4. Pulmonary Artery Hypertension  5. Right Heart Failure  6. ESRD with HD MWF  7. Leukocytosis --> increasing   8. Thrombocytopenia   9. History of C.difficile  . Diarrhea- tested negative for C.diff on 18   10. Right index finger - darkish discoloration and left middle finger - dry gangrene     RECOMMENDATIONS:   -- continue Vancomycin and D/c Pip-Tazo . Add Mereopenem dosed per CrCl. Add empiric Micafungin.   -- continue to trend WBC   -- monitor for signs of C diff infection given patient's prior C diff and recent abx use.   - consider CT chest/ abd/pelvis .      ID will continue to follow. Plan discussed with Primary team MD and RN      Micky Wilkes MD,M.Med.Sc.  Attending, Infectious Diseases  Pager: [phone]       SUBJECTIVE: (Recommend ? 4 systems)   Still on pressors. increasing WBC and requiring more oxygen supplement. Diarrhea and tested negative for C.diff on 18. Minimal trach secretions    ROS:  Unable to obtain due to patient intubated and sedated.   No Known Allergies  Current Outpatient Prescriptions   Medication Sig Dispense  Refill     [DISCONTINUED] SILDENAFIL CITRATE PO Take 20 mg by mouth 3 times daily       CURRENT ANTI-INFECTIVES:   Zosyn and IV Vancomycin , hydrocort . Minimal trach secretion/suction. Left middle finger black tip. Unchanged from previous days per significant other.     EXAMINATION: (Recommend ? 5 systems)   Vital Signs: /70  Pulse 97  Temp 97.7  F (36.5  C) (Oral)  Resp 18  Wt 66.5 kg (146 lb 9.7 oz)  SpO2 95%  BMI 21.65 kg/m2   GENERAL:  Patient laying in bed, intubated and sedated.    HEENT:  Head is normocephalic, atraumatic   EYES:  Eyes have icteric sclerae without conjunctival injection  ENT:  Intubated   NECK:  No  Cervical lymphadenopathy  LUNGS:  decreased breath sounds in lungs from anterior   CARDIOVASCULAR:  Regular rate and rhythm with 2/6 LUSB murmurs, gallops or rubs.  ABDOMEN:  Decreased bowel sounds, soft abdomen, nontender. No appreciable hepatosplenomegaly  SKIN:  left middle finger - black tip/ dry with several small dark colored macular lesions. Right index finger turns darkish discoloration   Line(s) are in place without any surrounding erythema or exudate.   NEUROLOGIC:  sedated   CURRENT LINES: left femoral arterial line, right dialysis catheter, R IJ, L [place].     NEW DATA/RESULTS:     Culture Micro   Date Value Ref Range Status   01/04/2018 No growth after 3 days  Preliminary   01/04/2018 No growth after 3 days  Preliminary   01/04/2018 Light growth  Normal griselda    Final   01/04/2018 (A)  Final    Moderate growth  Methicillin resistant Staphylococcus aureus (MRSA)     01/02/2018 (A)  Final    Heavy growth  Methicillin resistant Staphylococcus aureus (MRSA)         Recent Labs   Lab Test  03/14/16   1217  08/18/15   1423  03/11/15   1727  02/20/15   1341  11/20/14   1140  09/18/14   0900   CRP  4.3  4.1  6.4  140.0*  10.8*  3.5     Recent Labs   Lab Test  01/07/18   0945  01/07/18   0335  01/06/18   2204  01/06/18   1540  01/06/18   0954  01/06/18   0334   WBC  21.7*  23.5*   16.3*  13.4*  13.4*  14.2*     Recent Labs   Lab Test  01/07/18   0945  01/07/18   0335  01/06/18   2204  01/06/18   1540   CR  1.49*  1.50*  1.53*  1.83*   GFRESTIMATED  51*  50*  49*  40*       Hematology Studies  Recent Labs   Lab Test  01/07/18   0945  01/07/18   0335  01/06/18   2204  01/06/18 2006 01/06/18   1540  01/06/18   0954  01/06/18   0334   WBC  21.7*  23.5*  16.3*   --   13.4*  13.4*  14.2*   ANEU  20.7*  22.0*  14.7*   --   12.2*  12.2*  12.7*   AEOS  0.0  0.0  0.0   --   0.0  0.0  0.0   HCT  35.9*  35.6*  35.4*   --   35.3*  35.4*  35.0*   PLT  70*  66*  71*  70*  79*  70*  73*       Metabolic  Recent Labs   Lab Test  01/07/18   0945  01/07/18   0335  01/06/18   2204   NA  140  139  137   BUN  22  24  24   CO2  19*  21  19*   CR  1.49*  1.50*  1.53*   GFRESTIMATED  51*  50*  49*     Hepatic Studies  Recent Labs   Lab Test  01/07/18   0945  01/07/18   0335  01/06/18   2204   BILITOTAL  3.9*  4.1*  3.8*   ALKPHOS  68  66  69   ALBUMIN  2.0*  1.9*  1.9*   AST  46*  43  50*   ALT  37  36  36       Immunologlobulins  Recent Labs   Lab Test  03/11/15   1727  05/22/14   1215   IGG  1790*  1300   IGM   --   111   IGA   --   179   SED  12   --      Radiology Imaging  XR 1/6/18:  Impression:   1. Stable perihilar and bibasilar opacities, and increased small right  pleural effusion.  2. Stable support devices, including the endotracheal tube tip at the  level of the thoracic inlet.     CT head 1/5/18  Impression:  1. No acute intracranial pathology.  2. Mucosal thickening of the ethmoid air cells and sphenoid locules,  nonspecific, can be seen with sinusitis.  3. Bilateral mastoid effusion.    Abd Xray 1/5/2018  Impression:  Nonobstructive bowel gas pattern. No convincing radiographic evidence  of small bowel obstruction or megacolon.     Cardiology Studies:  Echocardiogram 1/2/2018:  Global and regional left ventricular function is normal with an EF of 60-65%.  Paradoxical septal motion consistent with  right ventricular pressure and volume overload is present.  Severe right ventricular dilation is present. Global right ventricular function is severely reduced. Severe right atrial enlargement is present.Severe tricuspid insufficiency is present. Estimated pulmonary artery systolic pressure is 71 mmHg plus right atrial pressure.  The inferior vena cava is dilated at 3.2 cm without respiratory variability, consistent with increased right atrial pressure of 15 mmHg.  There is moderate circumferential pericardial effusion with a maximum diameter of 2.27 cm, with no signs of respiratory variation in right/left inflow Doppler. There is no RA or RV collapse during diastole, although this may be obscured by elevated right sided filling pressures. Would recommend clinical correlation to determine if patient has tamponade physiology.  This study was compared with the study from 2/1/16.  RV function has worsened

## 2018-01-07 NOTE — PROGRESS NOTES
CRRT STATUS NOTE    DATA:  Time:  6:25 PM  Pressures WNL: YES  Filter Status:  WDL    Problems Reported/Alarms Noted: None reported    Supplies Present:  YES    ASSESSMENT:  Patient Net Fluid Balance: Net up approximately 1900cc today.  Unable to pull any fluid today 2nd to hypotension.  Added Dopamine gtt this pm, Norepi weaned to off, Vaso at 2.2u/hr.   Vital Signs:  BP improved on Dopamine.  BPs 110, HR 90's, O2 sat 92% on 50% FIO2 and 7.5 peep.   Labs:  ABG acidotic, Lactate 5.8, Plt 79, WBC 13.4  Goals of Therapy:  Net off 0-100cc/hr keeping MAP >60    INTERVENTIONS:  Rinsed back for CT scan, filter pressures increasing so decided not to recirculate.  Restarted.     PLAN:  Continue to follow plan of care as tolerates.  Notify CRRT resource RN for questions/concerns #[id number]

## 2018-01-08 NOTE — PROGRESS NOTES
Nephrology Progress Note  01/08/2018          Mr Gill is a 47 yom w/ESRD on MWF HD due to HTN, pulm HTN (considered primary Vs sec to restrictive lung disease) admitted with hypotension and AMS.  Nephrology consulted for ESRD and shock state. He has been on CRRT since 1/1/18 with femoral access placed 1/1/18, also with AVF.    Interval History  Mr Gill continues with CRRT for ESRD with poor BP's, have been unable to pull fluid due to hypotension but starting to at least match today even if increasing vasopressors as CVP is nearly 30 and does not need more preload.  May transition to comfort cares as he has end stage RV failure, continuing CRRT for now.      Assessment & Recommendations:   ESRD on HD MWF  On CRRT due to poor hemodynamics   EDW 63.5 (68.5kg now)  He has been hypotensive and unable to tolerate UF in HD PTA, trying for at least I=O today.  Using temp line for CRRT, fistula with some concern for stenosis but may not end up using.        -- he is currently on pressers with significant hypotension will continue on CRRT  -- has not tolerated UF but trying to at least match today, ok to increase pressors to achieve.    -- Renal panel and mag , labs per CRRT protocol  -- daily weights and IO monitoring     Hypervolemia with shock state ( septic/cardiogenic)  He has probable peritonitis likely SBP with hx of cirrhosis and >700 WBC and 77% PMN  On ABx per ID  CRRT for volume control  Will UF as tolerated  Hypervolemia with IVC 3.2 on ECHO and no resp variability   Cards following for shock state  Currently on  vaso and levophed, restarted dopamine  Chronic hypotension, on midodrine TID 10mg PTA  ECHO with moderate pericardial effusion cards following  Hypotension reviewed with cards and they will optimize the presser support      Electrolytes-No issues on CRRT      pH-Stable on CRRT, HAGMA resolved.       Anemia  On Micera 40  Held for now.  Hb 10.2 today.        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    Phos binders  were on hold per the last nephrology notes due to low phos and he has on Tums   Will follow on levels , phos is 3 with calcium 8.8 and albumin 1.9  Last  (12/19)      Acute hypoxic resp failure with hypervolemia  Viral panel -ve  Intubated now with hypoxemia  He has been not tolerating UF even with pressers and increased pulm infiltrates likely sec to hypervolemia  Presser support per cards      Pulm htn and right heart failure hx  On sildenafil PTA  ECHO with Ef 60-65% and rt heart increased pressure and hypervolemia  PAP 71 + TR RAP 15      Recommendations were communicated to primary team via verbal communication.       Rafael Landeros  Clinical Nurse Specialist  [phone]    Review of Systems:   I reviewed the following systems:  Unable to complete ROS due to vent/sedation.      Physical Exam:   I/O last 3 completed shifts:  In: 3597.72 [I.V.:1863.72; Other:69; NG/GT:345]  Out: 1132 [Emesis/NG output:975; Other:157]   /70  Pulse 97  Temp 97.9  F (36.6  C) (Axillary)  Resp 20  Wt 68.5 kg (151 lb 0.2 oz)  SpO2 91%  BMI 22.3 kg/m2     GENERAL APPEARANCE: ill appearing  EYES:  - scleral icterus, pupils equal  HENT: mouth without ulcers or lesions  PULM: lungs crackles to auscultation,  bilaterally, equal air movement, no clubbing  CV: regular rhythm, normal rate, no rub, distant heart sounds     -JVD +     -edema trace   GI: soft, non tender, - distended  INTEGUMENT: no cyanosis, - rash  NEURO:  - asterixis , sedated and on vent  Access femoral CVC for CRRT and LUE AVF    Labs:   All labs reviewed by me  Electrolytes/Renal -   Recent Labs   Lab Test  01/08/18   1014  01/08/18   0355  01/07/18   2153   NA  137  139  135   POTASSIUM  3.6  3.6  3.2*   CHLORIDE  105  108  105   CO2  21  21  20   BUN  24  23  22   CR  1.38*  1.47*  1.33*   GLC  150*  160*  167*   JAZMINE  8.8  8.8  8.7   MAG  2.2  2.2  2.0   PHOS  3.0  2.9  3.0       CBC -   Recent Labs   Lab Test  01/08/18   1014  01/08/18   0355   01/07/18   2153   WBC  13.7*  14.8*  15.9*   HGB  10.2*  10.5*  10.3*   PLT  93*  89*  70*       LFTs -   Recent Labs   Lab Test  01/08/18   1014  01/08/18   0355  01/07/18   2153   ALKPHOS  58  60  58   BILITOTAL  4.0*  3.9*  3.7*   ALT  38  38  36   AST  34  38  38   PROTTOTAL  6.9  6.8  6.7*   ALBUMIN  1.9*  1.9*  1.8*       Iron Panel -   Recent Labs   Lab Test  06/04/17   0427  02/19/16   2310  09/12/13   1455   IRON  71  56  57   IRONSAT  30  18  20   MARANDA   --   369   --            Current Medications:    meropenem  1 g Intravenous Q6H     micafungin  100 mg Intravenous Q24H     vancomycin (VANCOCIN) IV  1,000 mg Intravenous Q24H     sildenafil  20 mg Oral or Feeding Tube TID     lidocaine (PF)  10 mL Other Once     heparin  5,000 Units Subcutaneous Q12H     protein modular  1 packet Per Feeding Tube BID     omeprazole  40 mg Oral or Feeding Tube Daily     NEPHRO-VIKRAM RX  1 tablet Per Feeding Tube Daily     influenza quadrivalent (PF) vacc age 3 yrs and older  0.5 mL Intramuscular Prior to discharge     aspirin  81 mg Oral Daily     loratadine  10 mg Oral Daily     midodrine  20 mg Oral TID     hydrocortisone sodium succinate PF  50 mg Intravenous Q6H       DOPamine 5 mcg/kg/min (01/08/18 1200)     IV fluid REPLACEMENT ONLY       replacement solution for CVVHD & CVVHDF (PrismaSol BGK 2/3.5) 12.5 mL/kg/hr (01/08/18 1138)     replacement solution for CVVHD & CVVHDF (PrismaSol BGK 2/3.5) 200 mL/hr at 01/08/18 0506     dialysate for CVVHD & CVVHDF (Phoxillum BK4/2.5) 12.5 mL/kg/hr (01/08/18 1137)     vasopressin (PITRESSIN) infusion ADULT (40 mL) 3 Units/hr (01/08/18 1200)     norepinephrine 0.03 mcg/kg/min (01/08/18 1200)     midazolam 1 mg/hr (01/08/18 1200)     fentaNYL 25 mcg/hr (01/08/18 1200)         Attestation:  This patient has been seen, examined and evaluated by me, Maureen Nelson as a shared visit with the NP/PA above.      In brief:  Jayy Gill is a 47 year old male with a Hx of ESRD on MWF HD sec to  chronic hypertensive heart disease , pulm HTN (considered primary Vs sec to restrictive lung disease) admitted with hypotension and AMS. Nephrology consulted for ESRD and shock state. He has been on CRRT since 1/1/18 with femoral access placed 1/1/18    Seen/ examined on CRRT. MInimal UF secondary to low BP. On pressors. PA pressure very high.   Palliative consult/ family meeting pending.     Physical exam:  Vitals along with Ins/outs reviewed.    My key findings:  Intuabted / sedated. On pressors  CV: high PA pressures. s1 +s2  Edema trace  Pulm: scattered crackles  bilaterally,       Key management decisions made by me:  Plan:  COntinue CRRT until goals of care established with family meeting. UF as tolerated    I have reviewed today's vitals and labs.    Maureen Nelson MD   Date of service (date I saw patient) January 8, 2018

## 2018-01-08 NOTE — PROGRESS NOTES
Social Work Services Progress Note    Hospital Day: 8  Date of Initial Social Work Evaluation:  NA   Collaborated with:  Chart review, attended rounds, Palliative NP, pt's significant other-Jacqueline, Palliative SW     Data:  Per discussion with Palliative NP, pt has no spouse, adult children, living parents or siblings. Wondering if it is appropriate to use SO as decision-maker. Discussed that SO is not able to be decisionmaker without formal health care directive designating her as such. Will reach out to SO to see if there is other family available.     Stopped by to see pt/SO earlier, but SO was already speaking with . Spoke with Jacqueline by phone later to check in and offer support. Jacqueline reported that she understands pt will be moving towards comfort care and she is doing her best to get things in order with a  home. She expressed frustration about the difficulty she's had, as she and patient are not legally , but that she is doing her best to take care of things. Inquired about supports and family. . She confirmed that pt was an only child, his parents have passed and his children are minors. Jacqueline reported that she has family that was visiting and that everyone is a phone call away, so she draws on those supports. Discussed checking in tmrw, as she plans to be at the hospital and is hoping to have hand molds done. Encouraged her to take care of herself and provided SW contact info should she need anything. She thanked SW for the call. She had also just spoken with Palliative SW, as well.     Intervention:  Supportive check-in, collaboration with interdisciplinary team     Assessment:  Pt's SO was very pleasant and receptive to SW. She sounded sad, understandably so, and seemed to be taking care of end of life issues while also trying to cope herself.    Plan:    Anticipated Disposition:  TBD    Barriers to d/c plan:  Medical stability     Follow Up:  SW will continue to follow, support and  assist with ongoing social service and discharge planning needs.     HUE Mcdonnell, [facility]  ICU Float   Pager: [phone]  [email or web address]     NO LETTER

## 2018-01-08 NOTE — PROGRESS NOTES
[facility] SERVICE: PROGRESS NOTE  Jayy Gill : 1970 Sex: male:   Medical record number [id number] Attending Physician: Shoaib Prado MD  Date of Service: 2018    Mr. Gill is a 47 year old man with PMHx of Pulmonary HTN/PVOD, Right Heart Failure, Cirrhosis, ESRD on MWF HD who presented to the ED with worsening fatigue, developed acute hypoxemic respiratory failure and was intubated.    PROBLEM LIST:   1. Septic Shock / cardiogenic shock   - blood cx - 18 - negative x2 , 18 - negative so far   - ascites 18 - no growth    - sputum cx 18 - mod MRSA,  - positive for light Pseudomonas aeruginosa  - nares - 18 - heavy MRSA   2. Pneumonia: Aspiration vs Ventilator Associated   - CXR 17 - interval increasing perihilar and bibasilar opacities ( compared to 18)  3. Decompensated Cirrhosis  - ascites 18 - no growth  4. Pulmonary Artery Hypertension  5. Right Heart Failure  6. ESRD with HD MWF  7. Leukocytosis ---> improving   8. Thrombocytopenia --> improving   9. History of C.difficile  . Diarrhea- tested negative for C.diff on 18   10. Right index finger - darkish discoloration ( improved ) and left middle finger - dry gangrene     RECOMMENDATIONS:   - palliative care consulted , await family members   -- continue Vancomycin and Mereopenem dosed per CrCl. and Micafungin.   -- monitor for signs of C diff infection given patient's prior C diff and recent abx use.     ID will continue to follow. Plan discussed with Primary team MD and RN      Micky Wilkes MD,M.Med.Sc.  Attending, Infectious Diseases  Pager: [phone]       SUBJECTIVE: (Recommend ? 4 systems)   On pressors. Palliative care consulted . NG tube placed with significant >900 drained     ROS:  Unable to obtain due to patient intubated and sedated.   No Known Allergies  Current Outpatient Prescriptions   Medication Sig Dispense Refill     [DISCONTINUED] SILDENAFIL CITRATE PO  Take 20 mg by mouth 3 times daily       CURRENT ANTI-INFECTIVES:   Zosyn and IV Vancomycin , hydrocort . Minimal trach secretion/suction. Left middle finger black tip. Unchanged from previous days per significant other.     EXAMINATION: (Recommend ? 5 systems)   Vital Signs: /70  Pulse 97  Temp 97.9  F (36.6  C) (Axillary)  Resp 19  Wt 68.5 kg (151 lb 0.2 oz)  SpO2 90%  BMI 22.3 kg/m2   GENERAL:  Patient laying in bed, intubated and sedated.    HEENT:  Head is normocephalic, atraumatic   EYES:  Eyes closed  ENT:  Intubated   NECK:  No  Cervical lymphadenopathy  LUNGS:  decreased breath sounds in lungs from anterior   CARDIOVASCULAR:  Regular rate and rhythm with 2/6 LUSB murmurs, gallops or rubs.  ABDOMEN:  Decreased bowel sounds, soft abdomen, nontender. No appreciable hepatosplenomegaly  SKIN:  left middle finger - black tip/ dry with several small dark colored macular lesions. Right index finger - normal color today   Line(s) are in place without any surrounding erythema or exudate.   NEUROLOGIC:  sedated   CURRENT LINES: left femoral arterial line, right dialysis catheter, R IJ, L [place].     NEW DATA/RESULTS:     Culture Micro   Date Value Ref Range Status   01/07/2018 No growth after 1 day  Preliminary   01/07/2018 No growth after 1 day  Preliminary   01/07/2018 (A)  Preliminary    Light growth  Pseudomonas aeruginosa  Susceptibility testing in progress     01/07/2018 Culture in progress  Preliminary   01/04/2018 No growth after 4 days  Preliminary       Recent Labs   Lab Test  03/14/16   1217  08/18/15   1423  03/11/15   1727  02/20/15   1341  11/20/14   1140  09/18/14   0900   CRP  4.3  4.1  6.4  140.0*  10.8*  3.5     Recent Labs   Lab Test  01/08/18   1014  01/08/18   0355  01/07/18   2153  01/07/18   1618  01/07/18   0945  01/07/18   0335   WBC  13.7*  14.8*  15.9*  17.0*  21.7*  23.5*     Recent Labs   Lab Test  01/08/18   1014  01/08/18   0355  01/07/18   2153  01/07/18   1618   CR  1.38*   1.47*  1.33*  1.46*   GFRESTIMATED  55*  51*  58*  52*       Hematology Studies  Recent Labs   Lab Test  01/08/18   1014  01/08/18   0355  01/07/18   2153  01/07/18   1618  01/07/18   0945  01/07/18   0335   WBC  13.7*  14.8*  15.9*  17.0*  21.7*  23.5*   ANEU  12.6*  14.1*  15.3*  16.6*  20.7*  22.0*   AEOS  0.0  0.0  0.0  0.0  0.0  0.0   HCT  32.1*  33.3*  33.1*  33.8*  35.9*  35.6*   PLT  93*  89*  70*  76*  70*  66*       Metabolic  Recent Labs   Lab Test  01/08/18   1014  01/08/18   0355  01/07/18   2153   NA  137  139  135   BUN  24  23  22   CO2  21  21  20   CR  1.38*  1.47*  1.33*   GFRESTIMATED  55*  51*  58*     Hepatic Studies  Recent Labs   Lab Test  01/08/18   1014  01/08/18   0355  01/07/18   2153   BILITOTAL  4.0*  3.9*  3.7*   ALKPHOS  58  60  58   ALBUMIN  1.9*  1.9*  1.8*   AST  34  38  38   ALT  38  38  36       Immunologlobulins  Recent Labs   Lab Test  03/11/15   1727  05/22/14   1215   IGG  1790*  1300   IGM   --   111   IGA   --   179   SED  12   --      Radiology Imaging  XR 1/6/18:  Impression:   1. Stable perihilar and bibasilar opacities, and increased small right  pleural effusion.  2. Stable support devices, including the endotracheal tube tip at the  level of the thoracic inlet.     CT head 1/5/18  Impression:  1. No acute intracranial pathology.  2. Mucosal thickening of the ethmoid air cells and sphenoid locules,  nonspecific, can be seen with sinusitis.  3. Bilateral mastoid effusion.    Abd Xray 1/5/2018  Impression:  Nonobstructive bowel gas pattern. No convincing radiographic evidence  of small bowel obstruction or megacolon.     Cardiology Studies:  Echocardiogram 1/2/2018:  Global and regional left ventricular function is normal with an EF of 60-65%.  Paradoxical septal motion consistent with right ventricular pressure and volume overload is present.  Severe right ventricular dilation is present. Global right ventricular function is severely reduced. Severe right atrial  enlargement is present.Severe tricuspid insufficiency is present. Estimated pulmonary artery systolic pressure is 71 mmHg plus right atrial pressure.  The inferior vena cava is dilated at 3.2 cm without respiratory variability, consistent with increased right atrial pressure of 15 mmHg.  There is moderate circumferential pericardial effusion with a maximum diameter of 2.27 cm, with no signs of respiratory variation in right/left inflow Doppler. There is no RA or RV collapse during diastole, although this may be obscured by elevated right sided filling pressures. Would recommend clinical correlation to determine if patient has tamponade physiology.  This study was compared with the study from 2/1/16.  RV function has worsened

## 2018-01-08 NOTE — PHARMACY-VANCOMYCIN DOSING SERVICE
Pharmacy Vancomycin Note  Date of Service 2018  Patient's  1970   47 year old, male    Indication: Ventilator-Associated Pneumonia  Goal Trough Level: 15-20 mg/L  Day of Therapy: 3  Current Vancomycin regimen:  1,250 mg IV q24h    Current estimated CrCl = Estimated Creatinine Clearance: 59.5 mL/min (based on Cr of 1.46).    Renal: CRRT    Recent Vancomycin Levels (past 3 days)  2018:  6:08 PM Vancomycin Level 22.4 mg/L    Vancomycin IV Administrations (past 72 hours)                   vancomycin (VANCOCIN) 1,250 mg in NaCl 0.9 % 250 mL intermittent infusion (mg) 1,250 mg New Bag 18 1653                Nephrotoxins and other renal medications (Future)    Start     Dose/Rate Route Frequency Ordered Stop    18  vancomycin (VANCOCIN) 1000 mg in dextrose 5% 200 mL PREMIX      1,000 mg  200 mL/hr over 1 Hours Intravenous EVERY 24 HOURS 18 1949      18 0530  norepinephrine (LEVOPHED) 16 mg in D5W 250 mL infusion      0.03-0.4 mcg/kg/min × 61.6 kg (Dosing Weight)  1.7-23.1 mL/hr  Intravenous CONTINUOUS 18 0527      18 0330  vasopressin (PITRESSIN) 40 Units in D5W 40 mL infusion      0.5-4 Units/hr  0.5-4 mL/hr  Intravenous CONTINUOUS 18 0316               Contrast Orders - past 72 hours (72h ago through future)    Start     Dose/Rate Route Frequency Ordered Stop    18 1445  iopamidol (ISOVUE-370) solution 100 mL      100 mL Intravenous ONCE 18 1438 18 1540          Interpretation of levels and current regimen:  Trough level (~25 hr level) is  Supratherapeutic    Has serum creatinine changed > 50% in last 72 hours: No    Urine output: anuric  Renal Function: CRRT    Plan:  1.  Decrease Dose to 1,000mg q24h  2.  Pharmacy will check trough levels as appropriate in 1-3 Days.      Elzbieta Sam, PharmD Resident        .

## 2018-01-08 NOTE — PROGRESS NOTES
Cardiology Progress Note    HPI: Mr. Gill is a 48 yo male with group I pulmonary hypertension documented to be from pulmonary veno-occlusive disease but maintained on pulmonary vasodilators prior to admission who was admitted with predominantly septic shock attributed to spontaneous bacterial peritonitis. On-going attempts at volume removal through CVVHD (has ESRD).  On the ventilator with broad-spectrum antibiotics and multiple pressors.    Overnight/subj: Overnight intermittently required norepi, otherwise on dopamine/vaso.  Slightly down on FiO2.  No major changes in overall clinical status.    VS reviewed: Notable for temp: 97.9-98, HR 93-96, BP 88//63, MAP 59-74, oxygenating high 90s on AC, RR 18, , PEEP 10, FiO2 80%  I/O: 3664 in / 147 out yday, net +3517. Since  in / 995 out    Given SVo2 59, SAo2 96, CVP 26, MAP 74 the CO 4.46 (2.44),   Wt: on admit 64.8 kg (142 lb), yday 66.5 kg (146 lb), tday 68.5 kg (151 lb)  Drips: Dopamine 5 mcg/kg/min, vasopressin 3 u/hr, fentanyl 25 mcg/hr, versed 1 mg/hr  Scheduled Meds: ASA, meropenem, micafungin, vancomycin, sildenafil 20 TID, hydrocortisone 50 mg Q6H       Radiology Imaging  XR Abdomen: 1/8/18  Impression:    Feeding tube tip projects over the distal duodenum. Enteric tube tip  and sidehole project over the stomach. Nonobstructive bowel gas  Pattern.    Cardiology Studies:  N/A    Labs:           Phys exam:  GEN: Lying in bed, intubated/sedated  Pulm: On ventilator, diffuse bilateral crackles appreciated  Cardiac: RRR  MSK:  Stable small black circles noted on dorsum of L hand.  Black tissue noted on L middle finger extending to the base of the MTP.  Strong radial pulses bilaterally.  GI: Distended, firm, no change from prior exam  Neuro: Sedated    ASSESSMENT/PLAN:  Cardiovascular    Continues to require dopamine and vaso with intermittent norepi.      Group I pulmonary hypertension with RV failure- resumed sildenafil yesterday,  continue to hold macitentan for now.  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  He may have predominantly idiopathic PAH with a low burden of patchy pulmonary venous occlusion.    Per nephrology patient has history of digital ischemia on the L hand associated with his fistula.  Consulted plastics and vascular who recommended obtaining finger pressures.  Will hold off today given palliative consult and possible move towards comfort cares.  If decision is made not to go comfort will need to obtain finger pressures and notify vascular.  Pulmonary    Hypoxic respiratory failure with MRSA growing in sputum and worsening infiltrates on CXR, likely worsening pulmonary edema.    Despite pre-existing pulmonary hypertension he was not on oxygen at home.    Continuing ventilator support with stress dose steroids  Infectious Disease    On vancomycin, meropenem and micafungin per ID    Growing MRSA in sputum and has evidence of SBP on abdominal fluid from admission    Repeat sputum 1/7 growing pseudomonas  Gastrointestinal    Cirrhosis    Spontaneous bacterial peritonitis with ascitic neutrocytosis but no organisms grown on collected cultures   Renal    ESRD on HD prior to admission, now on CVVHD over intermittent HD because of hypotension.   Has worsening of pulmonary opacities on CXR concerning for infection vs pulmonary edema.  Not tolerating pulling fluid due to low MAPs.  Discussed with nephrology, will increase pressors today if needed to attempt more aggressive fluid removal (or at least matching).  Neuro    Patient previously noted to have dysconjugate gaze, head CT without significant abnormalities    On midazolam & fentanyl infusions for sedation and analgesia; titrating to RASS -2-3.  Goals of care    Patient remains critically ill.  Palliative consulted today, appreciate recommendations and  assistance.    No available record of POA and per patient's SO patient has never filled out formal paperwork.  Have requested social work attempt to find next of kin.  Prognosis remains extremely poor but will continue current measures until further information regarding POA is available.     Heme: chronic, stable anemia     Access: RIJ, R femoral CVC, L femoral arterial line  Nutrition:  Advancing tube feeds with guidance from Nutrition  Code status: DNR  DVT prophy: Heparin subq     Ganga Petty MD  Internal Medicine PGY-2  [phone]

## 2018-01-08 NOTE — CONSULTS
[facility], [place]    Palliative Care Consultation Note    Patient: Jayy Gill  Date of Admission:  1/1/2018    Requesting provider/team: Ganga Petty MD  Reason for consult: Goals of care    Recommendations:  -Social work consult and continue to try to find extended family members to help make decisions  -Patient has told medical team members in the ED he wanted to be DNR and only wanted short term intubation  -Continue to try to find Advanced directive or Health Care Agent documentation or POA documentation  -If transitioning to comfort cares in the near future, our team will be glad to provide recommendations for terminal extubation    Please see assessment below for rationale.    These recommendations have been discussed with the primary team.    Thank you for the opportunity to participate in the care of this patient and family. Our team will follow along. Please feel free to contact the on-call Palliative provider with any urgent needs.     Della Barnett MD  Internal Medicine-Pediatrics Resident, PGY4  [phone]    CAROLYN Molina CNS  Palliative Care Consult Team  Pager: [phone]    [facility] Inpatient Team Consult pager [phone] (M-F 8-4:30)  After-hours Answering Service [phone]   Palliative Clinic: [phone]     Assessment:  Jayy Gill is a 47 year old male with chronic HTN with resultant ESRD (on HD) severe pulmonary hypertension (thought to be PAH) complicated by RV failure, cirrhosis with recurrent ascites (thought to be related to CHF) who presented with altered mental status, found to have septic shock, likely from SBP and VAP.  Palliative care consulted for assistance with goals of care.     Goals of care: Had long discussion with Mr. Gill's significant other Teneka at his bedside. She gave a clear picture of his overall chronic medical illnesses and his more recent decline. She outlined that throughout all of this, she has been encouraging  "him to \"keep fighting\" and he has been wanting to \"keep fighting\" as long as he could participate in his hobbies. (He loves race cars and restoring old cars.) She said this past few weeks was the first time she heard him mention that he was \"tired of fighting.\" She said that in the ED they talked to the ED doctor about a \"short term\" intubation but that he wouldn't want to live on a machine if he couldn't talk/interact with anyone. He was clear to [place] that he does not want shocks or chest compressions (DNR).   Given the lack of improvement and actual decline during his hospitalization, it is becoming more and more likely that he will not survive this hospitalization. In that setting, it would be appropriate to avoid a prolonged intubation without good chance of recovery and move toward comfort cares, in light of his previously communicated wishes (not wanting prolonged intubation).   Jacqueline is preparing for this and is considering transition to comfort cares.    Unfortunately, due to his intubation/sedation, he does not have decision-making capacity and he has no known Advanced Directive or formal documentation that he wants Jacqueline Cordova to be his health care agent. He has verbally told Jacqueline and ED providers that he wants Jacqueline to be his health care agent.   According to [facility] policy:  \"Any patient who lacks decision making capacity must have an authorized surrogate decision-maker. If there is no authorized surrogate decision maker known or available, and the care need is not emergent or urgent, consider consultation with Ethics and/or Risk Management.\"    In terms of authorized surrogate decision- makers, the [facility] policy states:  \"Next of Kin   Next of kin may be appropriate patient decision makers only if there is no advance directive naming a health care agent or no court-appointed guardian.  [place] does not have a single statute which defines health care decision-making by a patient's next of " "kin in all situations. However, various [place] laws speak to next of kin decision making in specific situations.   If a patient, committed under the [place] Civil Commitment and Treatment statute, has not been declared incompetent by a court, but is determined by the head of the treatment facility to lack decisional capacity, next of kin in the following order may give consent:   -Spouse   -Parent   -Adult children   -Adult siblings  If the above individuals cannot be located, refuse to make decisions for the patient or are unable to make decisions, contact Risk Management for consideration of the need to petition for guardianship.   Other statutes, legal authorities and laws in other jurisdictions refer to the following order of decision-making:   -Spouse   -Adult children   -Parents   -Adult siblings   -More remote degrees of kinship  Absent a valid advance health care directive naming an unrelated person as health care agent, persons unrelated to the patient have no legal authority to make health care decisions on behalf of the patient. A patient without an advance health care directive who indicates to caregivers that she or he prefers an unrelated person to make health care decisions for him in the event of his incapacity should be advised at that time to execute an advance directive and be offered assistance to do so.\"    Spoke to Risk Management who recommended that we continue to search for a more remote relative of Mr. Gill's.     In the meantime, the medical team will continue to make decisions under Emergency settings/Best judgment and use Ms. Jacqueline Cordova as a consultant into Mr. Gill's wishes and values.     History of Present Illness   Sources of History: electronic health record and patient's significant other    Over the past 24-48 hours he has been stable but his overall condition has declined. He remains on 2 pressors, intubated with 70% FiO2. Sedated and appears comfortable. His " significant other Jacqueline says he has appeared comfortable while intubated/sedated.    ROS:  Palliative Symptom Review - unable to obtain due to intubation/sedation.     Past Medical History:   Past Medical History:   Diagnosis Date     Cardiomyopathy (H)      Dialysis patient (H)     M-W-F     Diarrhea     C diff     ESRD (end stage renal disease) (H)      HLD (hyperlipidemia)      Hypertension      Pulmonary hypertension 11/21/2013          Past Surgical History:   Past Surgical History:   Procedure Laterality Date     AV FISTULA OR GRAFT ARTERIAL      left upper arm     COLONOSCOPY  7/3015     COLONOSCOPY WITH CO2 INSUFFLATION N/A 11/10/2015    Procedure: COLONOSCOPY WITH CO2 INSUFFLATION;  Surgeon: Samuel Fritz MD;  Location: [facility] OR     ENDOBRONCHIAL ULTRASOUND FLEXIBLE N/A 9/18/2014    Procedure: ENDOBRONCHIAL ULTRASOUND FLEXIBLE;  Surgeon: Jg Morrison MD;  Location: [facility] GI     ENDOBRONCHIAL ULTRASOUND FLEXIBLE N/A 12/18/2014    Procedure: ENDOBRONCHIAL ULTRASOUND FLEXIBLE;  Surgeon: Jg Morrison MD;  Location: [facility] OR           Family History:   Family History   Problem Relation Age of Onset     Hypertension Mother      DIABETES Father      Family history reviewed       Allergies:   No Known Allergies       Medications:   I have reviewed this patient's medication profile and medications given in the past 24 hours.  Current Facility-Administered Medications   Medication     meropenem (MERREM) 1 g in 20 mL SWFI for IVP Vial     micafungin (MYCAMINE) 100 mg in NaCl 0.9 % 100 mL intermittent infusion     vancomycin (VANCOCIN) 1000 mg in dextrose 5% 200 mL PREMIX     DOPamine 400 mg in dextrose 5% 250 mL (adult std) - premix     sildenafil (REVATIO) suspension 20 mg     lidocaine (PF) (XYLOCAINE) 1 % injection 100 mg     heparin sodium PF injection 5,000 Units     protein modular (PROSource TF) 1 packet     albuterol (PROAIR HFA/PROVENTIL HFA/VENTOLIN HFA) Inhaler 6 puff     omeprazole  (priLOSEC) suspension 40 mg     artificial tears ophthalmic ointment     dextrose 10 % 1,000 mL infusion     NEPHRO-VIKRAM RX tablet 1 mg     PRE-filter replacement solution for CVVHD & CVVHDF (PrismaSol BGK 2/3.5)     POST-filter replacement solution for CVVHD & CVVHDF (PrismaSol BGK 2/3.5)     dialysate for CVVHD & CVVHDF (Phoxillum BK4/2.5)     naloxone (NARCAN) injection 0.1-0.4 mg     hypromellose-dextran (ARTIFICAL TEARS) ophthalmic solution 1 drop     influenza quadrivalent (PF) vacc age 3 yrs and older (FLUZONE or Flulaval) injection 0.5 mL     vasopressin (PITRESSIN) 40 Units in D5W 40 mL infusion     aspirin chewable tablet 81 mg     hydrOXYzine (ATARAX) tablet 50 mg     loratadine (CLARITIN) tablet 10 mg     midodrine (PROAMATINE) tablet 20 mg     ondansetron (ZOFRAN-ODT) ODT tab 4 mg    Or     ondansetron (ZOFRAN) injection 4 mg     glucose 40 % gel 15-30 g    Or     dextrose 50 % injection 25-50 mL    Or     glucagon injection 1 mg     hydrocortisone sodium succinate PF (solu-CORTEF) injection 50 mg     norepinephrine (LEVOPHED) 16 mg in D5W 250 mL infusion     midazolam (VERSED) 100 mg in sodium chloride 0.9% 100 mL infusion     midazolam (VERSED) injection 0.5-1 mg     fentaNYL (SUBLIMAZE) infusion     magnesium sulfate 2 g in NS intermittent infusion (PharMEDium or FV Cmpd)     sodium phosphate 20 mmol in D5W intermittent infusion     potassium chloride 20 mEq in 50 mL intermittent infusion     Social History:   Lives with Jacqueline. The two of them are not technically  she says but they have been together for 10 years. She says that they have a 5 year old child together. Mr. Gill also has a 14-year-old son (from a different relationship) and Alissa has a 17 year old daughter (from a different relationship).   He really enjoys race cars- used to race them himself but no longer does. Used to attend drag races.   He loves getting a car to work on restoring or tinkering with.   No longer working due  to his medical problems.          Physical Exam:   Vital Signs: Temp: 97.9  F (36.6  C) Temp src: Axillary BP: 110/70   Heart Rate: 95 Resp: 18 SpO2: 97 % O2 Device: Mechanical Ventilator    Weight: 151 lbs .24 oz    Physical Exam:  Constitutional: Thin appearing man, resting in bed, intubated/sedated. Appears comfortable.   HEENT: Normocephalic, without obvious abnormality, atramatic, ETT in place. Ophthalmic ointment in place. No nasoflaring. Nose is normal in appearance  Respiratory: Intubated. Breathing appears comfortable, synchronous with the vent  Cardiovascular: Regular rate and rhythm, normal S1 and S2, no S3 or S4, and no murmur noted.  Musculoskeletal: No redness, warmth, or swelling of the joints. Minimal spontaneous movements of extremities.   Neurologic: Sedated.  Skin: No rashes, erythema, pallor, petechia or purpura.     Data reviewed:     ROUTINE ICU LABS (Last four results)  CMP  Recent Labs  Lab 01/08/18 0355 01/07/18 2153 01/07/18  1618 01/07/18  0945    135 140 140   POTASSIUM 3.6 3.2* 3.2* 3.2*   CHLORIDE 108 105 107 108   CO2 21 20 22 19*   ANIONGAP 11 10 11 13   * 167* 179* 184*   BUN 23 22 21 22   CR 1.47* 1.33* 1.46* 1.49*   GFRESTIMATED 51* 58* 52* 51*   GFRESTBLACK 62 70 63 61   JAZMINE 8.8 8.7 8.8 8.7   MAG 2.2 2.0 2.1 2.1   PHOS 2.9 3.0 3.8 2.4*   PROTTOTAL 6.8 6.7* 6.7* 7.1   ALBUMIN 1.9* 1.8* 1.9* 2.0*   BILITOTAL 3.9* 3.7* 3.8* 3.9*   ALKPHOS 60 58 60 68   AST 38 38 40 46*   ALT 38 36 35 37     CBC  Recent Labs  Lab 01/08/18  0355 01/07/18 2153 01/07/18  1618 01/07/18  0945   WBC 14.8* 15.9* 17.0* 21.7*   RBC 3.48* 3.42* 3.51* 3.70*   HGB 10.5* 10.3* 10.6* 11.2*   HCT 33.3* 33.1* 33.8* 35.9*   MCV 96 97 96 97   MCH 30.2 30.1 30.2 30.3   MCHC 31.5 31.1* 31.4* 31.2*   RDW 17.9* 17.6* 17.4* 17.4*   PLT 89* 70* 76* 70*     INR  Recent Labs  Lab 01/06/18  0334 01/05/18  0327 01/02/18  0003   INR 1.61* 1.55* 2.06*     Arterial Blood Gas  Recent Labs  Lab 01/08/18  0355  01/07/18  2153 01/07/18  1618 01/07/18  0945 01/07/18  0335 01/07/18  0011   PH 7.30*  --   --  7.28* 7.32* 7.29*   PCO2 41  --   --  44 40 45   PO2 96  --   --  67* 73* 79*   HCO3 20*  --   --  20* 21 21   O2PER 80  80 80 80 70.0  70.0 80  80 100     CAROLYN Molina CNS  Palliative Care Consult Team  Pager: [phone]    [facility] Inpatient Team Consult pager [phone] (M-F 8-4:30)  After-hours Answering Service [phone]  Palliative Clinic: [phone]     Total time spent was 70 minutes,  >50% of time was spent counseling and/or coordination of care regarding goals of care    I was present and participated in the interview and exam of this patient. I have reviewed labs and imaging indpendently. I agree with the above document and have edited it to reflect my findings.

## 2018-01-08 NOTE — PROGRESS NOTES
Received a consult for suspected pressure injury on coccyx. Attempted to assess the area, per RN pt is not stable to be turned at this time and planning for possible comfort cares. RN to page WOC when able to turn the pt.

## 2018-01-08 NOTE — PROGRESS NOTES
"SPIRITUAL HEALTH SERVICES  SPIRITUAL ASSESSMENT Progress Note  [facility] ([place]) 4A  ON-CALL VISIT    PRIMARY FOCUS:     Goals of care    Emotional/spiritual/Congregational distress    Support for coping    ILLNESS CIRCUMSTANCES:   Responded to Epic consult order for end of life concerns. Reviewed documentation. Reflective conversation shared with pt's significant other Jacqueline, which integrated elements of illness and family narratives.     Context of Serious Illness/Symptom(s) - Jacqueline said that she is pt's \"power of \" and health care decision maker. She said that he has been declining this week but has been sick since 2009. She has been his caregiver for that time.     Resources for Support - Some family and friends.     DISTRESS:     Emotional/Existential/Relational Distress - Jacqueline said she has been \"going through the grief process\" this week since she learned that he might not get better.     Spiritual/Temple Distress - Not discussed.     Social/Cultural/Economic Distress - Not discussed.     SPIRITUAL/Rastafarian COPING:     Spiritism/Nory - Jacqueline identifies as spiritual but not Congregational. She said \"I believe if the Father, the Son, and the Holy Ghost, but I prefer to just keep my heart open to God.\" She is not currently connected to a Islam but says she would like to be in the future for the \"fellowship.\"    Spiritual Practice(s) - Prayer, which we shared. Jacqueline said she has prayed for him and anointed him.     Emotional/Existential/Relational Connections -   poppy Phillips reflected on how she understands the years she spend caring for Jayy as part of God's plan for her. She said \"there's a reason we came into each other's lives and I believe God wanted me to take care of him; and I've done it.\" She talked about the challenges of being his caregiver and how she feels like she fulfilled god's plan for her by caring for him when he needed it.   o Jayy and Jacqueline have a 6yo daughter. We reflected together on " "how Jacqueline will talk about death and dying with her.     GOALS OF CARE:    Goals of Care - Jacqueline said that Jayy was clear with her about wanting to be DNR and that he was ok being intubated if he had a chance of getting better again. She said, \"I know what he wants. He's ready.\"    Meaning/Sense-Making - Jacqueline said she is coming to terms with her grief and struggling to find a way to \"let him go.\"    PLAN: Jacqueline said she would appreciate a  stopping by tomorrow to pray for Jayy. Will notify unit  of request. [facility] remains available for any further needs or requests.     MAHNAZ BaDiv  Associate   Pager [phone]    "

## 2018-01-08 NOTE — PROGRESS NOTES
"Focus:  Palliative Care  Data: Jayy is a 47 year old man with a history of chronic HTN with resultant ESRD on dialysis, severe pulmonary hypertension complicated by RV failure, cirrhosis with recurrent asciter who presented with altered mental status, found to have septic shock.  Palliative care team has been consulted for goals of care.    Interventions: I spoke with his significant other (Nancy) via phone this afternoon regarding how she and the children are coping. They have a 5 year old daughter and  14 year old and 19 year old children are also involved. I believe the older children are her children from a previous relationship. She shares she plans to bring their 5 year old daughter to the hospital tomorrow at around 3pm. Their daughter's  recently  so she has prior experience with death. School counselors and teachers have been a resource to the family. Nancy becomes tearful when sharing about her conversation with their daughter. Nancy has explained to her that death is permanent. She told her that \"Daddy will be going to [facility] soon, just like your teacher.\"  Provided psychoeducation on how to talk to children about serious illness and death.     Nancy shares that she finds her rossy community to be supportive, however, she has a hard time letting people help. She realizes this and is trying to accept more help.     Nancy has questions regarding  home arrangements and Jayy's lack of life insurance. She is in communication with Unit SW Bettie Caldwell. She welcomes additional resources for helping children cope. She informs me that the tentative plan is to transition to comfort cares tomorrow but shared conflicting scheduling with the  home. It has been noted that Jayy does not have a HCD on file. He and Nancy are not legally . Unit  is involved.     Assessment: Nancy appears to be grieving appropriately given the situation. She is continuing to work while " also providing care to Jayy and support to her family. She self-identifies as one who finds it difficult to accept help. She appears receptive to supportive services here at the hospital.     Plan: I will not be available tomorrow afternoon when Nancy and their daughter arrive. Palliative  (Anjali Wharton) plans to be available and will facilitate hand impressions with the family at that time. I will either drop off or mail resources to Nancy, per her request.     HUE Chauhan, [facility]  Palliative Care Clinical Social Work Fellow  Pager: [phone]

## 2018-01-08 NOTE — PROGRESS NOTES
CRRT STATUS NOTE    DATA:  Time:    Pressures WNL: yes  Filter Status:  WDL    Problems Reported/Alarms Noted:  none    Supplies Present:  YES    ASSESSMENT:  Patient Net Fluid Balance:  Net up ~3500cc/yesterday. Down 222cc since midnight. Weight is up 2kgs since yesterday. Anuric.OG placed this shift with 975cc output.  Vital Signs:  SR 90s. MAP 60s-70s on DOPAmine and vasopressin. Levophed on/off overnight. Suprasystemic PA pressures: 100s/40s. CVP 26 --> 28. Kate CI 2.0 --> 2.3 with CO 3.7 --> 4.3. SVR ~1100 --> ~850 and SvO2 55 --> 59.  Afebrile with blood warmer& ariana hugger on. FiO2 on vent titrated between %.   Labs:  Please see below. No intervention needed.   Goals of Therapy:  Unable to pull fluid d/t increasing pressor needs, but meeting goal of 0-100/hr with MAP > 60 d/t large gastric output.     INTERVENTIONS:   Restarted this AM, no issues since.     PLAN:  Continue fluid removal if tolerated per goals of therapy.  Check circuit daily and change circuit q72 hrs (last restarted 1/8 0516) and PRN.  Please contact the CRRT resource RN at [phone] with any questions/concerns.    -Arlette Lorenzana RN 1/8/2018 7:00 AM       Most Recent 3 CBC's:  Recent Labs   Lab Test  01/08/18   0355  01/07/18   2153  01/07/18   1618   WBC  14.8*  15.9*  17.0*   HGB  10.5*  10.3*  10.6*   MCV  96  97  96   PLT  89*  70*  76*     Most Recent 3 BMP's:  Recent Labs   Lab Test  01/08/18   0355  01/07/18   2153  01/07/18   1618   NA  139  135  140   POTASSIUM  3.6  3.2*  3.2*   CHLORIDE  108  105  107   CO2  21  20  22   BUN  23  22  21   CR  1.47*  1.33*  1.46*   ANIONGAP  11  10  11   JAZMINE  8.8  8.7  8.8   GLC  160*  167*  179*

## 2018-01-08 NOTE — PROGRESS NOTES
Vascular Surgery Progress Note    Patient remains intubated and sedated, on multiple pressors in ICU    /70  Pulse 97  Temp 97.9  F (36.6  C) (Axillary)  Resp 18  Wt 68.5 kg (151 lb 0.2 oz)  SpO2 98%  BMI 22.3 kg/m2      Faint thrill in left AVF  Doppler right radial pulse and xiong  with improvement in pulse with compression of fistula  Right 3rd digit ischemia slightly worse from yesterday, involving distal aspect.    WBC   Date Value Ref Range Status   01/08/2018 14.8 (H) 4.0 - 11.0 10e9/L Final   ]  Hemoglobin   Date Value Ref Range Status   01/08/2018 10.5 (L) 13.3 - 17.7 g/dL Final   ]  INR/Prothrombin Time  Creatinine   Date Value Ref Range Status   01/08/2018 1.47 (H) 0.66 - 1.25 mg/dL Final   ]        Intake/Output Summary (Last 24 hours) at 01/08/18 0824  Last data filed at 01/08/18 0700   Gross per 24 hour   Intake          3381.81 ml   Output             1136 ml   Net          2245.81 ml       CT upper extremity showing mild brachial luminal narrowing, difficult to assess vessels below the elbow due to fistula    Assessment/Plan:  46 yo male with Left brachiocephalic AVF, left third digit ischemia    Arterial US of left upper extremity with waveforms at wrist with and without compression of fistula, also obtain finger pressures to evaluate flow to remaining digits.  If significant decreased flow to hand may need to ligate fistula.  Possible transition to comfort cares today or tomorrow   Would need to repeat left arm ultrasound with finger pressures to determine if fistula ligation is needed, if comfort cares not initiated     Appreciate plastics involvement  Continue ICU cares in a complex patient.  Wean pressors as able.  Antibiotics vanc/zosyn  Vascular surgery will follow peripherally       Jennifer LEON, CNS  Division of Vascular Surgery  [facility]  Pager [phone]

## 2018-01-08 NOTE — PLAN OF CARE
Problem: Patient Care Overview  Goal: Plan of Care/Patient Progress Review  Outcome: No Change  D: 47 admitted for septic shock.       I/A:   Neuro: Pupils remain dysconjugate with upward/ outward gaze; sluggish but reactive. Not following commands. Intermittently withdraw in extremities; no movement noted in RUE.Sedated with versed and fentanyl. Will arouse with turns. Normothermic with bear hugger.   CV: MAP goal >60; titrating vaso and levo. Levophed off most of the night. Dopamine straight rate. MAP will drop to low 50s with turns; required levophed to be restarted in order to obtain goal.  SR with rare PAC/PVC; HR 90-110s. Obtaining FICKs q6 hrs;CO and CI improving. PA pressures 90-100s/30s. CVP 26-28. Dopplerable pulses; left side much weaker. Cool extremities.   Resp: CMV settings; % FiO2. See ABG and VBGs. Severely desats with turns and cares; as low as 47%. Required bagging x2 to obtain sats in high 80s. Tan thick secretions.Expiratory wheezing noted. Titrating FiO2 down when able.   GI: Emesis x1; OG placed- 900mls out in 1 hr. TF via NJ; 55ml/hr (goal rate). Absent BS this AM.   : Anuric. Remains on CRRT; net neg goal 0-100. Unable to achieve goal all night.   Skin: L hand middle finger remains black. Right pointer finger dusky. Able to reposition patient q2 hrs; Non blanchable area near coccyx. LUE fistula +thrill and bruit.   Endo: Notified team about increasing BG; will place order for subq insulin.       P: Wean pressors as able. attempt fluid removal without increasing pressor requirement. Continue with POC and update team with concerns.

## 2018-01-08 NOTE — PROGRESS NOTES
CRRT RESTART NOTE    Reason for Restart:  Air in blood alarm    Patient s Vascular Access: Catheter      Right femoral             Placement Confirmed:  YES  Manufacture:  Wevod  Model:  [id number]  Length/Lithuanian Size:  20cm x 12Fr  Flush Volume:  1.4cc each port. Aspirated before access.     DATA:  Procedure:  cvvhdf  Start Time:  0516  Machine#:  6  Filter:  m150  Blood Flow:   180 mL/min  Pre-Replacement Solution:  PrismaSol BGK 2/3.5   Post-Replacement Solution:  PrismaSol BGK 2/3.5   Dialysate Solution:  Phoxillum BK4/2.5   Pre-Replacement Solution Rate:  800mL/hr  Post-Replacement Solution Rate:  200mL/hr  Dialysate Flow Rate:  800mL/hr  Patient Removal Rate:  0 mL/hr  Anticoagulation Type and Rate:  na    ASSESSMENT:  How Patient Tolerated Restart:  No issues  Vital Signs:  SBP 90s with MAP 60s  Initial Pressures:  Access:  -29  Filter:  112  Return:  67  TMP:  37  Change in Filter Pressure:  16    INTERVENTIONS:  Restarted per patricia orders.     PLAN:  Continue fluid removal if tolerated per goals of therapy.  Check circuit daily and change circuit q72 hrs and PRN.  Please contact the CRRT resource RN at [phone] with any questions/concerns. With next restart, please clear settings and set up machine with syringe in line.    Arlette Lorenzana RN 1/8/2018 5:35 AM

## 2018-01-08 NOTE — PLAN OF CARE
Problem: Patient Care Overview  Goal: Plan of Care/Patient Progress Review  S:  MD reviewed w/ SO signs that pt is requiring increasing support.  B:  Cards 2 resident reviewed w/ SO that pt today is requiring increased Fi02, peep and vasopressors to keep in desired range-----also that WBC were elevated, blood cx done and new ATB started, and that we were unable to remove fluid while on CRRT.  Cards 2 described how pt is in tenuous state and that while we will continue to provide supportive care, that he is showing some instability.  SO stated that she had hoped he would make it until Tuesday at which time she was anticipating she would make any decisions on his behalf but since this was looking unlikely that she would contact people that were important to the patient to come and see him if they chose to.  A  was offered but the SO said a friend was coming to pray for the patient. She said she would try to have any family/friends come to see the pt tonight or in am.       Unable to pull fluid all shift, unable to do I =O of the fluid intake.  Dopamine increased at start of shift to straight rate of 5mcg.  Morning CI 1.9----later 2.3.  Weaned off norepi at end of shift x 30 min but had to resume at low dose to keep MAP 60.  Remains on vaso at 3.3 as well as low dose fent and versed.        Middle finger of left hand unchanged from 24 hours ago: black and dusky with small purple satellite lesions of approx 1/4 inch diameter scattered on dorsum of left hand.  New onset of dusky R hand index finger----blanchable.  Cards 2 inspected.  A:  As noted.  R:  Cont w/ care plan, supportive care for family/friends.

## 2018-01-08 NOTE — PROGRESS NOTES
Plastic Surgery Progress Note    Continues to be on multiple vasopressors. No improvement in status since yesterday.    Temp:  [97.7  F (36.5  C)-98  F (36.7  C)] 97.9  F (36.6  C)  Heart Rate:  [] 99  Resp:  [18-20] 18  MAP:  [51 mmHg-89 mmHg] 80 mmHg  Arterial Line BP: ()/(43-75) 113/68  FiO2 (%):  [60 %-100 %] 60 %  SpO2:  [88 %-100 %] 100 %  I/O last 3 completed shifts:  In: 3045.58 [I.V.:1303.58; Other:67; NG/GT:355]  Out: 1519 [Emesis/NG output:1325; Other:194]    Gen: Intubated and mechanically ventilated.  On exam of L hand, there is stable dry gangrene of LF from tip to PIP volarly and middle phalanx dorsally. Proximal to this, there is turgor in tissue, but the remaining hand is cold. There are dusky lesions along the dorsum of the RF at the DIP level and IF at the MP level that have worsened since yesterday. Pulse ox measurement at the finger tips are around 75%. There is no palpable pulse.    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    PLAN:   - No surgical intervention recommended from plastic surgery standpoint.  - Recommend continuing to warm LUE.  - Wean vasopressors as able.  - AV fistula management per vascular surgery.  - Patient is on vancomycin and zosyn per ICU.

## 2018-01-08 NOTE — PROGRESS NOTES
"SPIRITUAL HEALTH SERVICES  SPIRITUAL ASSESSMENT Progress Note (Palliative Focus)  [facility] ([place]) 4A    PRIMARY FOCUS:     Goals of care    Emotional/spiritual/Confucianism distress    Support for coping    REFERRAL SOURCE: Palliative consult service follow up visit per family request.    ILLNESS CIRCUMSTANCES:   Reviewed documentation. Reflective conversation shared with significant other Jacqueline which integrated elements of illness and family narratives.     Context of Serious Illness/Symptom(s) - Pilytonia said she is preparing for patient Jayy Gill's death, and plans extubation and move to comfort measures only tomorrow, 1/9/2017.    Resources for Support - Jacqueline named herself as a supportive resource for Jayy, and a community of women she prays with as a support for herself.    DISTRESS:     Emotional/Spiritual/Existential Distress - Pilytonia specifically named not being distressed as she anticipates Jayy's death. She believes death will be a relief for him, as he has been \"suffering\" for many years. She is concerned about how their daughter, David, 6 yo, will cope with Jayy's death, and also said that David had recently experienced the death of her .    Protestant Distress - None named directly in visit. Jacqueline talked about the absence of a strong support network through a Protestant; she has a fellowship group, but no Protestant home presently.    Social/Cultural/Economic Distress - Jacqueline asked about [facility]'s burial assistance program, and I printed for her the application.    SPIRITUAL/Mandaen COPING:     Christian/Nory - Islam, spiritual, per Jacqueline.    Spiritual Practice(s) - Jacqueline finds prayer comforting and has anointed Jayy.    Emotional/Relational/Existential Connections - Jacqueline talked about her role in caring for Jayy, and how fulfilling that has been for her.    GOALS OF CARE:    Goals of Care - Jacqueline would like Jayy to be comfortable and free from " pain.    Meaning/Sense-Making - Jacqueline is drawing on her own rossy for support in coping, and she would like some kind of end-of-life ritual with Jayy and family, to be determined.    PLAN: I will follow for spiritual support.    Anjali Wharton  Palliative   Pager [phone]  [facility] Inpatient Team Consult pager [phone] (M-F 8-4:30)  After-hours Answering Service [phone]

## 2018-01-09 NOTE — PROGRESS NOTES
SPIRITUAL HEALTH SERVICES  SPIRITUAL ASSESSMENT Progress Note (Palliative Focus)  [facility] ([place]) 4A    REFERRAL SOURCE: Palliative consult service follow up visit/RN referral.    Visit with patient Jayy Gill's friend Claude at bedside. Claude shared stories of growing up with Jayy and his struggle with health over the last several years. Claude expressed support for Jayy's significant other Jacqueline, ex-wife Ofelia, son RUFINA, and daughter David, all of whom plan to gather this afternoon with Jayy. I spoke with ex-wife Ofelia by phone, and she plans to pick RUFINA up from school and arrive to hospital at four pm. She requested that I meet them at that time to do hand impressions with Jayy and RUFINA.     Plan: I will follow for spiritual support.    Anjali Wharton  Palliative   Pager [phone]  [facility] Inpatient Team Consult pager [phone] (M-F 8-4:30)  After-hours Answering Service [phone]

## 2018-01-09 NOTE — PROGRESS NOTES
Patient has increased needs for pressors this morning to keep MAP>60.  At approx 1100, patient on max doses of Dopamine, Vasopressin and Levophed.  Updates given to SOEdith and ex-wife, Ofelia.  At approx 1130, no heart rhythm on monitor, no pulse present.  MD notified.  Dr. Petty here and declared time of death at 11:35.  Anjali Porter, here and made hand imprints for patient's kids. Patients two friends that were present during time of death. Edith here shortly after and support given to her.

## 2018-01-09 NOTE — PROVIDER NOTIFICATION
"D/I: Concern over possible RV waveform on PA cath. Cath still at 65 cm mer and locked. No ectopy noted (two PVC's in last six hours).  PA-systolic in upper 90's as before but diastolic in teens and previously 40's. CXR done showing \"Stable Phoenix-Marie catheter tip with similar position to prior over the pulmonary outflow track\" per preliminary read.  Informed Cards crosscover of results. Patient placed on left side.   P: Monitor for waveform changes or increase in ectopy. Will pull back line if needed.   "

## 2018-01-09 NOTE — PROGRESS NOTES
CRRT STATUS NOTE    DATA:  Time:  6:13 PM  Pressures WNL:  YES  Filter Status:  WDL    Problems Reported/Alarms Noted:  None    Supplies Present:  YES    ASSESSMENT:  Patient Net Fluid Balance:  +155  Vital Signs:  , /65 (77), RR 20, O2 100  Labs:  K 3.6, Mg 2.1, Phos 3.0, Cr 1.43  Goals of Therapy:  0-50cc/hr net negative as BP's tolerate, ok to increase pressors to achieve    INTERVENTIONS:   None    PLAN:  Continue to monitor patient status; notify physician of changes. Plan to transition to comfort cares tomorrow.

## 2018-01-09 NOTE — PROGRESS NOTES
Social Work Services Progress Note    Hospital Day: 9  Date of Initial Social Work Evaluation:  NA   Collaborated with:  Pt's ex-wife Ofelia, bedside RN, resident, chart review     Data:  Received update from bedside RN that pt's ex-wife was present and had contact info for pt's extended family, as pt did had no decision-maker. Met with ex-wife, Ofelia. She expressed frustration that pt's SO Jacqueline wasn't keeping her and her son (who is pt's son) up to date with information and that she could've easily asked Ofelia for contact info for pt's family. She also expressed frustration that Tocesiatonia hadn't told her about completing hand molds with pt and wanted the opportunity for her son to be there, as well. Provided emotional support - supportive listening, validation, encouragement, and anticipatory guidance. Obtained contact info for two of pt's cousins (Brian and Kaushik) and put into emergency contacts in chart. Ofelia would like to be kept informed of plan for pt so that her child can be involved in end of life memory-making.     Spoke with resident MD and updated on family contacts.     Intervention:  Supportive visit, obtained family contact info     Assessment:  Ofelia was frustrated by relationship with pt's SO and feeling left out of communication. She was pleasant and receptive to SW and amenable to providing contact info for family.     Plan:    Anticipated Disposition:  Pt is EOL, anticipate hospital death.     Barriers to d/c plan:  Medical stability     Follow Up:  SW will continue to follow, support and assist with ongoing social service and discharge planning needs.     HUE Mcdonnell, [facility]  ICU Float   Pager: [phone]  [email or web address]     NO LETTER

## 2018-01-09 NOTE — PLAN OF CARE
Problem: Patient Care Overview  Goal: Plan of Care/Patient Progress Review  Outcome: No Change  Palliative care was here today to talk with the patient's significant other about the plan of care. The significant other may transition the patient to comfort care tomorrow. The patient's daughter will be here at the bedside tomorrow and palliative care will assist with making a hand print for the patient's daughter. CRRT continues and patient has been tolerating some fluid removal with titration of Levophed. Levophed has been off and on, and when it is on, it is at 0.03 meq/kg/min. We will continue with the current plan of care until further orders a replaced. Patient's significant other has been at the bedside most of the day and aware of the current plan of care. Refer to flow sheets for further details.

## 2018-01-09 NOTE — PLAN OF CARE
Problem: Patient Care Overview  Goal: Plan of Care/Patient Progress Review  Outcome: Declining  D/I: See note regarding PA cath.  Waveform unchanged on PA cath, minimal to no ectopy. Pa cath length unchanged at 65 cm. Difficult to get consistent SPO2 readings on patient. Still requiring preoxygenation prior to activity. Drops BP and SPO2 with any movement.  Slowly increasing pressor requirements (Levo now at 0.06 mcg/kg/min, vaso unchanged at 3 units/hour, dopamine straight rate). CVP mid 20's PA pressures 90's/teens and decreasing this morning to 80's/teens. Fentanyl 25 mcg/hr and versed 1mg/hr for pain with intermittent doses of versed for stacking on vent and tachypnea. CRRT - 261 overnight. No BM.  OG returns look tan and may possibly have some tube feeding in them. Patient minimally responsive to stimulus. SO Anncy here along with friends of patient, ex-wife and patient's son. Nancy asking good questions and appears to have good grasp of situation.   P: Transition to comfort cares today. Initial call to life source made, see death paperwork in chart.

## 2018-01-09 NOTE — PROGRESS NOTES
[facility], [place]    Palliative Care Progress Note    Patient: Jayy Gill  Date of Admission:  2018    Recommendations:  -increase fentanyl to  mcg/hr  -continue versed drip    Assessment  Jayy Gill is a 47 year old male with chronic HTN with resultant ESRD (on HD) severe pulmonary hypertension (thought to be PAH) complicated by RV failure, cirrhosis with recurrent ascites (thought to be related to CHF) who presented with altered mental status, found to have septic shock, likely from SBP and VAP.  Palliative care consulted for assistance with goals of care.     Symptoms: patient unable to communicate, appears comfortable.    Advance Care Planning:        Decision making capacity: No       Code Status:  DNR    Addendum: was updated by team that patient  at 11:35.  was updated.    These recommendations have been discussed with primary team.    CAROLYN Molina CNS  Palliative Care Consult Team  Pager: [phone]    [facility] Inpatient Team Consult pager [phone] (M-F 8-4:30)  After-hours Answering Service [phone]   Palliative Clinic: [phone]       Interval History:   Has increased need for pressors, rapidly declining when I saw him.       Medications:   I have reviewed this patient's medication profile and medications during this hospitalization        Review of Systems:   Palliative Symptom Review (0=no symptom/no concern, 1=mild, 2=moderate, 3=severe):      Unable to obtain, patient unable to communicate          Physical Exam:     Vital Signs: Temp: 98  F (36.7  C) Temp src: Axillary BP: (!) 86/62   Heart Rate: 60 Resp: 21 SpO2: (!) 78 % O2 Device: Mechanical Ventilator    Weight: 149 lbs 11.08 oz    Physical Exam:  Constitutional: Awake, alert, cooperative, no apparent distress  ENT: yellowish drainage from mouth  Lungs: No increased work of breathing  Neurologic: patient is sedated  Neuropsychiatric: unable to obtain      Data Reviewed:      ROUTINE ICU LABS (Last four results)  CMP  Recent Labs  Lab 01/09/18  0328 01/08/18 2122 01/08/18  1548 01/08/18  1014    138 136 137   POTASSIUM 3.7 3.6 3.6 3.6   CHLORIDE 106 106 105 105   CO2 21 21 20 21   ANIONGAP 11 12 12 11   * 142* 148* 150*   BUN 25 26 25 24   CR 1.51* 1.43* 1.43* 1.38*   GFRESTIMATED 50* 53* 53* 55*   GFRESTBLACK 60* 64 64 67   JAZMINE 8.7 8.9 8.8 8.8   MAG 2.1 2.0 2.1 2.2   PHOS 3.2 3.1 3.0 3.0   PROTTOTAL 6.8 7.0 7.1 6.9   ALBUMIN 1.9* 1.9* 1.9* 1.9*   BILITOTAL 4.5* 4.4* 4.5* 4.0*   ALKPHOS 62 68 68 58   AST 27 30 34 34   ALT 34 36 37 38     CBC  Recent Labs  Lab 01/09/18 0328 01/08/18 2122 01/08/18  1548 01/08/18  1014   WBC 14.1* 13.6* 13.6* 13.7*   RBC 3.40* 3.51* 3.46* 3.37*   HGB 10.3* 10.7* 10.4* 10.2*   HCT 32.4* 33.3* 32.8* 32.1*   MCV 95 95 95 95   MCH 30.3 30.5 30.1 30.3   MCHC 31.8 32.1 31.7 31.8   RDW 18.5* 18.6* 18.1* 18.0*   * 106* 103* 93*     INR  Recent Labs  Lab 01/06/18 0334 01/05/18  0327   INR 1.61* 1.55*     Arterial Blood Gas  Recent Labs  Lab 01/09/18 0335 01/08/18 2122 01/08/18 1548 01/08/18  1547 01/08/18  1114  01/08/18  0355  01/07/18  0945   PH  --   --  7.33*  --  7.32*  --  7.30*  --  7.28*   PCO2  --   --  38  --  39  --  41  --  44   PO2  --   --  72*  --  77*  --  96  --  67*   HCO3  --   --  20*  --  20*  --  20*  --  20*   O2PER 60 60% 60% 60% 60  < > 80  80  < > 70.0  70.0   < > = values in this interval not displayed.    CAROLYN Molina CNS  Palliative Care Consult Team  Pager: [phone]    [facility] Inpatient Team Consult pager [phone] (M-F 8-4:30)  After-hours Answering Service [phone]  Palliative Clinic: [phone]     Total time spent was 15 minutes,  >50% of time was spent counseling and/or coordination of care regarding goals of care.

## 2018-01-09 NOTE — PROGRESS NOTES
CRRT STATUS NOTE    DATA:  Time:  5:29 AM  Pressures WNL:  NO  Filter Status:  Clotting    Problems Reported/Alarms Noted:  None    Supplies Present:  YES    ASSESSMENT:  Patient Net Fluid Balance:  -234 ml @0500  Vital Signs:  , BP 85/46, MAP 58  Labs:  K 3.7, Mg 2.1, Phos 3.2, iCa 5.2, Crt 1.51  Goals of Therapy:  Net 0-50 ml / hr as BP tolerates. OK to increase pressors to pull.    INTERVENTIONS:   Restart will be done prior to next set of bags.     PLAN:  Continue to monitor circuit daily and change set q72 hours and PRN for clogging/clotting. Please call CRRT RN with any questions/problems.

## 2018-01-09 NOTE — DISCHARGE SUMMARY
[facility], [place]    Cardiology Discharge Summary- Cards 2 Service    Date of Admission:  1/1/2018  Date of Discharge:  1/9/2018  Discharging Attending Provider: Milly Titus MD  Discharge Team: Cards 2    Discharge Diagnoses   Septic shock  Ventilator-associated pneumonia with pseudomonas and MRSA  Acute hypoxic respiratory failure  Severe pulmonary hypertension  Cirrhosis complicated by spontaneous bacterial peritonitis  End-stage renal disease on dialysis      Hospital Course   Jayy Gill was admitted on 1/1/2018 for septic shock.  The following problems were addressed during his hospitalization:    # Septic shock  # Acute hypoxic respiratory failure  # Ventilator-associated pneumonia with pseudomonas and MRSA  # Severe PAH  # Cirrhosis complicated by SBP  # ESRD on dialysis  Patient presented with septic shock and acute hypoxic respiratory failure requiring intubation and multiple pressors.  Initially treated with broad-spectrum antibiotics and found to have spontaneous bacterial peritonitis.  Blood cultures remained negative throughout admission but patient was additionally found to have both MRSA and pseudomonas in sputum cultures.  The patient had expressed a desire to be DNR with only short-term intubation on admission.  His next of kin, ex-wife and significant other were contacted regarding his declining status and the plan had been to make the patient comfort cares later today after family had a chance to say goodbye.  However, this AM the patient's blood pressure could not be maintained despite maximal therapy on 3 pressors and he passed away.  Family arrived and declined autopsy.  Please see death note for exam.    Consultations This Hospital Stay   PHARMACY TO DOSE VANCO  PHYSICAL THERAPY ADULT IP CONSULT  OCCUPATIONAL THERAPY ADULT IP CONSULT  NEPHROLOGY ESRD ADULT IP CONSULT  CARDIOLOGY GENERAL ADULT IP CONSULT  PHARMACY TO DOSE VANCO  PHARMACY IP  CONSULT  PHARMACY IP CONSULT  WOUND OSTOMY CONTINENCE NURSE  IP CONSULT  NUTRITION SERVICES ADULT IP CONSULT  INFECTIOUS DISEASE GENERAL ADULT IP CONSULT  NUTRITION SERVICES ADULT IP CONSULT  NUTRITION SERVICES ADULT IP CONSULT  PHARMACY IP CONSULT  PHARMACY TO DOSE VANCO  VASCULAR SURGERY ADULT IP CONSULT  PLASTIC SURGERY IP CONSULT  VASCULAR SURGERY ADULT IP CONSULT  WOUND OSTOMY CONTINENCE NURSE  IP CONSULT  PHARMACY TO DOSE VANCO  SPIRITUAL HEALTH SERVICES IP CONSULT  PALLIATIVE CARE ADULT IP CONSULT     Code Status   DNR       The patient was discussed with Dr. Titus.    [facility]  Pager: [phone]    Discharge Disposition   Patient  during this admission  Condition at discharge:

## 2018-01-09 NOTE — PROGRESS NOTES
"SPIRITUAL HEALTH SERVICES  SPIRITUAL ASSESSMENT Progress Note (Palliative Focus)  [facility] ([place]) 4A    REFERRAL SOURCE: Palliative consult service follow up visit/RN referral.    Patient Jayy Gill  at 11:35am with friends at bedside. Friend Issac endorsed making hand impressions for children TJ and Temeah as they would not be coming to hospital. Significant other Jacqueline arrived, followed by ex-wife Ofelia. Family and friends expressing both grief and gratitude that Jayy is no longer \"suffering\". Jacqueline said, \"He walked right in to the Kingdom,\" drawing on her Caodaism rossy in meaning-making. Family talking about  arrangements; Jayy wished his body to be buried rather than cremation, if possible. Appreciative of spiritual/grief support and the ritual of making hand impressions.    Plan: No follow up needed unless requested. Should family have other needs, I am available for spiritual/grief support.    Anjali Wharton  Palliative   Pager [phone]  [facility] Inpatient Team Consult pager [phone] (M-F 8-4:30)  After-hours Answering Service [phone]   "

## 2018-01-09 NOTE — PROGRESS NOTES
MD DEATH PRONOUNCEMENT     Physical Exam: Unresponsive to noxious stimuli, no spontaneous respirations. Breath and heart sounds absent, no pupillary light reflex or corneal reflex. No pulses present.      Patient was pronounced dead at: 2018  Time of death: 11:35 AM    Active Problems:   Past Medical History:   Diagnosis Date     Cardiomyopathy (H)      Dialysis patient (H)     M-W-F     Diarrhea     C diff     ESRD (end stage renal disease) (H)      HLD (hyperlipidemia)      Hypertension      Pulmonary hypertension 2013       Please consider an autopsy if any of the following exist:   NO  Unexpected or unexplained death during or following any dental, medical, or surgical diagnostic treatment procedures.    NO  Death of mother at or up to seven days after delivery.    NO  All  and pediatric deaths.    NO  Death where the cause is sufficiently obscure to delay completion of the death certificate.    NO  Deaths in which autopsy would confirm a suspected illness/condition that would affect surviving family members or recipients of transplanted organs.      The following deaths must be reported to the 's Office:   NO  A death that may be due entirely or in part to any factors other than natural disease (recent surgery, recent trauma, suspected abuse/neglect).    NO  A death that may be an accident, suicide, or homicide.    NO  Any sudden, unexpected death in which there is no prior history of significant heart disease or any other condition associated with sudden death.    NO  Any death which is apparently due to natural causes but in which the  does not have a personal physician familiar with the patient s medical history, social, or environmental situation or the circumstances of the terminal event.    NO  Any death apparently due to Sudden Infant Death Syndrome.    NO  Deaths that occur during, in association with, or as consequences of a diagnostic, therapeutic, or anesthetic  procedure.    NO  Any death in which a fracture of a major bone has occurred within the past (6) six months.    NO  Any death in which the  was an inmate of a public institution or was in the custody of Law Enforcement personnel.      Disposition: Autopsy was discussed with family member/POA.  Autopsy was declined.    Ganga CANTRELL PGY-2  [phone]

## 2018-01-09 NOTE — PROGRESS NOTES
[facility] RESTART NOTE    Reason for Restart:  Filter clotting/clogging  Error Code:  None    Patient s Vascular Access: Catheter   Right Femoral       Placement Confirmed:  YES  Manufacture:  [employer]  Model:  Zafar  Length/[place] Size:  20 cm / 12 Fr  Flush Volume:  A 1.4 ml / V 1.4 ml    DATA:  Procedure:  CVVHDF  Start Time:  0635  Machine#:  6  Filter:  M150  Blood Flow:   180 mL/min  Pre-Replacement Solution:  PrismaSol 2/3.5  Post-Replacement Solution:  PrismaSol 2/3.5  Dialysate Solution:  Phoxillum 4/2.5  Pre-Replacement Solution Rate:  800mL/hr  Post-Replacement Solution Rate:  200mL/hr  Dialysate Flow Rate:  800mL/hr  Patient Removal Rate:  0 mL/hr  Anticoagulation Type and Rate:  0    ASSESSMENT:  How Patient Tolerated Restart:  well  Vital Signs:  , BP 97/52, MAP 65  Initial Pressures:  Access:  -28  Filter:  109  Return:  64  TMP:  40  Change in Filter Pressure:  15    INTERVENTIONS:  Restarted per MD's orders. Lines in normal position and blood warmer on.     PLAN:  Continue to monitor circuit daily and change set q72 hours and PRN for clogging/clotting. Please call CRRT RN with any questions/problems.

## 2018-01-10 LAB
BACTERIA SPEC CULT: NO GROWTH
BACTERIA SPEC CULT: NO GROWTH
SPECIMEN SOURCE: NORMAL
SPECIMEN SOURCE: NORMAL

## 2018-03-06 NOTE — ADDENDUM NOTE
Encounter addended by: SpeakerRobby on: 3/6/2018 10:59 AM<BR>     Actions taken: Sign clinical note, Episode resolved

## 2018-03-06 NOTE — PROGRESS NOTES
Respiratory Therapy Services Discharge Summary    Reason for discharge:    Patient is     Progress towards goals:  Goals partially met    Recommendation(s):    NA

## (undated) RX ORDER — LIDOCAINE HYDROCHLORIDE 10 MG/ML
INJECTION, SOLUTION INFILTRATION; PERINEURAL
Status: DISPENSED
Start: 2017-01-01

## (undated) RX ORDER — HEPARIN SODIUM 1000 [USP'U]/ML
INJECTION, SOLUTION INTRAVENOUS; SUBCUTANEOUS
Status: DISPENSED
Start: 2017-01-01

## (undated) RX ORDER — ALBUMIN (HUMAN) 12.5 G/50ML
SOLUTION INTRAVENOUS
Status: DISPENSED
Start: 2017-01-01

## (undated) RX ORDER — FENTANYL CITRATE 50 UG/ML
INJECTION, SOLUTION INTRAMUSCULAR; INTRAVENOUS
Status: DISPENSED
Start: 2017-01-01

## (undated) RX ORDER — SODIUM CHLORIDE 9 MG/ML
INJECTION, SOLUTION INTRAVENOUS
Status: DISPENSED
Start: 2017-01-01